# Patient Record
Sex: FEMALE | Race: WHITE | NOT HISPANIC OR LATINO | Employment: OTHER | URBAN - METROPOLITAN AREA
[De-identification: names, ages, dates, MRNs, and addresses within clinical notes are randomized per-mention and may not be internally consistent; named-entity substitution may affect disease eponyms.]

---

## 2017-01-12 ENCOUNTER — OFFICE VISIT (OUTPATIENT)
Dept: BARIATRICS/WEIGHT MGMT | Facility: CLINIC | Age: 57
End: 2017-01-12

## 2017-01-12 VITALS
WEIGHT: 258.5 LBS | BODY MASS INDEX: 45.8 KG/M2 | DIASTOLIC BLOOD PRESSURE: 78 MMHG | TEMPERATURE: 99 F | OXYGEN SATURATION: 98 % | RESPIRATION RATE: 18 BRPM | HEIGHT: 63 IN | HEART RATE: 84 BPM | SYSTOLIC BLOOD PRESSURE: 133 MMHG

## 2017-01-12 DIAGNOSIS — Z98.84 STATUS POST BARIATRIC SURGERY: Primary | ICD-10-CM

## 2017-01-12 DIAGNOSIS — E66.01 MORBID OBESITY DUE TO EXCESS CALORIES (HCC): ICD-10-CM

## 2017-01-12 DIAGNOSIS — R13.10 DYSPHAGIA, UNSPECIFIED TYPE: ICD-10-CM

## 2017-01-12 PROCEDURE — 99024 POSTOP FOLLOW-UP VISIT: CPT | Performed by: PHYSICIAN ASSISTANT

## 2017-01-12 NOTE — PROGRESS NOTES
[facility] BARIATRIC SURGERY  [address]  [facility] [address]  [phone]    Francoise Kamara.  1960    Day Of Visit: 1/12/17  Reason for Visit:  Band removal Follow Up    HPI:    56 y.o. year old female s/p LAGB APS w/HHR 11/2008 by GDW @[facility] followed by AGBR removal by Dr. Urias  on 12/21/16 for chronic dysphagia and port pain. Doing well now. No further issues w/ dysphagia. Tolerating PO w/out issue. Denies fever, nausea, vomiting and abdominal pain. Bowels are moving. Voiding well. No other issues/concerns.      Past Medical History   Diagnosis Date   • Fluid retention in legs    • GERD (gastroesophageal reflux disease)    • Hypertension    • Urinary frequency    • Wears glasses      Past Surgical History   Procedure Laterality Date   • Laparoscopic gastric banding     • Cholecystectomy  2015   • Colonoscopy  2014   • [place] tooth extraction     • Hiatal hernia repair     • Gastric banding removal N/A 12/21/2016     Procedure: GASTRIC BANDING REMOVAL LAPAROSCOPIC;  Surgeon: Guille Urias MD;  Location: [facility];  Service:        Current Outpatient Prescriptions:   •  estrogen, conjugated,-medroxyprogesterone (PREMPRO) 0.45-1.5 MG per tablet, Take 1 tablet by mouth Daily., Disp: , Rfl:   •  hydrochlorothiazide (HYDRODIURIL) 12.5 MG tablet, Take 12.5 mg by mouth Daily., Disp: , Rfl:   •  HYDROcodone-acetaminophen (NORCO) 7.5-325 MG per tablet, Take 1-2 tablets by mouth Every 4 (Four) Hours As Needed for moderate pain (4-6) (Pain)., Disp: 20 tablet, Rfl: 0  •  LISINOPRIL PO, Take 40 mg by mouth Daily., Disp: , Rfl:   •  pantoprazole (PROTONIX) 40 MG EC tablet, Take 40 mg by mouth Daily., Disp: , Rfl:   •  tolterodine LA (DETROL LA) 4 MG 24 hr capsule, Take 4 mg by mouth Daily., Disp: , Rfl:   Allergies   Allergen Reactions   • Penicillins Swelling     Social History     Social History   • Marital status:      Spouse name: N/A   • Number of children: N/A   •  "Years of education: N/A     Occupational History   • Not on file.     Social History Main Topics   • Smoking status: Never Smoker   • Smokeless tobacco: Never Used   • Alcohol use No   • Drug use: No   • Sexual activity: Yes     Partners: Male      Comment: spouse     Other Topics Concern   • Not on file     Social History Narrative     w/2 children. Lives in [place]. 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      Visit Vitals   • /78 (BP Location: Left arm, Patient Position: Sitting, Cuff Size: Large Adult)   • Pulse 84   • Temp 99 °F (37.2 °C) (Temporal Artery )   • Resp 18   • Ht 63\" (160 cm)   • Wt 258 lb 8 oz (117 kg)   • SpO2 98%   • BMI 45.79 kg/m2     General Appearance:  Well nourished.  In no acute distress.  Patient was observed to be obese.  Oral Cavity:   Buccal Mucosa: The buccal mucosa was moist.  Lungs:  Normal breath sounds/voice sounds.  Cardiovascular:   Heart Rate And Rhythm: Heart rate and rhythm normal.   Edema: No calf tenderness.  Abdomen:  Abdomen: incisions healing well.   Auscultation: The bowel sounds were normal.   Palpation: No mass was palpated in the abdomen, Soft, nontender, and nondistended.   Hernia: No hernia was discovered.  Musculoskeletal System:   General/bilateral: No edema present in extremities.  Normal movement of all extremities.  Neurological:  Was alert and oriented.   Gait And Stance: Gait and stance were normal.  Psychiatric:   Attitude: The attitude was cooperative.   Mood: Mood pleasant.  Skin:  The complexion was normal.  The skin moisture was normal and the skin temperature was normal.    Assessment:   2 weeks s/p AGBR;  the patient is doing well.   Dysphagia-resolved  Patient Active Problem List   Diagnosis   • Dysphagia   • Abdominal pain   • Urinary frequency   • Stress incontinence   • Hypertension   • GERD (gastroesophageal reflux disease)       Plan:   Call w/issues and concerns  Follow up 3 months for sleeve consult.  RTC " sooner with problems.    BRANDON Perla

## 2017-01-12 NOTE — MR AVS SNAPSHOT
Francoise Kamara   2017 11:30 AM   Office Visit    Dept Phone:  [phone]   Encounter #:  [id number]    Provider:  BRANDON Perla   Department:  [facility] BARIATRIC SURGERY                Your Full Care Plan              Your Updated Medication List          This list is accurate as of: 17 11:38 AM.  Always use your most recent med list.                estrogen (conjugated)-medroxyprogesterone 0.45-1.5 MG per tablet   Commonly known as:  PREMPRO       hydrochlorothiazide 12.5 MG tablet   Commonly known as:  HYDRODIURIL       HYDROcodone-acetaminophen 7.5-325 MG per tablet   Commonly known as:  NORCO   Take 1-2 tablets by mouth Every 4 (Four) Hours As Needed for moderate pain (4-6) (Pain).       LISINOPRIL PO       pantoprazole 40 MG EC tablet   Commonly known as:  PROTONIX       tolterodine LA 4 MG 24 hr capsule   Commonly known as:  DETROL LA               You Were Diagnosed With        Codes Comments    Status post bariatric surgery    -  Primary ICD-10-CM: Z98.84  ICD-9-CM: V45.86     Morbid obesity due to excess calories     ICD-10-CM: E66.01  ICD-9-CM: 278.01     Dysphagia, unspecified type     ICD-10-CM: R13.10  ICD-9-CM: 787.20       Instructions     None    Patient Instructions History      Upcoming Appointments     Visit Type Date Time Department    POST-OP 2017 11:30 AM [facility] BARIATRIC SURG BERTR[facility]      [employer] Signup     [facility] [employer] Elf allows you to send messages to your doctor, view your test results, renew your prescriptions, schedule appointments, and more. To sign up, go to [employer] and click on the Sign Up Now link in the New User? box. Enter your [employer] Activation Code exactly as it appears below along with the last four digits of your Social Security Number and your Date of Birth () to complete the sign-up process. If you do not sign up before the expiration date, you must request a new code.    [employer]end [employer] Elf  "Activation Code: [email or web address]  Expires: 1/26/2017 11:38 AM    If you have questions, you can email [email or web address] or call [phone] to talk to our MyChart staff. Remember, [employer] is NOT to be used for urgent needs. For medical emergencies, dial 911.               Other Info from Your Visit           Allergies     Penicillins  Swelling      Vital Signs     Blood Pressure Pulse Temperature Respirations Height    133/78 (BP Location: Left arm, Patient Position: Sitting, Cuff Size: Large Adult) 84 99 °F (37.2 °C) (Temporal Artery ) 18 63\" (160 cm)    Weight Oxygen Saturation Body Mass Index Smoking Status       258 lb 8 oz (117 kg) 98% 45.79 kg/m2 Never Smoker       Problems and Diagnoses Noted     Difficulty swallowing    Status following surgery for weight loss    -  Primary    Severe obesity            "

## 2017-01-16 RX ORDER — ERGOCALCIFEROL 1.25 MG/1
50000 CAPSULE ORAL WEEKLY
COMMUNITY
Start: 2016-12-22

## 2017-02-07 ENCOUNTER — DOCUMENTATION (OUTPATIENT)
Dept: BARIATRICS/WEIGHT MGMT | Facility: CLINIC | Age: 57
End: 2017-02-07

## 2017-02-07 DIAGNOSIS — R53.83 FATIGUE, UNSPECIFIED TYPE: Primary | ICD-10-CM

## 2017-02-07 DIAGNOSIS — R06.00 DYSPNEA, UNSPECIFIED TYPE: ICD-10-CM

## 2017-02-07 DIAGNOSIS — R10.13 DYSPEPSIA: ICD-10-CM

## 2017-03-27 ENCOUNTER — OFFICE VISIT (OUTPATIENT)
Dept: OBSTETRICS AND GYNECOLOGY | Facility: CLINIC | Age: 57
End: 2017-03-27

## 2017-03-27 VITALS
BODY MASS INDEX: 46.71 KG/M2 | DIASTOLIC BLOOD PRESSURE: 78 MMHG | SYSTOLIC BLOOD PRESSURE: 120 MMHG | HEIGHT: 63 IN | WEIGHT: 263.6 LBS

## 2017-03-27 DIAGNOSIS — Z79.890 POST-MENOPAUSE ON HRT (HORMONE REPLACEMENT THERAPY): Primary | ICD-10-CM

## 2017-03-27 DIAGNOSIS — Z01.419 ENCOUNTER FOR GYNECOLOGICAL EXAMINATION WITHOUT ABNORMAL FINDING: ICD-10-CM

## 2017-03-27 PROCEDURE — 99396 PREV VISIT EST AGE 40-64: CPT | Performed by: OBSTETRICS & GYNECOLOGY

## 2017-03-27 RX ORDER — METRONIDAZOLE 7.5 MG/G
1 GEL TOPICAL DAILY
COMMUNITY
Start: 2017-03-21 | End: 2017-10-03

## 2017-03-27 RX ORDER — DOXYCYCLINE HYCLATE 100 MG/1
1 CAPSULE ORAL DAILY
COMMUNITY
Start: 2017-03-21 | End: 2017-10-03 | Stop reason: DRUGHIGH

## 2017-03-27 RX ORDER — VALACYCLOVIR HYDROCHLORIDE 1 G/1
1 TABLET, FILM COATED ORAL AS NEEDED
COMMUNITY
Start: 2016-12-23 | End: 2018-03-28

## 2017-03-27 NOTE — PROGRESS NOTES
"   Chief Complaint   Patient presents with   • Gynecologic Exam       Francoise Kamara is a 56 y.o. year old  presenting to be seen for her annual exam.  This patient is menopausal and receives a prescription for Prempro, 0.45/1.5 mg daily from her PCP.  She denies menopausal symptoms.  She denies bowel or urinary symptoms.    SCREENING TESTS    Year 2012   Age                         PAP   Neg.                      HPV high risk                         Mammogram    benign benign                    MIKAYLA score                         Breast MRI                         Lipids                         Vitamin D                         Colonoscopy                         DEXA  Frax (hip/any)                         Ovarian Screen                           Enter the month test was performed.  If month not known, enter \"X'  · Black numbers = normal results  · Red numbers = abnormal results  · Black X = patient reported normal  · Red X - patient reported abnormal      Referred by:    Profession:    Other info:         History   Sexual Activity   • Sexual activity: Yes   • Partners: Male     Comment: spouse    She would not like to be screened for STD's at today's exam.     She exercises regularly: no.  She wears her seat belt: yes.  She has concerns about domestic violence: no.  She has noticed changes in height: no    GYN screening history:  · Last mammogram: was done on approximately 2016 and the result was: Birads I (Normal)..    No Additional Complaints Reported    The following portions of the patient's history were reviewed and updated as appropriate:vital signs and   She  does not have any pertinent problems on file.  She  has a past surgical history that includes Laparoscopic gastric banding; Cholecystectomy (); Colonoscopy (); [place] tooth extraction; Hiatal hernia repair; Gastric Banding Removal " "(N/A, 12/21/2016); and Esophagogastroduodenoscopy (2016).  Her family history includes Cancer in her father; Hypertension in her father, maternal grandfather, mother, and paternal grandmother; Sleep apnea in her mother; Stroke in her maternal grandfather.  She  reports that she has never smoked. She has never used smokeless tobacco. She reports that she does not drink alcohol or use illicit drugs.  Current Outpatient Prescriptions   Medication Sig Dispense Refill   • doxycycline (VIBRAMYCIN) 100 MG capsule Take 1 capsule by mouth Daily.     • estrogen, conjugated,-medroxyprogesterone (PREMPRO) 0.45-1.5 MG per tablet Take 1 tablet by mouth Daily.     • hydrochlorothiazide (HYDRODIURIL) 12.5 MG tablet Take 12.5 mg by mouth Daily.     • LISINOPRIL PO Take 40 mg by mouth Daily.     • metroNIDAZOLE (METROGEL) 0.75 % gel Apply 1 application topically Daily.     • pantoprazole (PROTONIX) 40 MG EC tablet Take 40 mg by mouth Daily.     • tolterodine LA (DETROL LA) 4 MG 24 hr capsule Take 4 mg by mouth Daily.     • valACYclovir (VALTREX) 1000 MG tablet Take 1 tablet by mouth As Needed.     • vitamin D (ERGOCALCIFEROL) 97281 UNITS capsule capsule        No current facility-administered medications for this visit.      She is allergic to penicillins..    Review of Systems  A comprehensive review of systems was negative.  Constitutional: negative for fever, chills, activity change, appetite change, fatigue and unexpected weight change.  Respiratory: negative  Cardiovascular: negative  Gastrointestinal: negative  Genitourinary:negative  Musculoskeletal:negative  Behavioral/Psych: negative       /78  Ht 63\" (160 cm)  Wt 263 lb 9.6 oz (120 kg)  BMI 46.69 kg/m2    Physical Exam    General:  obese - Body mass index is 46.69 kg/(m^2).   Skin:  No suspicious lesions seen   Thyroid: normal to inspection and palpation   Lungs:  clear to auscultation bilaterally   Heart:  regular rate and rhythm, S1, S2 normal, no murmur, click, " rub or gallop   Breasts:  Examined in supine position  Symmetric without masses or skin dimpling  Nipples normal without inversion, lesions or discharge  There are no palpable axillary nodes   Abdomen: soft, non-tender; no masses  no umbilical or inginual hernias are present  no hepato-splenomegaly   Pelvis: Clinical staff was present for exam  External genitalia:  normal appearance of the external genitalia including Bartholin's and [place]'s glands.  Vaginal:  atrophic mucosal changes are present;  Cervix:  normal appearance.  Uterus:  normal size, shape and consistency. anteverted;  Adnexa:  non palpable bilaterally.  Rectal:  anus visually normal appearing. recto-vaginal exam unremarkable and confirms findings;     Lab Review   No data reviewed    Imaging  Mammogram results- benign         ASSESSMENT  Problems Addressed this Visit        Genitourinary    Post-menopause on HRT (hormone replacement therapy) - Primary      Other Visit Diagnoses     Encounter for gynecological examination without abnormal finding              PLAN    Medications prescribed this encounter:    New Medications Ordered This Visit   Medications   • doxycycline (VIBRAMYCIN) 100 MG capsule     Sig: Take 1 capsule by mouth Daily.   • metroNIDAZOLE (METROGEL) 0.75 % gel     Sig: Apply 1 application topically Daily.   • valACYclovir (VALTREX) 1000 MG tablet     Sig: Take 1 tablet by mouth As Needed.   · Pap test done  · Calcium, 600 mg/ Vit. D, 400 IU daily; regular weight-bearing exercise  · Follow up: 12 month(s)  *Please note that portions of this documentation may have been completed with a voice recognition program.  Efforts were made to edit this dictation, but occasional words may have been mistranscribed.       This note was electronically signed.    POLLO Oseguera MD  March 27, 2017  2:15 PM

## 2017-03-28 ENCOUNTER — RESULTS ENCOUNTER (OUTPATIENT)
Dept: BARIATRICS/WEIGHT MGMT | Facility: CLINIC | Age: 57
End: 2017-03-28

## 2017-03-28 DIAGNOSIS — R10.13 DYSPEPSIA: ICD-10-CM

## 2017-03-28 DIAGNOSIS — R06.00 DYSPNEA, UNSPECIFIED TYPE: ICD-10-CM

## 2017-03-28 DIAGNOSIS — R53.83 FATIGUE, UNSPECIFIED TYPE: ICD-10-CM

## 2017-04-25 ENCOUNTER — TRANSCRIBE ORDERS (OUTPATIENT)
Dept: ADMINISTRATIVE | Facility: HOSPITAL | Age: 57
End: 2017-04-25

## 2017-04-25 DIAGNOSIS — Z12.31 VISIT FOR SCREENING MAMMOGRAM: Primary | ICD-10-CM

## 2017-04-26 DIAGNOSIS — R10.13 DYSPEPSIA: Primary | ICD-10-CM

## 2017-04-26 DIAGNOSIS — R06.00 DYSPNEA, UNSPECIFIED TYPE: ICD-10-CM

## 2017-04-26 DIAGNOSIS — R53.83 FATIGUE, UNSPECIFIED TYPE: ICD-10-CM

## 2017-04-26 DIAGNOSIS — R10.13 DYSPEPSIA: ICD-10-CM

## 2017-04-27 ENCOUNTER — OFFICE VISIT (OUTPATIENT)
Dept: BARIATRICS/WEIGHT MGMT | Facility: CLINIC | Age: 57
End: 2017-04-27

## 2017-04-27 ENCOUNTER — DOCUMENTATION (OUTPATIENT)
Dept: BARIATRICS/WEIGHT MGMT | Facility: HOSPITAL | Age: 57
End: 2017-04-27

## 2017-04-27 VITALS
TEMPERATURE: 98 F | BODY MASS INDEX: 45.16 KG/M2 | SYSTOLIC BLOOD PRESSURE: 124 MMHG | HEART RATE: 84 BPM | WEIGHT: 264.5 LBS | DIASTOLIC BLOOD PRESSURE: 78 MMHG | HEIGHT: 64 IN | OXYGEN SATURATION: 99 % | RESPIRATION RATE: 18 BRPM

## 2017-04-27 DIAGNOSIS — I10 ESSENTIAL HYPERTENSION: ICD-10-CM

## 2017-04-27 DIAGNOSIS — E66.01 MORBID OBESITY, UNSPECIFIED OBESITY TYPE (HCC): ICD-10-CM

## 2017-04-27 DIAGNOSIS — K21.9 GASTROESOPHAGEAL REFLUX DISEASE, ESOPHAGITIS PRESENCE NOT SPECIFIED: ICD-10-CM

## 2017-04-27 DIAGNOSIS — R53.83 FATIGUE, UNSPECIFIED TYPE: Primary | ICD-10-CM

## 2017-04-27 PROCEDURE — 99215 OFFICE O/P EST HI 40 MIN: CPT | Performed by: PHYSICIAN ASSISTANT

## 2017-04-27 NOTE — PROGRESS NOTES
[facility] BARIATRIC SURGERY  [address]  [facility] [address]  [phone]      Patient  Name:  Francoise Kamara.  :  1960      Date of Visit: 2017      Chief Complaint:  weight gain; unable to maintain weight loss    History of Present Illness:  Francoise Kamara is a 57 y.o. female who presents today for evaluation, education and consultation regarding weight loss surgery. The patient is interested in sleeve gastrectomy.     Francoise has been overweight for at least 43 years, has been 35 pounds or more overweight for at least 45 years, has been 100 pounds or more overweight for 28 or more years and started dieting at age 14.      s/p LAGB APS w/ HHR 2008 by [facility] @ [facility] s/p uncomplicated LAGBR by [facility] on 16 for band intolerance and chronic dysphagia.  Feels so much better since band removed.  No further issues w/ dysphagia/vomiting.   Only up 6 lbs.  Wishing to pursue revision.    Past Medical History:   Diagnosis Date   • Dyspnea on exertion    • Fatigue    • GERD (gastroesophageal reflux disease)     on daily Protonix, EGD 10/2016    • Hypertension    • Morbid obesity    • OAB (overactive bladder)    • Peripheral edema    • Post-menopause on HRT (hormone replacement therapy)    • Rosacea     on Doxycycline   • Stress incontinence    • Vaginal atrophy    • Vitamin D deficiency    • Wears glasses      Past Surgical History:   Procedure Laterality Date   • CHOLECYSTECTOMY      for stones w/ Dr. Barboza @ [facility]   • COLONOSCOPY     • ENDOSCOPY      by Dr. Urias   • GASTRIC BANDING REMOVAL N/A 2016    Procedure: GASTRIC BANDING REMOVAL LAPAROSCOPIC;  Surgeon: Guille Urias MD;  Location: [facility];  Service:    • LAPAROSCOPIC GASTRIC BANDING      s/p LAGB APS w/ HHR 2008 by GDW @ [facility]     Allergies   Allergen Reactions   • Penicillins Hives and Swelling     Invanz given for AGBR surgery       Current Outpatient Prescriptions:   •   doxycycline (VIBRAMYCIN) 100 MG capsule, Take 1 capsule by mouth Daily., Disp: , Rfl:   •  estrogen, conjugated,-medroxyprogesterone (PREMPRO) 0.45-1.5 MG per tablet, Take 1 tablet by mouth Daily., Disp: , Rfl:   •  hydrochlorothiazide (HYDRODIURIL) 12.5 MG tablet, Take 12.5 mg by mouth Daily., Disp: , Rfl:   •  LISINOPRIL PO, Take 40 mg by mouth Daily., Disp: , Rfl:   •  metroNIDAZOLE (METROGEL) 0.75 % gel, Apply 1 application topically Daily., Disp: , Rfl:   •  pantoprazole (PROTONIX) 40 MG EC tablet, Take 40 mg by mouth Daily., Disp: , Rfl:   •  tolterodine LA (DETROL LA) 4 MG 24 hr capsule, Take 4 mg by mouth Daily., Disp: , Rfl:   •  valACYclovir (VALTREX) 1000 MG tablet, Take 1 tablet by mouth As Needed., Disp: , Rfl:   •  vitamin D (ERGOCALCIFEROL) 51981 UNITS capsule capsule, , Disp: , Rfl:   Social History     Social History   • Marital status:      Spouse name: N/A   • Number of children: N/A   • Years of education: N/A     Occupational History   • Not on file.     Social History Main Topics   • Smoking status: Never Smoker   • Smokeless tobacco: Never Used   • Alcohol use No   • Drug use: No   • Sexual activity: Yes     Partners: Male      Comment: spouse     Other Topics Concern   • Not on file     Social History Narrative     w/2 children.  Lives in [place].  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      Family History   Problem Relation Age of Onset   • Hypertension Mother    • Sleep apnea Mother    • Hypertension Father    • Lung cancer Father    • Hypertension Maternal Grandfather    • Stroke Maternal Grandfather    • Hypertension Paternal Grandmother          Review of Systems:  Constitutional:  The patient reports fatigue, weight gain and denies fevers and chills.  Cardiovascular:  The patient reports HTN and denies heart disease and DVT.  Respiratory:  The patient denies asthma, apnea and PE.  Gastrointestinal:  The patient reports heartburn and denies  pancreatitis and liver disease.  Genitourinary:  The patient denies renal insufficiency.    Musculoskeletal:  The patient reports joint pain and denies autoimmune disease.  Neurological:  The patient denies seizure and stroke.  Psychiatric:  The patient denies anxiety, depression and bipolar disorder.  Endocrine:  The patient denies diabetes and thyroid disease.  Hematologic:  The patient denies anemia and bleeding disorder.  Skin:  The patient denies MRSA.    Physical Exam:  Vital Signs:  Weight: 264 lb 8 oz (120 kg)   Body mass index is 46.12 kg/(m^2).  Temp: 98 °F (36.7 °C)   Heart Rate: 84   BP: 124/78     Physical Exam   Constitutional: She is oriented to person, place, and time. She appears well-developed and well-nourished.   HENT:   Head: Normocephalic and atraumatic.   Eyes: Conjunctivae are normal. No scleral icterus.   Neck: Neck supple. No thyromegaly present.   Cardiovascular: Normal rate and regular rhythm.    No murmur heard.  Pulmonary/Chest: Effort normal and breath sounds normal. No respiratory distress. She has no wheezes. She has no rales.   Abdominal: Soft. Bowel sounds are normal. She exhibits no distension and no mass. There is no tenderness. No hernia.   Scars:  Lap kee, lapband, prior LLQ port   Musculoskeletal: Normal range of motion. She exhibits no edema.   Neurological: She is alert and oriented to person, place, and time. Gait normal.   Skin: Skin is warm and dry. No rash noted.   Psychiatric: She has a normal mood and affect. Judgment normal.   Vitals reviewed.         Patient Active Problem List   Diagnosis   • Urinary frequency   • Stress incontinence   • Hypertension   • GERD (gastroesophageal reflux disease)   • Vaginal atrophy   • Rosacea   • Post-menopause on HRT (hormone replacement therapy)   • OAB (overactive bladder)   • Vitamin D deficiency   • Peripheral edema   • Morbid obesity   • Fatigue   • Dyspnea on exertion       Assessment:    Francoise Kamara is a 57 y.o. year old  female with medically complicated obesity pursuing sleeve gastrectomy.    Weight loss surgery is deemed medically necessary given the following obesity related comorbidities including hypertension and GERD with current Weight: 264 lb 8 oz (120 kg) and Body mass index is 46.12 kg/(m^2)..    She is a good candidate for weight loss surgery pending further evaluation.    Plan:  The consultation plan and program requirements were reviewed with the patient.  The patient has been advised that a letter of medical support must be obtained from her primary care physician or referring provider. A psychological evaluation will be arranged.  A nutritional evaluation will be performed.  The patient was advised to start a high protein and low carbohydrate diet.  Necessary lifestyle modifications were discussed.  Instructions on how to access DANIELLE was given to the patient.  DANIELLE is an internet based educational video that explains the surgical procedure chosen and answers basic questions regarding that procedure.     Preoperative testing will include: CBC, CMP, Fasting Lipids, TSH, H.Pylori, CXR and EKG     Preop clearances required prior to surgery will include Cardiac.      Patient understands that bariatric surgery is not cosmetic surgery but rather a tool to help make a lifelong commitment to lifestyle changes including diet, exercise, behavior modifications, and healthy habits.      BRANDON Daniel

## 2017-04-28 NOTE — PROGRESS NOTES
Weight Loss Surgery  Presurgical Nutrition Assessment     Francoise Kamara  04/27/2017  [id number]  [id number]  1960  female    Surgery desired: Sleeve Gastrectomy    Vital Signs:  Weight: 264 lb 8 oz (120 kg)   Body mass index is 46.12 kg/(m^2).  Past Medical History:   Diagnosis Date   • Dyspnea on exertion    • Fatigue    • GERD (gastroesophageal reflux disease)     on daily Protonix, EGD 10/2016    • Hypertension    • Morbid obesity    • OAB (overactive bladder)    • Peripheral edema    • Post-menopause on HRT (hormone replacement therapy)    • Rosacea     on Doxycycline   • Stress incontinence    • Vaginal atrophy    • Vitamin D deficiency    • Wears glasses      Past Surgical History:   Procedure Laterality Date   • CHOLECYSTECTOMY  2015    for stones w/ Dr. Barboza @ [facility]   • COLONOSCOPY  2014   • ENDOSCOPY  2016    by Dr. Urias   • GASTRIC BANDING REMOVAL N/A 12/21/2016    Procedure: GASTRIC BANDING REMOVAL LAPAROSCOPIC;  Surgeon: Guille Urias MD;  Location: [facility];  Service:    • LAPAROSCOPIC GASTRIC BANDING  2008    s/p LAGB APS w/ HHR 11/2008 by GDW @ [facility]     Allergies   Allergen Reactions   • Penicillins Hives and Swelling     Invanz given for AGBR surgery       Current Outpatient Prescriptions:   •  doxycycline (VIBRAMYCIN) 100 MG capsule, Take 1 capsule by mouth Daily., Disp: , Rfl:   •  estrogen, conjugated,-medroxyprogesterone (PREMPRO) 0.45-1.5 MG per tablet, Take 1 tablet by mouth Daily., Disp: , Rfl:   •  hydrochlorothiazide (HYDRODIURIL) 12.5 MG tablet, Take 12.5 mg by mouth Daily., Disp: , Rfl:   •  LISINOPRIL PO, Take 40 mg by mouth Daily., Disp: , Rfl:   •  metroNIDAZOLE (METROGEL) 0.75 % gel, Apply 1 application topically Daily., Disp: , Rfl:   •  pantoprazole (PROTONIX) 40 MG EC tablet, Take 40 mg by mouth Daily., Disp: , Rfl:   •  tolterodine LA (DETROL LA) 4 MG 24 hr capsule, Take 4 mg by mouth Daily., Disp: , Rfl:   •  valACYclovir (VALTREX) 1000 MG tablet, Take 1 tablet by  "mouth As Needed., Disp: , Rfl:   •  vitamin D (ERGOCALCIFEROL) 50254 UNITS capsule capsule, , Disp: , Rfl:       Nutrition Assessment    Estimated energy needs: 2000    Estimated calories for weight loss:1500    IBW (Pounds):  164      Excess body weight (Pounds):100       Nutrition Recall  24 Hour recall: (B) (L) (D) -  Reviewed and discussed with patient       in general, an \"unhealthy\" diet      Exercise  rarely      Education    Provided manual:    Sleeve Gastrectomy    Recommend that team proceed with surgery and follow per protocol.      Nutrition Goals   Dietary Guidelines per manual  Protein goal:  grams per day   Eliminate soda    Exercise Goals  Add 15-30 minutes of activity per day as tolerated          Viky Rosado RD  04/27/2017  10:04 AM  "

## 2017-05-01 LAB
ALBUMIN SERPL-MCNC: 4 G/DL (ref 3.2–4.8)
ALBUMIN/GLOB SERPL: 1.5 G/DL (ref 1.5–2.5)
ALP SERPL-CCNC: 84 U/L (ref 25–100)
ALT SERPL-CCNC: 16 U/L (ref 7–40)
AST SERPL-CCNC: 18 U/L (ref 0–33)
BILIRUB SERPL-MCNC: 0.4 MG/DL (ref 0.3–1.2)
BUN SERPL-MCNC: 15 MG/DL (ref 9–23)
BUN/CREAT SERPL: 18.8 (ref 7–25)
CALCIUM SERPL-MCNC: 9.4 MG/DL (ref 8.7–10.4)
CHLORIDE SERPL-SCNC: 108 MMOL/L (ref 99–109)
CHOLEST SERPL-MCNC: 190 MG/DL (ref 0–200)
CO2 SERPL-SCNC: 26 MMOL/L (ref 20–31)
CREAT SERPL-MCNC: 0.8 MG/DL (ref 0.6–1.3)
ERYTHROCYTE [DISTWIDTH] IN BLOOD BY AUTOMATED COUNT: 14.2 % (ref 11.3–14.5)
GLOBULIN SER CALC-MCNC: 2.7 GM/DL
GLUCOSE SERPL-MCNC: 96 MG/DL (ref 70–100)
H PYLORI IGA SER-ACNC: <9 UNITS (ref 0–8.9)
H PYLORI IGG SER IA-ACNC: <0.9 U/ML (ref 0–0.8)
H PYLORI IGM SER-ACNC: <9 UNITS (ref 0–8.9)
HCT VFR BLD AUTO: 40.7 % (ref 34.5–44)
HDLC SERPL-MCNC: 72 MG/DL (ref 40–60)
HGB BLD-MCNC: 12.5 G/DL (ref 11.5–15.5)
LDLC SERPL CALC-MCNC: 101 MG/DL (ref 0–100)
MCH RBC QN AUTO: 28 PG (ref 27–31)
MCHC RBC AUTO-ENTMCNC: 30.7 G/DL (ref 32–36)
MCV RBC AUTO: 91.3 FL (ref 80–99)
PLATELET # BLD AUTO: 257 10*3/MM3 (ref 150–450)
POTASSIUM SERPL-SCNC: 4 MMOL/L (ref 3.5–5.5)
PROT SERPL-MCNC: 6.7 G/DL (ref 5.7–8.2)
RBC # BLD AUTO: 4.46 10*6/MM3 (ref 3.89–5.14)
SODIUM SERPL-SCNC: 141 MMOL/L (ref 132–146)
TRIGL SERPL-MCNC: 83 MG/DL (ref 0–150)
TSH SERPL DL<=0.005 MIU/L-ACNC: 2.46 MIU/ML (ref 0.35–5.35)
VLDLC SERPL CALC-MCNC: 16.6 MG/DL
WBC # BLD AUTO: 8.63 10*3/MM3 (ref 3.5–10.8)

## 2017-05-15 ENCOUNTER — HOSPITAL ENCOUNTER (OUTPATIENT)
Dept: MAMMOGRAPHY | Facility: HOSPITAL | Age: 57
Discharge: HOME | End: 2017-05-15
Attending: OBSTETRICS & GYNECOLOGY | Admitting: OBSTETRICS & GYNECOLOGY

## 2017-05-15 DIAGNOSIS — Z12.31 VISIT FOR SCREENING MAMMOGRAM: ICD-10-CM

## 2017-05-15 PROCEDURE — 77067 SCR MAMMO BI INCL CAD: CPT | Performed by: RADIOLOGY

## 2017-05-15 PROCEDURE — 77063 BREAST TOMOSYNTHESIS BI: CPT | Performed by: RADIOLOGY

## 2017-05-15 PROCEDURE — G0202 SCR MAMMO BI INCL CAD: HCPCS

## 2017-05-15 PROCEDURE — 77063 BREAST TOMOSYNTHESIS BI: CPT

## 2017-06-28 ENCOUNTER — CONSULT (OUTPATIENT)
Dept: CARDIOLOGY | Facility: CLINIC | Age: 57
End: 2017-06-28

## 2017-06-28 VITALS
DIASTOLIC BLOOD PRESSURE: 76 MMHG | HEIGHT: 63 IN | SYSTOLIC BLOOD PRESSURE: 126 MMHG | HEART RATE: 76 BPM | WEIGHT: 266 LBS | BODY MASS INDEX: 47.13 KG/M2

## 2017-06-28 DIAGNOSIS — Z01.810 PREOP CARDIOVASCULAR EXAM: Primary | ICD-10-CM

## 2017-06-28 DIAGNOSIS — E66.01 MORBID OBESITY, UNSPECIFIED OBESITY TYPE (HCC): ICD-10-CM

## 2017-06-28 DIAGNOSIS — R06.09 DOE (DYSPNEA ON EXERTION): ICD-10-CM

## 2017-06-28 DIAGNOSIS — I10 ESSENTIAL HYPERTENSION: ICD-10-CM

## 2017-06-28 PROCEDURE — 99242 OFF/OP CONSLTJ NEW/EST SF 20: CPT | Performed by: INTERNAL MEDICINE

## 2017-06-28 PROCEDURE — 93000 ELECTROCARDIOGRAM COMPLETE: CPT | Performed by: INTERNAL MEDICINE

## 2017-06-28 NOTE — PROGRESS NOTES
[facility]  Consultation H&P  Francoise Kamara  1960  [phone]    VISIT DATE:  06/28/2017    PCP: Ezekiel Ba MD  [address]  [place] [id number]    IDENTIFICATION: A 57 y.o. female from [facility]    CC:  Chief Complaint   Patient presents with   • Surgical Clearance     For Gastric sleeve.   • Hypertension       PROBLEM LIST:  1. HTN, on lisinopril, HCTZ  2. Morbid obesity  1. S/p gastric band 11/2008, then removal 12/2016 due to significant dysphagia  3. GERD  4. Overactive bladder and stress incontinence   5. Postmenopausal HRT  6. Surgical history  1. Laparoscopic gastric banding, 2008  2. Colonoscopy 2014  3. Cholecystectomy 2015  4. Endoscopy 2016  5. Gastric banding removal 2016    Allergies  Allergies   Allergen Reactions   • Penicillins Hives and Swelling     Invanz given for AGBR surgery       Current Medications    Current Outpatient Prescriptions:   •  doxycycline (VIBRAMYCIN) 100 MG capsule, Take 1 capsule by mouth Daily., Disp: , Rfl:   •  estrogen, conjugated,-medroxyprogesterone (PREMPRO) 0.45-1.5 MG per tablet, Take 1 tablet by mouth Daily., Disp: , Rfl:   •  hydrochlorothiazide (HYDRODIURIL) 12.5 MG tablet, Take 12.5 mg by mouth Daily., Disp: , Rfl:   •  LISINOPRIL PO, Take 40 mg by mouth Daily., Disp: , Rfl:   •  metroNIDAZOLE (METROGEL) 0.75 % gel, Apply 1 application topically Daily., Disp: , Rfl:   •  pantoprazole (PROTONIX) 40 MG EC tablet, Take 40 mg by mouth Daily., Disp: , Rfl:   •  tolterodine LA (DETROL LA) 4 MG 24 hr capsule, Take 4 mg by mouth Daily., Disp: , Rfl:   •  valACYclovir (VALTREX) 1000 MG tablet, Take 1 tablet by mouth As Needed., Disp: , Rfl:   •  vitamin D (ERGOCALCIFEROL) 27170 UNITS capsule capsule, 50,000 Units Every 7 (Seven) Days., Disp: , Rfl:      History of Present Illness   HPI  This is a 57-year-old  female with the above mentioned PMH who presents for consult for cardiac clearance for bariatric surgery with  the gastric sleeve procedure with Dr. Urias.  She previously had gastric banding that in 2008 and this was subsequently reversed in December 2016 due to complications with significant dysphagia.        Pt denies any chest pain,  orthopnea, PND, palpitations, lower extremity edema, or claudication. Pt denies history of CHF, DVT, PE, MI, CVA, TIA, or rheumatic fever.   She does attempt to exercise with walking at home and getting thousand steps each day.  She has noted some shortness of breath.    ROS  Review of Systems   Constitution: Negative for chills, fever, weakness, malaise/fatigue, night sweats, weight gain and weight loss.   HENT: Negative for headaches, hearing loss and nosebleeds.    Eyes: Negative for blurred vision, vision loss in left eye, vision loss in right eye, visual disturbance and visual halos.   Cardiovascular: Positive for dyspnea on exertion. Negative for chest pain, claudication, cyanosis, irregular heartbeat, leg swelling, near-syncope, orthopnea, palpitations, paroxysmal nocturnal dyspnea and syncope.   Respiratory: Negative for cough, hemoptysis, shortness of breath, snoring and wheezing.    Endocrine: Negative for cold intolerance, heat intolerance, polydipsia, polyphagia and polyuria.   Hematologic/Lymphatic: Negative for adenopathy and bleeding problem. Does not bruise/bleed easily.   Skin: Negative for dry skin, poor wound healing and rash.   Musculoskeletal: Negative for falls, joint pain, joint swelling, muscle cramps, muscle weakness, myalgias and neck pain.   Gastrointestinal: Negative for bloating, abdominal pain, change in bowel habit, bowel incontinence, constipation, diarrhea, dysphagia, excessive appetite, heartburn, hematemesis, hematochezia, jaundice, melena, nausea and vomiting.   Genitourinary: Negative for bladder incontinence, dysuria, flank pain, hematuria, hesitancy and nocturia.   Neurological: Negative for aphonia, excessive daytime sleepiness, dizziness, focal  "weakness, light-headedness, loss of balance, seizures, sensory change, tremors and vertigo.   Psychiatric/Behavioral: Negative for altered mental status, depression, memory loss, substance abuse and suicidal ideas. The patient is not nervous/anxious.    All other systems reviewed and are negative.      SOCIAL HX  Social History     Social History   • Marital status:      Spouse name: N/A   • Number of children: N/A   • Years of education: N/A     Occupational History   • Not on file.     Social History Main Topics   • Smoking status: Never Smoker   • Smokeless tobacco: Never Used   • Alcohol use No   • Drug use: No   • Sexual activity: Yes     Partners: Male      Comment: spouse     Other Topics Concern   • Not on file     Social History Narrative     w/2 children.  Lives in [place].  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        FAMILY HX  Family History   Problem Relation Age of Onset   • Hypertension Mother    • Sleep apnea Mother    • Hypertension Father    • Lung cancer Father    • Hypertension Maternal Grandfather    • Stroke Maternal Grandfather    • Hypertension Paternal Grandmother    • Breast cancer Paternal Aunt 45   • No Known Problems Brother    • BRCA 1/2 Neg Hx    • Ovarian cancer Neg Hx        Vitals:    06/28/17 0928   BP: 126/76   BP Location: Right arm   Patient Position: Sitting   Pulse: 76   Weight: 266 lb (121 kg)   Height: 63\" (160 cm)       PHYSICAL EXAMINATION:  Physical Exam   Constitutional: She is oriented to person, place, and time. She appears well-developed and well-nourished. No distress.   HENT:   Head: Normocephalic and atraumatic.   Nose: Nose normal.   Mouth/Throat: Uvula is midline, oropharynx is clear and moist and mucous membranes are normal.   Eyes: Conjunctivae and EOM are normal. Pupils are equal, round, and reactive to light. No scleral icterus.   Neck: Normal range of motion. Neck supple. No hepatojugular reflux and no JVD present. " Carotid bruit is not present. No tracheal deviation present. No thyromegaly present.   Cardiovascular: Normal rate, regular rhythm, S1 normal, S2 normal, intact distal pulses and normal pulses.  PMI is not displaced.  Exam reveals no gallop, no distant heart sounds, no friction rub, no midsystolic click and no opening snap.    No murmur heard.  Pulses:       Radial pulses are 2+ on the right side, and 2+ on the left side.        Dorsalis pedis pulses are 2+ on the right side, and 2+ on the left side.        Posterior tibial pulses are 2+ on the right side, and 2+ on the left side.   Pulmonary/Chest: Effort normal and breath sounds normal. She has no wheezes. She has no rhonchi. She has no rales.   Abdominal: Soft. Bowel sounds are normal. She exhibits no mass. There is no tenderness. There is no guarding.   Musculoskeletal: She exhibits no edema or tenderness.   Lymphadenopathy:     She has no cervical adenopathy.   Neurological: She is alert and oriented to person, place, and time.   Skin: Skin is warm, dry and intact. No rash noted. No cyanosis or erythema. Nails show no clubbing.   Psychiatric: She has a normal mood and affect. Her behavior is normal.   Nursing note and vitals reviewed.      Diagnostic Data:    ECG 12 Lead  Date/Time: 6/28/2017 10:01 AM  Performed by: PAULINE RAMOS  Authorized by: PAULINE RAMOS   Rhythm: sinus rhythm  Clinical impression: non-specific ECG          Lab Results   Component Value Date    CHLPL 190 04/27/2017    TRIG 83 04/27/2017    HDL 72 (H) 04/27/2017         LDL  101    Lab Results   Component Value Date    BUN 15 04/27/2017    CREATININE 0.80 04/27/2017     04/27/2017    K 4.0 04/27/2017     04/27/2017    CO2 26.0 04/27/2017     Lab Results   Component Value Date    HGBA1C 5.40 12/16/2016     Lab Results   Component Value Date    WBC 8.63 04/27/2017    HGB 12.5 04/27/2017    HCT 40.7 04/27/2017     04/27/2017       ASSESSMENT:   Diagnosis Plan   1. Preop  cardiovascular exam     2. Essential hypertension     3. Morbid obesity, unspecified obesity type     4. BEY (dyspnea on exertion)         PLAN:  1.  Dyspnea will document echocardiogram to assess filling pressures systolic diastolic function and pulmonary pressures  2.    3. Hypertension controlled on current regimen    Scribed for Boyd Dumont MD by Sangeeta Qiu PA-C. 6/28/2017  9:42 AM  I, Boyd Dumont MD, personally performed the services described in this documentation as scribed by the above named individual in my presence, and it is both accurate and complete.  6/28/2017  10:01 AM    Boyd Dumont MD, FACC

## 2017-07-12 ENCOUNTER — HOSPITAL ENCOUNTER (OUTPATIENT)
Dept: CARDIOLOGY | Facility: HOSPITAL | Age: 57
Discharge: HOME | End: 2017-07-12
Attending: INTERNAL MEDICINE | Admitting: INTERNAL MEDICINE

## 2017-07-12 VITALS — WEIGHT: 266 LBS | HEIGHT: 63 IN | BODY MASS INDEX: 47.13 KG/M2

## 2017-07-12 DIAGNOSIS — I10 ESSENTIAL HYPERTENSION: ICD-10-CM

## 2017-07-12 DIAGNOSIS — R06.09 DOE (DYSPNEA ON EXERTION): ICD-10-CM

## 2017-07-12 DIAGNOSIS — E66.01 MORBID OBESITY, UNSPECIFIED OBESITY TYPE (HCC): ICD-10-CM

## 2017-07-12 LAB
BH CV ECHO MEAS - AO ROOT AREA: 6.6 CM^2
BH CV ECHO MEAS - AO ROOT DIAM: 2.9 CM
BH CV ECHO MEAS - CONTRAST EF (2CH): 62.8 ML/M^2
BH CV ECHO MEAS - CONTRAST EF 4CH: 60 ML/M^2
BH CV ECHO MEAS - EDV(CUBED): 99.9 ML
BH CV ECHO MEAS - EDV(MOD-SP2): 78 ML
BH CV ECHO MEAS - EDV(MOD-SP4): 85 ML
BH CV ECHO MEAS - EDV(TEICH): 99.3 ML
BH CV ECHO MEAS - EF(CUBED): 75.3 %
BH CV ECHO MEAS - EF(MOD-SP2): 62.8 %
BH CV ECHO MEAS - EF(MOD-SP4): 60 %
BH CV ECHO MEAS - EF(TEICH): 67.3 %
BH CV ECHO MEAS - ESV(CUBED): 24.6 ML
BH CV ECHO MEAS - ESV(MOD-SP2): 29 ML
BH CV ECHO MEAS - ESV(MOD-SP4): 34 ML
BH CV ECHO MEAS - ESV(TEICH): 32.5 ML
BH CV ECHO MEAS - FS: 37.3 %
BH CV ECHO MEAS - IVS/LVPW: 0.96
BH CV ECHO MEAS - IVSD: 0.82 CM
BH CV ECHO MEAS - LA DIMENSION: 3.3 CM
BH CV ECHO MEAS - LA/AO: 1.1
BH CV ECHO MEAS - LAT PEAK E' VEL: 8.9 CM/SEC
BH CV ECHO MEAS - LV MASS(C)D: 127.4 GRAMS
BH CV ECHO MEAS - LVIDD: 4.6 CM
BH CV ECHO MEAS - LVIDS: 2.9 CM
BH CV ECHO MEAS - LVLD AP2: 6.9 CM
BH CV ECHO MEAS - LVLD AP4: 7.1 CM
BH CV ECHO MEAS - LVLS AP2: 5.3 CM
BH CV ECHO MEAS - LVLS AP4: 5.3 CM
BH CV ECHO MEAS - LVPWD: 0.86 CM
BH CV ECHO MEAS - MED PEAK E' VEL: 10.4 CM/SEC
BH CV ECHO MEAS - MV A MAX VEL: 95.6 CM/SEC
BH CV ECHO MEAS - MV DEC TIME: 0.14 SEC
BH CV ECHO MEAS - MV E MAX VEL: 122 CM/SEC
BH CV ECHO MEAS - MV E/A: 1.3
BH CV ECHO MEAS - PA ACC SLOPE: 363 CM/SEC^2
BH CV ECHO MEAS - PA ACC TIME: 0.18 SEC
BH CV ECHO MEAS - PA PR(ACCEL): -2 MMHG
BH CV ECHO MEAS - RAP SYSTOLE: 3 MMHG
BH CV ECHO MEAS - RVDD: 2.9 CM
BH CV ECHO MEAS - RVSP: 21.7 MMHG
BH CV ECHO MEAS - SV(CUBED): 75.3 ML
BH CV ECHO MEAS - SV(MOD-SP2): 49 ML
BH CV ECHO MEAS - SV(MOD-SP4): 51 ML
BH CV ECHO MEAS - SV(TEICH): 66.8 ML
BH CV ECHO MEAS - TAPSE (>1.6): 2.2 CM2
BH CV ECHO MEAS - TR MAX VEL: 216 CM/SEC
BH CV VAS BP LEFT ARM: NORMAL MMHG
BH CV XLRA - RV BASE: 3.9 CM
BH CV XLRA - RV LENGTH: 6.7 CM
BH CV XLRA - RV MID: 3.1 CM
BH CV XLRA - TDI S': 13.5 CM/SEC
E/E' RATIO: 12.7
LEFT ATRIUM VOLUME: 52 CM3
LV EF 2D ECHO EST: 60 %

## 2017-07-12 PROCEDURE — 93306 TTE W/DOPPLER COMPLETE: CPT

## 2017-07-12 PROCEDURE — 93306 TTE W/DOPPLER COMPLETE: CPT | Performed by: INTERNAL MEDICINE

## 2017-09-19 ENCOUNTER — TELEPHONE (OUTPATIENT)
Dept: CARDIOLOGY | Facility: CLINIC | Age: 57
End: 2017-09-19

## 2017-09-19 NOTE — TELEPHONE ENCOUNTER
----- Message from BRANDON Hein sent at 9/19/2017  1:45 PM EDT -----  Regarding: FW: Cardiac Clearance  Contact: [phone]  Bhanu,  I reviewed Dr. Dumont's reading of this pt's echo and it was wnl, so she can be cleared for gastric sleeve with Dr. Urias. I was unable to addend the note since Dr. Dumont has signed it. Can you send a note to Dr. Urias' office saying this pt is cleared? Thanks!    ----- Message -----     From: Dewayne Proctor Rep     Sent: 9/19/2017   1:00 PM       To: BRANDON Hein  Subject: Cardiac Clearance                                Hi Sangeeta,    The pt. Is needing cardiac clearance for her bariatric surgery. She stated that she had called the office and they explained to her it was in the letters. However I am only seeing the original consult note with Dr. Dumont so I just wanted to check and see if she needs to follow up with your office or if we can get a clearance letter sent over to us.     Thank you,  Tasia Bradley

## 2017-09-20 DIAGNOSIS — R10.13 DYSPEPSIA: Primary | ICD-10-CM

## 2017-09-20 DIAGNOSIS — R53.83 FATIGUE, UNSPECIFIED TYPE: ICD-10-CM

## 2017-09-20 DIAGNOSIS — R10.13 DYSPEPSIA: ICD-10-CM

## 2017-10-03 ENCOUNTER — CONSULT (OUTPATIENT)
Dept: BARIATRICS/WEIGHT MGMT | Facility: CLINIC | Age: 57
End: 2017-10-03

## 2017-10-03 VITALS
BODY MASS INDEX: 46.51 KG/M2 | HEIGHT: 63 IN | HEART RATE: 70 BPM | SYSTOLIC BLOOD PRESSURE: 121 MMHG | DIASTOLIC BLOOD PRESSURE: 86 MMHG | TEMPERATURE: 97.9 F | OXYGEN SATURATION: 99 % | WEIGHT: 262.5 LBS | RESPIRATION RATE: 18 BRPM

## 2017-10-03 DIAGNOSIS — E66.01 OBESITY, CLASS III, BMI 40-49.9 (MORBID OBESITY) (HCC): Primary | ICD-10-CM

## 2017-10-03 PROCEDURE — 99214 OFFICE O/P EST MOD 30 MIN: CPT | Performed by: SURGERY

## 2017-10-03 RX ORDER — IVERMECTIN 10 MG/G
1 CREAM TOPICAL DAILY
COMMUNITY
Start: 2017-09-27 | End: 2019-11-18

## 2017-10-03 RX ORDER — PANTOPRAZOLE SODIUM 40 MG/10ML
40 INJECTION, POWDER, LYOPHILIZED, FOR SOLUTION INTRAVENOUS ONCE
Status: CANCELLED | OUTPATIENT
Start: 2017-10-03 | End: 2017-10-03

## 2017-10-03 RX ORDER — ACETAMINOPHEN 325 MG/1
650 TABLET ORAL ONCE
Status: CANCELLED | OUTPATIENT
Start: 2017-10-03 | End: 2017-10-03

## 2017-10-03 RX ORDER — SCOLOPAMINE TRANSDERMAL SYSTEM 1 MG/1
1 PATCH, EXTENDED RELEASE TRANSDERMAL ONCE
Status: CANCELLED | OUTPATIENT
Start: 2017-10-03 | End: 2017-10-03

## 2017-10-03 RX ORDER — CHLORHEXIDINE GLUCONATE 0.12 MG/ML
15 RINSE ORAL ONCE
Status: CANCELLED | OUTPATIENT
Start: 2017-10-03

## 2017-10-03 RX ORDER — DOXYCYCLINE HYCLATE 50 MG/1
50 CAPSULE ORAL DAILY
COMMUNITY
End: 2018-10-29

## 2017-10-03 RX ORDER — SODIUM CHLORIDE 0.9 % (FLUSH) 0.9 %
1-10 SYRINGE (ML) INJECTION AS NEEDED
Status: CANCELLED | OUTPATIENT
Start: 2017-10-03

## 2017-10-03 RX ORDER — SODIUM CHLORIDE, SODIUM LACTATE, POTASSIUM CHLORIDE, CALCIUM CHLORIDE 600; 310; 30; 20 MG/100ML; MG/100ML; MG/100ML; MG/100ML
150 INJECTION, SOLUTION INTRAVENOUS CONTINUOUS
Status: CANCELLED | OUTPATIENT
Start: 2017-10-03

## 2017-10-03 NOTE — PROGRESS NOTES
[facility] BARIATRIC SURGERY  [address]  [facility] [id number]  [phone]      Patient  Name:  Francoise Kamara.  :  1960      Date of Visit: 10/3/2017      Chief Complaint:  weight gain; unable to maintain weight loss.  Preop LSG    History of Present Illness:  Francoise Kamara is a 57 y.o. female who presents today for evaluation, education and consultation regarding weight loss surgery. The patient is interested in sleeve gastrectomy.     Francoise has been overweight for at least 43 years, has been 35 pounds or more overweight for at least 45 years, has been 100 pounds or more overweight for 28 or more years and started dieting at age 14.      s/p LAGB APS w/ HHR 2008 by [facility]DW @ [facility] s/p uncomplicated LAGBR by GDW on 16 for band intolerance and chronic dysphagia.  Feels so much better since band removed.  No further issues w/ dysphagia/vomiting.   Only up 6 lbs.  Wishing to pursue revision.    The patient returns for final visit prior to LSG.  Original intake evaluation LC 17 reviewed.  The patient has had issues with morbid obesity for years and only temporary success with non-surgical and surgical methods of weight loss.  Pt is aware that LSG after prev AGB/AGBR carries increased risk over primary LSG alone, hawa wrt bleeding, infection, leak, prolonged OR times with incr risk pulm complications and VTE, etc and still wishes to proceed.  The patient is seeking LSG to help with the morbid obesity related conditions of  HTN, fatigue, elevated LDL, decreased HDL, edema, stress urinary incontinence, Vit D Defic, JAMISON.    Past Medical History:   Diagnosis Date   • Cardiomegaly     stable on CXR   • Dyspnea on exertion    • Elevated LDL cholesterol level    • Fatigue    • GERD (gastroesophageal reflux disease)     on daily Protonix, EGD 10/2016. Sx's resolved since AGBR, even if doesn't take her PPI. serum h. pyl neg.  EGD GDW 10/16 prior to AGBR  36 cm   •  Hypertension    • Hypoalbuminemia     admits to chronic undereating   • Low HDL (under 40)    • Microcytic red blood cells     H/H 12.6/38.4   • Morbid obesity    • OAB (overactive bladder)    • Peripheral edema    • Post-menopause on HRT (hormone replacement therapy)    • Rosacea     on Doxycycline   • Stress incontinence    • Vaginal atrophy    • Vitamin D deficiency    • Wears glasses      Past Surgical History:   Procedure Laterality Date   • CHOLECYSTECTOMY  2015    for stones w/ Dr. Barboza @ [facility]   • COLONOSCOPY  2014   • ENDOSCOPY  2016    by Dr. Urias   • GASTRIC BANDING REMOVAL N/A 12/21/2016    Procedure: GASTRIC BANDING REMOVAL LAPAROSCOPIC;  Surgeon: Guille Urias MD;  Location: [facility];  Service:    • LAPAROSCOPIC GASTRIC BANDING  2008    s/p LAGB APS w/ HHR 11/2008 by GDW @ [facility].  full dissection hiatus, single stitch ant repair     Allergies   Allergen Reactions   • Penicillins Hives and Swelling     Invanz given for AGBR surgery       Current Outpatient Prescriptions:   •  doxycycline (VIBRAMYCIN) 50 MG capsule, Take 50 mg by mouth 2 (Two) Times a Day., Disp: , Rfl:   •  hydrochlorothiazide (HYDRODIURIL) 12.5 MG tablet, Take 12.5 mg by mouth Daily., Disp: , Rfl:   •  LISINOPRIL PO, Take 40 mg by mouth Daily., Disp: , Rfl:   •  pantoprazole (PROTONIX) 40 MG EC tablet, Take 40 mg by mouth Daily., Disp: , Rfl:   •  SOOLANTRA 1 % cream, , Disp: , Rfl:   •  tolterodine LA (DETROL LA) 4 MG 24 hr capsule, Take 4 mg by mouth Daily., Disp: , Rfl:   •  vitamin D (ERGOCALCIFEROL) 14081 UNITS capsule capsule, 50,000 Units Every 7 (Seven) Days., Disp: , Rfl:   •  estrogen, conjugated,-medroxyprogesterone (PREMPRO) 0.45-1.5 MG per tablet, Take 1 tablet by mouth Daily., Disp: , Rfl:   •  valACYclovir (VALTREX) 1000 MG tablet, Take 1 tablet by mouth As Needed., Disp: , Rfl:   Social History     Social History   • Marital status:      Spouse name: N/A   • Number of children: N/A   • Years of education:  N/A     Occupational History   • Not on file.     Social History Main Topics   • Smoking status: Never Smoker   • Smokeless tobacco: Never Used   • Alcohol use No   • Drug use: No   • Sexual activity: Yes     Partners: Male      Comment: spouse     Other Topics Concern   • Not on file     Social History Narrative     w/2 children.  Lives in [place].  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      Family History   Problem Relation Age of Onset   • Hypertension Mother    • Sleep apnea Mother    • Hypertension Father    • Lung cancer Father    • Hypertension Maternal Grandfather    • Stroke Maternal Grandfather    • Hypertension Paternal Grandmother    • Breast cancer Paternal Aunt 45   • No Known Problems Brother    • BRCA 1/2 Neg Hx    • Ovarian cancer Neg Hx          Review of Systems:  Constitutional:  The patient reports fatigue, weight gain and denies fevers and chills.  Cardiovascular:  The patient reports HTN and denies heart disease and DVT.  Respiratory:  The patient denies asthma, apnea and PE.  Gastrointestinal:  The patient reports heartburn and denies pancreatitis and liver disease.  Genitourinary:  The patient denies renal insufficiency.    Musculoskeletal:  The patient reports joint pain and denies autoimmune disease.  Neurological:  The patient denies seizure and stroke.  Psychiatric:  The patient denies anxiety, depression and bipolar disorder.  Endocrine:  The patient denies diabetes and thyroid disease.  Hematologic:  The patient denies anemia and bleeding disorder.  Skin:  The patient denies MRSA.    Physical Exam:  Vital Signs:      There is no height or weight on file to calculate BMI.  Temp: 97.9 °F (36.6 °C)   Heart Rate: 70   BP: 121/86     Physical Exam   Constitutional: She is oriented to person, place, and time. She appears well-developed and well-nourished.   HENT:   Head: Normocephalic and atraumatic.   Eyes: Conjunctivae are normal. No scleral icterus.   Neck:  Neck supple. Carotid bruit is not present. No thyromegaly present.   Cardiovascular: Normal rate and regular rhythm.    No murmur heard.  Pulmonary/Chest: Effort normal and breath sounds normal. No respiratory distress. She has no wheezes. She has no rales.   Abdominal: Soft. Bowel sounds are normal. She exhibits no distension and no mass. There is no hepatosplenomegaly. There is no tenderness. No hernia.   Scars:  Lap kee, prior Lapband, prior LLQ port   Musculoskeletal: Normal range of motion. She exhibits no edema.   Neurological: She is alert and oriented to person, place, and time. Gait normal.   Skin: Skin is warm and dry. No rash noted.   Psychiatric: She has a normal mood and affect. Judgment normal.   Vitals reviewed.         Patient Active Problem List   Diagnosis   • Urinary frequency   • Stress incontinence   • Hypertension   • GERD (gastroesophageal reflux disease)   • Vaginal atrophy   • Rosacea   • Post-menopause on HRT (hormone replacement therapy)   • OAB (overactive bladder)   • Vitamin D deficiency   • Peripheral edema   • Morbid obesity   • Fatigue   • Dyspnea on exertion   • Wears glasses     Psych Brown /17 approp  Pritesh - HC x 1 w AGBR   Assessment:    Francoise Kamara is a 57 y.o. year old female with medically complicated obesity pursuing sleeve gastrectomy.    Weight loss surgery is deemed medically necessary given the following obesity related comorbidities including hypertension and GERD with current 262 lb 8 oz (119 kg) and Body mass index is 46.5 kg/(m^2).    Patient is aware that surgery is a tool, and that weight loss is not guaranteed but only seen in the context of appropriate use, follow up and exercise.    The patient was present for an approximately a 2.5 hour discussion of the purpose of weight loss surgery, how WLS is a tool to assist in achieving weight loss goals, the most common complications and how best to avoid them, and the strategies for short and long term weight  "loss.  Ample opportunity to discuss questions was available both in group and during the time of individual examination.    I reviewed all available preop labs, Xrays, tests, clearances, etc and signed off on these in the record.  All of this in addition to the patient's unique history and exam has been taken into consideration in determining their appropriate candidacy for weight loss surgery.    Complications  of laparoscopic/possible robotic gastric sleeve were discussed. The patient is well aware of the potential complications of surgery that include but not limited to bleeding, infections, deep venous thrombosis, pulmonary embolism, pulmonary complications such as pneumonia, cardiac events, hernias, small bowel obstruction, damage to the spleen or other organs, bowel injury, disfiguring scars, failure to lose weight, need for additional surgery, conversion to an open procedure, and death. Patient is also aware of complications which apply in this particular procedure that can include but are not limited to a \"leak\" at the staple line which in some instances may require conversion to gastric bypass.    The patient is aware if a hiatal hernia is encountered, it likely will be repaired.  R/B/A Rx to hiatal hernia repair were discussed as outlined in our long consent form.  Briefly risks in addition to those for LSG include recurrent hernia, JAMISON, dysphagia, esophageal injury, pneumothorax, injury to the vagus nerves, injury to the thoracic duct, aorta or vena cava.    Greater than 3 minutes was spent with the patient discussing avoiding all tobacco products and second hand smoke at least 2 weeks pre-operatively and 6 weeks post-operatively to minimize the risk of sleeve leak.  This included discussing the importance of avoiding even secondhand smoke as the risk of leak is increased.  Examples discussed:  I made it very clear that the patient understands they should avoid even riding in a car where someone has " previously smoked in the last 2 weeks, living in a house where someone smokes (even if it's in a separate room/patio/attached garage, etc.) we discussed that they should not have a conversation with a group of people who are smoking even if it's outside.  They can be around wood burning fires and barbecue.  I told them I do not know if marijuana has a same effects but my overall recommendation is to avoid it for 2 weeks prior in 6 weeks after surgery.  They also are aware that nicotine may also increase the risk of leak and I strongly encouraged him to avoid that as well for 2 weeks prior in 6 weeks after surgery.    Discussed the risks, benefits and alternative therapies at great length as outlined in our extensive consent forms, consent videos, and educational teaching process under the direction of the center's .    A copy of the patient's signed informed consent is on file.    Hypoalbuminemia suggests chronic protein malnutrition despite morbid obese status and may put pt at incr risk for poor wound healing, wound complications, infection, leak - pt aware and encouraged to increase protein intake to hopefully avoid delayed LSG leak.     Plan:  CORTNEY, raul serrano HHR.      Guille Urias MD

## 2017-10-04 PROBLEM — E66.01 OBESITY, CLASS III, BMI 40-49.9 (MORBID OBESITY) (HCC): Status: ACTIVE | Noted: 2017-10-04

## 2017-10-04 PROBLEM — E66.813 OBESITY, CLASS III, BMI 40-49.9 (MORBID OBESITY): Status: ACTIVE | Noted: 2017-10-04

## 2017-10-15 ENCOUNTER — APPOINTMENT (OUTPATIENT)
Dept: PREADMISSION TESTING | Facility: HOSPITAL | Age: 57
End: 2017-10-15

## 2017-10-15 LAB
ANION GAP SERPL CALCULATED.3IONS-SCNC: 3 MMOL/L (ref 3–11)
BUN BLD-MCNC: 20 MG/DL (ref 9–23)
BUN/CREAT SERPL: 28.6 (ref 7–25)
CALCIUM SPEC-SCNC: 9.4 MG/DL (ref 8.7–10.4)
CHLORIDE SERPL-SCNC: 106 MMOL/L (ref 99–109)
CO2 SERPL-SCNC: 27 MMOL/L (ref 20–31)
CREAT BLD-MCNC: 0.7 MG/DL (ref 0.6–1.3)
DEPRECATED RDW RBC AUTO: 43.5 FL (ref 37–54)
ERYTHROCYTE [DISTWIDTH] IN BLOOD BY AUTOMATED COUNT: 13.5 % (ref 11.3–14.5)
GFR SERPL CREATININE-BSD FRML MDRD: 87 ML/MIN/1.73
GLUCOSE BLD-MCNC: 85 MG/DL (ref 70–100)
HBA1C MFR BLD: 5.2 % (ref 4.8–5.6)
HCT VFR BLD AUTO: 42.4 % (ref 34.5–44)
HGB BLD-MCNC: 13.4 G/DL (ref 11.5–15.5)
MCH RBC QN AUTO: 27.9 PG (ref 27–31)
MCHC RBC AUTO-ENTMCNC: 31.6 G/DL (ref 32–36)
MCV RBC AUTO: 88.1 FL (ref 80–99)
PLATELET # BLD AUTO: 283 10*3/MM3 (ref 150–450)
PMV BLD AUTO: 10.9 FL (ref 6–12)
POTASSIUM BLD-SCNC: 4.3 MMOL/L (ref 3.5–5.5)
RBC # BLD AUTO: 4.81 10*6/MM3 (ref 3.89–5.14)
SODIUM BLD-SCNC: 136 MMOL/L (ref 132–146)
WBC NRBC COR # BLD: 13.96 10*3/MM3 (ref 3.5–10.8)

## 2017-10-15 PROCEDURE — 80048 BASIC METABOLIC PNL TOTAL CA: CPT | Performed by: ANESTHESIOLOGY

## 2017-10-15 PROCEDURE — 36415 COLL VENOUS BLD VENIPUNCTURE: CPT

## 2017-10-15 PROCEDURE — 85027 COMPLETE CBC AUTOMATED: CPT | Performed by: ANESTHESIOLOGY

## 2017-10-15 PROCEDURE — 83036 HEMOGLOBIN GLYCOSYLATED A1C: CPT | Performed by: ANESTHESIOLOGY

## 2017-10-20 ENCOUNTER — ANESTHESIA (OUTPATIENT)
Dept: PERIOP | Facility: HOSPITAL | Age: 57
End: 2017-10-20

## 2017-10-20 ENCOUNTER — ANESTHESIA EVENT (OUTPATIENT)
Dept: PERIOP | Facility: HOSPITAL | Age: 57
End: 2017-10-20

## 2017-10-20 ENCOUNTER — HOSPITAL ENCOUNTER (INPATIENT)
Facility: HOSPITAL | Age: 57
LOS: 1 days | Discharge: HOME | End: 2017-10-21
Attending: SURGERY | Admitting: SURGERY

## 2017-10-20 DIAGNOSIS — E66.01 OBESITY, CLASS III, BMI 40-49.9 (MORBID OBESITY) (HCC): ICD-10-CM

## 2017-10-20 LAB — POTASSIUM BLDA-SCNC: 3.92 MMOL/L (ref 3.5–5.3)

## 2017-10-20 PROCEDURE — 94640 AIRWAY INHALATION TREATMENT: CPT

## 2017-10-20 PROCEDURE — 25010000002 PROMETHAZINE PER 50 MG: Performed by: NURSE ANESTHETIST, CERTIFIED REGISTERED

## 2017-10-20 PROCEDURE — 25010000002 DEXAMETHASONE SODIUM PHOSPHATE 10 MG/ML SOLUTION 1 ML VIAL: Performed by: NURSE ANESTHETIST, CERTIFIED REGISTERED

## 2017-10-20 PROCEDURE — 25010000002 ENOXAPARIN PER 10 MG: Performed by: SURGERY

## 2017-10-20 PROCEDURE — 88307 TISSUE EXAM BY PATHOLOGIST: CPT | Performed by: SURGERY

## 2017-10-20 PROCEDURE — 0DB64Z3 EXCISION OF STOMACH, PERCUTANEOUS ENDOSCOPIC APPROACH, VERTICAL: ICD-10-PCS | Performed by: SURGERY

## 2017-10-20 PROCEDURE — 25010000002 FENTANYL CITRATE (PF) 100 MCG/2ML SOLUTION: Performed by: NURSE ANESTHETIST, CERTIFIED REGISTERED

## 2017-10-20 PROCEDURE — 25010000002 PROPOFOL 10 MG/ML EMULSION: Performed by: NURSE ANESTHETIST, CERTIFIED REGISTERED

## 2017-10-20 PROCEDURE — 25010000002 ONDANSETRON PER 1 MG: Performed by: NURSE ANESTHETIST, CERTIFIED REGISTERED

## 2017-10-20 PROCEDURE — 25010000002 PROPOFOL 1000 MG/ML EMULSION: Performed by: NURSE ANESTHETIST, CERTIFIED REGISTERED

## 2017-10-20 PROCEDURE — 25010000002 HYDROMORPHONE PER 4 MG: Performed by: NURSE ANESTHETIST, CERTIFIED REGISTERED

## 2017-10-20 PROCEDURE — 0DJ08ZZ INSPECTION OF UPPER INTESTINAL TRACT, VIA NATURAL OR ARTIFICIAL OPENING ENDOSCOPIC: ICD-10-PCS | Performed by: SURGERY

## 2017-10-20 PROCEDURE — 84132 ASSAY OF SERUM POTASSIUM: CPT | Performed by: SURGERY

## 2017-10-20 PROCEDURE — 25010000002 NEOSTIGMINE PER 0.5 MG: Performed by: NURSE ANESTHETIST, CERTIFIED REGISTERED

## 2017-10-20 PROCEDURE — 25010000002 BUPRENORPHINE PER 0.1 MG: Performed by: NURSE ANESTHETIST, CERTIFIED REGISTERED

## 2017-10-20 PROCEDURE — 25010000002 ONDANSETRON PER 1 MG: Performed by: SURGERY

## 2017-10-20 PROCEDURE — 25010000002 DEXAMETHASONE PER 1 MG: Performed by: NURSE ANESTHETIST, CERTIFIED REGISTERED

## 2017-10-20 PROCEDURE — 43775 LAP SLEEVE GASTRECTOMY: CPT | Performed by: SURGERY

## 2017-10-20 DEVICE — SEALANT WND FIBRIN TISSEEL VAPOR/HEAT/PREFIL/SYR 10ML: Type: IMPLANTABLE DEVICE | Site: STOMACH | Status: FUNCTIONAL

## 2017-10-20 DEVICE — PERI-STRIPS DRY WITH VERITAS COLLAGEN MATRIX (PSD-V) IS PREPARED FROM DEHYDRATED BOVINE PERICARDIUM PROCURED FROM CATTLE UNDER 30 MONTHS OF AGE IN THE UNITED STATES. ONE (1) TUBE OF PSD GEL (GEL) IS PROVIDED FOR EVERY TWO (2) POUCHES OF PSD-V. THE GEL IS USED TO CREATE A TEMPORARY BOND BETWEEN THE PSD-V BUTTRESS AND THE SURGICAL STAPLER JAWS UNTIL THE STAPLER IS POSITIONED AND FIRED.
Type: IMPLANTABLE DEVICE | Site: STOMACH | Status: FUNCTIONAL
Brand: PERI-STRIPS DRY WITH VERITAS COLLAGEN MATRIX

## 2017-10-20 RX ORDER — MAGNESIUM HYDROXIDE 1200 MG/15ML
LIQUID ORAL AS NEEDED
Status: DISCONTINUED | OUTPATIENT
Start: 2017-10-20 | End: 2017-10-20 | Stop reason: HOSPADM

## 2017-10-20 RX ORDER — MORPHINE SULFATE 2 MG/ML
4 INJECTION, SOLUTION INTRAMUSCULAR; INTRAVENOUS
Status: DISCONTINUED | OUTPATIENT
Start: 2017-10-20 | End: 2017-10-21 | Stop reason: HOSPADM

## 2017-10-20 RX ORDER — PROMETHAZINE HYDROCHLORIDE 25 MG/ML
12.5 INJECTION, SOLUTION INTRAMUSCULAR; INTRAVENOUS EVERY 6 HOURS PRN
Status: DISCONTINUED | OUTPATIENT
Start: 2017-10-20 | End: 2017-10-21 | Stop reason: HOSPADM

## 2017-10-20 RX ORDER — SODIUM CHLORIDE AND POTASSIUM CHLORIDE 150; 450 MG/100ML; MG/100ML
125 INJECTION, SOLUTION INTRAVENOUS CONTINUOUS
Status: DISCONTINUED | OUTPATIENT
Start: 2017-10-21 | End: 2017-10-21 | Stop reason: HOSPADM

## 2017-10-20 RX ORDER — MORPHINE SULFATE 2 MG/ML
6 INJECTION, SOLUTION INTRAMUSCULAR; INTRAVENOUS
Status: DISCONTINUED | OUTPATIENT
Start: 2017-10-20 | End: 2017-10-21 | Stop reason: HOSPADM

## 2017-10-20 RX ORDER — CYANOCOBALAMIN 1000 UG/ML
1000 INJECTION, SOLUTION INTRAMUSCULAR; SUBCUTANEOUS ONCE
Status: COMPLETED | OUTPATIENT
Start: 2017-10-21 | End: 2017-10-21

## 2017-10-20 RX ORDER — ATRACURIUM BESYLATE 10 MG/ML
INJECTION, SOLUTION INTRAVENOUS AS NEEDED
Status: DISCONTINUED | OUTPATIENT
Start: 2017-10-20 | End: 2017-10-20 | Stop reason: SURG

## 2017-10-20 RX ORDER — HYDROCODONE BITARTRATE AND ACETAMINOPHEN 7.5; 325 MG/1; MG/1
1 TABLET ORAL EVERY 4 HOURS PRN
Status: DISCONTINUED | OUTPATIENT
Start: 2017-10-20 | End: 2017-10-21 | Stop reason: HOSPADM

## 2017-10-20 RX ORDER — PANTOPRAZOLE SODIUM 40 MG/10ML
40 INJECTION, POWDER, LYOPHILIZED, FOR SOLUTION INTRAVENOUS
Status: DISCONTINUED | OUTPATIENT
Start: 2017-10-21 | End: 2017-10-21 | Stop reason: HOSPADM

## 2017-10-20 RX ORDER — ONDANSETRON 4 MG/1
4 TABLET, FILM COATED ORAL EVERY 6 HOURS PRN
Status: DISCONTINUED | OUTPATIENT
Start: 2017-10-20 | End: 2017-10-21 | Stop reason: HOSPADM

## 2017-10-20 RX ORDER — FIBRINOGEN HUMAN AND THROMBIN HUMAN 10 ML
KIT TOPICAL AS NEEDED
Status: DISCONTINUED | OUTPATIENT
Start: 2017-10-20 | End: 2017-10-20 | Stop reason: HOSPADM

## 2017-10-20 RX ORDER — PROPOFOL 10 MG/ML
VIAL (ML) INTRAVENOUS AS NEEDED
Status: DISCONTINUED | OUTPATIENT
Start: 2017-10-20 | End: 2017-10-20 | Stop reason: SURG

## 2017-10-20 RX ORDER — SODIUM CHLORIDE 0.9 % (FLUSH) 0.9 %
1-10 SYRINGE (ML) INJECTION AS NEEDED
Status: DISCONTINUED | OUTPATIENT
Start: 2017-10-20 | End: 2017-10-20 | Stop reason: HOSPADM

## 2017-10-20 RX ORDER — DEXAMETHASONE SODIUM PHOSPHATE 4 MG/ML
INJECTION, SOLUTION INTRA-ARTICULAR; INTRALESIONAL; INTRAMUSCULAR; INTRAVENOUS; SOFT TISSUE AS NEEDED
Status: DISCONTINUED | OUTPATIENT
Start: 2017-10-20 | End: 2017-10-20 | Stop reason: SURG

## 2017-10-20 RX ORDER — DIPHENHYDRAMINE HYDROCHLORIDE 50 MG/ML
25 INJECTION INTRAMUSCULAR; INTRAVENOUS EVERY 4 HOURS PRN
Status: DISCONTINUED | OUTPATIENT
Start: 2017-10-20 | End: 2017-10-20 | Stop reason: SDUPTHER

## 2017-10-20 RX ORDER — LORAZEPAM 1 MG/1
1 TABLET ORAL EVERY 12 HOURS PRN
Status: DISCONTINUED | OUTPATIENT
Start: 2017-10-20 | End: 2017-10-21 | Stop reason: HOSPADM

## 2017-10-20 RX ORDER — METOCLOPRAMIDE HYDROCHLORIDE 5 MG/ML
10 INJECTION INTRAMUSCULAR; INTRAVENOUS EVERY 6 HOURS PRN
Status: DISCONTINUED | OUTPATIENT
Start: 2017-10-20 | End: 2017-10-21 | Stop reason: HOSPADM

## 2017-10-20 RX ORDER — PROMETHAZINE HYDROCHLORIDE 25 MG/1
25 SUPPOSITORY RECTAL ONCE AS NEEDED
Status: COMPLETED | OUTPATIENT
Start: 2017-10-20 | End: 2017-10-20

## 2017-10-20 RX ORDER — HYDROMORPHONE HYDROCHLORIDE 1 MG/ML
0.5 INJECTION, SOLUTION INTRAMUSCULAR; INTRAVENOUS; SUBCUTANEOUS
Status: DISCONTINUED | OUTPATIENT
Start: 2017-10-20 | End: 2017-10-20 | Stop reason: HOSPADM

## 2017-10-20 RX ORDER — HYDROMORPHONE HYDROCHLORIDE 2 MG/1
2 TABLET ORAL EVERY 4 HOURS PRN
Status: DISCONTINUED | OUTPATIENT
Start: 2017-10-20 | End: 2017-10-21 | Stop reason: HOSPADM

## 2017-10-20 RX ORDER — LORAZEPAM 2 MG/ML
0.5 INJECTION INTRAMUSCULAR EVERY 12 HOURS PRN
Status: DISCONTINUED | OUTPATIENT
Start: 2017-10-20 | End: 2017-10-21 | Stop reason: HOSPADM

## 2017-10-20 RX ORDER — PROMETHAZINE HYDROCHLORIDE 25 MG/ML
12.5 INJECTION, SOLUTION INTRAMUSCULAR; INTRAVENOUS ONCE AS NEEDED
Status: COMPLETED | OUTPATIENT
Start: 2017-10-20 | End: 2017-10-20

## 2017-10-20 RX ORDER — SCOLOPAMINE TRANSDERMAL SYSTEM 1 MG/1
1 PATCH, EXTENDED RELEASE TRANSDERMAL ONCE
Status: DISCONTINUED | OUTPATIENT
Start: 2017-10-20 | End: 2017-10-20

## 2017-10-20 RX ORDER — ONDANSETRON 2 MG/ML
INJECTION INTRAMUSCULAR; INTRAVENOUS AS NEEDED
Status: DISCONTINUED | OUTPATIENT
Start: 2017-10-20 | End: 2017-10-20 | Stop reason: SURG

## 2017-10-20 RX ORDER — ACETAMINOPHEN 325 MG/1
650 TABLET ORAL ONCE
Status: DISCONTINUED | OUTPATIENT
Start: 2017-10-20 | End: 2017-10-20 | Stop reason: HOSPADM

## 2017-10-20 RX ORDER — LIDOCAINE HYDROCHLORIDE 10 MG/ML
INJECTION, SOLUTION INFILTRATION; PERINEURAL AS NEEDED
Status: DISCONTINUED | OUTPATIENT
Start: 2017-10-20 | End: 2017-10-20 | Stop reason: SURG

## 2017-10-20 RX ORDER — FENTANYL CITRATE 50 UG/ML
50 INJECTION, SOLUTION INTRAMUSCULAR; INTRAVENOUS
Status: DISCONTINUED | OUTPATIENT
Start: 2017-10-20 | End: 2017-10-20 | Stop reason: HOSPADM

## 2017-10-20 RX ORDER — SIMETHICONE 80 MG
80 TABLET,CHEWABLE ORAL 4 TIMES DAILY PRN
Status: DISCONTINUED | OUTPATIENT
Start: 2017-10-20 | End: 2017-10-21 | Stop reason: HOSPADM

## 2017-10-20 RX ORDER — PROMETHAZINE HYDROCHLORIDE 25 MG/1
25 TABLET ORAL ONCE AS NEEDED
Status: COMPLETED | OUTPATIENT
Start: 2017-10-20 | End: 2017-10-20

## 2017-10-20 RX ORDER — VALACYCLOVIR HYDROCHLORIDE 500 MG/1
1000 TABLET, FILM COATED ORAL DAILY PRN
Status: DISCONTINUED | OUTPATIENT
Start: 2017-10-20 | End: 2017-10-21 | Stop reason: HOSPADM

## 2017-10-20 RX ORDER — NALOXONE HCL 0.4 MG/ML
0.4 VIAL (ML) INJECTION
Status: DISCONTINUED | OUTPATIENT
Start: 2017-10-20 | End: 2017-10-21 | Stop reason: HOSPADM

## 2017-10-20 RX ORDER — PANTOPRAZOLE SODIUM 40 MG/10ML
40 INJECTION, POWDER, LYOPHILIZED, FOR SOLUTION INTRAVENOUS ONCE
Status: COMPLETED | OUTPATIENT
Start: 2017-10-20 | End: 2017-10-20

## 2017-10-20 RX ORDER — OXYBUTYNIN CHLORIDE 5 MG/1
5 TABLET, EXTENDED RELEASE ORAL DAILY
Status: DISCONTINUED | OUTPATIENT
Start: 2017-10-20 | End: 2017-10-21 | Stop reason: HOSPADM

## 2017-10-20 RX ORDER — CLONIDINE HYDROCHLORIDE 0.1 MG/1
0.1 TABLET ORAL EVERY 6 HOURS PRN
Status: DISCONTINUED | OUTPATIENT
Start: 2017-10-20 | End: 2017-10-21 | Stop reason: HOSPADM

## 2017-10-20 RX ORDER — CHLORHEXIDINE GLUCONATE 0.12 MG/ML
15 RINSE ORAL ONCE
Status: COMPLETED | OUTPATIENT
Start: 2017-10-20 | End: 2017-10-20

## 2017-10-20 RX ORDER — CYANOCOBALAMIN 1000 UG/ML
1000 INJECTION, SOLUTION INTRAMUSCULAR; SUBCUTANEOUS ONCE
Status: DISCONTINUED | OUTPATIENT
Start: 2017-10-20 | End: 2017-10-21 | Stop reason: HOSPADM

## 2017-10-20 RX ORDER — METOCLOPRAMIDE HYDROCHLORIDE 5 MG/ML
10 INJECTION INTRAMUSCULAR; INTRAVENOUS EVERY 6 HOURS
Status: DISCONTINUED | OUTPATIENT
Start: 2017-10-20 | End: 2017-10-21 | Stop reason: HOSPADM

## 2017-10-20 RX ORDER — LIDOCAINE HYDROCHLORIDE 10 MG/ML
0.2 INJECTION, SOLUTION INFILTRATION; PERINEURAL ONCE
Status: COMPLETED | OUTPATIENT
Start: 2017-10-20 | End: 2017-10-20

## 2017-10-20 RX ORDER — LORAZEPAM 1 MG/1
1 TABLET ORAL EVERY 12 HOURS PRN
Status: DISCONTINUED | OUTPATIENT
Start: 2017-10-20 | End: 2017-10-20 | Stop reason: SDUPTHER

## 2017-10-20 RX ORDER — LISINOPRIL 40 MG/1
40 TABLET ORAL DAILY
Status: DISCONTINUED | OUTPATIENT
Start: 2017-10-20 | End: 2017-10-21 | Stop reason: HOSPADM

## 2017-10-20 RX ORDER — SODIUM CHLORIDE, SODIUM LACTATE, POTASSIUM CHLORIDE, CALCIUM CHLORIDE 600; 310; 30; 20 MG/100ML; MG/100ML; MG/100ML; MG/100ML
150 INJECTION, SOLUTION INTRAVENOUS CONTINUOUS
Status: DISCONTINUED | OUTPATIENT
Start: 2017-10-20 | End: 2017-10-20 | Stop reason: SDUPTHER

## 2017-10-20 RX ORDER — LABETALOL HYDROCHLORIDE 5 MG/ML
10 INJECTION, SOLUTION INTRAVENOUS
Status: DISCONTINUED | OUTPATIENT
Start: 2017-10-20 | End: 2017-10-21 | Stop reason: HOSPADM

## 2017-10-20 RX ORDER — BUPIVACAINE HYDROCHLORIDE AND EPINEPHRINE 2.5; 5 MG/ML; UG/ML
INJECTION, SOLUTION EPIDURAL; INFILTRATION; INTRACAUDAL; PERINEURAL AS NEEDED
Status: DISCONTINUED | OUTPATIENT
Start: 2017-10-20 | End: 2017-10-20 | Stop reason: HOSPADM

## 2017-10-20 RX ORDER — FENTANYL CITRATE 50 UG/ML
INJECTION, SOLUTION INTRAMUSCULAR; INTRAVENOUS AS NEEDED
Status: DISCONTINUED | OUTPATIENT
Start: 2017-10-20 | End: 2017-10-20 | Stop reason: SURG

## 2017-10-20 RX ORDER — GLYCOPYRROLATE 0.2 MG/ML
INJECTION INTRAMUSCULAR; INTRAVENOUS AS NEEDED
Status: DISCONTINUED | OUTPATIENT
Start: 2017-10-20 | End: 2017-10-20 | Stop reason: SURG

## 2017-10-20 RX ORDER — ONDANSETRON 2 MG/ML
4 INJECTION INTRAMUSCULAR; INTRAVENOUS EVERY 6 HOURS PRN
Status: DISCONTINUED | OUTPATIENT
Start: 2017-10-20 | End: 2017-10-21 | Stop reason: HOSPADM

## 2017-10-20 RX ORDER — SODIUM CHLORIDE 9 MG/ML
INJECTION, SOLUTION INTRAVENOUS AS NEEDED
Status: DISCONTINUED | OUTPATIENT
Start: 2017-10-20 | End: 2017-10-20 | Stop reason: HOSPADM

## 2017-10-20 RX ORDER — SODIUM CHLORIDE, SODIUM LACTATE, POTASSIUM CHLORIDE, CALCIUM CHLORIDE 600; 310; 30; 20 MG/100ML; MG/100ML; MG/100ML; MG/100ML
150 INJECTION, SOLUTION INTRAVENOUS CONTINUOUS
Status: ACTIVE | OUTPATIENT
Start: 2017-10-20 | End: 2017-10-21

## 2017-10-20 RX ORDER — DIPHENHYDRAMINE HYDROCHLORIDE 50 MG/ML
25 INJECTION INTRAMUSCULAR; INTRAVENOUS EVERY 4 HOURS PRN
Status: DISCONTINUED | OUTPATIENT
Start: 2017-10-20 | End: 2017-10-21 | Stop reason: HOSPADM

## 2017-10-20 RX ORDER — DOXYCYCLINE HYCLATE 100 MG
50 TABLET ORAL EVERY 12 HOURS SCHEDULED
Status: DISCONTINUED | OUTPATIENT
Start: 2017-10-20 | End: 2017-10-21 | Stop reason: HOSPADM

## 2017-10-20 RX ORDER — LORAZEPAM 2 MG/ML
0.5 INJECTION INTRAMUSCULAR EVERY 12 HOURS PRN
Status: DISCONTINUED | OUTPATIENT
Start: 2017-10-20 | End: 2017-10-20 | Stop reason: SDUPTHER

## 2017-10-20 RX ADMIN — DEXAMETHASONE SODIUM PHOSPHATE 60 ML: 10 INJECTION, SOLUTION INTRAMUSCULAR; INTRAVENOUS at 15:11

## 2017-10-20 RX ADMIN — ATRACURIUM BESYLATE 50 MG: 10 INJECTION, SOLUTION INTRAVENOUS at 15:06

## 2017-10-20 RX ADMIN — CHLORHEXIDINE GLUCONATE 15 ML: 1.2 RINSE ORAL at 13:23

## 2017-10-20 RX ADMIN — ONDANSETRON 4 MG: 2 INJECTION INTRAMUSCULAR; INTRAVENOUS at 19:56

## 2017-10-20 RX ADMIN — LIDOCAINE HYDROCHLORIDE 0.2 ML: 10 INJECTION, SOLUTION EPIDURAL; INFILTRATION; INTRACAUDAL; PERINEURAL at 13:25

## 2017-10-20 RX ADMIN — SIMETHICONE CHEW TAB 80 MG 80 MG: 80 TABLET ORAL at 16:49

## 2017-10-20 RX ADMIN — ATRACURIUM BESYLATE 10 MG: 10 INJECTION, SOLUTION INTRAVENOUS at 15:33

## 2017-10-20 RX ADMIN — SODIUM CHLORIDE, POTASSIUM CHLORIDE, SODIUM LACTATE AND CALCIUM CHLORIDE: 600; 310; 30; 20 INJECTION, SOLUTION INTRAVENOUS at 15:02

## 2017-10-20 RX ADMIN — HYDROMORPHONE HYDROCHLORIDE 0.5 MG: 1 INJECTION, SOLUTION INTRAMUSCULAR; INTRAVENOUS; SUBCUTANEOUS at 16:44

## 2017-10-20 RX ADMIN — PANTOPRAZOLE SODIUM 40 MG: 40 INJECTION, POWDER, FOR SOLUTION INTRAVENOUS at 13:23

## 2017-10-20 RX ADMIN — FENTANYL CITRATE 50 MCG: 50 INJECTION INTRAMUSCULAR; INTRAVENOUS at 17:20

## 2017-10-20 RX ADMIN — ONDANSETRON 4 MG: 2 INJECTION INTRAMUSCULAR; INTRAVENOUS at 16:25

## 2017-10-20 RX ADMIN — DEXAMETHASONE SODIUM PHOSPHATE 4 MG: 4 INJECTION, SOLUTION INTRAMUSCULAR; INTRAVENOUS at 15:14

## 2017-10-20 RX ADMIN — SODIUM CHLORIDE, POTASSIUM CHLORIDE, SODIUM LACTATE AND CALCIUM CHLORIDE 1000 ML: 600; 310; 30; 20 INJECTION, SOLUTION INTRAVENOUS at 13:23

## 2017-10-20 RX ADMIN — ERTAPENEM SODIUM 1 G: 1 INJECTION, POWDER, LYOPHILIZED, FOR SOLUTION INTRAMUSCULAR; INTRAVENOUS at 15:02

## 2017-10-20 RX ADMIN — FENTANYL CITRATE 50 MCG: 50 INJECTION INTRAMUSCULAR; INTRAVENOUS at 16:55

## 2017-10-20 RX ADMIN — PROPOFOL 25 MCG/KG/MIN: 10 INJECTION, EMULSION INTRAVENOUS at 15:13

## 2017-10-20 RX ADMIN — ATRACURIUM BESYLATE 10 MG: 10 INJECTION, SOLUTION INTRAVENOUS at 16:02

## 2017-10-20 RX ADMIN — GLYCOPYRROLATE 0.4 MG: 0.2 INJECTION, SOLUTION INTRAMUSCULAR; INTRAVENOUS at 16:25

## 2017-10-20 RX ADMIN — PROPOFOL 200 MG: 10 INJECTION, EMULSION INTRAVENOUS at 15:06

## 2017-10-20 RX ADMIN — LIDOCAINE HYDROCHLORIDE 50 MG: 10 INJECTION, SOLUTION INFILTRATION; PERINEURAL at 15:06

## 2017-10-20 RX ADMIN — ALBUTEROL SULFATE 2.5 MG: 2.5 SOLUTION RESPIRATORY (INHALATION) at 19:12

## 2017-10-20 RX ADMIN — PROMETHAZINE HYDROCHLORIDE 6.25 MG: 25 INJECTION INTRAMUSCULAR; INTRAVENOUS at 16:47

## 2017-10-20 RX ADMIN — SCOPALAMINE 1 PATCH: 1 PATCH, EXTENDED RELEASE TRANSDERMAL at 13:22

## 2017-10-20 RX ADMIN — FENTANYL CITRATE 100 MCG: 50 INJECTION, SOLUTION INTRAMUSCULAR; INTRAVENOUS at 15:06

## 2017-10-20 RX ADMIN — Medication 2.5 MG: at 16:25

## 2017-10-20 NOTE — ANESTHESIA PROCEDURE NOTES
Airway  Urgency: elective    Date/Time: 10/20/2017 3:08 PM  Airway not difficult    General Information and Staff    Patient location during procedure: OR  Anesthesiologist: SHELLY LANE  CRNA: DANIEL RINCON    Indications and Patient Condition  Indications for airway management: airway protection    Preoxygenated: yes  MILS not maintained throughout  Mask difficulty assessment: 1 - vent by mask    Final Airway Details  Final airway type: endotracheal airway      Successful airway: ETT  Cuffed: yes   Successful intubation technique: direct laryngoscopy  Endotracheal tube insertion site: oral  Blade: Lesly  Blade size: #3  ETT size: 7.0 mm  Cormack-Lehane Classification: grade I - full view of glottis  Placement verified by: chest auscultation and capnometry   Measured from: lips  ETT to lips (cm): 20  Number of attempts at approach: 1    Additional Comments  Negative epigastric sounds, Breath sound equal bilaterally with symmetric chest rise and fall

## 2017-10-20 NOTE — ANESTHESIA POSTPROCEDURE EVALUATION
Patient: Francoise Kamara    Procedure Summary     Date Anesthesia Start Anesthesia Stop Room / Location    10/20/17 1502 1638 [facility] / [facility]       Procedure Diagnosis Surgeon Provider    GASTRIC SLEEVE LAPAROSCOPIC,  (N/A Abdomen); ESOPHAGOGASTRODUODENOSCOPY (N/A Esophagus) Obesity, Class III, BMI 40-49.9 (morbid obesity)  (Obesity, Class III, BMI 40-49.9 (morbid obesity) [E66.01]) MD Steve Ramirez MD          Anesthesia Type: general  Last vitals  BP   133/70 (10/20/17 1314)   Temp   99.2 °F (37.3 °C) (10/20/17 1637)   Pulse   98 (10/20/17 1637)   Resp   18 (10/20/17 1637)     SpO2   97 % (10/20/17 1637)     Post Anesthesia Care and Evaluation    Patient location during evaluation: PACU  Patient participation: complete - patient participated  Level of consciousness: sleepy but conscious  Pain management: adequate  Airway patency: patent  Anesthetic complications: No anesthetic complications  PONV Status: none  Cardiovascular status: hemodynamically stable and acceptable  Respiratory status: nonlabored ventilation, acceptable and nasal cannula  Hydration status: acceptable

## 2017-10-20 NOTE — PLAN OF CARE
Problem: Patient Care Overview (Adult)  Goal: Plan of Care Review  Outcome: Ongoing (interventions implemented as appropriate)    10/20/17 1820   Coping/Psychosocial Response Interventions   Plan Of Care Reviewed With patient;mother   Patient Care Overview   Progress unable to show any progress toward functional goals         Problem: Perioperative Period (Adult)  Goal: Signs and Symptoms of Listed Potential Problems Will be Absent or Manageable (Perioperative Period)  Outcome: Ongoing (interventions implemented as appropriate)    10/20/17 1820   Perioperative Period   Problems Assessed (Perioperative Period) all   Problems Present (Perioperative Period) pain         Problem: Bariatric Surgery (Open/Laparoscopic) (Adult,Pediatric)  Goal: Signs and Symptoms of Listed Potential Problems Will be Absent or Manageable (Bariatric Surgery)  Outcome: Ongoing (interventions implemented as appropriate)    10/20/17 1820   Bariatric Surgery (Open/Laparoscopic)   Problems Assessed (Bariatric Surgery) all   Problems Present (Bariatric Surgery) pain;nausea and vomiting

## 2017-10-20 NOTE — ANESTHESIA PROCEDURE NOTES
Peripheral Block    Patient location during procedure: OR  Start time: 10/20/2017 3:07 PM  Stop time: 10/20/2017 3:11 PM  Reason for block: at surgeon's request and post-op pain management  Performed by  Anesthesiologist: SHELLY LANE  Preanesthetic Checklist  Completed: patient identified, site marked, surgical consent, pre-op evaluation, timeout performed, IV checked, risks and benefits discussed and monitors and equipment checked  Prep:  Pt Position: supine  Sterile barriers:cap, gloves, sterile barriers and mask  Prep: ChloraPrep  Patient monitoring: blood pressure monitoring, continuous pulse oximetry and EKG  Procedure  Sedation:yes  Performed under: general  Guidance:ultrasound guided  Images:still images obtained    Laterality:Bilateral  Block Type:TAP  Injection Technique:single-shot  Needle Type:short-bevel and echogenic  Needle Gauge:20 G    Medications  Comment:Block Injection:  LA dose divided between Right and Left block       Adjuncts:  Decadron 4mg PSF, Buprenex 0.3mg (Per total volume of LA)  Local Injected:bupivacaine 0.25% Local Amount Injected:60mL  Post Assessment  Injection Assessment: negative aspiration for heme, incremental injection and no paresthesia on injection  Patient Tolerance:comfortable throughout block  Complications:no  Additional Notes      Under Ultrasound guidance, a BBraun 4inch 360 degree needle was advanced with Normal Saline hydro dissection of tissue.  The Internal Oblique and Transversus Abdominus muscles where visualized.  At or before the aponeurosis of Internal Oblique, local anesthetic spread was visualized in the Transversus Abdominus Plane. Injection was made incrementally with aspiration every 5 mls.  There was no  intravascular injection,  injection pressure was normal, there was no neural injection, and the procedure was completed without difficulty.  Thank You.

## 2017-10-20 NOTE — OP NOTE
Preoperative Diagnosis:   Morbid Obesity with Multiple Co-Morbidities s/p LapBand 11/08, LapBand removal 12/16    Postoperative Diagnosis:   Same    Procedure:                                                      Laparoscopic Sleeve Gastrectomy (85% subtotal vertical gastrectomy) over a 36 Macedonian Bougie Dilator                                                                        EGD    Surgeon:                                                       MOISES Urias MD    Anesthesia:                                                   GETA    EBL:                                                              50 cc    Fluids:                                                           Crystalloid    Specimens:                                                   Subtotal gastrectomy    Drains:                                                           None    Counts:                                                          Correct    Complications:                                               None    Indications:   This is a 57-year-old morbidly obese white female known to me s/p LapBand placement 11/08, removal for intolerance 12/16 who presents for elective laparoscopic sleeve gastrectomy.  Pt is aware that LSG after prev AGB/AGBR carries increased risk over primary LSG alone, hawa wrt bleeding, infection, leak, prolonged OR times with incr risk pulm complications and VTE, etc and still wishes to proceed.  She's undergone extensive preoperative education teaching and consent process everything's in order and she wishes to proceed.    Operative technique:     The patient was brought to the operating room, and placed supine upon the operating room table.  SCD hose were placed, she underwent uneventful general endotracheal anesthesia per the anesthesiology staff, she received IV Invanz and subcutaneous Lovenox, the anesthesiology staff performed a tap block, and her abdomen was prepped and draped with ChloraPrep in a sterile  fashion, an Ioban was used as well, a Ortiz catheter was not placed.    The peritoneal cavity was entered in the upper abdomen to the left of midline through a previous laparoscopy scar using an 11 mm trocar and an Optiview technique and the abdomen was insufflated to a pressure of 15 mmHg with CO2 gas.  Exploratory laparoscopy revealed no evidence of injury from the entrance technique, an enlarged, smooth appearing left lobe of the liver, adhesions of the omentum to the LUQ, no other abnormalities noted.  Under direct visualization a 5 mm trocar was placed through an old laparoscopy scar in the RUQ, and a 15 mm trocar was placed to the right of the umbilicus in a skin fold.  Adhesions were lysed in the LUQ and an 11 mm trocar was placed in the left mid abdomen and a 5 mm trocar was placed in a left subcostal position.  Through a stab incision in the epigastrium and Funmilayo retractors used to elevate the medial aspect of left lobe of the liver - there were dense adhesions of the lesser omentum and cardia to the undersurface of the lateral aspect of the left lobe of the liver.  These were tediously lysed, bleeding from the liver capsule was minor but nonetheless I used some Floseal.  I exposed the hiatus, no visible recurrent hiatal hernia from the anterior view and this was photodocumented.  Beginning approximately two thirds of the way around the greater curvature the stomach, the gastrocolic vessels were divided with the Harmonic scalpel.  This proceeded proximally taking down all the short gastric vessels and exposing the left peng.  Her were no posterior hernias or lipomas and this was photodocumented.  The remaining anterior previous band imbrications were tediously taken down taking care to avoid a gastrotomy.  The old band tunnel was opened up to the left peng.  There was still a fairly thick peel and this was debrided.  Gastrocolic vessels were then divided medially to 5 cm proximal to the pylorus.  Some  of the filmy attachments of the posterior stomach to the pancreas and retroperitoneum were divided.  The anesthesiology staff passed a 36 Kuwaiti blunt tip bougie dilator which was manipulated along the lesser curvature into the distal antrum.  The 85% subtotal vertical sleeve gastrectomy was then performed over the 36 Kuwaiti bougie dilator using an Curex.Coelon 60 mm articulating electric GST stapler.  The first firing was a black load, the next 5 firings were green loads.  The black load and the first 5 green loads included a single absorbable Veritas arthur-strip.  After clamping down the final green load and prior to firing it the bougie dilator was removed.  The sleeve was performed such that it was uniform in size, no hourglassing or narrowing, especially at the angularis, and the final firing was done a centimeter away from the angle of His to hopefully avoid incorporating esophageal fibers.  The subtotal gastrectomy specimen was placed into a large retrieval bag and withdrawn and placed on a separate [place] stand, it was an average size specimen.  The sleeve was submerged under saline.  Upper endoscopy was performed, and the endoscope was advanced into the duodenal bulb.  No air bubbles or leak seen, no bleeding at the staple line, no narrowing at the angularis, no pyloric spasm or deformity, no gastritis, no hiatal hernia or Franco's esophagus, no residual changes of previous banding, and the endoscope was withdrawn.  The subtotal gastrectomy specimen was inspected, staples were all well formed confirming laparoscopic findings, it was sent unopened to pathology for permanent section.  Irrigation fluid was suctioned free.  The sleeve was resting nicely and hemostatic.  The sleeve staple line and the raw areas of the liver (hemostasis had been excellent for a while) was treated with a total of 10 cc of aerosolized Tisseel fibrin glue.  Photodocumentation of the sleeve was obtained.  The Funmilayo retractor was  removed.  Fascia at the 15 mm trocar site incision was closed with a horizontal mattress 0 Vicryl suture placed with a suture passer under direct visualization and tying the knot extracorporeally, fascia was infiltrated approximately 10 cc of quarter percent Marcaine with epinephrine with the okay from anesthesia.  Remaining trochars were removed under direct visualization, no bleeding noted from their sites.  Subcutaneous tissue in the 15 mm incision was closed with a figure-of-eight 2-0 Vicryl plus suture, and skin in each incision was closed using 3-0 Monocryl plus in an interrupted subcuticular stitch followed by skin-a-fix.  The patient tolerated the procedure well without complication, was taken to the recovery room in stable condition.

## 2017-10-20 NOTE — BRIEF OP NOTE
GASTRIC SLEEVE LAPAROSCOPIC, ESOPHAGOGASTRODUODENOSCOPY  Progress Note    Francoise Kamara  10/20/2017    Pre-op Diagnosis:   Obesity, Class III, BMI 40-49.9 (morbid obesity) [E66.01]       Post-Op Diagnosis Codes:     * Obesity, Class III, BMI 40-49.9 (morbid obesity) [E66.01]    Procedure/CPT® Codes:  NC LAP, ABHILASH RESTRICT PROC, LONGITUDINAL GASTRECTOMY [96872]  NC ESOPHAGOGASTRODUODENOSCOPY TRANSORAL DIAGNOSTIC [77658]    Procedure(s):  GASTRIC SLEEVE LAPAROSCOPIC,   ESOPHAGOGASTRODUODENOSCOPY    Surgeon(s):  Guille Urias MD    Anesthesia: General with Block    Staff:   Circulator: Teri FALL RN  Scrub Person: Ana Méndez  Nursing Assistant: Katey Fajardo; Barrett Welch; Celine Calloway    Estimated Blood Loss: 50    Urine Voided: * No values recorded between 10/20/2017  3:02 PM and 10/20/2017  4:28 PM *    Specimens:                  ID Type Source Tests Collected by Time Destination   A : SUB-TOTAL GASTRECTOMY Tissue Stomach TISSUE EXAM Guille Urias MD 10/20/2017 1604          Drains:           Findings:     Complications: none      Guille Urias MD     Date: 10/20/2017  Time: 4:29 PM

## 2017-10-20 NOTE — ANESTHESIA PREPROCEDURE EVALUATION
Anesthesia Evaluation     Patient summary reviewed and Nursing notes reviewed   NPO Solid Status: > 8 hours  NPO Liquid Status: > 8 hours     Airway   Mallampati: II  TM distance: >3 FB  Neck ROM: full  no difficulty expected  Dental      Pulmonary    (+) shortness of breath,   Cardiovascular     ECG reviewed    (+) hypertension,     ROS comment: EKG NSR NS changes  ECHO  EF 60% ow normal     Neuro/Psych  GI/Hepatic/Renal/Endo    (+) obesity,  GERD,     Musculoskeletal     Abdominal    Substance History      OB/GYN          Other            Phys Exam Other: Has crown rear mouth                                 Anesthesia Plan    ASA 3     general   (TAP ERAS)  intravenous induction   Anesthetic plan and risks discussed with patient.    Plan discussed with CRNA.

## 2017-10-20 NOTE — H&P (VIEW-ONLY)
[facility] BARIATRIC SURGERY  [address]  [facility] [address]  [phone]      Patient  Name:  Francoise Kamara.  :  1960      Date of Visit: 10/3/2017      Chief Complaint:  weight gain; unable to maintain weight loss.  Preop LSG    History of Present Illness:  Francoise Kamara is a 57 y.o. female who presents today for evaluation, education and consultation regarding weight loss surgery. The patient is interested in sleeve gastrectomy.     Francoise has been overweight for at least 43 years, has been 35 pounds or more overweight for at least 45 years, has been 100 pounds or more overweight for 28 or more years and started dieting at age 14.      s/p LAGB APS w/ HHR 2008 by [facility] @ [facility] s/p uncomplicated LAGBR by GDW on 16 for band intolerance and chronic dysphagia.  Feels so much better since band removed.  No further issues w/ dysphagia/vomiting.   Only up 6 lbs.  Wishing to pursue revision.    The patient returns for final visit prior to LSG.  Original intake evaluation LC 17 reviewed.  The patient has had issues with morbid obesity for years and only temporary success with non-surgical and surgical methods of weight loss.  Pt is aware that LSG after prev AGB/AGBR carries increased risk over primary LSG alone, hawa wrt bleeding, infection, leak, prolonged OR times with incr risk pulm complications and VTE, etc and still wishes to proceed.  The patient is seeking LSG to help with the morbid obesity related conditions of  HTN, fatigue, elevated LDL, decreased HDL, edema, stress urinary incontinence, Vit D Defic, JAMISON.    Past Medical History:   Diagnosis Date   • Cardiomegaly     stable on CXR   • Dyspnea on exertion    • Elevated LDL cholesterol level    • Fatigue    • GERD (gastroesophageal reflux disease)     on daily Protonix, EGD 10/2016. Sx's resolved since AGBR, even if doesn't take her PPI. serum h. pyl neg.  EGD GDW 10/16 prior to AGBR  36 cm   •  Hypertension    • Hypoalbuminemia     admits to chronic undereating   • Low HDL (under 40)    • Microcytic red blood cells     H/H 12.6/38.4   • Morbid obesity    • OAB (overactive bladder)    • Peripheral edema    • Post-menopause on HRT (hormone replacement therapy)    • Rosacea     on Doxycycline   • Stress incontinence    • Vaginal atrophy    • Vitamin D deficiency    • Wears glasses      Past Surgical History:   Procedure Laterality Date   • CHOLECYSTECTOMY  2015    for stones w/ Dr. Barboza @ [facility]   • COLONOSCOPY  2014   • ENDOSCOPY  2016    by Dr. Urias   • GASTRIC BANDING REMOVAL N/A 12/21/2016    Procedure: GASTRIC BANDING REMOVAL LAPAROSCOPIC;  Surgeon: Guille Urias MD;  Location: [facility];  Service:    • LAPAROSCOPIC GASTRIC BANDING  2008    s/p LAGB APS w/ HHR 11/2008 by GDW @ [facility].  full dissection hiatus, single stitch ant repair     Allergies   Allergen Reactions   • Penicillins Hives and Swelling     Invanz given for AGBR surgery       Current Outpatient Prescriptions:   •  doxycycline (VIBRAMYCIN) 50 MG capsule, Take 50 mg by mouth 2 (Two) Times a Day., Disp: , Rfl:   •  hydrochlorothiazide (HYDRODIURIL) 12.5 MG tablet, Take 12.5 mg by mouth Daily., Disp: , Rfl:   •  LISINOPRIL PO, Take 40 mg by mouth Daily., Disp: , Rfl:   •  pantoprazole (PROTONIX) 40 MG EC tablet, Take 40 mg by mouth Daily., Disp: , Rfl:   •  SOOLANTRA 1 % cream, , Disp: , Rfl:   •  tolterodine LA (DETROL LA) 4 MG 24 hr capsule, Take 4 mg by mouth Daily., Disp: , Rfl:   •  vitamin D (ERGOCALCIFEROL) 81291 UNITS capsule capsule, 50,000 Units Every 7 (Seven) Days., Disp: , Rfl:   •  estrogen, conjugated,-medroxyprogesterone (PREMPRO) 0.45-1.5 MG per tablet, Take 1 tablet by mouth Daily., Disp: , Rfl:   •  valACYclovir (VALTREX) 1000 MG tablet, Take 1 tablet by mouth As Needed., Disp: , Rfl:   Social History     Social History   • Marital status:      Spouse name: N/A   • Number of children: N/A   • Years of education:  N/A     Occupational History   • Not on file.     Social History Main Topics   • Smoking status: Never Smoker   • Smokeless tobacco: Never Used   • Alcohol use No   • Drug use: No   • Sexual activity: Yes     Partners: Male      Comment: spouse     Other Topics Concern   • Not on file     Social History Narrative     w/2 children.  Lives in [place].  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      Family History   Problem Relation Age of Onset   • Hypertension Mother    • Sleep apnea Mother    • Hypertension Father    • Lung cancer Father    • Hypertension Maternal Grandfather    • Stroke Maternal Grandfather    • Hypertension Paternal Grandmother    • Breast cancer Paternal Aunt 45   • No Known Problems Brother    • BRCA 1/2 Neg Hx    • Ovarian cancer Neg Hx          Review of Systems:  Constitutional:  The patient reports fatigue, weight gain and denies fevers and chills.  Cardiovascular:  The patient reports HTN and denies heart disease and DVT.  Respiratory:  The patient denies asthma, apnea and PE.  Gastrointestinal:  The patient reports heartburn and denies pancreatitis and liver disease.  Genitourinary:  The patient denies renal insufficiency.    Musculoskeletal:  The patient reports joint pain and denies autoimmune disease.  Neurological:  The patient denies seizure and stroke.  Psychiatric:  The patient denies anxiety, depression and bipolar disorder.  Endocrine:  The patient denies diabetes and thyroid disease.  Hematologic:  The patient denies anemia and bleeding disorder.  Skin:  The patient denies MRSA.    Physical Exam:  Vital Signs:      There is no height or weight on file to calculate BMI.  Temp: 97.9 °F (36.6 °C)   Heart Rate: 70   BP: 121/86     Physical Exam   Constitutional: She is oriented to person, place, and time. She appears well-developed and well-nourished.   HENT:   Head: Normocephalic and atraumatic.   Eyes: Conjunctivae are normal. No scleral icterus.   Neck:  Neck supple. Carotid bruit is not present. No thyromegaly present.   Cardiovascular: Normal rate and regular rhythm.    No murmur heard.  Pulmonary/Chest: Effort normal and breath sounds normal. No respiratory distress. She has no wheezes. She has no rales.   Abdominal: Soft. Bowel sounds are normal. She exhibits no distension and no mass. There is no hepatosplenomegaly. There is no tenderness. No hernia.   Scars:  Lap kee, prior Lapband, prior LLQ port   Musculoskeletal: Normal range of motion. She exhibits no edema.   Neurological: She is alert and oriented to person, place, and time. Gait normal.   Skin: Skin is warm and dry. No rash noted.   Psychiatric: She has a normal mood and affect. Judgment normal.   Vitals reviewed.         Patient Active Problem List   Diagnosis   • Urinary frequency   • Stress incontinence   • Hypertension   • GERD (gastroesophageal reflux disease)   • Vaginal atrophy   • Rosacea   • Post-menopause on HRT (hormone replacement therapy)   • OAB (overactive bladder)   • Vitamin D deficiency   • Peripheral edema   • Morbid obesity   • Fatigue   • Dyspnea on exertion   • Wears glasses     Psych Brown /17 approp  Pritesh - HC x 1 w AGBR   Assessment:    Francoise Kamara is a 57 y.o. year old female with medically complicated obesity pursuing sleeve gastrectomy.    Weight loss surgery is deemed medically necessary given the following obesity related comorbidities including hypertension and GERD with current 262 lb 8 oz (119 kg) and Body mass index is 46.5 kg/(m^2).    Patient is aware that surgery is a tool, and that weight loss is not guaranteed but only seen in the context of appropriate use, follow up and exercise.    The patient was present for an approximately a 2.5 hour discussion of the purpose of weight loss surgery, how WLS is a tool to assist in achieving weight loss goals, the most common complications and how best to avoid them, and the strategies for short and long term weight  "loss.  Ample opportunity to discuss questions was available both in group and during the time of individual examination.    I reviewed all available preop labs, Xrays, tests, clearances, etc and signed off on these in the record.  All of this in addition to the patient's unique history and exam has been taken into consideration in determining their appropriate candidacy for weight loss surgery.    Complications  of laparoscopic/possible robotic gastric sleeve were discussed. The patient is well aware of the potential complications of surgery that include but not limited to bleeding, infections, deep venous thrombosis, pulmonary embolism, pulmonary complications such as pneumonia, cardiac events, hernias, small bowel obstruction, damage to the spleen or other organs, bowel injury, disfiguring scars, failure to lose weight, need for additional surgery, conversion to an open procedure, and death. Patient is also aware of complications which apply in this particular procedure that can include but are not limited to a \"leak\" at the staple line which in some instances may require conversion to gastric bypass.    The patient is aware if a hiatal hernia is encountered, it likely will be repaired.  R/B/A Rx to hiatal hernia repair were discussed as outlined in our long consent form.  Briefly risks in addition to those for LSG include recurrent hernia, JAMISON, dysphagia, esophageal injury, pneumothorax, injury to the vagus nerves, injury to the thoracic duct, aorta or vena cava.    Greater than 3 minutes was spent with the patient discussing avoiding all tobacco products and second hand smoke at least 2 weeks pre-operatively and 6 weeks post-operatively to minimize the risk of sleeve leak.  This included discussing the importance of avoiding even secondhand smoke as the risk of leak is increased.  Examples discussed:  I made it very clear that the patient understands they should avoid even riding in a car where someone has " previously smoked in the last 2 weeks, living in a house where someone smokes (even if it's in a separate room/patio/attached garage, etc.) we discussed that they should not have a conversation with a group of people who are smoking even if it's outside.  They can be around wood burning fires and barbecue.  I told them I do not know if marijuana has a same effects but my overall recommendation is to avoid it for 2 weeks prior in 6 weeks after surgery.  They also are aware that nicotine may also increase the risk of leak and I strongly encouraged him to avoid that as well for 2 weeks prior in 6 weeks after surgery.    Discussed the risks, benefits and alternative therapies at great length as outlined in our extensive consent forms, consent videos, and educational teaching process under the direction of the center's .    A copy of the patient's signed informed consent is on file.    Hypoalbuminemia suggests chronic protein malnutrition despite morbid obese status and may put pt at incr risk for poor wound healing, wound complications, infection, leak - pt aware and encouraged to increase protein intake to hopefully avoid delayed LSG leak.     Plan:  CORTNEY, raul serrano HHR.      Guille Urias MD

## 2017-10-21 ENCOUNTER — APPOINTMENT (OUTPATIENT)
Dept: GENERAL RADIOLOGY | Facility: HOSPITAL | Age: 57
End: 2017-10-21

## 2017-10-21 VITALS
DIASTOLIC BLOOD PRESSURE: 77 MMHG | HEART RATE: 76 BPM | RESPIRATION RATE: 24 BRPM | WEIGHT: 262 LBS | SYSTOLIC BLOOD PRESSURE: 157 MMHG | TEMPERATURE: 98.8 F | BODY MASS INDEX: 46.42 KG/M2 | OXYGEN SATURATION: 90 % | HEIGHT: 63 IN

## 2017-10-21 LAB
ALBUMIN SERPL-MCNC: 3.8 G/DL (ref 3.2–4.8)
ALBUMIN/GLOB SERPL: 1.4 G/DL (ref 1.5–2.5)
ALP SERPL-CCNC: 73 U/L (ref 25–100)
ALT SERPL W P-5'-P-CCNC: 33 U/L (ref 7–40)
ANION GAP SERPL CALCULATED.3IONS-SCNC: 11 MMOL/L (ref 3–11)
AST SERPL-CCNC: 29 U/L (ref 0–33)
BASOPHILS # BLD AUTO: 0.01 10*3/MM3 (ref 0–0.2)
BASOPHILS NFR BLD AUTO: 0.1 % (ref 0–1)
BILIRUB SERPL-MCNC: 0.3 MG/DL (ref 0.3–1.2)
BUN BLD-MCNC: 11 MG/DL (ref 9–23)
BUN/CREAT SERPL: 22 (ref 7–25)
CALCIUM SPEC-SCNC: 9 MG/DL (ref 8.7–10.4)
CHLORIDE SERPL-SCNC: 103 MMOL/L (ref 99–109)
CO2 SERPL-SCNC: 23 MMOL/L (ref 20–31)
CREAT BLD-MCNC: 0.5 MG/DL (ref 0.6–1.3)
DEPRECATED RDW RBC AUTO: 42.6 FL (ref 37–54)
EOSINOPHIL # BLD AUTO: 0 10*3/MM3 (ref 0–0.3)
EOSINOPHIL NFR BLD AUTO: 0 % (ref 0–3)
ERYTHROCYTE [DISTWIDTH] IN BLOOD BY AUTOMATED COUNT: 13.4 % (ref 11.3–14.5)
GFR SERPL CREATININE-BSD FRML MDRD: 128 ML/MIN/1.73
GLOBULIN UR ELPH-MCNC: 2.8 GM/DL
GLUCOSE BLD-MCNC: 123 MG/DL (ref 70–100)
HCT VFR BLD AUTO: 39.9 % (ref 34.5–44)
HGB BLD-MCNC: 12.7 G/DL (ref 11.5–15.5)
IMM GRANULOCYTES # BLD: 0.06 10*3/MM3 (ref 0–0.03)
IMM GRANULOCYTES NFR BLD: 0.5 % (ref 0–0.6)
IRON 24H UR-MRATE: 17 MCG/DL (ref 50–175)
LYMPHOCYTES # BLD AUTO: 1.08 10*3/MM3 (ref 0.6–4.8)
LYMPHOCYTES NFR BLD AUTO: 8.5 % (ref 24–44)
MCH RBC QN AUTO: 27.5 PG (ref 27–31)
MCHC RBC AUTO-ENTMCNC: 31.8 G/DL (ref 32–36)
MCV RBC AUTO: 86.4 FL (ref 80–99)
MONOCYTES # BLD AUTO: 0.26 10*3/MM3 (ref 0–1)
MONOCYTES NFR BLD AUTO: 2 % (ref 0–12)
NEUTROPHILS # BLD AUTO: 11.32 10*3/MM3 (ref 1.5–8.3)
NEUTROPHILS NFR BLD AUTO: 88.9 % (ref 41–71)
PLATELET # BLD AUTO: 251 10*3/MM3 (ref 150–450)
PMV BLD AUTO: 11.5 FL (ref 6–12)
POTASSIUM BLD-SCNC: 4.2 MMOL/L (ref 3.5–5.5)
PROT SERPL-MCNC: 6.6 G/DL (ref 5.7–8.2)
RBC # BLD AUTO: 4.62 10*6/MM3 (ref 3.89–5.14)
SODIUM BLD-SCNC: 137 MMOL/L (ref 132–146)
WBC NRBC COR # BLD: 12.73 10*3/MM3 (ref 3.5–10.8)

## 2017-10-21 PROCEDURE — 85025 COMPLETE CBC W/AUTO DIFF WBC: CPT | Performed by: SURGERY

## 2017-10-21 PROCEDURE — 25010000002 ONDANSETRON PER 1 MG: Performed by: SURGERY

## 2017-10-21 PROCEDURE — 25010000002 PYRIDOXINE PER 100 MG: Performed by: SURGERY

## 2017-10-21 PROCEDURE — 94799 UNLISTED PULMONARY SVC/PX: CPT

## 2017-10-21 PROCEDURE — 25010000002 THIAMINE PER 100 MG: Performed by: SURGERY

## 2017-10-21 PROCEDURE — 83540 ASSAY OF IRON: CPT | Performed by: SURGERY

## 2017-10-21 PROCEDURE — 25010000002 METOCLOPRAMIDE PER 10 MG: Performed by: SURGERY

## 2017-10-21 PROCEDURE — 80053 COMPREHEN METABOLIC PANEL: CPT | Performed by: SURGERY

## 2017-10-21 PROCEDURE — 25010000002 NA FERRIC GLUC CPLX PER 12.5 MG: Performed by: SURGERY

## 2017-10-21 PROCEDURE — 74241: CPT

## 2017-10-21 PROCEDURE — 25010000002 CYANOCOBALAMIN PER 1000 MCG: Performed by: SURGERY

## 2017-10-21 PROCEDURE — 25810000003 POTASSIUM CHLORIDE PER 2 MEQ: Performed by: SURGERY

## 2017-10-21 PROCEDURE — 94640 AIRWAY INHALATION TREATMENT: CPT

## 2017-10-21 PROCEDURE — 99024 POSTOP FOLLOW-UP VISIT: CPT | Performed by: SURGERY

## 2017-10-21 PROCEDURE — 0 DIATRIZOATE MEGLUMINE & SODIUM PER 1 ML: Performed by: SURGERY

## 2017-10-21 RX ORDER — ONDANSETRON 4 MG/1
4 TABLET, ORALLY DISINTEGRATING ORAL EVERY 8 HOURS PRN
Qty: 20 TABLET | Refills: 0 | Status: SHIPPED | OUTPATIENT
Start: 2017-10-21 | End: 2017-11-16

## 2017-10-21 RX ORDER — HYDROCODONE BITARTRATE AND ACETAMINOPHEN 7.5; 325 MG/1; MG/1
1 TABLET ORAL EVERY 6 HOURS PRN
Qty: 30 TABLET | Refills: 0 | Status: SHIPPED | OUTPATIENT
Start: 2017-10-21 | End: 2017-11-16

## 2017-10-21 RX ADMIN — PANTOPRAZOLE SODIUM 40 MG: 40 INJECTION, POWDER, FOR SOLUTION INTRAVENOUS at 05:00

## 2017-10-21 RX ADMIN — ERTAPENEM SODIUM 1 G: 1 INJECTION, POWDER, LYOPHILIZED, FOR SOLUTION INTRAMUSCULAR; INTRAVENOUS at 15:12

## 2017-10-21 RX ADMIN — Medication 45 ML: at 09:46

## 2017-10-21 RX ADMIN — PYRIDOXINE HYDROCHLORIDE 250 ML/HR: 100 INJECTION, SOLUTION INTRAMUSCULAR; INTRAVENOUS at 08:52

## 2017-10-21 RX ADMIN — METOCLOPRAMIDE 10 MG: 5 INJECTION, SOLUTION INTRAMUSCULAR; INTRAVENOUS at 05:11

## 2017-10-21 RX ADMIN — METOCLOPRAMIDE 10 MG: 5 INJECTION, SOLUTION INTRAMUSCULAR; INTRAVENOUS at 11:45

## 2017-10-21 RX ADMIN — LISINOPRIL 40 MG: 40 TABLET ORAL at 10:18

## 2017-10-21 RX ADMIN — CYANOCOBALAMIN 1000 MCG: 1000 INJECTION, SOLUTION INTRAMUSCULAR; SUBCUTANEOUS at 10:18

## 2017-10-21 RX ADMIN — HYDROCODONE BITARTRATE AND ACETAMINOPHEN 1 TABLET: 7.5; 325 TABLET ORAL at 00:10

## 2017-10-21 RX ADMIN — SODIUM CHLORIDE 125 MG: 9 INJECTION, SOLUTION INTRAVENOUS at 13:00

## 2017-10-21 RX ADMIN — METOCLOPRAMIDE 10 MG: 5 INJECTION, SOLUTION INTRAMUSCULAR; INTRAVENOUS at 05:00

## 2017-10-21 RX ADMIN — OXYBUTYNIN CHLORIDE 5 MG: 5 TABLET, EXTENDED RELEASE ORAL at 10:18

## 2017-10-21 RX ADMIN — ONDANSETRON 4 MG: 2 INJECTION INTRAMUSCULAR; INTRAVENOUS at 09:28

## 2017-10-21 RX ADMIN — POTASSIUM CHLORIDE AND SODIUM CHLORIDE 125 ML/HR: 450; 150 INJECTION, SOLUTION INTRAVENOUS at 08:52

## 2017-10-21 RX ADMIN — LISINOPRIL 40 MG: 40 TABLET ORAL at 05:01

## 2017-10-21 RX ADMIN — DOXYCYCLINE HYCLATE 50 MG: 100 TABLET, COATED ORAL at 10:17

## 2017-10-21 RX ADMIN — METOCLOPRAMIDE 10 MG: 5 INJECTION, SOLUTION INTRAMUSCULAR; INTRAVENOUS at 00:10

## 2017-10-21 RX ADMIN — ALBUTEROL SULFATE 2.5 MG: 2.5 SOLUTION RESPIRATORY (INHALATION) at 07:49

## 2017-10-21 RX ADMIN — ALBUTEROL SULFATE 2.5 MG: 2.5 SOLUTION RESPIRATORY (INHALATION) at 13:03

## 2017-10-21 NOTE — PLAN OF CARE
Problem: Patient Care Overview (Adult)  Goal: Plan of Care Review  Outcome: Ongoing (interventions implemented as appropriate)    10/21/17 1735   Coping/Psychosocial Response Interventions   Plan Of Care Reviewed With patient   Patient Care Overview   Progress improving         Problem: Perioperative Period (Adult)  Goal: Signs and Symptoms of Listed Potential Problems Will be Absent or Manageable (Perioperative Period)  Outcome: Ongoing (interventions implemented as appropriate)    10/21/17 1735   Perioperative Period   Problems Assessed (Perioperative Period) all   Problems Present (Perioperative Period) pain         Problem: Bariatric Surgery (Open/Laparoscopic) (Adult,Pediatric)  Goal: Signs and Symptoms of Listed Potential Problems Will be Absent or Manageable (Bariatric Surgery)  Outcome: Ongoing (interventions implemented as appropriate)    10/21/17 1735   Bariatric Surgery (Open/Laparoscopic)   Problems Assessed (Bariatric Surgery) all   Problems Present (Bariatric Surgery) pain;nausea and vomiting

## 2017-10-21 NOTE — PROGRESS NOTES
"Bariatric Surgery Progress Note:      Chief Complaint:  POD #1    Subjective     Interval History:  Doing well.  No complaints.  Pain controlled.  Denies N/V and tolerating stage 1 diet.  No fevers.  Ambulating.  Voiding.  IS 1000.  From [place], but has home in [place] and plans to convalesce there.      Objective     Vital Signs  Blood pressure 157/77, pulse 76, temperature 98.8 °F (37.1 °C), temperature source Oral, resp. rate 24, height 63\" (160 cm), weight 262 lb (119 kg), SpO2 90 %.      Intake/Output Summary (Last 24 hours) at 10/21/17 1808  Last data filed at 10/21/17 1800   Gross per 24 hour   Intake             3156 ml   Output             1300 ml   Net             1856 ml       Physical Exam:  General: Alert, NAD  Lungs: Clear  Heart: RRR  Abdomen: soft, appropriate, incisions clean and dry, +BS  Extremities: warm, (+) SCDs       Labs:  Lab Results (last 24 hours)     Procedure Component Value Units Date/Time    Tissue Pathology Exam - Tissue, Stomach [[id number]] Collected:  10/20/17 1604    Specimen:  Tissue from Stomach Updated:  10/21/17 0416    CBC & Differential [[id number]] Collected:  10/21/17 0535    Specimen:  Blood Updated:  10/21/17 0623    Narrative:       The following orders were created for panel order CBC & Differential.  Procedure                               Abnormality         Status                     ---------                               -----------         ------                     CBC Auto Differential[[id number]]        Abnormal            Final result                 Please view results for these tests on the individual orders.    CBC Auto Differential [[id number]]  (Abnormal) Collected:  10/21/17 0535    Specimen:  Blood Updated:  10/21/17 0623     WBC 12.73 (H) 10*3/mm3      RBC 4.62 10*6/mm3      Hemoglobin 12.7 g/dL      Hematocrit 39.9 %      MCV 86.4 fL      MCH 27.5 pg      MCHC 31.8 (L) g/dL      RDW 13.4 %      RDW-SD 42.6 fl      MPV 11.5 fL      Platelets 251 10*3/mm3  "     Neutrophil % 88.9 (H) %      Lymphocyte % 8.5 (L) %      Monocyte % 2.0 %      Eosinophil % 0.0 %      Basophil % 0.1 %      Immature Grans % 0.5 %      Neutrophils, Absolute 11.32 (H) 10*3/mm3      Lymphocytes, Absolute 1.08 10*3/mm3      Monocytes, Absolute 0.26 10*3/mm3      Eosinophils, Absolute 0.00 10*3/mm3      Basophils, Absolute 0.01 10*3/mm3      Immature Grans, Absolute 0.06 (H) 10*3/mm3     Comprehensive Metabolic Panel [[id number]]  (Abnormal) Collected:  10/21/17 0535    Specimen:  Blood Updated:  10/21/17 0643     Glucose 123 (H) mg/dL      BUN 11 mg/dL      Creatinine 0.50 (L) mg/dL      Sodium 137 mmol/L      Potassium 4.2 mmol/L      Chloride 103 mmol/L      CO2 23.0 mmol/L      Calcium 9.0 mg/dL      Total Protein 6.6 g/dL      Albumin 3.80 g/dL      ALT (SGPT) 33 U/L      AST (SGOT) 29 U/L      Alkaline Phosphatase 73 U/L      Total Bilirubin 0.3 mg/dL      eGFR Non African Amer 128 mL/min/1.73      Globulin 2.8 gm/dL      A/G Ratio 1.4 (L) g/dL      BUN/Creatinine Ratio 22.0     Anion Gap 11.0 mmol/L     Narrative:       [employer] Guidelines    Stage     Description        GFR  1         Normal or High     90+  2         Mild decrease      60-89  3         Moderate decrease  30-59  4         Severe decrease    15-29  5         Kidney failure     <15    Iron [[id number]]  (Abnormal) Collected:  10/21/17 0535    Specimen:  Blood Updated:  10/21/17 0643     Iron 17 (L) mcg/dL             Assessment/Plan     POD # 1 s/p LSG.    Doing well.  UGI normal post-sleeve, images and report reviewed.  IV iron given for low Fe.  Patient feels well and has met discharge criteria.  Will discharge home with follow up in 1 week with PA.  Rx given for Norco and zofran.   Discharge instructions reviewed with patient and all questions answered.        10/21/17  6:08 PM  Angelica Wiley MD

## 2017-10-23 LAB
CYTO UR: NORMAL
LAB AP CASE REPORT: NORMAL
LAB AP CLINICAL INFORMATION: NORMAL
Lab: NORMAL
PATH REPORT.FINAL DX SPEC: NORMAL
PATH REPORT.GROSS SPEC: NORMAL

## 2017-10-27 ENCOUNTER — OFFICE VISIT (OUTPATIENT)
Dept: BARIATRICS/WEIGHT MGMT | Facility: CLINIC | Age: 57
End: 2017-10-27

## 2017-10-27 VITALS
RESPIRATION RATE: 18 BRPM | OXYGEN SATURATION: 99 % | WEIGHT: 246.5 LBS | TEMPERATURE: 97.8 F | SYSTOLIC BLOOD PRESSURE: 118 MMHG | HEIGHT: 63 IN | HEART RATE: 76 BPM | BODY MASS INDEX: 43.68 KG/M2 | DIASTOLIC BLOOD PRESSURE: 72 MMHG

## 2017-10-27 DIAGNOSIS — Z98.84 S/P BARIATRIC SURGERY: Primary | ICD-10-CM

## 2017-10-27 PROCEDURE — 99024 POSTOP FOLLOW-UP VISIT: CPT | Performed by: PHYSICIAN ASSISTANT

## 2017-10-27 NOTE — PROGRESS NOTES
[facility] Bariatric Surgery  [address]  [facility] [address] [phone]      Patient Name:  Francoise Kamara.  :  1960      Date of Visit: 10/27/2017      Reason for Visit:  POD # 7    HPI:  Francoise Kamara is a 57 y.o. female s/p LSG by GDW on 10/20/17    Doing well.  No issues/concerns. Denies dysphagia, reflux, nausea, vomiting, abdominal pain, pulmonary issues and fevers.  Tolerating diet progression - on stage 1.  Getting 70-80g prot/day.  Drinking fluids w/out issue.  Not yet taking vitamins.  On Pantoprazole.  Ambulating frequently - already back to work w/ [employer].     Presurgery weight: 262 pounds.  Today's weight is 246 lb 8 oz (112 kg) pounds, today's  Body mass index is 43.67 kg/(m^2)., and her weight loss since surgery is 16 pounds.       Past Medical History:   Diagnosis Date   • Cardiomegaly     stable on CXR   • Dyspnea on exertion    • Elevated LDL cholesterol level    • Fatigue    • GERD (gastroesophageal reflux disease)     on daily Protonix, EGD 10/2016. Sx's resolved since AGBR, even if doesn't take her PPI. serum h. pyl neg.  EGD GDW 10/16 prior to AGBR  36 cm   • Hypertension    • Hypoalbuminemia     admits to chronic undereating   • Low HDL (under 40)    • Microcytic red blood cells     H/H 12.6/38.4   • Morbid obesity    • OAB (overactive bladder)    • Peripheral edema    • Post-menopause on HRT (hormone replacement therapy)    • Rosacea     on Doxycycline   • Stress incontinence    • Vaginal atrophy    • Vitamin D deficiency    • Wears glasses      Past Surgical History:   Procedure Laterality Date   • CHOLECYSTECTOMY      for stones w/ Dr. Barboza @ [facility]   • COLONOSCOPY     • ENDOSCOPY      by Dr. Urias   • ENDOSCOPY N/A 10/20/2017    Procedure: ESOPHAGOGASTRODUODENOSCOPY;  Surgeon: Guille Urias MD;  Location: [facility];  Service:    • GASTRIC BANDING REMOVAL N/A 2016    Procedure: GASTRIC BANDING REMOVAL  "LAPAROSCOPIC;  Surgeon: Guille Urias MD;  Location:  [facility];  Service:    • GASTRIC SLEEVE LAPAROSCOPIC N/A 10/20/2017    Procedure: GASTRIC SLEEVE LAPAROSCOPIC, ;  Surgeon: Guille Urias MD;  Location:  [facility];  Service:    • LAPAROSCOPIC GASTRIC BANDING  2008    s/p LAGB APS w/ HHR 11/2008 by GDW @ [facility].  full dissection hiatus, single stitch ant repair   • WISDOM TOOTH EXTRACTION       Outpatient Prescriptions Marked as Taking for the 10/27/17 encounter (Office Visit) with BRANDON Daniel   Medication Sig Dispense Refill   • doxycycline (VIBRAMYCIN) 50 MG capsule Take 50 mg by mouth 2 (Two) Times a Day.     • LISINOPRIL PO Take 40 mg by mouth Daily.     • pantoprazole (PROTONIX) 40 MG EC tablet Take 40 mg by mouth Daily.     • SOOLANTRA 1 % cream Apply 1 application topically Daily. Apply to face     • vitamin D (ERGOCALCIFEROL) 70337 UNITS capsule capsule 50,000 Units 1 (One) Time Per Week. sundays       Allergies   Allergen Reactions   • Penicillins Hives and Swelling     Invanz given for AGBR surgery       Social History     Social History   • Marital status:      Spouse name: N/A   • Number of children: N/A   • Years of education: N/A     Occupational History   • Not on file.     Social History Main Topics   • Smoking status: Never Smoker   • Smokeless tobacco: Never Used   • Alcohol use No   • Drug use: No   • Sexual activity: Defer      Comment: spouse     Other Topics Concern   • Not on file     Social History Narrative     w/2 children.  Lives in [place].  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        /72 (BP Location: Left arm, Patient Position: Sitting, Cuff Size: Large Adult)  Pulse 76  Temp 97.8 °F (36.6 °C) (Temporal Artery )   Resp 18  Ht 63\" (160 cm)  Wt 246 lb 8 oz (112 kg)  SpO2 99%  BMI 43.67 kg/m2  Physical Exam   Constitutional: She appears well-developed and well-nourished. She is cooperative.   obese   HENT: "   Mouth/Throat: Oropharynx is clear and moist and mucous membranes are normal.   Eyes: Conjunctivae are normal. No scleral icterus.   Cardiovascular: Normal rate.    Pulmonary/Chest: Effort normal.   Abdominal: Soft. Bowel sounds are normal. There is no tenderness.   Incisions healing well   Musculoskeletal: Normal range of motion. She exhibits no edema.   Neurological: She is alert.   Skin: Skin is warm and dry. No rash noted.   Psychiatric: She has a normal mood and affect. Judgment normal.         Assessment:   POD # 7 s/p LSG by GDW on 10/20/17      Plan:  Doing well. Continue to advance diet per manual.  Increase protein intake to 100g/day.  Increase exercise/activity as tolerated.  Reviewed lifting restrictions, nothing >25 lbs x 2 more weeks.  Start vitamins.  Continue PPI.  Continue to avoid ASA/NSAIDs/Steroids x 6 weeks postop.  Call w/ problems/concerns.    The patient was instructed to follow up in 3 weeks, sooner if needed.

## 2017-11-16 ENCOUNTER — OFFICE VISIT (OUTPATIENT)
Dept: BARIATRICS/WEIGHT MGMT | Facility: CLINIC | Age: 57
End: 2017-11-16

## 2017-11-16 VITALS
TEMPERATURE: 99.8 F | BODY MASS INDEX: 41.99 KG/M2 | HEART RATE: 104 BPM | DIASTOLIC BLOOD PRESSURE: 81 MMHG | SYSTOLIC BLOOD PRESSURE: 144 MMHG | RESPIRATION RATE: 18 BRPM | OXYGEN SATURATION: 99 % | HEIGHT: 63 IN | WEIGHT: 237 LBS

## 2017-11-16 DIAGNOSIS — E55.9 HYPOVITAMINOSIS D: ICD-10-CM

## 2017-11-16 DIAGNOSIS — I10 ESSENTIAL HYPERTENSION: ICD-10-CM

## 2017-11-16 DIAGNOSIS — Z13.21 MALNUTRITION SCREEN: ICD-10-CM

## 2017-11-16 DIAGNOSIS — R53.83 FATIGUE, UNSPECIFIED TYPE: Primary | ICD-10-CM

## 2017-11-16 DIAGNOSIS — Z90.3 POSTGASTRECTOMY MALABSORPTION: ICD-10-CM

## 2017-11-16 DIAGNOSIS — K21.9 GASTROESOPHAGEAL REFLUX DISEASE, ESOPHAGITIS PRESENCE NOT SPECIFIED: ICD-10-CM

## 2017-11-16 DIAGNOSIS — K91.2 POSTGASTRECTOMY MALABSORPTION: ICD-10-CM

## 2017-11-16 DIAGNOSIS — R06.00 DYSPNEA, UNSPECIFIED TYPE: ICD-10-CM

## 2017-11-16 DIAGNOSIS — Z13.0 SCREENING, IRON DEFICIENCY ANEMIA: ICD-10-CM

## 2017-11-16 PROCEDURE — 99024 POSTOP FOLLOW-UP VISIT: CPT | Performed by: SURGERY

## 2017-11-16 NOTE — PROGRESS NOTES
[facility] Bariatric Surgery  [address]  [facility] [address]  [phone]      Patient Name:  Francoise Kamara.  :  1960      Date of Visit: 2017      Reason for Visit:  1 month postop    HPI:  Francoise Kamara is a 57 y.o. female s/p LSG by GDW on 10/20/17    Doing well.  No issues/concerns. Denies dysphagia, reflux, nausea, vomiting and abdominal pain.  Tolerating diet progression - on stage 4.  Getting 70 g prot/day.  Drinking 50 fluid oz/day.  Sometimes gets a [employer] all-natural lemonade.  Taking MVI, B12, B1, Calcium, Vit D and Vit C.  Vitamin D 50,000 U weekly.  Thought there was enough iron in MVI so wasn't taking an extra vitamin pill.  On Pantoprazole.  Still holding ASA , NSAIDs , Tramadol, Hormones, Diuretics  and Steroids.  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  Exercising: not regularly yet.     Presurgery weight:  262 pounds. Today's weight is 237 lb (108 kg) pounds, today's Body mass index is 41.98 kg/(m^2)., and her weight loss since surgery is 25 pounds.       Past Medical History:   Diagnosis Date   • Cardiomegaly     stable on CXR   • Dyspnea on exertion    • Elevated LDL cholesterol level    • Fatigue    • GERD (gastroesophageal reflux disease)     on daily Protonix, EGD 10/2016. Sx's resolved since AGBR, even if doesn't take her PPI. serum h. pyl neg.  EGD GDW 10/16 prior to AGBR  36 cm   • Hypertension    • Hypoalbuminemia     admits to chronic undereating   • Low HDL (under 40)    • Microcytic red blood cells     H/H 12.6/38.4   • Morbid obesity    • OAB (overactive bladder)    • Peripheral edema    • Post-menopause on HRT (hormone replacement therapy)    • Rosacea     on Doxycycline   • Stress incontinence    • Vaginal atrophy    • Vitamin D deficiency    • Wears glasses      Past Surgical History:   Procedure Laterality Date   • CHOLECYSTECTOMY   "2015    for stones w/ Dr. Barboza @ [facility]   • COLONOSCOPY  2014   • ENDOSCOPY  2016    by Dr. Urias   • ENDOSCOPY N/A 10/20/2017    Procedure: ESOPHAGOGASTRODUODENOSCOPY;  Surgeon: Guille Urias MD;  Location:  [facility];  Service:    • GASTRIC BANDING REMOVAL N/A 12/21/2016    Procedure: GASTRIC BANDING REMOVAL LAPAROSCOPIC;  Surgeon: Guille Urias MD;  Location:  [facility];  Service:    • GASTRIC SLEEVE LAPAROSCOPIC N/A 10/20/2017    Procedure: GASTRIC SLEEVE LAPAROSCOPIC, ;  Surgeon: Guille Urias MD;  Location:  [facility];  Service:    • LAPAROSCOPIC GASTRIC BANDING  2008    s/p LAGB APS w/ HHR 11/2008 by GDW @ [facility].  full dissection hiatus, single stitch ant repair   • WISDOM TOOTH EXTRACTION       No outpatient prescriptions have been marked as taking for the 11/16/17 encounter (Office Visit) with Angelica Wiley MD.     Allergies   Allergen Reactions   • Penicillins Hives and Swelling     Invanz given for AGBR surgery       Social History     Social History   • Marital status:      Spouse name: N/A   • Number of children: N/A   • Years of education: N/A     Occupational History   • Not on file.     Social History Main Topics   • Smoking status: Never Smoker   • Smokeless tobacco: Never Used   • Alcohol use No   • Drug use: No   • Sexual activity: Defer      Comment: spouse     Other Topics Concern   • Not on file     Social History Narrative     w/2 children.  Lives in [place].  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        /81 (BP Location: Left arm, Patient Position: Sitting, Cuff Size: Large Adult)  Pulse 104  Temp 99.8 °F (37.7 °C) (Temporal Artery )   Resp 18  Ht 63\" (160 cm)  Wt 237 lb (108 kg)  SpO2 99%  BMI 41.98 kg/m2    Physical Exam   Constitutional: She is oriented to person, place, and time. She appears well-developed and well-nourished.   HENT:   Head: Normocephalic and atraumatic.   Mouth/Throat: No oropharyngeal exudate. "   Pulmonary/Chest: Effort normal. No respiratory distress.   Abdominal: Soft. She exhibits no distension. There is no tenderness.   Incisions well-healed with no induration/erythema/drainage   Neurological: She is alert and oriented to person, place, and time.   Skin: Skin is warm and dry. No rash noted. She is not diaphoretic. No erythema.   Psychiatric: She has a normal mood and affect. Her behavior is normal. Judgment and thought content normal.         Assessment:  1 month s/p LSG by JULY on 10/20/17    Plan:  Doing well.  Continue to advance diet per manual.  Continue protein >70g/day.  Encouraged good food choices - high protein, low carb.  Continue routine exercise.  Routine bariatric labs ordered.  Continue vitamins w/ adjustments pending lab results.  Call w/ problems/concerns.    The patient was instructed to follow up in 2 months, sooner if needed.     Angelica Wiley MD

## 2017-11-26 LAB
25(OH)D3+25(OH)D2 SERPL-MCNC: 52.7 NG/ML
A-TOCOPHEROL VIT E SERPL-MCNC: 8.6 MG/L (ref 5.3–16.8)
ALBUMIN SERPL-MCNC: 4 G/DL (ref 3.2–4.8)
ALBUMIN/GLOB SERPL: 1.5 G/DL (ref 1.5–2.5)
ALP SERPL-CCNC: 71 U/L (ref 25–100)
ALT SERPL-CCNC: 32 U/L (ref 7–40)
AST SERPL-CCNC: 24 U/L (ref 0–33)
BASOPHILS # BLD AUTO: 0.02 10*3/MM3 (ref 0–0.2)
BASOPHILS NFR BLD AUTO: 0.3 % (ref 0–1)
BILIRUB SERPL-MCNC: 0.5 MG/DL (ref 0.3–1.2)
BUN SERPL-MCNC: 10 MG/DL (ref 9–23)
BUN/CREAT SERPL: 14.3 (ref 7–25)
CALCIUM SERPL-MCNC: 9.2 MG/DL (ref 8.7–10.4)
CHLORIDE SERPL-SCNC: 108 MMOL/L (ref 99–109)
CO2 SERPL-SCNC: 32 MMOL/L (ref 20–31)
CREAT SERPL-MCNC: 0.7 MG/DL (ref 0.6–1.3)
EOSINOPHIL # BLD AUTO: 0.16 10*3/MM3 (ref 0–0.3)
EOSINOPHIL NFR BLD AUTO: 2.2 % (ref 0–3)
ERYTHROCYTE [DISTWIDTH] IN BLOOD BY AUTOMATED COUNT: 14.7 % (ref 11.3–14.5)
FERRITIN SERPL-MCNC: 78 NG/ML (ref 10–291)
FOLATE SERPL-MCNC: 3.62 NG/ML (ref 3.2–20)
GFR SERPLBLD CREATININE-BSD FMLA CKD-EPI: 105 ML/MIN/1.73
GFR SERPLBLD CREATININE-BSD FMLA CKD-EPI: 87 ML/MIN/1.73
GLOBULIN SER CALC-MCNC: 2.6 GM/DL
GLUCOSE SERPL-MCNC: 87 MG/DL (ref 70–100)
HCT VFR BLD AUTO: 42.7 % (ref 34.5–44)
HGB BLD-MCNC: 13 G/DL (ref 11.5–15.5)
IMM GRANULOCYTES # BLD: 0.02 10*3/MM3 (ref 0–0.03)
IMM GRANULOCYTES NFR BLD: 0.3 % (ref 0–0.6)
IRON SERPL-MCNC: 53 MCG/DL (ref 50–175)
LYMPHOCYTES # BLD AUTO: 2.74 10*3/MM3 (ref 0.6–4.8)
LYMPHOCYTES NFR BLD AUTO: 38.5 % (ref 24–44)
MAGNESIUM SERPL-MCNC: 2.2 MG/DL (ref 1.3–2.7)
MCH RBC QN AUTO: 27.5 PG (ref 27–31)
MCHC RBC AUTO-ENTMCNC: 30.4 G/DL (ref 32–36)
MCV RBC AUTO: 90.5 FL (ref 80–99)
METHYLMALONATE SERPL-SCNC: 110 NMOL/L (ref 0–378)
MONOCYTES # BLD AUTO: 0.52 10*3/MM3 (ref 0–1)
MONOCYTES NFR BLD AUTO: 7.3 % (ref 0–12)
NEUTROPHILS # BLD AUTO: 3.66 10*3/MM3 (ref 1.5–8.3)
NEUTROPHILS NFR BLD AUTO: 51.4 % (ref 41–71)
PHOSPHATE SERPL-MCNC: 3.3 MG/DL (ref 2.4–5.1)
PLATELET # BLD AUTO: 206 10*3/MM3 (ref 150–450)
POTASSIUM SERPL-SCNC: 3.9 MMOL/L (ref 3.5–5.5)
PREALB SERPL-MCNC: 17 MG/DL (ref 10–36)
PROT SERPL-MCNC: 6.6 G/DL (ref 5.7–8.2)
PTH-INTACT SERPL-MCNC: 39 PG/ML (ref 15–65)
RBC # BLD AUTO: 4.72 10*6/MM3 (ref 3.89–5.14)
SODIUM SERPL-SCNC: 145 MMOL/L (ref 132–146)
VIT A SERPL-MCNC: 38 UG/DL (ref 20–65)
VIT B1 BLD-SCNC: 88.6 NMOL/L (ref 66.5–200)
WBC # BLD AUTO: 7.12 10*3/MM3 (ref 3.5–10.8)
ZINC SERPL-MCNC: 68 UG/DL (ref 56–134)

## 2018-01-22 ENCOUNTER — OFFICE VISIT (OUTPATIENT)
Dept: BARIATRICS/WEIGHT MGMT | Facility: CLINIC | Age: 58
End: 2018-01-22

## 2018-01-22 VITALS
RESPIRATION RATE: 18 BRPM | SYSTOLIC BLOOD PRESSURE: 92 MMHG | HEIGHT: 63 IN | DIASTOLIC BLOOD PRESSURE: 64 MMHG | OXYGEN SATURATION: 99 % | WEIGHT: 222.51 LBS | HEART RATE: 96 BPM | BODY MASS INDEX: 39.43 KG/M2 | TEMPERATURE: 99 F

## 2018-01-22 DIAGNOSIS — E55.9 VITAMIN D DEFICIENCY: ICD-10-CM

## 2018-01-22 DIAGNOSIS — Z98.84 STATUS POST BARIATRIC SURGERY: ICD-10-CM

## 2018-01-22 DIAGNOSIS — R53.83 FATIGUE, UNSPECIFIED TYPE: Primary | ICD-10-CM

## 2018-01-22 DIAGNOSIS — E66.9 OBESITY, CLASS II, BMI 35-39.9: ICD-10-CM

## 2018-01-22 PROCEDURE — 99214 OFFICE O/P EST MOD 30 MIN: CPT | Performed by: PHYSICIAN ASSISTANT

## 2018-01-22 NOTE — PROGRESS NOTES
[facility] Bariatric Surgery  [address]  [facility] [address]  [phone]      Patient Name:  Francoise Kamara.  :  1960      Date of Visit: 2018      Reason for Visit:  3 month postop    HPI:  Francoise Kamara is a 57 y.o. female s/p LSG by GDW on 10/20/17    Doing well.  She has had urgency with bowels, episodes of diarrhea.  She did have this prior to surgery, has not had workup of this.  Denies dysphagia, reflux, nausea, vomiting and abdominal pain.  Tolerated diet progression well, eating steak.  Getting 70-100g prot/day. Using protein shake and protein bars.  Trying for 3 meals a day.  Drinking 24 fluid oz/day, adding lemon water to make this more enjoyable. Taking MVI, B12, B1, Calcium, Vit D, iron and Vit C.  Vitamin D 50,000 U weekly.   On Pantoprazole  , reflux well controlled. Has not been routinely exercising, does have a gym really close to her house.     Presurgery weight:  262 pounds. Today's weight is 101 kg (222 lb 8.2 oz) pounds, today's Body mass index is 39.42 kg/(m^2)., and her weight loss since surgery is 40 pounds.       Past Medical History:   Diagnosis Date   • Cardiomegaly     stable on CXR   • Dyspnea on exertion    • Elevated LDL cholesterol level    • Fatigue    • GERD (gastroesophageal reflux disease)     on daily Protonix, EGD 10/2016. Sx's resolved since AGBR, even if doesn't take her PPI. serum h. pyl neg.  EGD GDW 10/16 prior to AGBR  36 cm   • Hypertension    • Hypoalbuminemia     admits to chronic undereating   • Low HDL (under 40)    • Microcytic red blood cells     H/H 12.6/38.4   • Morbid obesity    • OAB (overactive bladder)    • Peripheral edema    • Post-menopause on HRT (hormone replacement therapy)    • Rosacea     on Doxycycline   • Stress incontinence    • Vaginal atrophy    • Vitamin D deficiency    • Wears glasses      Past Surgical History:   Procedure Laterality Date   • CHOLECYSTECTOMY      for stones w/   Tamra @ [facility]   • COLONOSCOPY  2014   • ENDOSCOPY  2016    by Dr. Urias   • ENDOSCOPY N/A 10/20/2017    Procedure: ESOPHAGOGASTRODUODENOSCOPY;  Surgeon: Guille Urias MD;  Location:  [facility];  Service:    • GASTRIC BANDING REMOVAL N/A 12/21/2016    Procedure: GASTRIC BANDING REMOVAL LAPAROSCOPIC;  Surgeon: Guille Urias MD;  Location:  [facility];  Service:    • GASTRIC SLEEVE LAPAROSCOPIC N/A 10/20/2017    Procedure: GASTRIC SLEEVE LAPAROSCOPIC, ;  Surgeon: Guille Urias MD;  Location:  [facility];  Service:    • LAPAROSCOPIC GASTRIC BANDING  2008    s/p LAGB APS w/ HHR 11/2008 by GDW @ [facility].  full dissection hiatus, single stitch ant repair   • WISDOM TOOTH EXTRACTION       Outpatient Prescriptions Marked as Taking for the 1/22/18 encounter (Office Visit) with Celine Jacobsen PA-C   Medication Sig Dispense Refill   • doxycycline (VIBRAMYCIN) 50 MG capsule Take 50 mg by mouth 2 (Two) Times a Day.     • LISINOPRIL PO Take 40 mg by mouth Daily.     • pantoprazole (PROTONIX) 40 MG EC tablet Take 40 mg by mouth Daily.     • SOOLANTRA 1 % cream Apply 1 application topically Daily. Apply to face     • tolterodine LA (DETROL LA) 4 MG 24 hr capsule Take 4 mg by mouth Daily.     • valACYclovir (VALTREX) 1000 MG tablet Take 1 tablet by mouth As Needed.     • vitamin D (ERGOCALCIFEROL) 95857 UNITS capsule capsule 50,000 Units 1 (One) Time Per Week. sundays       Allergies   Allergen Reactions   • Penicillins Hives and Swelling     Invanz given for AGBR surgery       Social History     Social History   • Marital status:      Spouse name: N/A   • Number of children: N/A   • Years of education: N/A     Occupational History   • Not on file.     Social History Main Topics   • Smoking status: Never Smoker   • Smokeless tobacco: Never Used   • Alcohol use No   • Drug use: No   • Sexual activity: Defer      Comment: spouse     Other Topics Concern   • Not on file     Social History Narrative     w/2  "children.  Lives in [place].  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        BP 92/64 (BP Location: Left arm, Patient Position: Sitting, Cuff Size: Large Adult)  Pulse 96  Temp 99 °F (37.2 °C) (Temporal Artery )   Resp 18  Ht 160 cm (63\")  Wt 101 kg (222 lb 8.2 oz)  SpO2 99%  BMI 39.42 kg/m2    Physical Exam   Constitutional: She is oriented to person, place, and time. She appears well-developed and well-nourished.   HENT:   Head: Normocephalic and atraumatic.   Mouth/Throat: No oropharyngeal exudate.   Cardiovascular: Normal rate, regular rhythm and normal heart sounds.    Pulmonary/Chest: Effort normal and breath sounds normal. No respiratory distress. She has no wheezes.   Abdominal: Soft. Bowel sounds are normal. She exhibits no distension. There is no tenderness.   Incisions well-healed with no induration/erythema/drainage   Neurological: She is alert and oriented to person, place, and time.   Skin: Skin is warm and dry. No rash noted. She is not diaphoretic. No erythema.   Psychiatric: She has a normal mood and affect. Her behavior is normal. Judgment and thought content normal.         Assessment:  3 month s/p LSG by JULY on 10/20/17    ICD-10-CM ICD-9-CM   1. Fatigue, unspecified type R53.83 780.79   2. Vitamin D deficiency E55.9 268.9   3. Status post bariatric surgery Z98.84 V45.86   4. Obesity, Class II, BMI 35-39.9 E66.9 278.00       Plan:  Doing well.  Continue to advance diet per manual.  Increase protein to 100g/day.  Encouraged good food choices - high protein, low carb.  Start routine exercise.  Routine bariatric labs ordered.  Continue vitamins w/ adjustments pending lab results.  Call w/ problems/concerns.    The patient was instructed to follow up in 3 months, sooner if needed.     Celine Jacobsen PA-C      "

## 2018-01-25 LAB
25(OH)D3+25(OH)D2 SERPL-MCNC: 35.3 NG/ML (ref 30–100)
ALBUMIN SERPL-MCNC: 4.1 G/DL (ref 3.5–5.5)
ALBUMIN/GLOB SERPL: 1.4 {RATIO} (ref 1.2–2.2)
ALP SERPL-CCNC: 90 IU/L (ref 39–117)
ALT SERPL-CCNC: 12 IU/L (ref 0–32)
AST SERPL-CCNC: 12 IU/L (ref 0–40)
BASOPHILS # BLD AUTO: 0.1 X10E3/UL (ref 0–0.2)
BASOPHILS NFR BLD AUTO: 1 %
BILIRUB SERPL-MCNC: 0.3 MG/DL (ref 0–1.2)
BUN SERPL-MCNC: 11 MG/DL (ref 6–24)
BUN/CREAT SERPL: 13 (ref 9–23)
CALCIUM SERPL-MCNC: 9.3 MG/DL (ref 8.7–10.2)
CHLORIDE SERPL-SCNC: 100 MMOL/L (ref 96–106)
CO2 SERPL-SCNC: 24 MMOL/L (ref 18–29)
CREAT SERPL-MCNC: 0.86 MG/DL (ref 0.57–1)
EOSINOPHIL # BLD AUTO: 0.1 X10E3/UL (ref 0–0.4)
EOSINOPHIL NFR BLD AUTO: 1 %
ERYTHROCYTE [DISTWIDTH] IN BLOOD BY AUTOMATED COUNT: 14.5 % (ref 12.3–15.4)
FERRITIN SERPL-MCNC: 98 NG/ML (ref 15–150)
FOLATE SERPL-MCNC: 6.9 NG/ML
GFR SERPLBLD CREATININE-BSD FMLA CKD-EPI: 76 ML/MIN/1.73
GFR SERPLBLD CREATININE-BSD FMLA CKD-EPI: 88 ML/MIN/1.73
GLOBULIN SER CALC-MCNC: 2.9 G/DL (ref 1.5–4.5)
GLUCOSE SERPL-MCNC: 91 MG/DL (ref 65–99)
HCT VFR BLD AUTO: 41.6 % (ref 34–46.6)
HGB BLD-MCNC: 13.5 G/DL (ref 11.1–15.9)
IMM GRANULOCYTES # BLD: 0 X10E3/UL (ref 0–0.1)
IMM GRANULOCYTES NFR BLD: 0 %
IRON SERPL-MCNC: 55 UG/DL (ref 27–159)
LYMPHOCYTES # BLD AUTO: 2.9 X10E3/UL (ref 0.7–3.1)
LYMPHOCYTES NFR BLD AUTO: 24 %
MCH RBC QN AUTO: 28 PG (ref 26.6–33)
MCHC RBC AUTO-ENTMCNC: 32.5 G/DL (ref 31.5–35.7)
MCV RBC AUTO: 86 FL (ref 79–97)
METHYLMALONATE SERPL-SCNC: 150 NMOL/L (ref 0–378)
MONOCYTES # BLD AUTO: 0.9 X10E3/UL (ref 0.1–0.9)
MONOCYTES NFR BLD AUTO: 7 %
NEUTROPHILS # BLD AUTO: 8.1 X10E3/UL (ref 1.4–7)
NEUTROPHILS NFR BLD AUTO: 67 %
PLATELET # BLD AUTO: 277 X10E3/UL (ref 150–379)
POTASSIUM SERPL-SCNC: 4 MMOL/L (ref 3.5–5.2)
PREALB SERPL-MCNC: 19 MG/DL (ref 10–36)
PROT SERPL-MCNC: 7 G/DL (ref 6–8.5)
RBC # BLD AUTO: 4.82 X10E6/UL (ref 3.77–5.28)
SODIUM SERPL-SCNC: 143 MMOL/L (ref 134–144)
VIT B1 BLD-SCNC: 98.5 NMOL/L (ref 66.5–200)
WBC # BLD AUTO: 12.1 X10E3/UL (ref 3.4–10.8)

## 2018-03-28 ENCOUNTER — OFFICE VISIT (OUTPATIENT)
Dept: OBSTETRICS AND GYNECOLOGY | Facility: CLINIC | Age: 58
End: 2018-03-28

## 2018-03-28 VITALS
BODY MASS INDEX: 40.04 KG/M2 | WEIGHT: 226 LBS | HEIGHT: 63 IN | DIASTOLIC BLOOD PRESSURE: 80 MMHG | SYSTOLIC BLOOD PRESSURE: 122 MMHG

## 2018-03-28 DIAGNOSIS — N32.81 OAB (OVERACTIVE BLADDER): Primary | ICD-10-CM

## 2018-03-28 DIAGNOSIS — N95.2 VAGINAL ATROPHY: ICD-10-CM

## 2018-03-28 DIAGNOSIS — Z01.419 ENCOUNTER FOR GYNECOLOGICAL EXAMINATION WITHOUT ABNORMAL FINDING: ICD-10-CM

## 2018-03-28 DIAGNOSIS — Z78.0 MENOPAUSE: ICD-10-CM

## 2018-03-28 PROCEDURE — 99396 PREV VISIT EST AGE 40-64: CPT | Performed by: OBSTETRICS & GYNECOLOGY

## 2018-03-29 ENCOUNTER — TRANSCRIBE ORDERS (OUTPATIENT)
Dept: ADMINISTRATIVE | Facility: HOSPITAL | Age: 58
End: 2018-03-29

## 2018-03-29 DIAGNOSIS — Z12.31 VISIT FOR SCREENING MAMMOGRAM: Primary | ICD-10-CM

## 2018-04-23 ENCOUNTER — OFFICE VISIT (OUTPATIENT)
Dept: BARIATRICS/WEIGHT MGMT | Facility: CLINIC | Age: 58
End: 2018-04-23

## 2018-04-23 VITALS
TEMPERATURE: 97.8 F | SYSTOLIC BLOOD PRESSURE: 132 MMHG | WEIGHT: 213.5 LBS | OXYGEN SATURATION: 99 % | HEART RATE: 78 BPM | HEIGHT: 63 IN | BODY MASS INDEX: 37.83 KG/M2 | RESPIRATION RATE: 18 BRPM | DIASTOLIC BLOOD PRESSURE: 90 MMHG

## 2018-04-23 DIAGNOSIS — Z90.3 POSTGASTRECTOMY MALABSORPTION: ICD-10-CM

## 2018-04-23 DIAGNOSIS — K91.2 POSTGASTRECTOMY MALABSORPTION: ICD-10-CM

## 2018-04-23 DIAGNOSIS — R53.83 FATIGUE, UNSPECIFIED TYPE: ICD-10-CM

## 2018-04-23 DIAGNOSIS — R19.7 DIARRHEA, UNSPECIFIED TYPE: ICD-10-CM

## 2018-04-23 DIAGNOSIS — Z98.84 STATUS POST BARIATRIC SURGERY: ICD-10-CM

## 2018-04-23 DIAGNOSIS — Z13.21 MALNUTRITION SCREEN: ICD-10-CM

## 2018-04-23 DIAGNOSIS — E55.9 HYPOVITAMINOSIS D: Primary | ICD-10-CM

## 2018-04-23 DIAGNOSIS — Z13.0 SCREENING, IRON DEFICIENCY ANEMIA: ICD-10-CM

## 2018-04-23 PROCEDURE — 99214 OFFICE O/P EST MOD 30 MIN: CPT | Performed by: PHYSICIAN ASSISTANT

## 2018-04-23 NOTE — PROGRESS NOTES
[facility] Bariatric Surgery  [address]  [facility] [address]  [phone]        Patient Name:  Francoise Kamara.  :  1960      Date of Visit: 2018      Reason for Visit:   6 months postop      HPI: Francoise Kamara is a 58 y.o. female s/p LSG by GDW on 10/20/17    Doing well. Very pleased with progress. Mobility and SOA improved, able to be more active, overall feeling much better. She continues to have urgency with defecation, with occasional incontinence.  Diarrhea noted daily ongoing since surgery, a couple episodes a day, last few days has been solid. No other issues/concerns. Denies dysphagia, reflux, nausea, vomiting and abdominal pain.  Getting 60g prot/day, 1 shake a day. Tolerating all foods, eats occasional biscuits with jam. Protein shake for breakfast, meals for lunch and dinner, minimal snacks: premier protein bars.   Drinking mostly lemonade 3-4oz, doesn't drink much otherwise.  3 month labs revealed bariatric levels wnl .  Taking Vit D and iron, D weekly per PCP.  On Pantoprazole.  Not really exercising.     Presurgery weight: 262 pounds.  Today's weight is 96.8 kg (213 lb 8 oz) pounds, today's  Body mass index is 37.82 kg/m²., and her weight loss since surgery is 49 pounds.      Past Medical History:   Diagnosis Date   • Cardiomegaly     stable on CXR   • Dyspnea on exertion    • Elevated LDL cholesterol level    • Fatigue    • GERD (gastroesophageal reflux disease)     on daily Protonix, EGD 10/2016. Sx's resolved since AGBR, even if doesn't take her PPI. serum h. pyl neg.  EGD GDW 10/16 prior to AGBR  36 cm   • Hypertension    • Hypoalbuminemia     admits to chronic undereating   • Low HDL (under 40)    • Microcytic red blood cells     H/H 12.6/38.4   • Morbid obesity    • OAB (overactive bladder)    • Peripheral edema    • Post-menopause on HRT (hormone replacement therapy)    • Rosacea     on Doxycycline   • Stress incontinence    • Vaginal  atrophy    • Vitamin D deficiency    • Wears glasses      Past Surgical History:   Procedure Laterality Date   • CHOLECYSTECTOMY  2015    for stones w/ Dr. Barboza @ [facility]   • COLONOSCOPY  2014   • ENDOSCOPY  2016    by Dr. Urias   • ENDOSCOPY N/A 10/20/2017    Procedure: ESOPHAGOGASTRODUODENOSCOPY;  Surgeon: Guille Urias MD;  Location:  [facility];  Service:    • GASTRIC BANDING REMOVAL N/A 12/21/2016    Procedure: GASTRIC BANDING REMOVAL LAPAROSCOPIC;  Surgeon: Guille Urias MD;  Location:  [facility];  Service:    • GASTRIC SLEEVE LAPAROSCOPIC N/A 10/20/2017    Procedure: GASTRIC SLEEVE LAPAROSCOPIC, ;  Surgeon: Guille Urias MD;  Location:  [facility];  Service:    • LAPAROSCOPIC GASTRIC BANDING  2008    s/p LAGB APS w/ HHR 11/2008 by W @ [facility] ([place]  full dissection hiatus, single stitch ant repair   • WISDOM TOOTH EXTRACTION       Outpatient Prescriptions Marked as Taking for the 4/23/18 encounter (Office Visit) with Celine Jcaobsen PA-C   Medication Sig Dispense Refill   • doxycycline (VIBRAMYCIN) 50 MG capsule Take 50 mg by mouth Daily.     • LISINOPRIL PO Take 40 mg by mouth Daily.     • pantoprazole (PROTONIX) 40 MG EC tablet Take 40 mg by mouth Daily.     • SOOLANTRA 1 % cream Apply 1 application topically Daily. Apply to face     • tolterodine LA (DETROL LA) 4 MG 24 hr capsule Take 4 mg by mouth Daily.     • vitamin D (ERGOCALCIFEROL) 31270 UNITS capsule capsule 50,000 Units 1 (One) Time Per Week. sundays         Allergies   Allergen Reactions   • Penicillins Hives and Swelling     Invanz given for AGBR surgery       Social History     Social History   • Marital status:      Spouse name: N/A   • Number of children: N/A   • Years of education: N/A     Occupational History   • Not on file.     Social History Main Topics   • Smoking status: Never Smoker   • Smokeless tobacco: Never Used   • Alcohol use No   • Drug use: No   • Sexual activity: Defer      Comment: spouse     Other Topics  "Concern   • Not on file     Social History Narrative     w/2 children.  Lives in [place].  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        /90 (BP Location: Left arm, Patient Position: Sitting, Cuff Size: Large Adult)   Pulse 78   Temp 97.8 °F (36.6 °C) (Temporal Artery )   Resp 18   Ht 160 cm (63\")   Wt 96.8 kg (213 lb 8 oz)   SpO2 99%   BMI 37.82 kg/m²     Physical Exam   Constitutional: She is oriented to person, place, and time. She appears well-developed and well-nourished.   HENT:   Head: Normocephalic and atraumatic.   Cardiovascular: Normal rate, regular rhythm and normal heart sounds.    Pulmonary/Chest: Effort normal and breath sounds normal.   Abdominal: Soft. Bowel sounds are normal.   Incisions well healed   Neurological: She is alert and oriented to person, place, and time.   Skin: Skin is warm and dry.   Psychiatric: She has a normal mood and affect. Her behavior is normal. Judgment and thought content normal.         Assessment:  6 months s/p LSG by JULY on 10/20/17    ICD-10-CM ICD-9-CM   1. Hypovitaminosis D E55.9 268.9   2. Screening, iron deficiency anemia Z13.0 V78.0   3. Malnutrition screen Z13.21 V77.2   4. Postgastrectomy malabsorption K91.2 579.3    Z90.3    5. Fatigue, unspecified type R53.83 780.79   6. Status post bariatric surgery Z98.84 V45.86   7. Diarrhea, unspecified type R19.7 787.91         Plan:  Diarrhea: will r/o c diff with stool testing. Try alternative/ dairy free protein shakes. Otherwise, discuss with PCP.   Continue w/ good food choices and healthy habits.  Continue to focus on high protein, low carb. Increase protein to  daily.  Increase water to 64oz daily. Keep tracking intake.  Continue routine exercise.  Routine bariatric labs ordered.  Continue vitamins w/ adjustments pending lab results.   Call w/ problems/concerns.     The patient was instructed to follow up in 3 months, sooner if needed.      Total time spent w/ " patient 25 minutes and 15 minutes spent counseling the patient on nutrition and necessary dietary/lifestyle modifications.

## 2018-04-27 ENCOUNTER — TELEPHONE (OUTPATIENT)
Dept: BARIATRICS/WEIGHT MGMT | Facility: CLINIC | Age: 58
End: 2018-04-27

## 2018-04-27 RX ORDER — METRONIDAZOLE 500 MG/1
500 TABLET ORAL 3 TIMES DAILY
Qty: 42 TABLET | Refills: 0 | Status: SHIPPED | OUTPATIENT
Start: 2018-04-27 | End: 2018-05-11

## 2018-04-30 ENCOUNTER — RESULTS ENCOUNTER (OUTPATIENT)
Dept: BARIATRICS/WEIGHT MGMT | Facility: CLINIC | Age: 58
End: 2018-04-30

## 2018-04-30 DIAGNOSIS — R19.7 DIARRHEA, UNSPECIFIED TYPE: ICD-10-CM

## 2018-05-16 ENCOUNTER — HOSPITAL ENCOUNTER (OUTPATIENT)
Dept: MAMMOGRAPHY | Facility: HOSPITAL | Age: 58
Discharge: HOME | End: 2018-05-16
Attending: OBSTETRICS & GYNECOLOGY | Admitting: OBSTETRICS & GYNECOLOGY

## 2018-05-16 DIAGNOSIS — Z12.31 VISIT FOR SCREENING MAMMOGRAM: ICD-10-CM

## 2018-05-16 PROCEDURE — 77067 SCR MAMMO BI INCL CAD: CPT | Performed by: RADIOLOGY

## 2018-05-16 PROCEDURE — 77063 BREAST TOMOSYNTHESIS BI: CPT | Performed by: RADIOLOGY

## 2018-05-16 PROCEDURE — 77067 SCR MAMMO BI INCL CAD: CPT

## 2018-05-16 PROCEDURE — 77063 BREAST TOMOSYNTHESIS BI: CPT

## 2018-07-26 ENCOUNTER — OFFICE VISIT (OUTPATIENT)
Dept: BARIATRICS/WEIGHT MGMT | Facility: CLINIC | Age: 58
End: 2018-07-26

## 2018-07-26 VITALS
OXYGEN SATURATION: 99 % | BODY MASS INDEX: 35.88 KG/M2 | TEMPERATURE: 97.8 F | RESPIRATION RATE: 18 BRPM | WEIGHT: 202.5 LBS | DIASTOLIC BLOOD PRESSURE: 74 MMHG | SYSTOLIC BLOOD PRESSURE: 112 MMHG | HEIGHT: 63 IN | HEART RATE: 86 BPM

## 2018-07-26 DIAGNOSIS — E55.9 HYPOVITAMINOSIS D: ICD-10-CM

## 2018-07-26 DIAGNOSIS — E66.9 OBESITY, CLASS II, BMI 35-39.9: ICD-10-CM

## 2018-07-26 DIAGNOSIS — K91.2 POSTGASTRECTOMY MALABSORPTION: ICD-10-CM

## 2018-07-26 DIAGNOSIS — Z13.21 MALNUTRITION SCREEN: ICD-10-CM

## 2018-07-26 DIAGNOSIS — Z98.84 STATUS POST BARIATRIC SURGERY: Primary | ICD-10-CM

## 2018-07-26 DIAGNOSIS — Z13.0 SCREENING, IRON DEFICIENCY ANEMIA: ICD-10-CM

## 2018-07-26 DIAGNOSIS — Z90.3 POSTGASTRECTOMY MALABSORPTION: ICD-10-CM

## 2018-07-26 DIAGNOSIS — R53.83 FATIGUE, UNSPECIFIED TYPE: ICD-10-CM

## 2018-07-26 PROCEDURE — 99214 OFFICE O/P EST MOD 30 MIN: CPT | Performed by: PHYSICIAN ASSISTANT

## 2018-07-26 NOTE — PROGRESS NOTES
[facility] Bariatric Surgery  [address]  [facility] [address] [phone]        Patient Name:  Francoise Kamara.  :  1960      Date of Visit: 2018      Reason for Visit:   9 months postop      HPI: Francoise Kamara is a 58 y.o. female s/p LSG by [facility] on 10/20/17    LOV- C diff (+), was treated and retested (-) by PCP.     Doing well. Very pleased with weight loss, overall feeling much better.  Ideal goal [id number], but would be happy under 200lb.  No issues/concerns. Denies dysphagia, reflux, nausea, vomiting and abdominal pain.  Getting 20-50g prot/day, protein bar+ shake + minimal food.  Not eating much through the day, only when starving.    Not tracking calories.    Drinking mostly lemonade, 3-4 oz at a time.   Drinking <64 fluid oz/day.  3 month labs revealed low calcium, low albumin, low calcium, low D. Prealbumin 19.  Taking B1, Vit D and iron.  On Pantoprazole 40, has not trialed off. Not exercising.         Presurgery weight: 262 pounds.  Today's weight is 91.9 kg (202 lb 8 oz) pounds, today's  Body mass index is 35.87 kg/m²., and her weight loss since surgery is 60 pounds.      Past Medical History:   Diagnosis Date   • Cardiomegaly     stable on CXR   • Dyspnea on exertion    • Elevated LDL cholesterol level    • Fatigue    • GERD (gastroesophageal reflux disease)     on daily Protonix, EGD 10/2016. Sx's resolved since AGBR, even if doesn't take her PPI. serum h. pyl neg.  EGD [facility] 10/16 prior to AGBR  36 cm   • Hypertension    • Hypoalbuminemia     admits to chronic undereating   • Low HDL (under 40)    • Microcytic red blood cells     H/H 12.6/38.4   • Morbid obesity (CMS/HCC)    • OAB (overactive bladder)    • Peripheral edema    • Post-menopause on HRT (hormone replacement therapy)    • Rosacea     on Doxycycline   • Stress incontinence    • Vaginal atrophy    • Vitamin D deficiency    • Wears glasses      Past Surgical History:   Procedure  Laterality Date   • BREAST BIOPSY      ? LATERALITY   • CHOLECYSTECTOMY  2015    for stones w/ Dr. Barboza @ [facility] [place]   • COLONOSCOPY  2014   • ENDOSCOPY  2016    by Dr. Urias   • ENDOSCOPY N/A 10/20/2017    Procedure: ESOPHAGOGASTRODUODENOSCOPY;  Surgeon: Guille Urias MD;  Location:  [facility];  Service:    • GASTRIC BANDING REMOVAL N/A 12/21/2016    Procedure: GASTRIC BANDING REMOVAL LAPAROSCOPIC;  Surgeon: Guille Urias MD;  Location:  [facility];  Service:    • GASTRIC SLEEVE LAPAROSCOPIC N/A 10/20/2017    Procedure: GASTRIC SLEEVE LAPAROSCOPIC, ;  Surgeon: Guille Urias MD;  Location:  [facility];  Service:    • LAPAROSCOPIC GASTRIC BANDING  2008    s/p LAGB APS w/ HHR 11/2008 by [facility] @ [facility] [place].  full dissection hiatus, single stitch ant repair   • WISDOM TOOTH EXTRACTION       Outpatient Prescriptions Marked as Taking for the 7/26/18 encounter (Office Visit) with Celine Jacobsen PA-C   Medication Sig Dispense Refill   • doxycycline (VIBRAMYCIN) 50 MG capsule Take 50 mg by mouth Daily.     • LISINOPRIL PO Take 40 mg by mouth Daily.     • pantoprazole (PROTONIX) 40 MG EC tablet Take 40 mg by mouth Daily.     • SOOLANTRA 1 % cream Apply 1 application topically Daily. Apply to face     • tolterodine LA (DETROL LA) 4 MG 24 hr capsule Take 4 mg by mouth Daily.     • vitamin D (ERGOCALCIFEROL) 77402 UNITS capsule capsule 50,000 Units 1 (One) Time Per Week. sundays         Allergies   Allergen Reactions   • Penicillins Hives and Swelling     Invanz given for AGBR surgery       Social History     Social History   • Marital status:      Spouse name: N/A   • Number of children: N/A   • Years of education: N/A     Occupational History   • Not on file.     Social History Main Topics   • Smoking status: Never Smoker   • Smokeless tobacco: Never Used   • Alcohol use No   • Drug use: No   • Sexual activity: Defer      Comment: spouse     Other Topics Concern   • Not on file     Social History Narrative      "w/2 children.  Lives in [place].  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        /74 (BP Location: Left arm, Patient Position: Sitting, Cuff Size: Large Adult)   Pulse 86   Temp 97.8 °F (36.6 °C) (Temporal Artery )   Resp 18   Ht 160 cm (63\")   Wt 91.9 kg (202 lb 8 oz)   SpO2 99%   BMI 35.87 kg/m²     Physical Exam   Constitutional: She is oriented to person, place, and time. She appears well-developed and well-nourished.   HENT:   Head: Normocephalic and atraumatic.   Cardiovascular: Normal rate, regular rhythm and normal heart sounds.    Pulmonary/Chest: Effort normal and breath sounds normal. No respiratory distress. She has no wheezes.   Abdominal: Soft. Bowel sounds are normal. She exhibits no distension. There is no tenderness.   Incisions well healed   Neurological: She is alert and oriented to person, place, and time.   Skin: Skin is warm and dry.   Psychiatric: She has a normal mood and affect. Her behavior is normal. Judgment and thought content normal.         Assessment:  9 months s/p LSG by JULY on 10/20/17    ICD-10-CM ICD-9-CM   1. Status post bariatric surgery Z98.84 V45.86   2. Fatigue, unspecified type R53.83 780.79   3. Hypovitaminosis D E55.9 268.9   4. Screening, iron deficiency anemia Z13.0 V78.0   5. Malnutrition screen Z13.21 V77.2   6. Postgastrectomy malabsorption K91.2 579.3    Z90.3    7. Obesity, Class II, BMI 35-39.9 E66.9 278.00         Plan:  Continue w/ good food choices and healthy habits.  Continue to focus on high protein, low carb.  Start tracking intake, discussed increasing intake- target 3 meals a day with snacks between- 1200 calories, 70-100g protein.  Continue routine exercise.  Routine bariatric labs ordered.  Continue vitamins w/ adjustments pending lab results. Did not start calcium , advised f/u with PCP to discuss labs as well. Call w/ problems/concerns.     The patient was instructed to follow up in 3 months, sooner if " needed.      Total time spent w/ patient 25 minutes and 15 minutes spent counseling the patient on nutrition and necessary dietary/lifestyle modifications.

## 2018-07-30 LAB
25(OH)D3+25(OH)D2 SERPL-MCNC: 36.8 NG/ML (ref 30–100)
ALBUMIN SERPL-MCNC: 4.3 G/DL (ref 3.5–5.5)
ALBUMIN/GLOB SERPL: 1.5 {RATIO} (ref 1.2–2.2)
ALP SERPL-CCNC: 94 IU/L (ref 39–117)
ALT SERPL-CCNC: 8 IU/L (ref 0–32)
AST SERPL-CCNC: 13 IU/L (ref 0–40)
BASOPHILS # BLD AUTO: 0 X10E3/UL (ref 0–0.2)
BASOPHILS NFR BLD AUTO: 0 %
BILIRUB SERPL-MCNC: 0.4 MG/DL (ref 0–1.2)
BUN SERPL-MCNC: 13 MG/DL (ref 6–24)
BUN/CREAT SERPL: 17 (ref 9–23)
CALCIUM SERPL-MCNC: 9.7 MG/DL (ref 8.7–10.2)
CHLORIDE SERPL-SCNC: 100 MMOL/L (ref 96–106)
CO2 SERPL-SCNC: 25 MMOL/L (ref 20–29)
CREAT SERPL-MCNC: 0.76 MG/DL (ref 0.57–1)
EOSINOPHIL # BLD AUTO: 0.1 X10E3/UL (ref 0–0.4)
EOSINOPHIL NFR BLD AUTO: 1 %
ERYTHROCYTE [DISTWIDTH] IN BLOOD BY AUTOMATED COUNT: 13.4 % (ref 12.3–15.4)
FERRITIN SERPL-MCNC: 83 NG/ML (ref 15–150)
FOLATE SERPL-MCNC: 3.4 NG/ML
GLOBULIN SER CALC-MCNC: 2.8 G/DL (ref 1.5–4.5)
GLUCOSE SERPL-MCNC: 76 MG/DL (ref 65–99)
HCT VFR BLD AUTO: 42.2 % (ref 34–46.6)
HGB BLD-MCNC: 13.8 G/DL (ref 11.1–15.9)
IMM GRANULOCYTES # BLD: 0 X10E3/UL (ref 0–0.1)
IMM GRANULOCYTES NFR BLD: 0 %
IRON SERPL-MCNC: 50 UG/DL (ref 27–159)
LYMPHOCYTES # BLD AUTO: 4.1 X10E3/UL (ref 0.7–3.1)
LYMPHOCYTES NFR BLD AUTO: 34 %
Lab: NORMAL
MCH RBC QN AUTO: 29.7 PG (ref 26.6–33)
MCHC RBC AUTO-ENTMCNC: 32.7 G/DL (ref 31.5–35.7)
MCV RBC AUTO: 91 FL (ref 79–97)
METHYLMALONATE SERPL-SCNC: 170 NMOL/L (ref 0–378)
MONOCYTES # BLD AUTO: 0.6 X10E3/UL (ref 0.1–0.9)
MONOCYTES NFR BLD AUTO: 5 %
NEUTROPHILS # BLD AUTO: 7.3 X10E3/UL (ref 1.4–7)
NEUTROPHILS NFR BLD AUTO: 60 %
PLATELET # BLD AUTO: 256 X10E3/UL (ref 150–379)
POTASSIUM SERPL-SCNC: 4.4 MMOL/L (ref 3.5–5.2)
PREALB SERPL-MCNC: 21 MG/DL (ref 10–36)
PROT SERPL-MCNC: 7.1 G/DL (ref 6–8.5)
RBC # BLD AUTO: 4.65 X10E6/UL (ref 3.77–5.28)
SODIUM SERPL-SCNC: 139 MMOL/L (ref 134–144)
VIT B1 BLD-SCNC: 104 NMOL/L (ref 66.5–200)
WBC # BLD AUTO: 12.1 X10E3/UL (ref 3.4–10.8)

## 2018-10-29 ENCOUNTER — OFFICE VISIT (OUTPATIENT)
Dept: BARIATRICS/WEIGHT MGMT | Facility: CLINIC | Age: 58
End: 2018-10-29

## 2018-10-29 VITALS
BODY MASS INDEX: 35.61 KG/M2 | SYSTOLIC BLOOD PRESSURE: 116 MMHG | HEART RATE: 72 BPM | WEIGHT: 201 LBS | OXYGEN SATURATION: 99 % | TEMPERATURE: 97.7 F | HEIGHT: 63 IN | DIASTOLIC BLOOD PRESSURE: 78 MMHG | RESPIRATION RATE: 18 BRPM

## 2018-10-29 DIAGNOSIS — K21.9 GASTROESOPHAGEAL REFLUX DISEASE, ESOPHAGITIS PRESENCE NOT SPECIFIED: ICD-10-CM

## 2018-10-29 DIAGNOSIS — R10.13 DYSPEPSIA: Primary | ICD-10-CM

## 2018-10-29 DIAGNOSIS — Z98.84 S/P BARIATRIC SURGERY: ICD-10-CM

## 2018-10-29 DIAGNOSIS — E66.9 OBESITY, CLASS II, BMI 35-39.9: ICD-10-CM

## 2018-10-29 PROBLEM — E66.01 OBESITY, CLASS III, BMI 40-49.9 (MORBID OBESITY) (HCC): Status: RESOLVED | Noted: 2017-10-04 | Resolved: 2018-10-29

## 2018-10-29 PROBLEM — E66.813 OBESITY, CLASS III, BMI 40-49.9 (MORBID OBESITY): Status: RESOLVED | Noted: 2017-10-04 | Resolved: 2018-10-29

## 2018-10-29 PROCEDURE — 99213 OFFICE O/P EST LOW 20 MIN: CPT | Performed by: PHYSICIAN ASSISTANT

## 2018-10-29 NOTE — PROGRESS NOTES
[facility] Bariatric Surgery  [address]  [facility] [address] [phone]        Patient Name:  Francoise Kamara.  :  1960      Date of Visit: 10/29/2018      Reason for Visit:   Annual Eval - 1 year postop    HPI: Francoise Kamara is a 58 y.o. female s/p LSG by [facility] on 10/20/17    Only down 1 lb in the last 3 months, but she is still completely thrilled w/ her progress.  Wanting to be <200 lbs.  Gets 50-75g prot/day but does not really track intake o/w.  Still has excellent portion control.  No issues/concerns.  Has tried to go w/out Protonix and says cannot.  Zantac does not control her symptoms - wishes to continue on her PPI tx.  Doing daily MVI + iron + wkly Vit D.  Recent labs w/ PCP 10/24/18 WNL.  Not exercising consistently, but stays very active w/ work and life in general.     Presurgery weight: 262 pounds.  Today's weight is 91.2 kg (201 lb) pounds, today's  Body mass index is 35.61 kg/m²., and her weight loss since surgery is 61 pounds.      Past Medical History:   Diagnosis Date   • Cardiomegaly     stable on CXR   • Dyspnea on exertion    • Elevated LDL cholesterol level    • Fatigue    • GERD (gastroesophageal reflux disease)     on daily Protonix, EGD 10/2016. Sx's resolved since AGBR, even if doesn't take her PPI. serum h. pyl neg.  EGD [facility] 10/16 prior to AGBR  36 cm   • Hypertension    • Hypoalbuminemia     admits to chronic undereating   • Low HDL (under 40)    • Microcytic red blood cells     H/H 12.6/38.4   • Morbid obesity (CMS/HCC)    • OAB (overactive bladder)    • Peripheral edema    • Post-menopause on HRT (hormone replacement therapy)    • Rosacea     on Doxycycline   • Stress incontinence    • Vaginal atrophy    • Vitamin D deficiency    • Wears glasses      Past Surgical History:   Procedure Laterality Date   • BREAST BIOPSY      ? LATERALITY   • CHOLECYSTECTOMY      for stones w/ Dr. Barboza @ [facility]   • COLONOSCOPY     • ENDOSCOPY   2016    by Dr. Urias   • ENDOSCOPY N/A 10/20/2017    Procedure: ESOPHAGOGASTRODUODENOSCOPY;  Surgeon: Guille Urias MD;  Location:  [facility];  Service:    • GASTRIC BANDING REMOVAL N/A 12/21/2016    Procedure: GASTRIC BANDING REMOVAL LAPAROSCOPIC;  Surgeon: Guille Urias MD;  Location:  [facility];  Service:    • GASTRIC SLEEVE LAPAROSCOPIC N/A 10/20/2017    Procedure: GASTRIC SLEEVE LAPAROSCOPIC, ;  Surgeon: Guille Urias MD;  Location:  [facility];  Service:    • LAPAROSCOPIC GASTRIC BANDING  2008    s/p LAGB APS w/ HHR 11/2008 by [facility] @ [facility].  full dissection hiatus, single stitch ant repair   • WISDOM TOOTH EXTRACTION       Outpatient Prescriptions Marked as Taking for the 10/29/18 encounter (Office Visit) with Yessenia Elaine PA   Medication Sig Dispense Refill   • LISINOPRIL PO Take 40 mg by mouth Daily.     • pantoprazole (PROTONIX) 40 MG EC tablet Take 40 mg by mouth Daily.     • SOOLANTRA 1 % cream Apply 1 application topically Daily. Apply to face     • tolterodine LA (DETROL LA) 4 MG 24 hr capsule Take 4 mg by mouth Daily.     • vitamin D (ERGOCALCIFEROL) 55410 UNITS capsule capsule 50,000 Units 1 (One) Time Per Week. sundays         Allergies   Allergen Reactions   • Penicillins Hives and Swelling     Invanz given for AGBR surgery       Social History     Social History   • Marital status:      Spouse name: N/A   • Number of children: N/A   • Years of education: N/A     Occupational History   • Not on file.     Social History Main Topics   • Smoking status: Never Smoker   • Smokeless tobacco: Never Used   • Alcohol use No   • Drug use: No   • Sexual activity: Defer      Comment: spouse     Other Topics Concern   • Not on file     Social History Narrative     w/2 children.  Lives in [place].  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        /78 (BP Location: Left arm, Patient Position: Sitting, Cuff Size: Large Adult)   Pulse 72   Temp 97.7 °F  "(36.5 °C) (Temporal Artery )   Resp 18   Ht 160 cm (63\")   Wt 91.2 kg (201 lb)   SpO2 99%   BMI 35.61 kg/m²     Physical Exam   Constitutional: She appears well-developed and well-nourished. She is cooperative.   HENT:   Mouth/Throat: Oropharynx is clear and moist and mucous membranes are normal.   Eyes: Conjunctivae are normal. No scleral icterus.   Cardiovascular: Normal rate.    Pulmonary/Chest: Effort normal.   Abdominal: Soft. There is no tenderness.   Musculoskeletal: Normal range of motion. She exhibits no edema.   Neurological: She is alert.   Skin: Skin is warm and dry. No rash noted.   Psychiatric: She has a normal mood and affect. Judgment normal.         Assessment:  1 year s/p LSG by JULY on 10/20/17    ICD-10-CM ICD-9-CM   1. Dyspepsia R10.13 536.8   2. Gastroesophageal reflux disease, esophagitis presence not specified K21.9 530.81   3. Obesity, Class II, BMI 35-39.9 E66.9 278.00   4. S/P bariatric surgery Z98.84 V45.86         Plan:  Continue w/ good food choices and healthy habits.  Continue to focus on high protein, low carb.  Keep tracking intake.  Continue routine exercise.  Recent PCP labs reviewed.  Continue current vitamin regimen.  Call w/ problems/concerns.     The patient was instructed to follow up in 6 months, sooner if needed.            "

## 2019-04-02 ENCOUNTER — OFFICE VISIT (OUTPATIENT)
Dept: OBSTETRICS AND GYNECOLOGY | Facility: CLINIC | Age: 59
End: 2019-04-02

## 2019-04-02 VITALS
HEIGHT: 63 IN | BODY MASS INDEX: 37.35 KG/M2 | WEIGHT: 210.8 LBS | SYSTOLIC BLOOD PRESSURE: 124 MMHG | DIASTOLIC BLOOD PRESSURE: 78 MMHG

## 2019-04-02 DIAGNOSIS — N32.81 OAB (OVERACTIVE BLADDER): ICD-10-CM

## 2019-04-02 DIAGNOSIS — Z01.419 ENCOUNTER FOR GYNECOLOGICAL EXAMINATION WITHOUT ABNORMAL FINDING: ICD-10-CM

## 2019-04-02 DIAGNOSIS — Z79.890 POST-MENOPAUSE ON HRT (HORMONE REPLACEMENT THERAPY): Primary | ICD-10-CM

## 2019-04-02 PROBLEM — Z78.0 MENOPAUSE: Status: RESOLVED | Noted: 2018-03-28 | Resolved: 2019-04-02

## 2019-04-02 PROCEDURE — 99396 PREV VISIT EST AGE 40-64: CPT | Performed by: OBSTETRICS & GYNECOLOGY

## 2019-04-02 RX ORDER — TOLTERODINE 4 MG/1
4 CAPSULE, EXTENDED RELEASE ORAL DAILY
Qty: 30 CAPSULE | Refills: 11 | Status: SHIPPED | OUTPATIENT
Start: 2019-04-02 | End: 2020-04-01

## 2019-04-02 RX ORDER — HYDROCHLOROTHIAZIDE 12.5 MG/1
12.5 CAPSULE, GELATIN COATED ORAL AS NEEDED
COMMUNITY

## 2019-04-02 RX ORDER — ANTIARTHRITIC COMBINATION NO.2 900 MG
TABLET ORAL
COMMUNITY

## 2019-04-02 NOTE — PROGRESS NOTES
Chief Complaint   Patient presents with   • Gynecologic Exam     annual       Francoise Kamara is a 58 y.o. year old  presenting to be seen for her annual exam.  Patient has a history of a gastric band procedure in 2008; gastric band removal in ; and a gastric sleeve procedure in 2017.  She has lost 70 pounds since that procedure.  She has had a cholecystectomy.  She has no postmenopausal bleeding.  She takes Prempro, 0.45/1.5 mg daily and is asymptomatic.  She has no side effects on this medication.  She desires to decrease the strength of her estrogen dose.  She has a diagnosis of overactive bladder and is treated with tolterodine LA, 4 mg daily with relief of symptoms.  Her urinary frequency and urgency have decreased as she is losing weight.  She has no side effects on this medication.  She denies bowel symptoms.    SCREENING TESTS    Year 2012   Age                         PAP     Neg.                    HPV high risk                         Mammogram       benign                  MIKAYLA score                         Breast MRI                         Lipids                         Vitamin D                         Colonoscopy                         DEXA  Frax (hip/any)                         Ovarian Screen                             She exercises regularly: no.  She wears her seat belt: yes.  She has concerns about domestic violence: no.  She has noticed changes in height: no    GYN screening history:  · Last mammogram: was done on approximately 2018 and the result was: Birads I (Normal)..    No Additional Complaints Reported    The following portions of the patient's history were reviewed and updated as appropriate:vital signs and   She  has a past medical history of Cardiomegaly, Dyspnea on exertion, Elevated LDL cholesterol level, Fatigue, GERD (gastroesophageal reflux disease),  Hypertension, Hypoalbuminemia, Low HDL (under 40), Microcytic red blood cells, Morbid obesity (CMS/HCC), OAB (overactive bladder), Peripheral edema, Post-menopause on HRT (hormone replacement therapy), Rosacea, Stress incontinence, Vaginal atrophy, Vitamin D deficiency, and Wears glasses.  She does not have any pertinent problems on file.  She  has a past surgical history that includes Laparoscopic gastric banding (2008); Cholecystectomy (2015); Colonoscopy (2014); Gastric Banding Removal (N/A, 12/21/2016); Esophagogastroduodenoscopy (2016); [place] tooth extraction; Gastric Sleeve (N/A, 10/20/2017); Esophagogastroduodenoscopy (N/A, 10/20/2017); and Breast biopsy.  Her family history includes Breast cancer (age of onset: 45) in her paternal aunt; Hypertension in her father, maternal grandfather, mother, and paternal grandmother; Lung cancer in her father; No Known Problems in her brother; Sleep apnea in her mother; Stroke in her maternal grandfather.  She  reports that she has never smoked. She has never used smokeless tobacco. She reports that she does not drink alcohol or use drugs.  Current Outpatient Medications   Medication Sig Dispense Refill   • Biotin 5000 MCG tablet Take  by mouth.     • Ferrous Gluconate (IRON 27 PO) Take  by mouth.     • hydrochlorothiazide (MICROZIDE) 12.5 MG capsule Take 12.5 mg by mouth Daily.     • LISINOPRIL PO Take 40 mg by mouth Daily.     • pantoprazole (PROTONIX) 40 MG EC tablet Take 40 mg by mouth Daily.     • SOOLANTRA 1 % cream Apply 1 application topically Daily. Apply to face     • tolterodine LA (DETROL LA) 4 MG 24 hr capsule Take 1 capsule by mouth Daily. 30 capsule 11   • vitamin D (ERGOCALCIFEROL) 36357 UNITS capsule capsule 50,000 Units 1 (One) Time Per Week. sundays     • estrogen, conjugated,-medroxyprogesterone (PREMPRO) 0.3-1.5 MG per tablet Take 1 tablet by mouth Daily. 30 tablet 11     No current facility-administered medications for this visit.      She is  "allergic to penicillins..    Review of Systems  A comprehensive review of systems was taken.  Constitutional: negative for fever, chills, activity change, appetite change, fatigue and unexpected weight change.  Respiratory: negative  Cardiovascular: negative  Gastrointestinal: negative  Genitourinary:negative  Musculoskeletal:negative  Behavioral/Psych: negative       /78   Ht 160 cm (63\")   Wt 95.6 kg (210 lb 12.8 oz)   Breastfeeding? No   BMI 37.34 kg/m²     Physical Exam    General:  alert; cooperative; well developed; well nourished  obese - Body mass index is 37.34 kg/m².   Skin:  No suspicious lesions seen   Thyroid: normal to inspection and palpation   Lungs:  clear to auscultation bilaterally   Heart:  regular rate and rhythm, S1, S2 normal, no murmur, click, rub or gallop   Breasts:  Examined in supine position  Symmetric without masses or skin dimpling  Nipples normal without inversion, lesions or discharge  There are no palpable axillary nodes  Fibrocystic changes are present both breasts without a discrete mass   Abdomen: soft, non-tender; no masses  no umbilical or inguinal hernias are present  no hepato-splenomegaly   Pelvis: Clinical staff was present for exam  External genitalia:  normal appearance of the external genitalia including Bartholin's and Blackduck's glands.  Vaginal:  normal pink mucosa without prolapse or lesions.  Cervix:  normal appearance.  Uterus:  normal size, shape and consistency. anteverted;  Adnexa:  non palpable bilaterally.  Rectal:  anus visually normal appearing. recto-vaginal exam unremarkable and confirms findings;     Lab Review   No data reviewed    Imaging  Mammogram results         ASSESSMENT  Problems Addressed this Visit        Musculoskeletal and Integument    OAB (overactive bladder)    Relevant Medications    tolterodine LA (DETROL LA) 4 MG 24 hr capsule       Genitourinary    Post-menopause on HRT (hormone replacement therapy) - Primary    Relevant " Medications    estrogen, conjugated,-medroxyprogesterone (PREMPRO) 0.3-1.5 MG per tablet      Other Visit Diagnoses     Encounter for gynecological examination without abnormal finding        Relevant Orders    Liquid-based Pap Smear, Screening          PLAN    Medications prescribed this encounter:    New Medications Ordered This Visit   Medications   • tolterodine LA (DETROL LA) 4 MG 24 hr capsule     Sig: Take 1 capsule by mouth Daily.     Dispense:  30 capsule     Refill:  11   • estrogen, conjugated,-medroxyprogesterone (PREMPRO) 0.3-1.5 MG per tablet     Sig: Take 1 tablet by mouth Daily.     Dispense:  30 tablet     Refill:  11   · Monthly self breast assessment and annual breast imaging  · The patient will contact me if her menopausal symptoms worsen  · Calcium, 600 mg/ Vit. D, 400 IU daily; regular weight-bearing exercise  · Follow up: 12 month(s)  *Please note that portions of this documentation may have been completed with a voice recognition program.  Efforts were made to edit this dictation, but occasional words may have been mistranscribed.       This note was electronically signed.    POLLO Oseguera MD  April 2, 2019  10:18 AM

## 2019-05-08 ENCOUNTER — OFFICE VISIT (OUTPATIENT)
Dept: BARIATRICS/WEIGHT MGMT | Facility: CLINIC | Age: 59
End: 2019-05-08

## 2019-05-08 VITALS
BODY MASS INDEX: 36.68 KG/M2 | HEIGHT: 63 IN | DIASTOLIC BLOOD PRESSURE: 68 MMHG | HEART RATE: 75 BPM | RESPIRATION RATE: 18 BRPM | SYSTOLIC BLOOD PRESSURE: 118 MMHG | TEMPERATURE: 96.4 F | OXYGEN SATURATION: 100 % | WEIGHT: 207 LBS

## 2019-05-08 DIAGNOSIS — I10 HYPERTENSION, UNSPECIFIED TYPE: ICD-10-CM

## 2019-05-08 DIAGNOSIS — E55.9 VITAMIN D DEFICIENCY: ICD-10-CM

## 2019-05-08 DIAGNOSIS — E66.9 OBESITY, CLASS II, BMI 35-39.9: ICD-10-CM

## 2019-05-08 DIAGNOSIS — R79.0 ABNORMAL BLOOD LEVEL OF IRON: ICD-10-CM

## 2019-05-08 DIAGNOSIS — R53.83 FATIGUE, UNSPECIFIED TYPE: Primary | ICD-10-CM

## 2019-05-08 DIAGNOSIS — Z98.84 S/P BARIATRIC SURGERY: ICD-10-CM

## 2019-05-08 PROCEDURE — 99214 OFFICE O/P EST MOD 30 MIN: CPT | Performed by: PHYSICIAN ASSISTANT

## 2019-05-08 NOTE — PROGRESS NOTES
"[facility]ariat[facility]  [address]  [facility] [address] [phone]        Patient Name:  Francoise Kamara.  :  1960      Date of Visit: 2019      Reason for Visit:   18 months postop    HPI: Francoise Kamara is a 59 y.o. female s/p LSG by GDW on 10/20/17    Has gained 6 lbs in the last 6 months.  Says \"I know what I need to be doing - more protein and more exercise.\"  Getting maybe 60g prot/day.  Typically relies on protein shakes but says Premiere protein tends to send her right to the restroom, so she is starting to avoid drinking it b/c of that.  Has not experimented w/ any other protein shakes to find one that she would better tolerate.  Not tracking intake.  Unfortunately not exercising.  No other issues/concerns.      Last bariatric labs 2018 - WBC 12, o/w unremarkable.  Taking daily iron + wkly Vit D.      Presurgery weight: 262 pounds.  Today's weight is 93.9 kg (207 lb) pounds, today's  Body mass index is 36.67 kg/m²., and her weight loss since surgery is 55 pounds.      Past Medical History:   Diagnosis Date   • Cardiomegaly     stable on CXR   • Dyspnea on exertion    • Elevated LDL cholesterol level    • Fatigue    • GERD (gastroesophageal reflux disease)     on daily Protonix, EGD 10/2016. Sx's resolved since AGBR, even if doesn't take her PPI. serum h. pyl neg.  EGD GDW 10/16 prior to AGBR  36 cm   • Hypertension    • Hypoalbuminemia     admits to chronic undereating   • Low HDL (under 40)    • Microcytic red blood cells     H/H 12.6/38.4   • Morbid obesity (CMS/HCC)    • OAB (overactive bladder)    • Peripheral edema    • Post-menopause on HRT (hormone replacement therapy)    • Rosacea     on Doxycycline   • Stress incontinence    • Vaginal atrophy    • Vitamin D deficiency    • Wears glasses      Past Surgical History:   Procedure Laterality Date   • BREAST BIOPSY      ? LATERALITY   • CHOLECYSTECTOMY      for stones w/ Dr. Barboza " @ [facility]   • COLONOSCOPY  2014   • ENDOSCOPY  2016    by Dr. Urias   • ENDOSCOPY N/A 10/20/2017    Procedure: ESOPHAGOGASTRODUODENOSCOPY;  Surgeon: Guille Urias MD;  Location:  [facility];  Service:    • GASTRIC BANDING REMOVAL N/A 12/21/2016    Procedure: GASTRIC BANDING REMOVAL LAPAROSCOPIC;  Surgeon: Guille Urias MD;  Location:  [facility];  Service:    • GASTRIC SLEEVE LAPAROSCOPIC N/A 10/20/2017    Procedure: GASTRIC SLEEVE LAPAROSCOPIC, ;  Surgeon: Guille Urias MD;  Location:  [facility];  Service:    • LAPAROSCOPIC GASTRIC BANDING  2008    s/p LAGB APS w/ HHR 11/2008 by G[facility] @ [facility].  full dissection hiatus, single stitch ant repair   • WISDOM TOOTH EXTRACTION       Outpatient Medications Marked as Taking for the 5/8/19 encounter (Office Visit) with Yessenia Elaine PA   Medication Sig Dispense Refill   • Biotin 5000 MCG tablet Take  by mouth.     • estrogen, conjugated,-medroxyprogesterone (PREMPRO) 0.3-1.5 MG per tablet Take 1 tablet by mouth Daily. 30 tablet 11   • Ferrous Gluconate (IRON 27 PO) Take  by mouth.     • hydrochlorothiazide (MICROZIDE) 12.5 MG capsule Take 12.5 mg by mouth Daily.     • LISINOPRIL PO Take 40 mg by mouth Daily.     • pantoprazole (PROTONIX) 40 MG EC tablet Take 40 mg by mouth Daily.     • SOOLANTRA 1 % cream Apply 1 application topically Daily. Apply to face     • tolterodine LA (DETROL LA) 4 MG 24 hr capsule Take 1 capsule by mouth Daily. 30 capsule 11   • vitamin D (ERGOCALCIFEROL) 87297 UNITS capsule capsule 50,000 Units 1 (One) Time Per Week. sundays         Allergies   Allergen Reactions   • Penicillins Hives and Swelling     Invanz given for AGBR surgery       Social History     Socioeconomic History   • Marital status:      Spouse name: Not on file   • Number of children: Not on file   • Years of education: Not on file   • Highest education level: Not on file   Tobacco Use   • Smoking status: Never Smoker   • Smokeless tobacco: Never Used   Substance  "and Sexual Activity   • Alcohol use: No   • Drug use: No   • Sexual activity: Defer     Comment: spouse   Social History Narrative     w/2 children.  Lives in [place].  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        /68 (BP Location: Left arm, Patient Position: Sitting, Cuff Size: Adult)   Pulse 75   Temp 96.4 °F (35.8 °C) (Temporal)   Resp 18   Ht 160 cm (63\")   Wt 93.9 kg (207 lb)   SpO2 100%   BMI 36.67 kg/m²     Physical Exam   Constitutional: She appears well-developed and well-nourished. She is cooperative.   HENT:   Mouth/Throat: Oropharynx is clear and moist and mucous membranes are normal.   Eyes: Conjunctivae are normal. No scleral icterus.   Cardiovascular: Normal rate.   Pulmonary/Chest: Effort normal.   Abdominal: Soft. There is no tenderness.   Musculoskeletal: Normal range of motion. She exhibits no edema.   Neurological: She is alert.   Skin: Skin is warm and dry. No rash noted.   Psychiatric: She has a normal mood and affect. Judgment normal.         Assessment:  18 months s/p LSG by JULY on 10/20/17    ICD-10-CM ICD-9-CM   1. Fatigue, unspecified type R53.83 780.79   2. Vitamin D deficiency E55.9 268.9   3. Abnormal blood level of iron R79.0 790.6   4. Hypertension, unspecified type I10 401.9   5. Obesity, Class II, BMI 35-39.9 E66.9 278.00   6. S/P bariatric surgery Z98.84 V45.86       Plan:  Encouraged to refocus on healthy habits.  Start tracking intake w/ goal of 1200 calories and 100g protein/day.  Naomi w/ lactose-free/dairy-free protein shakes to find one she can better tolerate.  Increase exercise/activity as able - perhaps start walking w/ her  who walks nightly for his exercise.  Routine bariatric labs ordered.  Further input pending results.  Call w/ issues/concerns.       The patient was instructed to follow up in 6 months, sooner if needed.    Total time spent w/ patient 25 minutes and 15 minutes spent counseling the patient on " nutrition and necessary dietary/lifestyle modifications.

## 2019-05-11 LAB
25(OH)D3+25(OH)D2 SERPL-MCNC: 33.2 NG/ML (ref 30–100)
ALBUMIN SERPL-MCNC: 4.1 G/DL (ref 3.5–5.5)
ALBUMIN/GLOB SERPL: 1.4 {RATIO} (ref 1.2–2.2)
ALP SERPL-CCNC: 82 IU/L (ref 39–117)
ALT SERPL-CCNC: 9 IU/L (ref 0–32)
AST SERPL-CCNC: 14 IU/L (ref 0–40)
BASOPHILS # BLD AUTO: 0 X10E3/UL (ref 0–0.2)
BASOPHILS NFR BLD AUTO: 0 %
BILIRUB SERPL-MCNC: 0.6 MG/DL (ref 0–1.2)
BUN SERPL-MCNC: 10 MG/DL (ref 6–24)
BUN/CREAT SERPL: 14 (ref 9–23)
CALCIUM SERPL-MCNC: 9.6 MG/DL (ref 8.7–10.2)
CHLORIDE SERPL-SCNC: 104 MMOL/L (ref 96–106)
CO2 SERPL-SCNC: 22 MMOL/L (ref 20–29)
CREAT SERPL-MCNC: 0.72 MG/DL (ref 0.57–1)
EOSINOPHIL # BLD AUTO: 0.1 X10E3/UL (ref 0–0.4)
EOSINOPHIL NFR BLD AUTO: 1 %
ERYTHROCYTE [DISTWIDTH] IN BLOOD BY AUTOMATED COUNT: 12.8 % (ref 12.3–15.4)
FERRITIN SERPL-MCNC: 63 NG/ML (ref 15–150)
FOLATE SERPL-MCNC: 5.2 NG/ML
GLOBULIN SER CALC-MCNC: 2.9 G/DL (ref 1.5–4.5)
GLUCOSE SERPL-MCNC: 82 MG/DL (ref 65–99)
HCT VFR BLD AUTO: 42 % (ref 34–46.6)
HGB BLD-MCNC: 13.8 G/DL (ref 11.1–15.9)
IMM GRANULOCYTES # BLD AUTO: 0 X10E3/UL (ref 0–0.1)
IMM GRANULOCYTES NFR BLD AUTO: 0 %
IRON SERPL-MCNC: 104 UG/DL (ref 27–159)
LYMPHOCYTES # BLD AUTO: 3.8 X10E3/UL (ref 0.7–3.1)
LYMPHOCYTES NFR BLD AUTO: 38 %
Lab: NORMAL
MCH RBC QN AUTO: 29.4 PG (ref 26.6–33)
MCHC RBC AUTO-ENTMCNC: 32.9 G/DL (ref 31.5–35.7)
MCV RBC AUTO: 90 FL (ref 79–97)
METHYLMALONATE SERPL-SCNC: 107 NMOL/L (ref 0–378)
MONOCYTES # BLD AUTO: 0.7 X10E3/UL (ref 0.1–0.9)
MONOCYTES NFR BLD AUTO: 7 %
NEUTROPHILS # BLD AUTO: 5.4 X10E3/UL (ref 1.4–7)
NEUTROPHILS NFR BLD AUTO: 54 %
PLATELET # BLD AUTO: 261 X10E3/UL (ref 150–379)
POTASSIUM SERPL-SCNC: 4.3 MMOL/L (ref 3.5–5.2)
PREALB SERPL-MCNC: 23 MG/DL (ref 10–36)
PROT SERPL-MCNC: 7 G/DL (ref 6–8.5)
RBC # BLD AUTO: 4.69 X10E6/UL (ref 3.77–5.28)
SODIUM SERPL-SCNC: 143 MMOL/L (ref 134–144)
VIT B1 BLD-SCNC: 112.9 NMOL/L (ref 66.5–200)
WBC # BLD AUTO: 10 X10E3/UL (ref 3.4–10.8)

## 2019-05-22 ENCOUNTER — TRANSCRIBE ORDERS (OUTPATIENT)
Dept: ADMINISTRATIVE | Facility: HOSPITAL | Age: 59
End: 2019-05-22

## 2019-05-22 DIAGNOSIS — Z12.31 VISIT FOR SCREENING MAMMOGRAM: Primary | ICD-10-CM

## 2019-06-28 ENCOUNTER — HOSPITAL ENCOUNTER (OUTPATIENT)
Dept: MAMMOGRAPHY | Facility: HOSPITAL | Age: 59
Discharge: HOME | End: 2019-06-28
Admitting: OBSTETRICS & GYNECOLOGY

## 2019-06-28 DIAGNOSIS — Z12.31 VISIT FOR SCREENING MAMMOGRAM: ICD-10-CM

## 2019-06-28 PROCEDURE — 77063 BREAST TOMOSYNTHESIS BI: CPT | Performed by: RADIOLOGY

## 2019-06-28 PROCEDURE — 77063 BREAST TOMOSYNTHESIS BI: CPT

## 2019-06-28 PROCEDURE — 77067 SCR MAMMO BI INCL CAD: CPT

## 2019-06-28 PROCEDURE — 77067 SCR MAMMO BI INCL CAD: CPT | Performed by: RADIOLOGY

## 2019-07-18 ENCOUNTER — HOSPITAL ENCOUNTER (OUTPATIENT)
Dept: ULTRASOUND IMAGING | Facility: HOSPITAL | Age: 59
Discharge: HOME | End: 2019-07-18

## 2019-07-18 ENCOUNTER — HOSPITAL ENCOUNTER (OUTPATIENT)
Dept: MAMMOGRAPHY | Facility: HOSPITAL | Age: 59
Discharge: HOME | End: 2019-07-18

## 2019-07-18 ENCOUNTER — HOSPITAL ENCOUNTER (OUTPATIENT)
Dept: MAMMOGRAPHY | Facility: HOSPITAL | Age: 59
Discharge: HOME | End: 2019-07-18
Admitting: RADIOLOGY

## 2019-07-18 DIAGNOSIS — R92.8 ABNORMAL MAMMOGRAM: ICD-10-CM

## 2019-07-18 PROCEDURE — A4648 IMPLANTABLE TISSUE MARKER: HCPCS

## 2019-07-18 PROCEDURE — 76642 ULTRASOUND BREAST LIMITED: CPT | Performed by: RADIOLOGY

## 2019-07-18 PROCEDURE — 77065 DX MAMMO INCL CAD UNI: CPT

## 2019-07-18 PROCEDURE — 19083 BX BREAST 1ST LESION US IMAG: CPT | Performed by: RADIOLOGY

## 2019-07-18 PROCEDURE — 76642 ULTRASOUND BREAST LIMITED: CPT

## 2019-07-18 PROCEDURE — G0279 TOMOSYNTHESIS, MAMMO: HCPCS

## 2019-07-18 PROCEDURE — 77065 DX MAMMO INCL CAD UNI: CPT | Performed by: RADIOLOGY

## 2019-07-18 PROCEDURE — 88360 TUMOR IMMUNOHISTOCHEM/MANUAL: CPT | Performed by: OBSTETRICS & GYNECOLOGY

## 2019-07-18 PROCEDURE — 88305 TISSUE EXAM BY PATHOLOGIST: CPT | Performed by: OBSTETRICS & GYNECOLOGY

## 2019-07-18 PROCEDURE — 77061 BREAST TOMOSYNTHESIS UNI: CPT | Performed by: RADIOLOGY

## 2019-07-18 PROCEDURE — 25010000003 LIDOCAINE 1 % SOLUTION: Performed by: RADIOLOGY

## 2019-07-18 PROCEDURE — 88341 IMHCHEM/IMCYTCHM EA ADD ANTB: CPT | Performed by: OBSTETRICS & GYNECOLOGY

## 2019-07-18 PROCEDURE — 88342 IMHCHEM/IMCYTCHM 1ST ANTB: CPT | Performed by: OBSTETRICS & GYNECOLOGY

## 2019-07-18 RX ORDER — LIDOCAINE HYDROCHLORIDE AND EPINEPHRINE 10; 10 MG/ML; UG/ML
10 INJECTION, SOLUTION INFILTRATION; PERINEURAL ONCE
Status: COMPLETED | OUTPATIENT
Start: 2019-07-18 | End: 2019-07-18

## 2019-07-18 RX ORDER — LIDOCAINE HYDROCHLORIDE 10 MG/ML
5 INJECTION, SOLUTION INFILTRATION; PERINEURAL ONCE
Status: COMPLETED | OUTPATIENT
Start: 2019-07-18 | End: 2019-07-18

## 2019-07-18 RX ADMIN — LIDOCAINE HYDROCHLORIDE 1 ML: 10 INJECTION, SOLUTION INFILTRATION; PERINEURAL at 10:10

## 2019-07-18 RX ADMIN — LIDOCAINE HYDROCHLORIDE,EPINEPHRINE BITARTRATE 2 ML: 10; .01 INJECTION, SOLUTION INFILTRATION; PERINEURAL at 10:12

## 2019-07-22 LAB
CYTO UR: NORMAL
DX PRELIMINARY: NORMAL
LAB AP CASE REPORT: NORMAL
LAB AP CLINICAL INFORMATION: NORMAL
LAB AP DIAGNOSIS COMMENT: NORMAL
LAB AP SPECIAL STAINS: NORMAL
PATH REPORT.FINAL DX SPEC: NORMAL
PATH REPORT.GROSS SPEC: NORMAL

## 2019-07-23 ENCOUNTER — TELEPHONE (OUTPATIENT)
Dept: MAMMOGRAPHY | Facility: HOSPITAL | Age: 59
End: 2019-07-23

## 2019-07-23 NOTE — TELEPHONE ENCOUNTER
Referring provider's office notified pathology returned as cancer & patient will be notified. Pt notified of pathology results and recommendation. Verbalizes understanding. Denies discomfort. Denies signs and symptoms of infection.       Patient desires Dr. Stephanie Fraire for surgical consult. Patient notified of appointment on 7.29.19 @ 1500/1530. Told to bring photo ID, insurance cards & list of current medications. Patient encouraged to call back with any questions or concerns.  Pt information sent to PARISH Perdomo RN, OCN, Breast Navigator for evaluation & referral for genetic counseling. Breast cancer information packet offered and accepted. Patient verbalized understanding.

## 2019-07-24 ENCOUNTER — TELEPHONE (OUTPATIENT)
Dept: MAMMOGRAPHY | Facility: HOSPITAL | Age: 59
End: 2019-07-24

## 2019-07-24 NOTE — TELEPHONE ENCOUNTER
Patient called in with questions about pathology results.  Questions answered.  Patient verbalized understanding.  Encouraged patient to call back with any additional concerns/questions.

## 2019-08-01 ENCOUNTER — HOSPITAL ENCOUNTER (OUTPATIENT)
Dept: MRI IMAGING | Facility: HOSPITAL | Age: 59
Discharge: HOME | End: 2019-08-01
Admitting: SURGERY

## 2019-08-01 DIAGNOSIS — C50.911 MALIGNANT NEOPLASM OF RIGHT FEMALE BREAST (HCC): ICD-10-CM

## 2019-08-01 LAB — CREAT BLDA-MCNC: 0.8 MG/DL (ref 0.6–1.3)

## 2019-08-01 PROCEDURE — 77049 MRI BREAST C-+ W/CAD BI: CPT | Performed by: RADIOLOGY

## 2019-08-01 PROCEDURE — A9577 INJ MULTIHANCE: HCPCS | Performed by: SURGERY

## 2019-08-01 PROCEDURE — 82565 ASSAY OF CREATININE: CPT

## 2019-08-01 PROCEDURE — 77049 MRI BREAST C-+ W/CAD BI: CPT

## 2019-08-01 PROCEDURE — 0 GADOBENATE DIMEGLUMINE 529 MG/ML SOLUTION: Performed by: SURGERY

## 2019-08-01 RX ADMIN — GADOBENATE DIMEGLUMINE 20 ML: 529 INJECTION, SOLUTION INTRAVENOUS at 09:33

## 2019-08-07 ENCOUNTER — TELEPHONE (OUTPATIENT)
Dept: MRI IMAGING | Facility: HOSPITAL | Age: 59
End: 2019-08-07

## 2019-08-07 ENCOUNTER — DOCUMENTATION (OUTPATIENT)
Dept: ONCOLOGY | Facility: CLINIC | Age: 59
End: 2019-08-07

## 2019-08-07 NOTE — PROGRESS NOTES
Spoke with patient on the phone regarding MRI results. Pt states she is good to go and needs to be scheduled for surgery. Patient's name and  were given to Maricruz Smith at [facility]. Patient verbalizes understanding that the  will call her to plan her surgery. SC

## 2019-08-07 NOTE — PROGRESS NOTES
Patient called to say she is ready to move forward with BCT - we reviewed plan of care for this procedure - I explained that it may be Monday before she hears from Michael Sug  as it has to be coordinated with breast imaging for wire localization -patient verbalized understanding.

## 2019-08-07 NOTE — TELEPHONE ENCOUNTER
Called pt with Breast MRI results. Recommended surgical consult. Pt was transferred to the HELP line after questions were asked and answered. Pt expressed understanding and was encouraged to call with any further questions or concerns.

## 2019-08-08 ENCOUNTER — TRANSCRIBE ORDERS (OUTPATIENT)
Dept: MAMMOGRAPHY | Facility: HOSPITAL | Age: 59
End: 2019-08-08

## 2019-08-08 ENCOUNTER — TRANSCRIBE ORDERS (OUTPATIENT)
Dept: ADMINISTRATIVE | Facility: HOSPITAL | Age: 59
End: 2019-08-08

## 2019-08-08 DIAGNOSIS — C50.211 MALIGNANT NEOPLASM OF UPPER-INNER QUADRANT OF RIGHT FEMALE BREAST, UNSPECIFIED ESTROGEN RECEPTOR STATUS (HCC): Primary | ICD-10-CM

## 2019-08-15 ENCOUNTER — APPOINTMENT (OUTPATIENT)
Dept: PREADMISSION TESTING | Facility: HOSPITAL | Age: 59
End: 2019-08-15

## 2019-08-15 LAB
ANION GAP SERPL CALCULATED.3IONS-SCNC: 11 MMOL/L (ref 5–15)
BUN BLD-MCNC: 15 MG/DL (ref 6–20)
BUN/CREAT SERPL: 22.1 (ref 7–25)
CALCIUM SPEC-SCNC: 9.3 MG/DL (ref 8.6–10.5)
CHLORIDE SERPL-SCNC: 105 MMOL/L (ref 98–107)
CO2 SERPL-SCNC: 29 MMOL/L (ref 22–29)
CREAT BLD-MCNC: 0.68 MG/DL (ref 0.57–1)
DEPRECATED RDW RBC AUTO: 42.6 FL (ref 37–54)
ERYTHROCYTE [DISTWIDTH] IN BLOOD BY AUTOMATED COUNT: 12.3 % (ref 12.3–15.4)
GFR SERPL CREATININE-BSD FRML MDRD: 89 ML/MIN/1.73
GLUCOSE BLD-MCNC: 91 MG/DL (ref 65–99)
HCT VFR BLD AUTO: 43.7 % (ref 34–46.6)
HGB BLD-MCNC: 13.7 G/DL (ref 12–15.9)
MCH RBC QN AUTO: 29.4 PG (ref 26.6–33)
MCHC RBC AUTO-ENTMCNC: 31.4 G/DL (ref 31.5–35.7)
MCV RBC AUTO: 93.8 FL (ref 79–97)
PLATELET # BLD AUTO: 197 10*3/MM3 (ref 140–450)
PMV BLD AUTO: 11.2 FL (ref 6–12)
POTASSIUM BLD-SCNC: 4.7 MMOL/L (ref 3.5–5.2)
RBC # BLD AUTO: 4.66 10*6/MM3 (ref 3.77–5.28)
SODIUM BLD-SCNC: 145 MMOL/L (ref 136–145)
WBC NRBC COR # BLD: 6.27 10*3/MM3 (ref 3.4–10.8)

## 2019-08-15 PROCEDURE — 85027 COMPLETE CBC AUTOMATED: CPT | Performed by: SURGERY

## 2019-08-15 PROCEDURE — 93010 ELECTROCARDIOGRAM REPORT: CPT | Performed by: INTERNAL MEDICINE

## 2019-08-15 PROCEDURE — 80048 BASIC METABOLIC PNL TOTAL CA: CPT | Performed by: SURGERY

## 2019-08-15 PROCEDURE — 93005 ELECTROCARDIOGRAM TRACING: CPT

## 2019-08-15 PROCEDURE — 36415 COLL VENOUS BLD VENIPUNCTURE: CPT

## 2019-08-15 NOTE — PAT
It was noted during Pre Admission Testing that patient was wearing some form of fingernail polish (gel/regular) and/or acrylic/artificial nails.  Patient was told that polish and/or artificial nails must be removed for surgery.  If a patient had recent manicure, and would rather not remove polish or artificial nails. Then the minimum requirement is that the polish/artificial nails must be removed from the middle finger on each hand.  Patient verbalized understanding.    If patient was having surgery on an upper extremity, then the patient was instructed that fingernail polish/artificial fingernails must be removed for surgery.  NO EXCEPTIONS.  Patient verbalized understanding.    If patient was having surgery on a lower extremity, then the patient was instructed that toenail polish on both extremities must be removed for surgery.  NO EXCEPTIONS. Patient verbalized understanding.  The following information and instructions were given:    Nothing to eat or drink after midnight except sips of water with routine prescribed medication (except blood thinner, certain blood pressure medications, diabetes, or weight reducing medication) unless otherwise instructed by your physician.  Do not eat, drink, smoke or chew gum after midnight the night before surgery. This also includes no mints.    EXCEPTION: [facility]AS patients Patient instructed to drink 20 ounces (or until full) of Gatorade and it needs to be completed 1 hour before given arrival time on the day of surgery. (NO RED Gatorade)    Patient verbalized understanding.      DO NOT shave for two days before your procedure.  Do not wear makeup.      DO NOT wear fingernail polish (gel/regular) and/or acrylic/artificial nails on the day of surgery.   If a patient had recent manicure and would rather not remove polish or artificial nails, then the minimum requirement is that the polish/artificial nails must be removed from the middle finger on each hand.      If patient was having  surgery on an upper extremity, then the patient was instructed that fingernail polish/artificial fingernails must be removed for surgery.  NO EXCEPTIONS.      If patient was having surgery on a lower extremity, then the patient was instructed that toenail polish on both extremities must be removed for surgery.  NO EXCEPTIONS.    Remove all jewelry (advised to go to jeweler if unable to remove).  Jewelry especially rings can no longer be taped for surgery.    Leave anything you consider valuable at home.      Bring the following with you (if applicable)   -picture ID and insurance cards   -Co-pay/deductible required by insurance   -Medications in the original bottles (not a list) including all over-the-counter meds     Education booklet, brochure, and/or given to patient.    Patient must have a  for transportation home after procedure.  It must be an   adult that will take responsibility for care for 24 hours after surgery.

## 2019-08-15 NOTE — DISCHARGE INSTRUCTIONS
The following information and instructions were given:    Nothing to eat or drink after midnight except sips of water with routine prescribed medication (except blood thinner, certain blood pressure medications, diabetes, or weight reducing medication) unless otherwise instructed by your physician.  Do not eat, drink, smoke or chew gum after midnight the night before surgery. This also includes no mints.    EXCEPTION: [facility]AS patients Patient instructed to drink 20 ounces (or until full) of Gatorade and it needs to be completed 1 hour before given arrival time on the day of surgery. (NO RED Gatorade)    Patient verbalized understanding.      DO NOT shave for two days before your procedure.  Do not wear makeup.      DO NOT wear fingernail polish (gel/regular) and/or acrylic/artificial nails on the day of surgery.   If a patient had recent manicure and would rather not remove polish or artificial nails, then the minimum requirement is that the polish/artificial nails must be removed from the middle finger on each hand.      If patient was having surgery on an upper extremity, then the patient was instructed that fingernail polish/artificial fingernails must be removed for surgery.  NO EXCEPTIONS.      If patient was having surgery on a lower extremity, then the patient was instructed that toenail polish on both extremities must be removed for surgery.  NO EXCEPTIONS.    Remove all jewelry (advised to go to jeweler if unable to remove).  Jewelry especially rings can no longer be taped for surgery.    Leave anything you consider valuable at home.      Bring the following with you (if applicable)   -picture ID and insurance cards   -Co-pay/deductible required by insurance   -Medications in the original bottles (not a list) including all over-the-counter meds     Education booklet, brochure, and/or given to patient.    Patient must have a  for transportation home after procedure.  It must be an   adult that will take  responsibility for care for 24 hours after surgery.    It was noted during Pre Admission Testing that patient was wearing some form of fingernail polish (gel/regular) and/or acrylic/artificial nails.  Patient was told that polish and/or artificial nails must be removed for surgery.  If a patient had recent manicure, and would rather not remove polish or artificial nails. Then the minimum requirement is that the polish/artificial nails must be removed from the middle finger on each hand.  Patient verbalized understanding.    If patient was having surgery on an upper extremity, then the patient was instructed that fingernail polish/artificial fingernails must be removed for surgery.  NO EXCEPTIONS.  Patient verbalized understanding.    If patient was having surgery on a lower extremity, then the patient was instructed that toenail polish on both extremities must be removed for surgery.  NO EXCEPTIONS. Patient verbalized understanding.

## 2019-08-19 ENCOUNTER — HOSPITAL ENCOUNTER (OUTPATIENT)
Dept: MAMMOGRAPHY | Facility: HOSPITAL | Age: 59
Discharge: HOME | End: 2019-08-19
Admitting: SURGERY

## 2019-08-19 ENCOUNTER — LAB REQUISITION (OUTPATIENT)
Dept: LAB | Facility: HOSPITAL | Age: 59
End: 2019-08-19

## 2019-08-19 ENCOUNTER — HOSPITAL ENCOUNTER (OUTPATIENT)
Dept: MAMMOGRAPHY | Facility: HOSPITAL | Age: 59
Discharge: HOME | End: 2019-08-19

## 2019-08-19 ENCOUNTER — HOSPITAL ENCOUNTER (OUTPATIENT)
Dept: NUCLEAR MEDICINE | Facility: HOSPITAL | Age: 59
Discharge: HOME | End: 2019-08-19

## 2019-08-19 DIAGNOSIS — C50.211 MALIGNANT NEOPLASM OF UPPER-INNER QUADRANT OF RIGHT FEMALE BREAST (HCC): ICD-10-CM

## 2019-08-19 DIAGNOSIS — C50.211 MALIGNANT NEOPLASM OF UPPER-INNER QUADRANT OF RIGHT FEMALE BREAST, UNSPECIFIED ESTROGEN RECEPTOR STATUS (HCC): ICD-10-CM

## 2019-08-19 PROCEDURE — 88341 IMHCHEM/IMCYTCHM EA ADD ANTB: CPT | Performed by: SURGERY

## 2019-08-19 PROCEDURE — 88342 IMHCHEM/IMCYTCHM 1ST ANTB: CPT | Performed by: SURGERY

## 2019-08-19 PROCEDURE — 88307 TISSUE EXAM BY PATHOLOGIST: CPT | Performed by: SURGERY

## 2019-08-19 PROCEDURE — 25010000003 LIDOCAINE 1 % SOLUTION: Performed by: RADIOLOGY

## 2019-08-19 PROCEDURE — 76098 X-RAY EXAM SURGICAL SPECIMEN: CPT | Performed by: RADIOLOGY

## 2019-08-19 PROCEDURE — 76098 X-RAY EXAM SURGICAL SPECIMEN: CPT

## 2019-08-19 PROCEDURE — 88305 TISSUE EXAM BY PATHOLOGIST: CPT | Performed by: SURGERY

## 2019-08-19 PROCEDURE — A9541 TC99M SULFUR COLLOID: HCPCS | Performed by: SURGERY

## 2019-08-19 PROCEDURE — 38792 RA TRACER ID OF SENTINL NODE: CPT

## 2019-08-19 PROCEDURE — 19281 PERQ DEVICE BREAST 1ST IMAG: CPT | Performed by: RADIOLOGY

## 2019-08-19 PROCEDURE — 0 TECHNETIUM FILTERED SULFUR COLLOID: Performed by: SURGERY

## 2019-08-19 RX ORDER — LIDOCAINE HYDROCHLORIDE 10 MG/ML
5 INJECTION, SOLUTION INFILTRATION; PERINEURAL ONCE
Status: COMPLETED | OUTPATIENT
Start: 2019-08-19 | End: 2019-08-19

## 2019-08-19 RX ADMIN — LIDOCAINE HYDROCHLORIDE 1 ML: 10 INJECTION, SOLUTION INFILTRATION; PERINEURAL at 08:45

## 2019-08-19 RX ADMIN — TECHNETIUM TC 99M SULFUR COLLOID 1 DOSE: KIT at 11:45

## 2019-08-28 ENCOUNTER — DOCUMENTATION (OUTPATIENT)
Dept: ONCOLOGY | Facility: CLINIC | Age: 59
End: 2019-08-28

## 2019-08-28 NOTE — PROGRESS NOTES
Patient called to report that she is having discomfort around her surgical site. She is reporting th steristips look brown. She does not report any fevers, redness, or drainage at site. Patient will see MD at her post op visit tomorrow. Patient reassured she does not exhibit s/s of infection an she will have some tenderness at the surgical site. She was reassured and will call with nay new questions or concerns. SC

## 2019-09-10 ENCOUNTER — TRANSCRIBE ORDERS (OUTPATIENT)
Dept: ADMINISTRATIVE | Facility: HOSPITAL | Age: 59
End: 2019-09-10

## 2019-09-10 DIAGNOSIS — C50.211 MALIGNANT NEOPLASM OF UPPER-INNER QUADRANT OF RIGHT FEMALE BREAST, UNSPECIFIED ESTROGEN RECEPTOR STATUS (HCC): Primary | ICD-10-CM

## 2019-09-12 ENCOUNTER — HOSPITAL ENCOUNTER (OUTPATIENT)
Dept: NUCLEAR MEDICINE | Facility: HOSPITAL | Age: 59
Discharge: HOME | End: 2019-09-12

## 2019-09-12 ENCOUNTER — CONSULT (OUTPATIENT)
Dept: ONCOLOGY | Facility: CLINIC | Age: 59
End: 2019-09-12

## 2019-09-12 VITALS
TEMPERATURE: 97.5 F | DIASTOLIC BLOOD PRESSURE: 90 MMHG | BODY MASS INDEX: 38.8 KG/M2 | RESPIRATION RATE: 18 BRPM | WEIGHT: 219 LBS | HEIGHT: 63 IN | HEART RATE: 70 BPM | SYSTOLIC BLOOD PRESSURE: 144 MMHG | OXYGEN SATURATION: 100 %

## 2019-09-12 DIAGNOSIS — C50.211 MALIGNANT NEOPLASM OF UPPER-INNER QUADRANT OF RIGHT FEMALE BREAST, UNSPECIFIED ESTROGEN RECEPTOR STATUS (HCC): ICD-10-CM

## 2019-09-12 DIAGNOSIS — C50.411 MALIGNANT NEOPLASM OF UPPER-OUTER QUADRANT OF RIGHT BREAST IN FEMALE, ESTROGEN RECEPTOR POSITIVE (HCC): Primary | ICD-10-CM

## 2019-09-12 DIAGNOSIS — Z17.0 MALIGNANT NEOPLASM OF UPPER-OUTER QUADRANT OF RIGHT BREAST IN FEMALE, ESTROGEN RECEPTOR POSITIVE (HCC): Primary | ICD-10-CM

## 2019-09-12 PROCEDURE — 78306 BONE IMAGING WHOLE BODY: CPT

## 2019-09-12 PROCEDURE — A9503 TC99M MEDRONATE: HCPCS | Performed by: SURGERY

## 2019-09-12 PROCEDURE — 0 TECHNETIUM MEDRONATE KIT: Performed by: SURGERY

## 2019-09-12 PROCEDURE — 99205 OFFICE O/P NEW HI 60 MIN: CPT | Performed by: INTERNAL MEDICINE

## 2019-09-12 RX ORDER — TC 99M MEDRONATE 20 MG/10ML
26.2 INJECTION, POWDER, LYOPHILIZED, FOR SOLUTION INTRAVENOUS
Status: COMPLETED | OUTPATIENT
Start: 2019-09-12 | End: 2019-09-12

## 2019-09-12 RX ADMIN — Medication 26.2 MILLICURIE: at 09:00

## 2019-09-12 NOTE — PROGRESS NOTES
ID: 59 y.o. year old female from [place] [address]    PCP: Ezekiel Ba MD    REFERRING PHYSICIAN: Stephanie Fraire MD    Reason for Consultation: Stage Ib low-grade lobular carcinoma of the right breast status post lumpectomy    Dear Dr. Fraire    It is a pleasure to meet Mrs. Kamara today.  She is a very pleasant 59-year-old lady who is in reasonable health who presents today for consultation due to a newly diagnosed lobular breast cancer.  She underwent a lumpectomy in mid August 2019.  Pathology revealed this to be a 2.2 cm mass with 3 nodes positive in the axilla.  She is healed up nicely from her surgery and presents today to discuss adjuvant therapy going forward.  She does not have significant chronic medical issues.  She does have some heartburn and issues with mild anemia.  She also has some obesity issues.  But overall she is actually in reasonable health.  She denies any headaches.  No pain in her back.  No unexplained weight loss.      Past Medical History:   Diagnosis Date   • Cardiomegaly     stable on CXR   • Dyspnea on exertion    • Elevated LDL cholesterol level    • Fatigue    • GERD (gastroesophageal reflux disease)     on daily Protonix, EGD 10/2016. Sx's resolved since AGBR, even if doesn't take her PPI. serum h. pyl neg.  EGD [facility] 10/16 prior to AGBR  36 cm   • Hypertension    • Hypoalbuminemia     admits to chronic undereating   • Low HDL (under 40)    • Malignant neoplasm of upper-outer quadrant of right breast in female, estrogen receptor positive (CMS/HCC) 9/12/2019   • Microcytic red blood cells     H/H 12.6/38.4   • Morbid obesity (CMS/HCC)    • OAB (overactive bladder)    • Peripheral edema    • Post-menopause on HRT (hormone replacement therapy)    • Rosacea     on Doxycycline   • Stress incontinence    • Vaginal atrophy    • Vitamin D deficiency    • Wears glasses        Past Surgical History:   Procedure Laterality Date   • BREAST BIOPSY      ? LATERALITY   • BREAST LUMPECTOMY Right  08/19/2019   • CHOLECYSTECTOMY  2015    for stones w/ Dr. Barboza @ [facility]   • COLONOSCOPY  2014   • COLONOSCOPY  2017   • ENDOSCOPY  2016    by Dr. Urias   • ENDOSCOPY N/A 10/20/2017    Procedure: ESOPHAGOGASTRODUODENOSCOPY;  Surgeon: Guille Urias MD;  Location:  [facility];  Service:    • GASTRIC BANDING REMOVAL N/A 12/21/2016    Procedure: GASTRIC BANDING REMOVAL LAPAROSCOPIC;  Surgeon: Guille Urias MD;  Location:  [facility];  Service:    • GASTRIC SLEEVE LAPAROSCOPIC N/A 10/20/2017    Procedure: GASTRIC SLEEVE LAPAROSCOPIC, ;  Surgeon: Guille Urias MD;  Location:  [facility];  Service:    • LAPAROSCOPIC GASTRIC BANDING  2008    s/p LAGB APS w/ HHR 11/2008 by [facility] @ [facility].  full dissection hiatus, single stitch ant repair   • WISDOM TOOTH EXTRACTION         Social History     Socioeconomic History   • Marital status:      Spouse name: Not on file   • Number of children: Not on file   • Years of education: Not on file   • Highest education level: Not on file   Tobacco Use   • Smoking status: Never Smoker   • Smokeless tobacco: Never Used   Substance and Sexual Activity   • Alcohol use: No   • Drug use: No   • Sexual activity: Defer     Comment: spouse   Social History Narrative     w/2 children.  Lives in [place].  Retired speech pathologist x 28yrs. Now working w/[employer].        Family History   Problem Relation Age of Onset   • Hypertension Mother    • Sleep apnea Mother    • Hypertension Father    • Lung cancer Father    • Hypertension Maternal Grandfather    • Stroke Maternal Grandfather    • Hypertension Paternal Grandmother    • Breast cancer Paternal Aunt 45   • No Known Problems Brother    • BRCA 1/2 Neg Hx    • Ovarian cancer Neg Hx        Review of Systems:    16 point review of systems was performed and reviewed and scanned into the EMR    Review of Systems - Oncology      Current Outpatient Medications:   •  Biotin 5000 MCG tablet, Take  by mouth., Disp: , Rfl:   •   estrogen, conjugated,-medroxyprogesterone (PREMPRO) 0.3-1.5 MG per tablet, Take 1 tablet by mouth Daily., Disp: 30 tablet, Rfl: 11  •  Ferrous Gluconate (IRON 27 PO), Take  by mouth., Disp: , Rfl:   •  hydrochlorothiazide (MICROZIDE) 12.5 MG capsule, Take 12.5 mg by mouth Daily., Disp: , Rfl:   •  LISINOPRIL PO, Take 40 mg by mouth Daily., Disp: , Rfl:   •  oxyCODONE-acetaminophen (PERCOCET) 5-325 MG per tablet, Take 1-2 tablets by mouth Every 6 (Six) Hours As Needed for pain, Disp: 15 tablet, Rfl: 0  •  pantoprazole (PROTONIX) 40 MG EC tablet, Take 40 mg by mouth Daily., Disp: , Rfl:   •  SOOLANTRA 1 % cream, Apply 1 application topically Daily. Apply to face, Disp: , Rfl:   •  tolterodine LA (DETROL LA) 4 MG 24 hr capsule, Take 1 capsule by mouth Daily., Disp: 30 capsule, Rfl: 11  •  vitamin D (ERGOCALCIFEROL) 26300 UNITS capsule capsule, 50,000 Units 1 (One) Time Per Week. sundays, Disp: , Rfl:   No current facility-administered medications for this visit.     Allergies   Allergen Reactions   • Penicillins Hives and Swelling     Invanz given for AGBR surgery       ECOG SCORE: 0    Objective     Vitals:    09/12/19 0936   BP: 144/90   Pulse: 70   Resp: 18   Temp: 97.5 °F (36.4 °C)   SpO2: 100%     Body mass index is 38.79 kg/m².  Body surface area is 2.01 meters squared.        09/12/19  0936   Weight: 99.3 kg (219 lb)         Physical Exam    General: well appearing, in no acute distress  HEENT: sclera anicteric, oropharynx clear, neck is supple  Lymphatics: no cervical, supraclavicular, or axillary adenopathy  Cardiovascular: regular rate and rhythm, no murmurs, rubs or gallops  Lungs: clear to auscultation bilaterally  Abdomen: soft, nontender, nondistended.  No palpable organomegaly  Extremities: no lower extremity edema  Skin: no rashes, lesions, bruising, or petechiae  Msk:  Shows no weakness of the large muscle groups  Psych: Mood is stable      Lab Results   Component Value Date    GLUCOSE 91 08/15/2019     BUN 15 08/15/2019    CREATININE 0.68 08/15/2019     08/15/2019    K 4.7 08/15/2019     08/15/2019    CO2 29.0 08/15/2019    CALCIUM 9.3 08/15/2019    PROTEINTOT 6.6 10/21/2017    ALBUMIN 4.1 05/08/2019    BILITOT 0.6 05/08/2019    ALKPHOS 82 05/08/2019    AST 14 05/08/2019    ALT 9 05/08/2019       Lab Results   Component Value Date    HGB 13.7 08/15/2019    HCT 43.7 08/15/2019    MCV 93.8 08/15/2019     08/15/2019    WBC 6.27 08/15/2019    NEUTROABS 5.4 05/08/2019    LYMPHSABS 3.8 (H) 05/08/2019    MONOSABS 0.7 05/08/2019    EOSABS 0.1 05/08/2019    BASOSABS 0.0 05/08/2019       Mammo Post Clip Placement Right    Addendum Date: 7/22/2019    Pathology has been made available for the patient's recent ultrasound-guided core needle biopsy of a right breast mass. Pathology demonstrated infiltrating intermediate grade lobular carcinoma. Estrogen receptor strongly positive, progesterone receptor strongly positive, HER-2/ysabel negative by immunohistochemistry.  This is considered concordant with the imaging findings. Additionally, given lobular pathology, a breast MRI is recommended to evaluate extent of disease.  Our nurse navigator will contact the patient with the results. The next step would be a surgical referral.  Known right breast malignancy, appropriate clinical action should be pursued.  This report was finalized on 7/22/2019 4:56 PM by Celia Allan MD.      Mri Breast Bilateral Diagnostic With & Without Contrast    Result Date: 8/6/2019  Biopsy-proven carcinoma right 12:00 position with maximum dimensions of 1.7 cm in the anterior posterior projection. There is no evidence of multifocal or multicentric disease. There is no evidence of axillary adenopathy  BI-RADS CATEGORY: 6, KNOWN BIOPSY PROVEN MALIGNANCY.  RECOMMENDATION: Recommend patient followup with Dr. Fraire for further surgical planning. The patient will be notified of the results/recommendations by her lead MRI breast technologist.   This report was finalized on 8/6/2019 9:49 AM by Dr. Gissel Sommers MD.      Nm Windsor Node Injection Only    Result Date: 8/19/2019  Dictation for the purposes of radiopharmaceutical usage alone utilizing 550 microcuries of Technetium 99m filtered sulfur colloid in the right breast for lymphoscintigraphy and sentinel node mapping.   D:  08/19/2019 E:  08/19/2019  This report was finalized on 8/19/2019 5:17 PM by Dr. Ruddy James.      Mammo Breast Specimen    Result Date: 8/23/2019  Successful needle localization of biopsy-proven malignancy right breast.  PATHOLOGY: Infiltrating and in situ intermediate grade lobular carcinoma. Margins of resection negative for tumor closest infiltrating margin inferior and superior measuring 4 mm each. No lymphovascular invasion identified. Windsor node 1 positive for metastatic lobular carcinoma. Windsor node 2 positive for metastatic lobular carcinoma, non-Windsor node extension positive for metastatic carcinoma. Result is concordant.  RECOMMENDATION: Surgical and oncology follow-up.  D:  08/23/2019 E:  08/23/2019  This report was finalized on 8/23/2019 10:19 AM by Dr. Fidelia Koroma MD.      Mammo Breast Placement Device Initial Without Biopsy    Result Date: 8/23/2019  Successful needle localization of biopsy-proven malignancy right breast.  PATHOLOGY: Infiltrating and in situ intermediate grade lobular carcinoma. Margins of resection negative for tumor closest infiltrating margin inferior and superior measuring 4 mm each. No lymphovascular invasion identified. Windsor node 1 positive for metastatic lobular carcinoma. Windsor node 2 positive for metastatic lobular carcinoma, non-Windsor node extension positive for metastatic carcinoma. Result is concordant.  RECOMMENDATION: Surgical and oncology follow-up.  D:  08/23/2019 E:  08/23/2019  This report was finalized on 8/23/2019 10:19 AM by Dr. Fidelia Koroma MD.      Mammo Diagnostic Digital Tomosynthesis Right With  Cad    Result Date: 7/18/2019  Suspicious right breast mass. Differential diagnosis includes radial scar versus malignancy.  ACR BI-RADS CATEGORY:  4, SUSPICIOUS  RECOMMENDATION:  Ultrasound-guided core needle biopsy.  CAD was utilized.  The standard false-negative rate of mammography is between 10% and 25%. Complex patterns or increased breast density will markedly elevate the false-negative rate of mammography.    A letter, in lay terminology, with the results of this exam was given to the patient at the time of the visit.  __________________________________________________________ Physician Order  Ultrasound Guided Breast Biopsy  Diagnosis: Abnormal Mammogram  This report was finalized on 7/18/2019 12:27 PM by Celia Allan MD.      Us Breast Left Limited    Result Date: 7/18/2019  Suspicious right breast mass. Differential diagnosis includes radial scar versus malignancy.  ACR BI-RADS CATEGORY:  4, SUSPICIOUS  RECOMMENDATION:  Ultrasound-guided core needle biopsy.  CAD was utilized.  The standard false-negative rate of mammography is between 10% and 25%. Complex patterns or increased breast density will markedly elevate the false-negative rate of mammography.    A letter, in lay terminology, with the results of this exam was given to the patient at the time of the visit.  __________________________________________________________ Physician Order  Ultrasound Guided Breast Biopsy  Diagnosis: Abnormal Mammogram  This report was finalized on 7/18/2019 12:27 PM by Celia Allan MD.      Mammo Screening Digital Tomosynthesis Bilateral With Cad    Result Date: 7/2/2019  Possible area of architectural distortion in the right 11:00 region.  RECOMMENDATION: 2-D/3-D right CC focal compression, right MLO focal compression, and right ML views.   BI-RADS CATEGORY 0, INCOMPLETE:  NEED ADDITIONAL IMAGING EVALUATION.   She will be contacted by our office to schedule an appointment for the additional studies.   CAD was utilized.   The standard false-negative rate of mammography is between 10% and 25%. Complex patterns or increased breast density will markedly elevate the false-negative rate of mammography.    A letter, in lay terminology, with the results of this exam will be mailed to the patient. _______________________________ Physician Order  Diagnostic Mammogram and/or Ultrasound.  Diagnosis: Abnormal Screening Mammogram  This report was finalized on 7/2/2019 10:02 AM by Dr. Anh Littlejohn MD.      Us Guided Breast Biopsy With & Without Device Initial    Addendum Date: 7/22/2019    Pathology has been made available for the patient's recent ultrasound-guided core needle biopsy of a right breast mass. Pathology demonstrated infiltrating intermediate grade lobular carcinoma. Estrogen receptor strongly positive, progesterone receptor strongly positive, HER-2/ysabel negative by immunohistochemistry.  This is considered concordant with the imaging findings. Additionally, given lobular pathology, a breast MRI is recommended to evaluate extent of disease.  Our nurse navigator will contact the patient with the results. The next step would be a surgical referral.  Known right breast malignancy, appropriate clinical action should be pursued.  This report was finalized on 7/22/2019 4:56 PM by Celia Allan MD.      Lab Results   Component Value Date    FINALDX  08/19/2019     1. RIGHT BREAST LUMPECTOMY WITH NEEDLE LOCALIZATION:   Infiltrating and in-situ intermediate grade lobular carcinoma.  Bloom Denis score 6/9.  Infiltrating tumor size 2.2x1.5x1.0 cm.   Margins of resection negative for tumor with closest infiltrating margin inferior and superior measuring 4 mm each.   No lymphvascular invasion identified.   Presence of previous biopsy site identified.  See Template.  2. SENTINEL LYMPH NODE #1, RIGHT AXILLA, EXCISION:   Lymph node x1; positive for metastatic lobular carcinoma (1/1).  3. SENTINEL LYMPH NODE #2, RIGHT AXILLA, EXCISION:  Lymph node  x1; positive for metastatic lobular carcinoma (1/1).  4. NONSENTINEL LYMPH NODE, RIGHT AXILLA, EXCISION:  Lymph node x1; positive for metastatic lobular carcinoma (1/1).     INVASIVE BREAST CANCER STAGING TEMPLATE:  TYPE OF SPECIMEN/PROCEDURE:  Needle localized lumpectomy  SPECIMEN LATERALITY: Right breast  TUMOR SITE: 12 o'clock   TUMOR SIZE: (Size of Largest Invasive Carcinoma): 2.2x1.5x1.0 cm   HISTOLOGIC TYPE OF INVASIVE CARCINOMA:  Lobular   GRADE: Intermediate   TUBULAR FORMATION:  3  MITOTIC ACTIVITY:  1  PLEOMORPHISM:  2  OVERALL SCORE: 6/9   DUCTAL CARCINOMA IN SITU (DCIS) EXTENT: 0  TUMOR EXTENSION (Only if structures present and involved):    SKIN: N/A    NIPPLE: N/A    SKELETAL MUSCLE: N/A   SURGICAL MARGINS (Uninvolved/positive): Uninvolved   INVASIVE CARCINOMA MARGINS (Uninvolved/Positive) Uninvolved   DISTANCE FROM CLOSEST MARGIN: 4 mm   SPECIFIC CLOSEST MARGIN: Inferior and superior   LCIS MARGINS (Uninvolved/Positive/No LCIS): Uninvolved   DISTANCE FROM CLOSEST MARGIN: 1 mm  SPECIFIC CLOSEST MARGIN: Inferior   LYMPH NODES (required only if lymph nodes are present in the specimen):  Present   NUMBER OF LYMPH NODES WITH MACROMETASTASIS: 3  NUMBER OF LYMPH NODES WITH MICROMETASTASIS: 0  NUMBER OF LYMPH NODES WITH ISOLATED TUMOR CELLS: 0  TOTAL NUMBER OF LYMPH NODES EXAMINED (Including sentinel nodes): 3  NUMBER OF SENTINEL NODES EXAMINED: 2  EXTRANODAL EXTENSION OF TUMOR:  0  VASCULAR/LYMPHATIC INVASION:  Not identified     ESTROGEN RECEPTOR STATUS BY IHC METHOD: Positive   PROGESTERONE RECEPTOR STATUS BY IHC METHOD: Positive    HER-2/ysabel ONCOPROTEIN STATUS BY IHC METHOD:  Negative   HER-2/ysabel ONCOGENE STATUS BY IN SITU HYBRIDIZATION ANALYSIS:  Not performed   TREATMENT EFFECT (BREAST): None  TREATMENT EFFECT (NODES): None   ADDITIONAL PATHOLOGIC FINDINGS:  Previous biopsy site identified   OTHER STUDIES:  None  AJCC PATHOLOGIC STAGE:  (COMPLETED BY PATHOLOGIST, BASED ONLY ON TISSUE FINDINGS, MORE  EXTENSIVE DISEASE MAY NOT BE KNOWN TO THE PATHOLOGIST)  pT=  2  pN=  2  pM=  Unknown    DGD/mbc            ASSESSMENT:    1.  Stage Ib low-grade lobular carcinoma of the right breast status post lumpectomy.  She has 3 out of 3 nodes positive in the axilla.  I discussed with her the concept of adjuvant therapy going forward.  First we need to get a CAT scan of the chest abdomen pelvis and a bone scan to decide if she has metastatic disease.  If she does not then I would recommend adjuvant chemotherapy with dose dense Adriamycin and Cytoxan followed by weekly Taxol.  After which she will need radiotherapy and then I will place her on likely 10 years of adjuvant hormone blockade.  She will need a port placed prior to  chemotherapy.  I discussed with her the expected side effects.  Also discussed with her the rationale of adjuvant therapy.  I was able to answer all the questions she had for me.  Hopefully after all the testing is done she will have only localized disease present.  I will see her back in my clinic on the first day of chemotherapy to discuss the scans and address any other questions that may arise.  She will also be scheduled with my nurse practitioner to discuss the expectations of the side effects and how to manage them.  She will also need a 2D echo of the heart due to the use of an anthracycline.    I spent a total of 60 minutes in direct patient care, greater than 45 minutes (greater than 50%) were spent in coordination of care, and counseling the patient regarding breast cancer. Answered any questions patient had with medication and plan.        Thank you for allowing me to participate in the care of this patient.    Yours sincerely,    Laila Thacker MD  [facility]  Hematology and Oncology    Return on: 09/25/19  Return in (Approximately): Schedule with next infusion, 2 weeks    Orders Placed This Encounter   Procedures   • Ambulatory Referral to [facility] for Port-a-Cath      Referral Priority:   Routine     Referral Type:   Consultation     Referral Reason:   Specialty Services Required     Referral Location:   [facility]     Requested Specialty:   General Surgery     Number of Visits Requested:   1   • Adult Transthoracic Echo Complete W/ Cont if Necessary Per Protocol     Standing Status:   Future     Order Specific Question:   Reason for exam?     Answer:   Other Reasons     Order Specific Question:   Other reason(s)?     Answer:   Chemotherapy         Please note that portions of this note may have been completed with a voice recognition program. Efforts were made to edit the dictations, but occasionally words are mistranscribed.

## 2019-09-20 ENCOUNTER — OFFICE VISIT (OUTPATIENT)
Dept: ONCOLOGY | Facility: CLINIC | Age: 59
End: 2019-09-20

## 2019-09-20 ENCOUNTER — HOSPITAL ENCOUNTER (OUTPATIENT)
Dept: CT IMAGING | Facility: HOSPITAL | Age: 59
Discharge: HOME | End: 2019-09-20
Admitting: SURGERY

## 2019-09-20 ENCOUNTER — HOSPITAL ENCOUNTER (OUTPATIENT)
Dept: CARDIOLOGY | Facility: HOSPITAL | Age: 59
Discharge: HOME | End: 2019-09-20

## 2019-09-20 VITALS — BODY MASS INDEX: 38.8 KG/M2 | HEIGHT: 63 IN | WEIGHT: 219 LBS

## 2019-09-20 VITALS
HEART RATE: 85 BPM | TEMPERATURE: 98.7 F | DIASTOLIC BLOOD PRESSURE: 75 MMHG | SYSTOLIC BLOOD PRESSURE: 141 MMHG | BODY MASS INDEX: 39.34 KG/M2 | HEIGHT: 63 IN | WEIGHT: 222 LBS | RESPIRATION RATE: 18 BRPM

## 2019-09-20 DIAGNOSIS — Z17.0 MALIGNANT NEOPLASM OF UPPER-OUTER QUADRANT OF RIGHT BREAST IN FEMALE, ESTROGEN RECEPTOR POSITIVE (HCC): Primary | ICD-10-CM

## 2019-09-20 DIAGNOSIS — C50.411 MALIGNANT NEOPLASM OF UPPER-OUTER QUADRANT OF RIGHT BREAST IN FEMALE, ESTROGEN RECEPTOR POSITIVE (HCC): ICD-10-CM

## 2019-09-20 DIAGNOSIS — C50.211 MALIGNANT NEOPLASM OF UPPER-INNER QUADRANT OF RIGHT FEMALE BREAST, UNSPECIFIED ESTROGEN RECEPTOR STATUS (HCC): ICD-10-CM

## 2019-09-20 DIAGNOSIS — Z17.0 MALIGNANT NEOPLASM OF UPPER-OUTER QUADRANT OF RIGHT BREAST IN FEMALE, ESTROGEN RECEPTOR POSITIVE (HCC): ICD-10-CM

## 2019-09-20 DIAGNOSIS — C50.411 MALIGNANT NEOPLASM OF UPPER-OUTER QUADRANT OF RIGHT BREAST IN FEMALE, ESTROGEN RECEPTOR POSITIVE (HCC): Primary | ICD-10-CM

## 2019-09-20 LAB
BH CV ECHO MEAS - AO ROOT AREA (BSA CORRECTED): 1.4
BH CV ECHO MEAS - AO ROOT AREA: 6.2 CM^2
BH CV ECHO MEAS - AO ROOT DIAM: 2.8 CM
BH CV ECHO MEAS - BSA(HAYCOCK): 2.2 M^2
BH CV ECHO MEAS - BSA: 2 M^2
BH CV ECHO MEAS - BZI_BMI: 38.8 KILOGRAMS/M^2
BH CV ECHO MEAS - BZI_METRIC_HEIGHT: 160 CM
BH CV ECHO MEAS - BZI_METRIC_WEIGHT: 99.3 KG
BH CV ECHO MEAS - EDV(CUBED): 80.6 ML
BH CV ECHO MEAS - EDV(MOD-SP2): 82 ML
BH CV ECHO MEAS - EDV(MOD-SP4): 65 ML
BH CV ECHO MEAS - EDV(TEICH): 84 ML
BH CV ECHO MEAS - EF(CUBED): 71.9 %
BH CV ECHO MEAS - EF(MOD-BP): 60 %
BH CV ECHO MEAS - EF(MOD-SP2): 54.9 %
BH CV ECHO MEAS - EF(MOD-SP4): 60 %
BH CV ECHO MEAS - EF(TEICH): 63.9 %
BH CV ECHO MEAS - ESV(CUBED): 22.7 ML
BH CV ECHO MEAS - ESV(MOD-SP2): 37 ML
BH CV ECHO MEAS - ESV(MOD-SP4): 26 ML
BH CV ECHO MEAS - ESV(TEICH): 30.3 ML
BH CV ECHO MEAS - FS: 34.5 %
BH CV ECHO MEAS - IVS/LVPW: 1.1
BH CV ECHO MEAS - IVSD: 0.99 CM
BH CV ECHO MEAS - LA/AO: 1.3
BH CV ECHO MEAS - LAD MAJOR: 5.9 CM
BH CV ECHO MEAS - LAT PEAK E' VEL: 9.1 CM/SEC
BH CV ECHO MEAS - LATERAL E/E' RATIO: 10.7
BH CV ECHO MEAS - LV DIASTOLIC VOL/BSA (35-75): 32.3 ML/M^2
BH CV ECHO MEAS - LV MASS(C)D: 137.3 GRAMS
BH CV ECHO MEAS - LV MASS(C)DI: 68.3 GRAMS/M^2
BH CV ECHO MEAS - LV SYSTOLIC VOL/BSA (12-30): 12.9 ML/M^2
BH CV ECHO MEAS - LVIDD: 4.3 CM
BH CV ECHO MEAS - LVIDS: 2.8 CM
BH CV ECHO MEAS - LVLD AP2: 7.9 CM
BH CV ECHO MEAS - LVLD AP4: 7 CM
BH CV ECHO MEAS - LVLS AP2: 6.7 CM
BH CV ECHO MEAS - LVLS AP4: 6.6 CM
BH CV ECHO MEAS - LVPWD: 0.94 CM
BH CV ECHO MEAS - MED PEAK E' VEL: 10.6 CM/SEC
BH CV ECHO MEAS - MEDIAL E/E' RATIO: 9.2
BH CV ECHO MEAS - MV A MAX VEL: 98.2 CM/SEC
BH CV ECHO MEAS - MV DEC SLOPE: 403 CM/SEC^2
BH CV ECHO MEAS - MV DEC TIME: 0.18 SEC
BH CV ECHO MEAS - MV E MAX VEL: 97.7 CM/SEC
BH CV ECHO MEAS - MV E/A: 0.99
BH CV ECHO MEAS - MV P1/2T MAX VEL: 116 CM/SEC
BH CV ECHO MEAS - MV P1/2T: 84.3 MSEC
BH CV ECHO MEAS - MVA P1/2T LCG: 1.9 CM^2
BH CV ECHO MEAS - MVA(P1/2T): 2.6 CM^2
BH CV ECHO MEAS - PA ACC SLOPE: 348 CM/SEC^2
BH CV ECHO MEAS - PA ACC TIME: 0.2 SEC
BH CV ECHO MEAS - PA PR(ACCEL): -9.2 MMHG
BH CV ECHO MEAS - RAP SYSTOLE: 5 MMHG
BH CV ECHO MEAS - RVDD: 2.9 CM
BH CV ECHO MEAS - RVSP: 21 MMHG
BH CV ECHO MEAS - SI(CUBED): 28.8 ML/M^2
BH CV ECHO MEAS - SI(MOD-SP2): 22.4 ML/M^2
BH CV ECHO MEAS - SI(MOD-SP4): 19.4 ML/M^2
BH CV ECHO MEAS - SI(TEICH): 26.7 ML/M^2
BH CV ECHO MEAS - SV(CUBED): 58 ML
BH CV ECHO MEAS - SV(MOD-SP2): 45 ML
BH CV ECHO MEAS - SV(MOD-SP4): 39 ML
BH CV ECHO MEAS - SV(TEICH): 53.6 ML
BH CV ECHO MEAS - TAPSE (>1.6): 2.6 CM2
BH CV ECHO MEAS - TR MAX PG: 16 MMHG
BH CV ECHO MEAS - TR MAX VEL: 198.5 CM/SEC
BH CV ECHO MEASUREMENTS AVERAGE E/E' RATIO: 9.92
BH CV VAS BP RIGHT ARM: NORMAL MMHG
BH CV XLRA - RV BASE: 3.5 CM
BH CV XLRA - RV LENGTH: 6.4 CM
BH CV XLRA - RV MID: 2.8 CM
BH CV XLRA - TDI S': 12.2 CM/SEC
CREAT BLDA-MCNC: 0.7 MG/DL (ref 0.6–1.3)
LEFT ATRIUM VOLUME INDEX: 30.8 ML/M^2
LEFT ATRIUM VOLUME: 62 ML
MAXIMAL PREDICTED HEART RATE: 163 BPM
STRESS TARGET HR: 139 BPM

## 2019-09-20 PROCEDURE — 93306 TTE W/DOPPLER COMPLETE: CPT

## 2019-09-20 PROCEDURE — 99215 OFFICE O/P EST HI 40 MIN: CPT | Performed by: NURSE PRACTITIONER

## 2019-09-20 PROCEDURE — 25010000002 IOPAMIDOL 61 % SOLUTION: Performed by: SURGERY

## 2019-09-20 PROCEDURE — 74177 CT ABD & PELVIS W/CONTRAST: CPT

## 2019-09-20 PROCEDURE — 82565 ASSAY OF CREATININE: CPT

## 2019-09-20 PROCEDURE — 71260 CT THORAX DX C+: CPT

## 2019-09-20 PROCEDURE — 93306 TTE W/DOPPLER COMPLETE: CPT | Performed by: INTERNAL MEDICINE

## 2019-09-20 RX ORDER — LIDOCAINE AND PRILOCAINE 25; 25 MG/G; MG/G
CREAM TOPICAL AS NEEDED
Qty: 30 G | Refills: 2 | Status: SHIPPED | OUTPATIENT
Start: 2019-09-20 | End: 2020-03-02

## 2019-09-20 RX ORDER — ONDANSETRON HYDROCHLORIDE 8 MG/1
8 TABLET, FILM COATED ORAL 3 TIMES DAILY PRN
Qty: 30 TABLET | Refills: 5 | Status: SHIPPED | OUTPATIENT
Start: 2019-09-20 | End: 2019-11-18

## 2019-09-20 RX ORDER — ONDANSETRON HYDROCHLORIDE 8 MG/1
8 TABLET, FILM COATED ORAL 3 TIMES DAILY PRN
Qty: 30 TABLET | Refills: 5 | Status: SHIPPED | OUTPATIENT
Start: 2019-09-20 | End: 2019-09-20 | Stop reason: SDUPTHER

## 2019-09-20 RX ORDER — DEXAMETHASONE 4 MG/1
TABLET ORAL
Qty: 6 TABLET | Refills: 3 | Status: SHIPPED | OUTPATIENT
Start: 2019-09-20 | End: 2019-09-20 | Stop reason: SDUPTHER

## 2019-09-20 RX ORDER — LIDOCAINE AND PRILOCAINE 25; 25 MG/G; MG/G
CREAM TOPICAL AS NEEDED
Qty: 30 G | Refills: 2 | Status: SHIPPED | OUTPATIENT
Start: 2019-09-20 | End: 2019-09-20 | Stop reason: SDUPTHER

## 2019-09-20 RX ORDER — DEXAMETHASONE 4 MG/1
TABLET ORAL
Qty: 6 TABLET | Refills: 3 | Status: SHIPPED | OUTPATIENT
Start: 2019-09-20 | End: 2019-10-11 | Stop reason: SDUPTHER

## 2019-09-20 RX ADMIN — IOPAMIDOL 85 ML: 612 INJECTION, SOLUTION INTRAVENOUS at 10:52

## 2019-09-20 RX ADMIN — BARIUM SULFATE 450 ML: 21 SUSPENSION ORAL at 09:40

## 2019-09-20 NOTE — PROGRESS NOTES
CHEMOTHERAPY PREPARATION    Francoise Kamara  [id number]  1960    Chief Complaint: chemo preparation visit     History of present illness:  Francoise Kamara is a 59 y.o. year old female who is here today for chemotherapy preparation and needs assessment. The patient has been diagnosed with breast cancer and is scheduled to begin treatment with DD DOXOrubicin / Cyclophosphamide followed by Taxol.     Oncology History:     No history exists.       Past Medical History:   Diagnosis Date   • Cardiomegaly     stable on CXR   • Dyspnea on exertion    • Elevated LDL cholesterol level    • Fatigue    • GERD (gastroesophageal reflux disease)     on daily Protonix, EGD 10/2016. Sx's resolved since AGBR, even if doesn't take her PPI. serum h. pyl neg.  EGD [facility] 10/16 prior to AGBR  36 cm   • Hypertension    • Hypoalbuminemia     admits to chronic undereating   • Low HDL (under 40)    • Malignant neoplasm of upper-outer quadrant of right breast in female, estrogen receptor positive (CMS/HCC) 9/12/2019   • Microcytic red blood cells     H/H 12.6/38.4   • Morbid obesity (CMS/HCC)    • OAB (overactive bladder)    • Peripheral edema    • Post-menopause on HRT (hormone replacement therapy)    • Rosacea     on Doxycycline   • Stress incontinence    • Vaginal atrophy    • Vitamin D deficiency    • Wears glasses        Past Surgical History:   Procedure Laterality Date   • BREAST BIOPSY      ? LATERALITY   • BREAST LUMPECTOMY Right 08/19/2019   • CHOLECYSTECTOMY  2015    for stones w/ Dr. Barboza @ [facility]   • COLONOSCOPY  2014   • COLONOSCOPY  2017   • ENDOSCOPY  2016    by Dr. Urias   • ENDOSCOPY N/A 10/20/2017    Procedure: ESOPHAGOGASTRODUODENOSCOPY;  Surgeon: Guille Urias MD;  Location:  [facility];  Service:    • GASTRIC BANDING REMOVAL N/A 12/21/2016    Procedure: GASTRIC BANDING REMOVAL LAPAROSCOPIC;  Surgeon: Guille Urias MD;  Location:  [facility];  Service:    • GASTRIC SLEEVE LAPAROSCOPIC N/A  "10/20/2017    Procedure: GASTRIC SLEEVE LAPAROSCOPIC, ;  Surgeon: Guille Urias MD;  Location: [facility];  Service:    • LAPAROSCOPIC GASTRIC BANDING  2008    s/p LAGB APS w/ HHR 11/2008 by [facility] @ [facility].  full dissection hiatus, single stitch ant repair   • WISDOM TOOTH EXTRACTION         MEDICATIONS: The current medication list was reviewed and reconciled.     Allergies:  is allergic to penicillins.    Family History   Problem Relation Age of Onset   • Hypertension Mother    • Sleep apnea Mother    • Hypertension Father    • Lung cancer Father    • Hypertension Maternal Grandfather    • Stroke Maternal Grandfather    • Hypertension Paternal Grandmother    • Breast cancer Paternal Aunt 45   • No Known Problems Brother    • BRCA 1/2 Neg Hx    • Ovarian cancer Neg Hx          Review of Systems   Constitutional: Negative.    HENT: Negative.    Eyes: Negative.    Respiratory: Negative.    Cardiovascular: Negative.    Gastrointestinal: Negative.    Endocrine: Negative.    Genitourinary: Negative.    Musculoskeletal: Negative.    Skin: Negative.    Allergic/Immunologic: Negative.    Neurological: Negative.    Hematological: Negative.    Psychiatric/Behavioral: The patient is nervous/anxious.        Physical Exam  Vital Signs: /75   Pulse 85   Temp 98.7 °F (37.1 °C) (Temporal)   Resp 18   Ht 160 cm (62.99\")   Wt 101 kg (222 lb)   BMI 39.34 kg/m²    General Appearance:  alert, cooperative, no apparent distress and appears stated age   Neurologic/Psychiatric: A&O x 3, gait steady, appropriate affect   HEENT:  Normocephalic, without obvious abnormality, mucous membranes moist   Lungs:   Clear to auscultation bilaterally; respirations regular, even, and unlabored bilaterally   Heart:  Regular rate and rhythm, no murmurs appreciated   Extremities: Normal, atraumatic; no clubbing, cyanosis, or edema    Skin: No rashes, lesions, or abnormal coloration noted     ECOG Performance Status: (0) Fully active, able to " carry on all predisease performance without restriction          NEEDS ASSESSMENTS    Genetics  The patient's new diagnosis and family history have been reviewed for genetic counseling needs. A genetic referral is not recommended.     Psychosocial  The patient has completed a PHQ-9 Depression Screening and the Distress Thermometer (DT) today.   PHQ-9 results show 1-4 (Minimal Depression). The patient scored their distress today as 3 on a scale of 0-10 with 0 being no distress and 10 being extreme distress.   Problems marked by the patient as being an issue for them within the last week include family problems and physical problems.   Results were reviewed along with psychosocial resources offered by our cancer center. Our oncology social worker will be flagged for a DT score of 4 or above, and a same day call will be made for a score of 9 or 10. A mental health referral is recommended at this time. The patient is not accepting of a referral to [facility].   Copies of patient's questionnaires will be scanned into EMR for details and further reference.    Barriers to care  A barriers form was also completed by the patient today. We discussed services offered by our facility to help her have adequate access to care. The patient was given the name and card for our Oncology Social Worker, Kelly Morton. Based upon barriers assessment today, the patient will not require a follow-up call from the  to further discuss needs.   A copy of the barriers form will also be scanned into EMR for details and further reference.     VAD Assessment  The patient and I discussed planned intervenous chemotherapy as well as other IV treatments that are often needed throughout the course of treatment. These may include, but are not limited to blood transfusions, antibiotics, and IV hydration. The vasculature does not appear to be adequate for multiple peripheral IVs throughout their treatment course. Discussed risks and  "benefits of VADs. The patient would like to pursue Port-A-Cath insertion prior to initiation of treatment.     Advanced Care Planning  The patient and I discussed advanced care planning, \"Conversations that Matter\".   This service was offered, free of charge, for development of advance directives with a certified ACP facilitator.  The patient does not have an up-to-date advanced directive. This document is not on file with our office. The patient is not interested in an appointment with one of our facilitators to create or update their advanced directives.      Palliative Care  The patient and I discussed palliative care services. Palliative care is not the same as Hospice care. This is specialized medical care for people living with serious illness with the goal of improving quality of life for the patient and their family. [facility] has partnered with [facility] to offer our patients outpatient palliative care early along with their treatment to assist in coordination of care, symptom management, pain management, and medical decision making.  Oncology criteria for palliative care referral is not met at this time. The patient is not interested in a palliative care consultation.     Additional Referral needs  none      CHEMOTHERAPY EDUCATION    Booklets Given: Chemotherapy and You [x]  Eating Hints [x]    Sexuality/Fertility Books []      Chemotherapy/Biotherapy Education Sheets: (list all that apply)  nausea management, acid reflux management, diarrhea management, Cancer resourse contacts information, skin and mouth care and vaccination information                                                                                                                                                                 Chemotherapy Regimen:   Treatment Plans     Name Type Plan dates Plan Provider         Active    OP BREAST AC DD DOXOrubicin / Cyclophosphamide ONCOLOGY TREATMENT  9/24/2019 - Present Laila " MD Kedar                    TOPICS EDUCATION PROVIDED COMMENTS   ANEMIA:  role of RBC, cause, s/s, ways to manage, role of transfusion [x]    THROMBOCYTOPENIA:  role of platelet, cause, s/s, ways to prevent bleeding, things to avoid, when to seek help [x]    NEUTROPENIA:  role of WBC, cause, infection precautions, s/s of infection, when to call MD [x]    NUTRITION & APPETITE CHANGES:  importance of maintaining healthy diet & weight, ways to manage to improve intake, dietary consult, exercise regimen [x]    DIARRHEA:  causes, s/s of dehydration, ways to manage, dietary changes, when to call MD [x]    CONSTIPATION:  causes, ways to manage, dietary changes, when to call MD [x]    NAUSEA & VOMITING:  cause, use of antiemetics, dietary changes, when to call MD [x]    MOUTH SORES:  causes, oral care, ways to manage [x]    ALOPECIA:  cause, ways to manage, resources [x]    INFERTILITY & SEXUALITY:  causes, fertility preservation options, sexuality changes, ways to manage, importance of birth control [x]    NERVOUS SYSTEM CHANGES:  causes, s/s, neuropathies, cognitive changes, ways to manage [x]    PAIN:  causes, ways to manage [x] ????   SKIN & NAIL CHANGES:  cause, s/s, ways to manage [x]    ORGAN TOXICITIES:  cause, s/s, need for diagnostic tests, labs, when to notify MD [x]    SURVIVORSHIP:  distress, distress assessment, secondary malignancies, early/late effects, follow-up, social issues, social support [x]    HOME CARE:  use of spill kits, storing of PO chemo, how to manage bodily fluids [x]    MISCELLANEOUS:  drug interactions, administration, vesicant, et [x]        Assessment and Plan:    Diagnoses and all orders for this visit:    Malignant neoplasm of upper-outer quadrant of right breast in female, estrogen receptor positive (CMS/HCC)  -     lidocaine-prilocaine (EMLA) 2.5-2.5 % cream; Apply  topically to the appropriate area as directed As Needed (45-60 minutes prior to port access.  Cover with  saran/plastic wrap).  -     Provider communication  -     dexamethasone (DECADRON) 4 MG tablet; Take 2 tablets in the morning daily on Days 2, 3 & 4.  Take with food.  -     ondansetron (ZOFRAN) 8 MG tablet; Take 1 tablet by mouth 3 (Three) Times a Day As Needed for Nausea or Vomiting.        This was a 60 minute face-to-face visit with 55 minutes spent in  counseling and coordination of care as documented above.   The patient and I have reviewed their new cancer diagnosis and scheduled treatment plan. Needs assessment was completed including genetics, psychosocial needs, barriers to care, VAD evaluation, advanced care planning, and palliative care services. Referrals have been ordered as appropriate based upon our evaluation and patient desires.     Chemotherapy teaching was also completed today as documented above. Adequate time was given to answer all questions to her satisfaction. Patient and family are aware of their care team members and contact information if they have questions or problems throughout the treatment course. Needs assessments and education has been completed. The patient is adequately prepared to begin treatment as scheduled.     Reviewed with patient education regarding  Zofran, dexamethasone and EMLA cream prescriptions sent to pharmacy.       I advised the patient that she can take Tylenol or Ibuprofen as needed for aches/pains related to cancer/treatment. I also advised patient she could use Senakot or Miralax as needed for constipation or Imodium as needed for diarrhea.       I reviewed with the patient the care team members. I also reviewed the option of the urgent care clinic through our oncology office for evaluation and management of symptoms related to treatment.      Tiffanie Gomez, APRN   09/20/2019

## 2019-09-23 ENCOUNTER — TRANSCRIBE ORDERS (OUTPATIENT)
Dept: ADMINISTRATIVE | Facility: HOSPITAL | Age: 59
End: 2019-09-23

## 2019-09-23 DIAGNOSIS — C50.411 MALIGNANT NEOPLASM OF UPPER-OUTER QUADRANT OF RIGHT FEMALE BREAST, UNSPECIFIED ESTROGEN RECEPTOR STATUS (HCC): Primary | ICD-10-CM

## 2019-09-24 ENCOUNTER — HOSPITAL ENCOUNTER (OUTPATIENT)
Dept: GENERAL RADIOLOGY | Facility: HOSPITAL | Age: 59
Discharge: HOME | End: 2019-09-24
Admitting: SURGERY

## 2019-09-24 DIAGNOSIS — C50.411 MALIGNANT NEOPLASM OF UPPER-OUTER QUADRANT OF RIGHT FEMALE BREAST, UNSPECIFIED ESTROGEN RECEPTOR STATUS (HCC): ICD-10-CM

## 2019-09-24 PROCEDURE — 71045 X-RAY EXAM CHEST 1 VIEW: CPT

## 2019-09-25 ENCOUNTER — HOSPITAL ENCOUNTER (OUTPATIENT)
Dept: ONCOLOGY | Facility: HOSPITAL | Age: 59
Setting detail: INFUSION SERIES
Discharge: HOME | End: 2019-09-25

## 2019-09-25 ENCOUNTER — OFFICE VISIT (OUTPATIENT)
Dept: ONCOLOGY | Facility: CLINIC | Age: 59
End: 2019-09-25

## 2019-09-25 ENCOUNTER — HOSPITAL ENCOUNTER (OUTPATIENT)
Dept: GENERAL RADIOLOGY | Facility: HOSPITAL | Age: 59
Discharge: HOME | End: 2019-09-25
Admitting: INTERNAL MEDICINE

## 2019-09-25 ENCOUNTER — TELEPHONE (OUTPATIENT)
Dept: ONCOLOGY | Facility: CLINIC | Age: 59
End: 2019-09-25

## 2019-09-25 ENCOUNTER — DOCUMENTATION (OUTPATIENT)
Dept: NUTRITION | Facility: HOSPITAL | Age: 59
End: 2019-09-25

## 2019-09-25 ENCOUNTER — EDUCATION (OUTPATIENT)
Dept: ONCOLOGY | Facility: HOSPITAL | Age: 59
End: 2019-09-25

## 2019-09-25 VITALS
OXYGEN SATURATION: 98 % | HEART RATE: 74 BPM | DIASTOLIC BLOOD PRESSURE: 81 MMHG | SYSTOLIC BLOOD PRESSURE: 174 MMHG | WEIGHT: 224 LBS | BODY MASS INDEX: 39.69 KG/M2 | RESPIRATION RATE: 18 BRPM | TEMPERATURE: 97.3 F | HEIGHT: 63 IN

## 2019-09-25 DIAGNOSIS — Z17.0 MALIGNANT NEOPLASM OF UPPER-OUTER QUADRANT OF RIGHT BREAST IN FEMALE, ESTROGEN RECEPTOR POSITIVE (HCC): Primary | ICD-10-CM

## 2019-09-25 DIAGNOSIS — C50.411 MALIGNANT NEOPLASM OF UPPER-OUTER QUADRANT OF RIGHT BREAST IN FEMALE, ESTROGEN RECEPTOR POSITIVE (HCC): Primary | ICD-10-CM

## 2019-09-25 DIAGNOSIS — Z95.828 PORT-A-CATH IN PLACE: ICD-10-CM

## 2019-09-25 DIAGNOSIS — C50.411 MALIGNANT NEOPLASM OF UPPER-OUTER QUADRANT OF RIGHT BREAST IN FEMALE, ESTROGEN RECEPTOR POSITIVE (HCC): ICD-10-CM

## 2019-09-25 DIAGNOSIS — Z17.0 MALIGNANT NEOPLASM OF UPPER-OUTER QUADRANT OF RIGHT BREAST IN FEMALE, ESTROGEN RECEPTOR POSITIVE (HCC): ICD-10-CM

## 2019-09-25 LAB
ALBUMIN SERPL-MCNC: 4.2 G/DL (ref 3.5–5.2)
ALBUMIN/GLOB SERPL: 1.9 G/DL
ALP SERPL-CCNC: 82 U/L (ref 39–117)
ALT SERPL W P-5'-P-CCNC: 7 U/L (ref 1–33)
ANION GAP SERPL CALCULATED.3IONS-SCNC: 9 MMOL/L (ref 5–15)
AST SERPL-CCNC: 10 U/L (ref 1–32)
BILIRUB SERPL-MCNC: 0.5 MG/DL (ref 0.2–1.2)
BUN BLD-MCNC: 12 MG/DL (ref 6–20)
BUN/CREAT SERPL: 18.2 (ref 7–25)
CALCIUM SPEC-SCNC: 9.1 MG/DL (ref 8.6–10.5)
CHLORIDE SERPL-SCNC: 106 MMOL/L (ref 98–107)
CO2 SERPL-SCNC: 28 MMOL/L (ref 22–29)
CREAT BLD-MCNC: 0.66 MG/DL (ref 0.57–1)
ERYTHROCYTE [DISTWIDTH] IN BLOOD BY AUTOMATED COUNT: 13.3 % (ref 12.3–15.4)
GFR SERPL CREATININE-BSD FRML MDRD: 92 ML/MIN/1.73
GLOBULIN UR ELPH-MCNC: 2.2 GM/DL
GLUCOSE BLD-MCNC: 82 MG/DL (ref 65–99)
HCT VFR BLD AUTO: 37.5 % (ref 34–46.6)
HGB BLD-MCNC: 12.1 G/DL (ref 12–15.9)
LYMPHOCYTES # BLD AUTO: 3.2 10*3/MM3 (ref 0.7–3.1)
LYMPHOCYTES NFR BLD AUTO: 38 % (ref 19.6–45.3)
MCH RBC QN AUTO: 29.6 PG (ref 26.6–33)
MCHC RBC AUTO-ENTMCNC: 32.2 G/DL (ref 31.5–35.7)
MCV RBC AUTO: 92 FL (ref 79–97)
MONOCYTES # BLD AUTO: 0.5 10*3/MM3 (ref 0.1–0.9)
MONOCYTES NFR BLD AUTO: 5.6 % (ref 5–12)
NEUTROPHILS # BLD AUTO: 4.7 10*3/MM3 (ref 1.7–7)
NEUTROPHILS NFR BLD AUTO: 56.4 % (ref 42.7–76)
PLATELET # BLD AUTO: 177 10*3/MM3 (ref 140–450)
PMV BLD AUTO: 9 FL (ref 6–12)
POTASSIUM BLD-SCNC: 4.3 MMOL/L (ref 3.5–5.2)
PROT SERPL-MCNC: 6.4 G/DL (ref 6–8.5)
RBC # BLD AUTO: 4.08 10*6/MM3 (ref 3.77–5.28)
SODIUM BLD-SCNC: 143 MMOL/L (ref 136–145)
WBC NRBC COR # BLD: 8.3 10*3/MM3 (ref 3.4–10.8)

## 2019-09-25 PROCEDURE — 25010000002 PALONOSETRON 0.25 MG/5ML SOLUTION PREFILLED SYRINGE: Performed by: INTERNAL MEDICINE

## 2019-09-25 PROCEDURE — 96374 THER/PROPH/DIAG INJ IV PUSH: CPT

## 2019-09-25 PROCEDURE — 25010000002 FOSAPREPITANT PER 1 MG: Performed by: INTERNAL MEDICINE

## 2019-09-25 PROCEDURE — 96411 CHEMO IV PUSH ADDL DRUG: CPT

## 2019-09-25 PROCEDURE — 36598 INJ W/FLUOR EVAL CV DEVICE: CPT

## 2019-09-25 PROCEDURE — 85025 COMPLETE CBC W/AUTO DIFF WBC: CPT | Performed by: INTERNAL MEDICINE

## 2019-09-25 PROCEDURE — 96377 APPLICATON ON-BODY INJECTOR: CPT

## 2019-09-25 PROCEDURE — 96409 CHEMO IV PUSH SNGL DRUG: CPT

## 2019-09-25 PROCEDURE — 96375 TX/PRO/DX INJ NEW DRUG ADDON: CPT

## 2019-09-25 PROCEDURE — 25010000002 PEGFILGRASTIM 6 MG/0.6ML PREFILLED SYRINGE KIT: Performed by: INTERNAL MEDICINE

## 2019-09-25 PROCEDURE — 99214 OFFICE O/P EST MOD 30 MIN: CPT | Performed by: INTERNAL MEDICINE

## 2019-09-25 PROCEDURE — 25010000002 DOXORUBICIN PER 10 MG: Performed by: INTERNAL MEDICINE

## 2019-09-25 PROCEDURE — 96413 CHEMO IV INFUSION 1 HR: CPT

## 2019-09-25 PROCEDURE — 96417 CHEMO IV INFUS EACH ADDL SEQ: CPT

## 2019-09-25 PROCEDURE — 80053 COMPREHEN METABOLIC PANEL: CPT | Performed by: INTERNAL MEDICINE

## 2019-09-25 PROCEDURE — 25010000002 DEXAMETHASONE PER 1 MG: Performed by: INTERNAL MEDICINE

## 2019-09-25 PROCEDURE — 96367 TX/PROPH/DG ADDL SEQ IV INF: CPT

## 2019-09-25 PROCEDURE — 25010000002 CYCLOPHOSPHAMIDE PER 100 MG: Performed by: INTERNAL MEDICINE

## 2019-09-25 RX ORDER — SODIUM CHLORIDE 9 MG/ML
250 INJECTION, SOLUTION INTRAVENOUS ONCE
Status: COMPLETED | OUTPATIENT
Start: 2019-09-25 | End: 2019-09-25

## 2019-09-25 RX ORDER — DOXORUBICIN HYDROCHLORIDE 2 MG/ML
60 INJECTION, SOLUTION INTRAVENOUS ONCE
Status: COMPLETED | OUTPATIENT
Start: 2019-09-25 | End: 2019-09-25

## 2019-09-25 RX ORDER — PALONOSETRON 0.05 MG/ML
0.25 INJECTION, SOLUTION INTRAVENOUS ONCE
Status: CANCELLED | OUTPATIENT
Start: 2019-09-25

## 2019-09-25 RX ORDER — DOXORUBICIN HYDROCHLORIDE 2 MG/ML
60 INJECTION, SOLUTION INTRAVENOUS ONCE
Status: CANCELLED | OUTPATIENT
Start: 2019-09-25

## 2019-09-25 RX ORDER — LEVOFLOXACIN 500 MG/1
500 TABLET, FILM COATED ORAL DAILY
Qty: 10 TABLET | Refills: 0 | Status: SHIPPED | OUTPATIENT
Start: 2019-09-25 | End: 2019-09-30

## 2019-09-25 RX ORDER — SODIUM CHLORIDE 9 MG/ML
250 INJECTION, SOLUTION INTRAVENOUS ONCE
Status: CANCELLED | OUTPATIENT
Start: 2019-09-25

## 2019-09-25 RX ORDER — PALONOSETRON 0.05 MG/ML
0.25 INJECTION, SOLUTION INTRAVENOUS ONCE
Status: COMPLETED | OUTPATIENT
Start: 2019-09-25 | End: 2019-09-25

## 2019-09-25 RX ADMIN — HEPARIN 500 UNITS: 100 SYRINGE at 13:22

## 2019-09-25 RX ADMIN — PALONOSETRON 0.25 MG: 0.25 INJECTION, SOLUTION INTRAVENOUS at 11:52

## 2019-09-25 RX ADMIN — SODIUM CHLORIDE 150 MG: 9 INJECTION, SOLUTION INTRAVENOUS at 11:53

## 2019-09-25 RX ADMIN — SODIUM CHLORIDE 250 ML: 9 INJECTION, SOLUTION INTRAVENOUS at 12:47

## 2019-09-25 RX ADMIN — DEXAMETHASONE SODIUM PHOSPHATE 12 MG: 4 INJECTION, SOLUTION INTRAMUSCULAR; INTRAVENOUS at 11:52

## 2019-09-25 RX ADMIN — DOXORUBICIN HYDROCHLORIDE 120 MG: 2 INJECTION, SOLUTION INTRAVENOUS at 12:40

## 2019-09-25 RX ADMIN — PEGFILGRASTIM 6 MG: KIT SUBCUTANEOUS at 13:19

## 2019-09-25 RX ADMIN — CYCLOPHOSPHAMIDE 1210 MG: 1 INJECTION, POWDER, FOR SOLUTION INTRAVENOUS; ORAL at 12:47

## 2019-09-25 NOTE — PROGRESS NOTES
Oncology Nutrition Screening    Patient Name:  Francoise Kamara  YOB: 1960  MRN: [id number]  Date:  09/25/19  Physician:  Dr. Rodriguez / Dr. Everett    Type of Cancer Treatment:   Surgery: right breast lumpectomy (8/19/19)  Radiation/Cyberknife: consult pending (9/30/19)  Chemotherapy: Dose Dense Adriamycin / Cytoxan - every 14 days x 4 followed by Taxol - weekly x 12    Patient Active Problem List   Diagnosis   • Urinary frequency   • Stress incontinence   • Hypertension   • GERD (gastroesophageal reflux disease)   • Vaginal atrophy   • Rosacea   • OAB (overactive bladder)   • Vitamin D deficiency   • Peripheral edema   • Fatigue   • Dyspnea on exertion   • Wears glasses   • Cardiomegaly   • Post-menopause on HRT (hormone replacement therapy)   • Malignant neoplasm of upper-outer quadrant of right breast in female, estrogen receptor positive (CMS/HCC)       Current Outpatient Medications   Medication Sig Dispense Refill   • Biotin 5000 MCG tablet Take  by mouth.     • dexamethasone (DECADRON) 4 MG tablet Take 2 tablets in the morning daily on Days 2, 3 & 4.  Take with food. 6 tablet 3   • estrogen, conjugated,-medroxyprogesterone (PREMPRO) 0.3-1.5 MG per tablet Take 1 tablet by mouth Daily. 30 tablet 11   • Ferrous Gluconate (IRON 27 PO) Take  by mouth.     • hydrochlorothiazide (MICROZIDE) 12.5 MG capsule Take 12.5 mg by mouth Daily.     • HYDROcodone-acetaminophen (NORCO) 5-325 MG per tablet Take 1 tablet by mouth Every 6 (Six) Hours As Needed for pain. 10 tablet 0   • levoFLOXacin (LEVAQUIN) 500 MG tablet Take 1 tablet by mouth Daily for 7 days. 1 tablet 10 tablet 0   • lidocaine-prilocaine (EMLA) 2.5-2.5 % cream Apply  topically to the appropriate area as directed As Needed (45-60 minutes prior to port access.  Cover with saran/plastic wrap). 30 g 2   • LISINOPRIL PO Take 40 mg by mouth Daily.     • ondansetron (ZOFRAN) 8 MG tablet Take 1 tablet by mouth 3 (Three) Times a Day As Needed for Nausea  or Vomiting. 30 tablet 5   • oxyCODONE-acetaminophen (PERCOCET) 5-325 MG per tablet Take 1-2 tablets by mouth Every 6 (Six) Hours As Needed for pain 15 tablet 0   • pantoprazole (PROTONIX) 40 MG EC tablet Take 40 mg by mouth Daily.     • SOOLANTRA 1 % cream Apply 1 application topically Daily. Apply to face     • tolterodine LA (DETROL LA) 4 MG 24 hr capsule Take 1 capsule by mouth Daily. 30 capsule 11   • vitamin D (ERGOCALCIFEROL) 98117 UNITS capsule capsule 50,000 Units 1 (One) Time Per Week. sundays       No current facility-administered medications for this visit.        Glycemic Risk:   Benji    Weight:   Height: 63 inches  Weight: 224 lbs.   BMI: 39.7  Obese  Weight - patient reports she has gained a few pounds recently    Oral Food Intake:  Special Diet Restrictions: Gastric Sleeve (2017)  Premier Protein Drinks - 1-1.5 per day    Hydration Status:   How many 8 ounce glass of water of fluid do you drink per day?  Patient states she does not drink much fluids throughout the day.    Enteral Feeding:   n/a    Nutrition Symptoms:   No Problems with Eating    Activity:   Not assessed at this time.     reports that she has never smoked. She has never used smokeless tobacco. She reports that she does not drink alcohol or use drugs.    Evaluation of Nutritional Risk:   Patient has been identified at mild nutritional risk due to diagnosis, treatment course, and patient education.  Met with patient and her mother during her initial chemotherapy infusion appointment.  She states her appetite/oral intake have been normal recently and she denies nutritional complaints at this time.    Discussed the importance of good nutrition during her treatment course focusing on adequate calorie, protein, nutrient and fluid intake.  Advised her to be consuming smaller more frequent meals/snacks throughout the day to aid with potential nausea management.  Emphasized the importance of protein and its role in the diet; reviewed high  "protein foods; and recommended she have a protein source at each meal/snack.  Also emphasized the importance of hydration; reviewed good hydrating fluid options; and strongly encouraged her to increase her fluid intake with a goal of 64 ounces daily.  Discussed nutritional supplements and their role in the diet and encouraged her to drink supplements as needed.  Briefly discussed the basics and importance of food safety during her treatment course.  Provided and reviewed written diet material \"Nutritional Considerations in Breast Cancer\" for her reference.    Answered their questions and both voiced understanding of information discussed.  RD's contact information provided and encouraged to call with questions.  Will monitor as needed during her treatment course.    Electronically signed by:  Paz Rosado RD  11:39 AM  "

## 2019-09-25 NOTE — PROGRESS NOTES
Patient had her port placed yesterday. Today noted swelling around the site. Port was accessed by Carly Wills RN with 20G 1in needle. Flushed without resistance, blood tinged with return but nothing more. Tried laying down, standing up, coughing and deep breathing. No blood return. Port was de-accessed and Teri Mast RN re-accessed with 20G 1.5in needle. Still flushing well, no resistance but only light pink/orange blood tinge in tubing with return. Port de-accessed again and Yamileth Brady RN re-accessed with 20g 1in needle. Same issues. No blood return. Yamileth Brady RN spoke in person to Dr. Rodriguez about the issue. Dr. Rodriguez ordered CT guided port access. Ms. Kamara went to CT and had the port accessed and returned to our unit. When she returned we were able to get blood return, draw labs and flush without resistance.

## 2019-09-25 NOTE — TELEPHONE ENCOUNTER
----- Message from Meli Mast sent at 9/25/2019  9:01 AM EDT -----  Regarding: ROSALINE-CLARIFICATION ON DIRECTIONS   Contact: [phone]  Rose with [facility] pharmacy called needs clarification on the directions for the Levofloxacin. Please call

## 2019-09-25 NOTE — PROGRESS NOTES
PROBLEM LIST:  Oncology/Hematology History    Lab Results   Component Value Date    FINALDX  08/19/2019     1. RIGHT BREAST LUMPECTOMY WITH NEEDLE LOCALIZATION:   Infiltrating and in-situ intermediate grade lobular carcinoma.  Bloom Denis score 6/9.  Infiltrating tumor size 2.2x1.5x1.0 cm.   Margins of resection negative for tumor with closest infiltrating margin inferior and superior measuring 4 mm each.   No lymphvascular invasion identified.   Presence of previous biopsy site identified.  See Template.  2. SENTINEL LYMPH NODE #1, RIGHT AXILLA, EXCISION:   Lymph node x1; positive for metastatic lobular carcinoma (1/1).  3. SENTINEL LYMPH NODE #2, RIGHT AXILLA, EXCISION:  Lymph node x1; positive for metastatic lobular carcinoma (1/1).  4. NONSENTINEL LYMPH NODE, RIGHT AXILLA, EXCISION:  Lymph node x1; positive for metastatic lobular carcinoma (1/1).     INVASIVE BREAST CANCER STAGING TEMPLATE:  TYPE OF SPECIMEN/PROCEDURE:  Needle localized lumpectomy  SPECIMEN LATERALITY: Right breast  TUMOR SITE: 12 o'clock   TUMOR SIZE: (Size of Largest Invasive Carcinoma): 2.2x1.5x1.0 cm   HISTOLOGIC TYPE OF INVASIVE CARCINOMA:  Lobular   GRADE: Intermediate   TUBULAR FORMATION:  3  MITOTIC ACTIVITY:  1  PLEOMORPHISM:  2  OVERALL SCORE: 6/9   DUCTAL CARCINOMA IN SITU (DCIS) EXTENT: 0  TUMOR EXTENSION (Only if structures present and involved):    SKIN: N/A    NIPPLE: N/A    SKELETAL MUSCLE: N/A   SURGICAL MARGINS (Uninvolved/positive): Uninvolved   INVASIVE CARCINOMA MARGINS (Uninvolved/Positive) Uninvolved   DISTANCE FROM CLOSEST MARGIN: 4 mm   SPECIFIC CLOSEST MARGIN: Inferior and superior   LCIS MARGINS (Uninvolved/Positive/No LCIS): Uninvolved   DISTANCE FROM CLOSEST MARGIN: 1 mm  SPECIFIC CLOSEST MARGIN: Inferior   LYMPH NODES (required only if lymph nodes are present in the specimen):  Present   NUMBER OF LYMPH NODES WITH MACROMETASTASIS: 3  NUMBER OF LYMPH NODES WITH MICROMETASTASIS: 0  NUMBER OF LYMPH NODES WITH  ISOLATED TUMOR CELLS: 0  TOTAL NUMBER OF LYMPH NODES EXAMINED (Including sentinel nodes): 3  NUMBER OF SENTINEL NODES EXAMINED: 2  EXTRANODAL EXTENSION OF TUMOR:  0  VASCULAR/LYMPHATIC INVASION:  Not identified     ESTROGEN RECEPTOR STATUS BY IHC METHOD: Positive   PROGESTERONE RECEPTOR STATUS BY IHC METHOD: Positive    HER-2/ysabel ONCOPROTEIN STATUS BY IHC METHOD:  Negative   HER-2/ysabel ONCOGENE STATUS BY IN SITU HYBRIDIZATION ANALYSIS:  Not performed   TREATMENT EFFECT (BREAST): None  TREATMENT EFFECT (NODES): None   ADDITIONAL PATHOLOGIC FINDINGS:  Previous biopsy site identified   OTHER STUDIES:  None  AJCC PATHOLOGIC STAGE:  (COMPLETED BY PATHOLOGIST, BASED ONLY ON TISSUE FINDINGS, MORE EXTENSIVE DISEASE MAY NOT BE KNOWN TO THE PATHOLOGIST)  pT=  2  pN=  2  pM=  Unknown    DGD/mbc                Malignant neoplasm of upper-outer quadrant of right breast in female, estrogen receptor positive (CMS/HCC)    8/16/2019 Initial Diagnosis     Malignant neoplasm of upper-outer quadrant of right breast in female, estrogen receptor positive (CMS/HCC)         8/19/2019 Surgery     Surgery       Right breast lumpectomy with sentinel node biopsy by Dr. Stephanie Fraire         8/19/2019 Cancer Staged     Cancer Staging  Malignant neoplasm of upper-outer quadrant of right breast in female, estrogen receptor positive (CMS/HCC)  Staging form: Breast, AJCC 8th Edition  - Pathologic stage from 9/12/2019: Stage IB (pT2, pN1a, cM0, G2, ER: Positive, ID: Positive, HER2: Negative) - Signed by Laila Thacker MD on 9/12/2019 9/19/2019 Imaging     Negative work-up for metastatic disease by CAT scan of the chest abdomen pelvis and bone scan         9/20/2019 -  Other Event     ECHO - Normal EF         9/23/2019 Surgery     Surgery       Port Placement         9/25/2019 -  Chemotherapy     OP BREAST AC DD DOXOrubicin / Cyclophosphamide              REASON FOR VISIT: Management of my breast cancer    HISTORY OF PRESENT ILLNESS:   59  y.o.  female presents today for management of her breast cancer.  Prior to this visit she underwent imaging to make sure she did not have metastatic disease.  She also had an echo for heart and a port placement.  Clinically she is doing well.  She is recovered nicely from her surgery.  She denies any issues with pain.  No recent infections.        Past Medical History:   Diagnosis Date   • Cardiomegaly     stable on CXR   • Dyspnea on exertion    • Elevated LDL cholesterol level    • Fatigue    • GERD (gastroesophageal reflux disease)     on daily Protonix, EGD 10/2016. Sx's resolved since AGBR, even if doesn't take her PPI. serum h. pyl neg.  EGD [facility] 10/16 prior to AGBR  36 cm   • Hypertension    • Hypoalbuminemia     admits to chronic undereating   • Low HDL (under 40)    • Malignant neoplasm of upper-outer quadrant of right breast in female, estrogen receptor positive (CMS/HCC) 9/12/2019   • Microcytic red blood cells     H/H 12.6/38.4   • Morbid obesity (CMS/HCC)    • OAB (overactive bladder)    • Peripheral edema    • Post-menopause on HRT (hormone replacement therapy)    • Rosacea     on Doxycycline   • Stress incontinence    • Vaginal atrophy    • Vitamin D deficiency    • Wears glasses      Social History     Socioeconomic History   • Marital status:      Spouse name: Not on file   • Number of children: Not on file   • Years of education: Not on file   • Highest education level: Not on file   Tobacco Use   • Smoking status: Never Smoker   • Smokeless tobacco: Never Used   Substance and Sexual Activity   • Alcohol use: No   • Drug use: No   • Sexual activity: Defer     Comment: spouse   Social History Narrative     w/2 children.  Lives in [place].  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      Family History   Problem Relation Age of Onset   • Hypertension Mother    • Sleep apnea Mother    • Hypertension Father    • Lung cancer Father    • Hypertension Maternal Grandfather     • Stroke Maternal Grandfather    • Hypertension Paternal Grandmother    • Breast cancer Paternal Aunt 45   • No Known Problems Brother    • BRCA 1/2 Neg Hx    • Ovarian cancer Neg Hx        Review of Systems:    Review of Systems - Oncology   A comprehensive 14 point review of systems was performed and was negative except as mentioned.      Medications:        Current Outpatient Medications:   •  Biotin 5000 MCG tablet, Take  by mouth., Disp: , Rfl:   •  dexamethasone (DECADRON) 4 MG tablet, Take 2 tablets in the morning daily on Days 2, 3 & 4.  Take with food., Disp: 6 tablet, Rfl: 3  •  estrogen, conjugated,-medroxyprogesterone (PREMPRO) 0.3-1.5 MG per tablet, Take 1 tablet by mouth Daily., Disp: 30 tablet, Rfl: 11  •  Ferrous Gluconate (IRON 27 PO), Take  by mouth., Disp: , Rfl:   •  hydrochlorothiazide (MICROZIDE) 12.5 MG capsule, Take 12.5 mg by mouth Daily., Disp: , Rfl:   •  HYDROcodone-acetaminophen (NORCO) 5-325 MG per tablet, Take 1 tablet by mouth Every 6 (Six) Hours As Needed for pain., Disp: 10 tablet, Rfl: 0  •  lidocaine-prilocaine (EMLA) 2.5-2.5 % cream, Apply  topically to the appropriate area as directed As Needed (45-60 minutes prior to port access.  Cover with saran/plastic wrap)., Disp: 30 g, Rfl: 2  •  LISINOPRIL PO, Take 40 mg by mouth Daily., Disp: , Rfl:   •  ondansetron (ZOFRAN) 8 MG tablet, Take 1 tablet by mouth 3 (Three) Times a Day As Needed for Nausea or Vomiting., Disp: 30 tablet, Rfl: 5  •  oxyCODONE-acetaminophen (PERCOCET) 5-325 MG per tablet, Take 1-2 tablets by mouth Every 6 (Six) Hours As Needed for pain, Disp: 15 tablet, Rfl: 0  •  pantoprazole (PROTONIX) 40 MG EC tablet, Take 40 mg by mouth Daily., Disp: , Rfl:   •  SOOLANTRA 1 % cream, Apply 1 application topically Daily. Apply to face, Disp: , Rfl:   •  tolterodine LA (DETROL LA) 4 MG 24 hr capsule, Take 1 capsule by mouth Daily., Disp: 30 capsule, Rfl: 11  •  vitamin D (ERGOCALCIFEROL) 68254 UNITS capsule capsule, 50,000  "Units 1 (One) Time Per Week. sundays, Disp: , Rfl:       ALLERGIES:    Allergies   Allergen Reactions   • Penicillins Hives and Swelling     Invanz given for AGBR surgery         Physical Exam    VITAL SIGNS:  /81 Comment: LUE  Pulse 74   Temp 97.3 °F (36.3 °C) (Temporal)   Resp 18   Ht 160 cm (63\")   Wt 102 kg (224 lb)   SpO2 98% Comment: RA  BMI 39.68 kg/m²     Wt Readings from Last 3 Encounters:   09/25/19 102 kg (224 lb)   09/20/19 99.3 kg (219 lb)   09/20/19 101 kg (222 lb)       Body mass index is 39.68 kg/m². Body surface area is 2.03 meters squared.         Performance Status: 0    General: well appearing, in no acute distress  HEENT: sclera anicteric, oropharynx clear, neck is supple  Lymphatics: no cervical, supraclavicular, or axillary adenopathy  Cardiovascular: regular rate and rhythm, no murmurs, rubs or gallops  Lungs: clear to auscultation bilaterally  Abdomen: soft, nontender, nondistended.  No palpable organomegaly  Extremities: no lower extremity edema  Skin: no rashes, lesions, bruising, or petechiae  Msk:  Shows no weakness of the large muscle groups  Psych: Mood is stable  Port in the left chest wall      RECENT LABS:    Lab Results   Component Value Date    HGB 13.7 08/15/2019    HCT 43.7 08/15/2019    MCV 93.8 08/15/2019     08/15/2019    WBC 6.27 08/15/2019    NEUTROABS 5.4 05/08/2019    LYMPHSABS 3.8 (H) 05/08/2019    MONOSABS 0.7 05/08/2019    EOSABS 0.1 05/08/2019    BASOSABS 0.0 05/08/2019       Lab Results   Component Value Date    GLUCOSE 91 08/15/2019    BUN 15 08/15/2019    CREATININE 0.70 09/20/2019     08/15/2019    K 4.7 08/15/2019     08/15/2019    CO2 29.0 08/15/2019    CALCIUM 9.3 08/15/2019    PROTEINTOT 6.6 10/21/2017    ALBUMIN 4.1 05/08/2019    BILITOT 0.6 05/08/2019    ALKPHOS 82 05/08/2019    AST 14 05/08/2019    ALT 9 05/08/2019       Ct Chest With Contrast    Result Date: 9/22/2019  No CT evidence of metastatic disease. No evidence of " acute intrathoracic, intra-abdominal or pelvic abnormality. Postsurgical changes identified within the right axillary region. No definite remaining lymph nodes identified.  DICTATED:   09/20/2019 EDITED/ls :   09/21/2019  This report was finalized on 9/22/2019 9:00 AM by Dr. Paz Hubbard MD.      Nm Bone Scan Whole Body    Result Date: 9/13/2019  Normal examination.  D:  09/13/2019 E:  09/13/2019     This report was finalized on 9/13/2019 4:23 PM by Dr. César Paiz MD.      Ct Abdomen Pelvis With Contrast    Result Date: 9/22/2019  No CT evidence of metastatic disease. No evidence of acute intrathoracic, intra-abdominal or pelvic abnormality. Postsurgical changes identified within the right axillary region. No definite remaining lymph nodes identified.  DICTATED:   09/20/2019 EDITED/ls :   09/21/2019  This report was finalized on 9/22/2019 9:00 AM by Dr. Paz Hubbard MD.              Assessment/Plan    1. Stage IB infiltrating intermediate grade lobular carcinoma of the right breast with node positivity.  I reviewed the CAT scans and the bone scan that she had completed.  This shows no sign of metastatic disease.  She also has a normal echo.  We will go ahead and start her on dose dense Adriamycin and Cytoxan.   plan for 4 cycles of Adriamycin and Cytoxan followed by 12 weekly cycles of Taxol.  We will see her back in my clinic in 2 weeks for cycle #2.  No dose reduction before starting treatment.          I spent a total of 25 minutes in direct patient care, greater than 20 minutes (greater than 50%) were spent in coordination of care, and counseling the patient regarding breast cancer. Answered any questions patient had with medication and plan.      Laila Thacker MD  [facility] Hematology and Oncology         Orders Placed This Encounter   Procedures   • Comprehensive metabolic panel   • CBC and Differential       9/25/2019         Please note that portions of this note may have been  completed with a voice recognition program. Efforts were made to edit the dictations, but occasionally words are mistranscribed.

## 2019-09-25 NOTE — PLAN OF CARE
Outpatient Infusion • [address] • [address] • [place] [id number] • [phone]      CHEMOTHERAPY EDUCATION SHEET    NAME:  Francoise Kamara      : 1960           DATE: 19    Booklets Given: Chemotherapy and You []  Eating Hints []    Sexuality/Fertility Books []     Chemotherapy/Biotherapy Education Sheets: (list all that apply)  [facility] patient education sheets for doxorubicin (Adriamycin) & cyclophosphamide (Cytoxan)                                                                                                                                                                Chemotherapy Regimen:  doxorubicin (Adriamycin) & cyclophosphamide (Cytoxan) + Pegfilgrastim (Neulasta Onpro on-body injector)     TOPICS EDUCATION PROVIDED EDUCATION REINFORCED COMMENTS   ANEMIA:  role of RBC, cause, s/s, ways to manage, role of transfusion [] [x] Discussed role of RBCs, s/s of anemia, importance of maintaining a normal activity level, and when to seek medical attention    THROMBOCYTOPENIA:  role of platelet, cause, s/s, ways to prevent bleeding, things to avoid, when to seek help [] [x] Discussed the role of platelets in blood clotting, s/s, applying pressure to wounds, and when to seek medical attention    NEUTROPENIA:  role of WBC, cause, infection precautions, s/s of infection, when to call MD [] [x] Discussed the role of WBCs in fighting off infections, increased risk of infection because of chemotherapy, s/s of infection, infection prevention strategies, reporting a fever > 100.4 F, and when to seek medical attention    NUTRITION & APPETITE CHANGES:  importance of maintaining healthy diet & weight, ways to manage to improve intake, dietary consult, exercise regimen [] [x] Patient spoke to nutritionist prior to our conversation; reiterated importance of healthy diet & adequate hydration    DIARRHEA:  causes, s/s of dehydration, ways to manage, dietary changes, when to call MD [] [x]  Discussed possibility of diarrhea, home management with loperamide, and when to seek medical attention    CONSTIPATION:  causes, ways to manage, dietary changes, when to call MD [] [x] Discussed possibility of constipation, home management with polyethylene glycol, and when to seek medical attention    NAUSEA & VOMITING:  cause, use of antiemetics, dietary changes, when to call MD [] [x] Discussed high emetic risk of doxarubicin and moderate emetic risk of cyclophosphamide, pre-medications before infusion, and home use of dexamethasone and PRN ondansetron    MOUTH SORES:  causes, oral care, ways to manage [] [x] Dicussed possibility of mucositis & mouth sores, s/s, prevention with mouthwash of salt + baking soda + water, and when to seek medical attention    ALOPECIA:  cause, ways to manage, resources [] [x] Discussed risk of alopecia with cyclophosphamide regimens > 300mg/m2   INFERTILITY & SEXUALITY:  causes, fertility preservation options, sexuality changes, ways to manage, importance of birth control [] [x] Discussed using protection for partner x 48 hr post chemotherapy, possibility of amenorrhea with cyclophosphamide and temporary infertility with doxorubicin (patient is post-menopausal so not a concern for her)    NERVOUS SYSTEM CHANGES:  causes, s/s, neuropathies, cognitive changes, ways to manage [] [x] Discussed possibility of neurotoxicity/peripheral neuropathy, s/s, weakness/malaise, and when to seek medical attention    PAIN:  causes, ways to manage [] [] ????   SKIN & NAIL CHANGES:  cause, s/s, ways to manage [] [x] Discussed possibility of rash and/or disorder of skin pigmentation (cyclophosphamide) as well as darkening of the nail beds and/or red sweat/tears/urine 1-2 days after treatment (doxorubicin)    ORGAN TOXICITIES:  cause, s/s, need for diagnostic tests, labs, when to notify MD [] [x] Discussed routine lab monitoring, what we're watching, and why    SURVIVORSHIP:  distress, distress  assessment, secondary malignancies, early/late effects, follow-up, social issues, social support [] []    HOME CARE:  use of spill kits, storing of PO chemo, how to manage bodily fluids [] [x] Discussed safe handling of bodily fluids, wearing gloves, closing the toilet lid when flushing, washing soiled linens separately x 48 hr post chemo   MISCELLANEOUS:  drug interactions, administration, vesicant, et [] []      Referrals:        Notes:   I spoke to the patient and her family member in the infusion center this morning. We discussed the above mentioned points and I provided her with a calendar outlining her treatment regimen and the [facility] patient education sheets for doxorubicin (Adriamycin) & cyclophosphamide (Cytoxan). We discussed how her treatment will look at the infusion center and her home use of dexamethasone daily for the next few days ad well as ondansetron PRN for N/V. I answered all her questions at the time and encouraged her to reach out with any more that come up.

## 2019-09-30 ENCOUNTER — OFFICE VISIT (OUTPATIENT)
Dept: RADIATION ONCOLOGY | Facility: HOSPITAL | Age: 59
End: 2019-09-30

## 2019-09-30 ENCOUNTER — HOSPITAL ENCOUNTER (OUTPATIENT)
Dept: RADIATION ONCOLOGY | Facility: HOSPITAL | Age: 59
Setting detail: RADIATION/ONCOLOGY SERIES
Discharge: HOME | End: 2019-09-30

## 2019-09-30 VITALS
OXYGEN SATURATION: 97 % | DIASTOLIC BLOOD PRESSURE: 73 MMHG | RESPIRATION RATE: 18 BRPM | HEART RATE: 63 BPM | TEMPERATURE: 98.5 F | HEIGHT: 63 IN | WEIGHT: 226.9 LBS | SYSTOLIC BLOOD PRESSURE: 132 MMHG | BODY MASS INDEX: 40.2 KG/M2

## 2019-09-30 DIAGNOSIS — C50.411 MALIGNANT NEOPLASM OF UPPER-OUTER QUADRANT OF RIGHT BREAST IN FEMALE, ESTROGEN RECEPTOR POSITIVE (HCC): Primary | ICD-10-CM

## 2019-09-30 DIAGNOSIS — Z17.0 MALIGNANT NEOPLASM OF UPPER-OUTER QUADRANT OF RIGHT BREAST IN FEMALE, ESTROGEN RECEPTOR POSITIVE (HCC): Primary | ICD-10-CM

## 2019-09-30 NOTE — PROGRESS NOTES
CONSULTATION NOTE    NAME:      Francoise Kamara  :                                                          1960  DATE OF CONSULTATION:                       19  REQUESTING PHYSICIAN:                   Stephanie Fraire MD  REASON FOR CONSULTATION:           Cancer Staging  Malignant neoplasm of upper-outer quadrant of right breast in female, estrogen receptor positive (CMS/HCC)  Staging form: Breast, AJCC 8th Edition  - Pathologic stage from 2019: Stage IB (pT2, pN1a, cM0, G2, ER: Positive, WA: Positive, HER2: Negative)          BRIEF HISTORY:  Francoise Kamara  is a very pleasant 59 y.o. female  who underwent right breast lumpectomy at the 12 o'clock position by Dr. Yvrose Fraire for a 2.2 x 1.5 x 1 cm infiltrating lobular carcinoma.  There was no lymphovascular invasion and 3/3 lymph nodes were positive for tumor without extracapsular extension.  Surgical margins were negative by 4 mm.  CTs of the chest abdomen pelvis were negative for metastatic disease.  She underwent port placement on 2019 and started chemotherapy of AC DD doxorubicin/cyclophosphamide.  She is here to discuss postoperative radiation.      Allergies   Allergen Reactions   • Penicillins Hives and Swelling     Invanz given for AGBR surgery       Social History     Tobacco Use   • Smoking status: Never Smoker   • Smokeless tobacco: Never Used   Substance Use Topics   • Alcohol use: No   • Drug use: No         Past Medical History:   Diagnosis Date   • Cardiomegaly     stable on CXR   • Dyspnea on exertion    • Elevated LDL cholesterol level    • Fatigue    • GERD (gastroesophageal reflux disease)     on daily Protonix, EGD 10/2016. Sx's resolved since AGBR, even if doesn't take her PPI. serum h. pyl neg.  EGD [facility] 10/16 prior to AGBR  36 cm   • Hypertension    • Hypoalbuminemia     admits to chronic undereating   • Low HDL (under 40)    • Malignant neoplasm of upper-outer quadrant of right breast in female,  "estrogen receptor positive (CMS/HCC) 9/12/2019   • Microcytic red blood cells     H/H 12.6/38.4   • Morbid obesity (CMS/HCC)    • OAB (overactive bladder)    • Peripheral edema    • Post-menopause on HRT (hormone replacement therapy)    • Rosacea     on Doxycycline   • Stress incontinence    • Vaginal atrophy    • Vitamin D deficiency    • Wears glasses        family history includes Breast cancer (age of onset: 45) in her paternal aunt; Hypertension in her father, maternal grandfather, mother, and paternal grandmother; Lung cancer in her father; No Known Problems in her brother; Sleep apnea in her mother; Stroke in her maternal grandfather.     Past Surgical History:   Procedure Laterality Date   • BREAST BIOPSY      ? LATERALITY   • BREAST LUMPECTOMY Right 08/19/2019   • CHOLECYSTECTOMY  2015    for stones w/ Dr. Barboza @ [facility]   • COLONOSCOPY  2014   • COLONOSCOPY  2017   • ENDOSCOPY  2016    by Dr. Urias   • ENDOSCOPY N/A 10/20/2017    Procedure: ESOPHAGOGASTRODUODENOSCOPY;  Surgeon: Guille Urias MD;  Location:  [facility];  Service:    • GASTRIC BANDING REMOVAL N/A 12/21/2016    Procedure: GASTRIC BANDING REMOVAL LAPAROSCOPIC;  Surgeon: Guille Urias MD;  Location:  [facility];  Service:    • GASTRIC SLEEVE LAPAROSCOPIC N/A 10/20/2017    Procedure: GASTRIC SLEEVE LAPAROSCOPIC, ;  Surgeon: Guille Urias MD;  Location:  [facility];  Service:    • LAPAROSCOPIC GASTRIC BANDING  2008    s/p LAGB APS w/ HHR 11/2008 by GDW @ [facility].  full dissection hiatus, single stitch ant repair   • VENOUS ACCESS DEVICE (PORT) INSERTION Left 09/24/2019   • WISDOM TOOTH EXTRACTION          Review of Systems   All other systems reviewed and are negative.          Objective   VITAL SIGNS:   Vitals:    09/30/19 0925   BP: 132/73   Pulse: 63   Resp: 18   Temp: 98.5 °F (36.9 °C)   TempSrc: Temporal   SpO2: 97%  Comment: RA   Weight: 103 kg (226 lb 14.4 oz)   Height: 160 cm (63\")   PainSc:   2   PainLoc: Neck        KPS       " 90%    Physical Exam   Constitutional: She is oriented to person, place, and time. She appears well-developed and well-nourished. No distress.   HENT:   Head: Normocephalic and atraumatic.   Eyes: EOM are normal. No scleral icterus.   Neck: Neck supple.   Cardiovascular: Normal rate and regular rhythm.   Pulmonary/Chest: Effort normal and breath sounds normal.   Lymphadenopathy:     She has no cervical adenopathy.   Neurological: She is alert and oriented to person, place, and time.   Nursing note and vitals reviewed.  The breast exam reveals a healing surgical incision in the 12 o'clock position of the right breast and in the right axilla. The breast incision has brisk erythema and sutures in place. She has been using neosporin on the area per Dr. Fraire.  She has no masses or suspicious lesions in either breast and no axillary adenopathy bilaterally.         The following portions of the patient's history were reviewed and updated as appropriate: allergies, current medications, past family history, past medical history, past social history, past surgical history and problem list.    Assessment      IMPRESSION:   Francoise Kamara underwent right breast lumpectomy at the 12 o'clock position  for a 2.2 x 1.5 x 1 cm infiltrating lobular carcinoma.  There was no lymphovascular invasion and 3/3 lymph nodes were positive for tumor without extracapsular extension.  Surgical margins were negative by 4 mm.  CTs of the chest abdomen pelvis were negative for metastatic disease.  She underwent port placement on 9/23/2019 and will receive chemotherapy of AC DD doxorubicin/cyclophosphamide.        RECOMMENDATIONS: I recommend postoperative radiotherapy to decrease the risk of local recurrence.  She had 3 of 3+ lymph nodes.  The pros and cons, risks and benefits were discussed.  She will undergo chemotherapy and then return for radiation.  The patient lives in [place] but would like to undergo treatment here at [facility].  I  anticipate 45-50 Ag in 25 fractions to the right breast and regional lymphatics and a tumor boost of 10 Gray in 5 fractions.  She will call my nurse, Aliyah, when she is ready to return for treatment.  Thank you very much for asking me to see Mrs. Kamara..          Karen Everett MD      Errors in dictation may reflect use of voice recognition software and not all errors in transcription may have been detected prior to signing.

## 2019-10-11 ENCOUNTER — HOSPITAL ENCOUNTER (OUTPATIENT)
Dept: ONCOLOGY | Facility: HOSPITAL | Age: 59
Setting detail: INFUSION SERIES
Discharge: HOME | End: 2019-10-11

## 2019-10-11 ENCOUNTER — OFFICE VISIT (OUTPATIENT)
Dept: ONCOLOGY | Facility: CLINIC | Age: 59
End: 2019-10-11

## 2019-10-11 ENCOUNTER — HOSPITAL ENCOUNTER (OUTPATIENT)
Dept: GENERAL RADIOLOGY | Facility: HOSPITAL | Age: 59
Discharge: HOME | End: 2019-10-11
Admitting: INTERNAL MEDICINE

## 2019-10-11 VITALS
BODY MASS INDEX: 39.16 KG/M2 | HEIGHT: 63 IN | OXYGEN SATURATION: 100 % | TEMPERATURE: 97.5 F | DIASTOLIC BLOOD PRESSURE: 67 MMHG | HEART RATE: 79 BPM | WEIGHT: 221 LBS | SYSTOLIC BLOOD PRESSURE: 143 MMHG | RESPIRATION RATE: 18 BRPM

## 2019-10-11 DIAGNOSIS — Z17.0 MALIGNANT NEOPLASM OF UPPER-OUTER QUADRANT OF RIGHT BREAST IN FEMALE, ESTROGEN RECEPTOR POSITIVE (HCC): Primary | ICD-10-CM

## 2019-10-11 DIAGNOSIS — C50.411 MALIGNANT NEOPLASM OF UPPER-OUTER QUADRANT OF RIGHT BREAST IN FEMALE, ESTROGEN RECEPTOR POSITIVE (HCC): Primary | ICD-10-CM

## 2019-10-11 DIAGNOSIS — Z17.0 MALIGNANT NEOPLASM OF UPPER-OUTER QUADRANT OF RIGHT BREAST IN FEMALE, ESTROGEN RECEPTOR POSITIVE (HCC): ICD-10-CM

## 2019-10-11 DIAGNOSIS — Z95.828 PORT-A-CATH IN PLACE: Primary | ICD-10-CM

## 2019-10-11 DIAGNOSIS — C50.411 MALIGNANT NEOPLASM OF UPPER-OUTER QUADRANT OF RIGHT BREAST IN FEMALE, ESTROGEN RECEPTOR POSITIVE (HCC): ICD-10-CM

## 2019-10-11 DIAGNOSIS — Z95.828 PORT-A-CATH IN PLACE: ICD-10-CM

## 2019-10-11 LAB
ALBUMIN SERPL-MCNC: 3.6 G/DL (ref 3.5–5.2)
ALBUMIN/GLOB SERPL: 1.2 G/DL
ALP SERPL-CCNC: 76 U/L (ref 39–117)
ALT SERPL W P-5'-P-CCNC: 10 U/L (ref 1–33)
ANION GAP SERPL CALCULATED.3IONS-SCNC: 9 MMOL/L (ref 5–15)
AST SERPL-CCNC: 12 U/L (ref 1–32)
BILIRUB SERPL-MCNC: 0.3 MG/DL (ref 0.2–1.2)
BUN BLD-MCNC: 11 MG/DL (ref 6–20)
BUN/CREAT SERPL: 16.9 (ref 7–25)
CALCIUM SPEC-SCNC: 9 MG/DL (ref 8.6–10.5)
CHLORIDE SERPL-SCNC: 105 MMOL/L (ref 98–107)
CO2 SERPL-SCNC: 28 MMOL/L (ref 22–29)
CREAT BLD-MCNC: 0.65 MG/DL (ref 0.57–1)
ERYTHROCYTE [DISTWIDTH] IN BLOOD BY AUTOMATED COUNT: 13.9 % (ref 12.3–15.4)
GFR SERPL CREATININE-BSD FRML MDRD: 94 ML/MIN/1.73
GLOBULIN UR ELPH-MCNC: 3 GM/DL
GLUCOSE BLD-MCNC: 74 MG/DL (ref 65–99)
HCT VFR BLD AUTO: 36.2 % (ref 34–46.6)
HGB BLD-MCNC: 12 G/DL (ref 12–15.9)
LYMPHOCYTES # BLD AUTO: 1.9 10*3/MM3 (ref 0.7–3.1)
LYMPHOCYTES NFR BLD AUTO: 33.8 % (ref 19.6–45.3)
MCH RBC QN AUTO: 30.6 PG (ref 26.6–33)
MCHC RBC AUTO-ENTMCNC: 33.3 G/DL (ref 31.5–35.7)
MCV RBC AUTO: 91.9 FL (ref 79–97)
MONOCYTES # BLD AUTO: 0.5 10*3/MM3 (ref 0.1–0.9)
MONOCYTES NFR BLD AUTO: 8.9 % (ref 5–12)
NEUTROPHILS # BLD AUTO: 3.2 10*3/MM3 (ref 1.7–7)
NEUTROPHILS NFR BLD AUTO: 57.3 % (ref 42.7–76)
PLATELET # BLD AUTO: 207 10*3/MM3 (ref 140–450)
PMV BLD AUTO: 8 FL (ref 6–12)
POTASSIUM BLD-SCNC: 3.7 MMOL/L (ref 3.5–5.2)
PROT SERPL-MCNC: 6.6 G/DL (ref 6–8.5)
RBC # BLD AUTO: 3.94 10*6/MM3 (ref 3.77–5.28)
SODIUM BLD-SCNC: 142 MMOL/L (ref 136–145)
WBC NRBC COR # BLD: 5.6 10*3/MM3 (ref 3.4–10.8)

## 2019-10-11 PROCEDURE — 96376 TX/PRO/DX INJ SAME DRUG ADON: CPT

## 2019-10-11 PROCEDURE — 96367 TX/PROPH/DG ADDL SEQ IV INF: CPT

## 2019-10-11 PROCEDURE — 96409 CHEMO IV PUSH SNGL DRUG: CPT

## 2019-10-11 PROCEDURE — 25010000002 DOXORUBICIN PER 10 MG: Performed by: INTERNAL MEDICINE

## 2019-10-11 PROCEDURE — 25010000002 PEGFILGRASTIM 6 MG/0.6ML PREFILLED SYRINGE KIT: Performed by: INTERNAL MEDICINE

## 2019-10-11 PROCEDURE — 85025 COMPLETE CBC W/AUTO DIFF WBC: CPT | Performed by: INTERNAL MEDICINE

## 2019-10-11 PROCEDURE — 96413 CHEMO IV INFUSION 1 HR: CPT

## 2019-10-11 PROCEDURE — 25010000002 PALONOSETRON 0.25 MG/5ML SOLUTION PREFILLED SYRINGE: Performed by: INTERNAL MEDICINE

## 2019-10-11 PROCEDURE — 96368 THER/DIAG CONCURRENT INF: CPT

## 2019-10-11 PROCEDURE — 99214 OFFICE O/P EST MOD 30 MIN: CPT | Performed by: INTERNAL MEDICINE

## 2019-10-11 PROCEDURE — 36415 COLL VENOUS BLD VENIPUNCTURE: CPT

## 2019-10-11 PROCEDURE — 80053 COMPREHEN METABOLIC PANEL: CPT | Performed by: INTERNAL MEDICINE

## 2019-10-11 PROCEDURE — 36598 INJ W/FLUOR EVAL CV DEVICE: CPT

## 2019-10-11 PROCEDURE — 25010000002 DEXAMETHASONE PER 1 MG: Performed by: INTERNAL MEDICINE

## 2019-10-11 PROCEDURE — 25010000002 FOSAPREPITANT PER 1 MG: Performed by: INTERNAL MEDICINE

## 2019-10-11 PROCEDURE — 96377 APPLICATON ON-BODY INJECTOR: CPT

## 2019-10-11 PROCEDURE — 25010000002 CYCLOPHOSPHAMIDE PER 100 MG: Performed by: INTERNAL MEDICINE

## 2019-10-11 PROCEDURE — 96411 CHEMO IV PUSH ADDL DRUG: CPT

## 2019-10-11 PROCEDURE — 96375 TX/PRO/DX INJ NEW DRUG ADDON: CPT

## 2019-10-11 RX ORDER — PALONOSETRON 0.05 MG/ML
0.25 INJECTION, SOLUTION INTRAVENOUS ONCE
Status: COMPLETED | OUTPATIENT
Start: 2019-10-11 | End: 2019-10-11

## 2019-10-11 RX ORDER — DEXAMETHASONE 4 MG/1
TABLET ORAL
Qty: 6 TABLET | Refills: 3 | Status: SHIPPED | OUTPATIENT
Start: 2019-10-11 | End: 2019-12-06

## 2019-10-11 RX ORDER — PALONOSETRON 0.05 MG/ML
0.25 INJECTION, SOLUTION INTRAVENOUS ONCE
Status: CANCELLED | OUTPATIENT
Start: 2019-10-11

## 2019-10-11 RX ORDER — SODIUM CHLORIDE 9 MG/ML
250 INJECTION, SOLUTION INTRAVENOUS ONCE
Status: CANCELLED | OUTPATIENT
Start: 2019-10-11

## 2019-10-11 RX ORDER — DOXORUBICIN HYDROCHLORIDE 2 MG/ML
60 INJECTION, SOLUTION INTRAVENOUS ONCE
Status: CANCELLED | OUTPATIENT
Start: 2019-10-11

## 2019-10-11 RX ORDER — SODIUM CHLORIDE 9 MG/ML
250 INJECTION, SOLUTION INTRAVENOUS ONCE
Status: COMPLETED | OUTPATIENT
Start: 2019-10-11 | End: 2019-10-11

## 2019-10-11 RX ORDER — DOXORUBICIN HYDROCHLORIDE 2 MG/ML
60 INJECTION, SOLUTION INTRAVENOUS ONCE
Status: COMPLETED | OUTPATIENT
Start: 2019-10-11 | End: 2019-10-11

## 2019-10-11 RX ORDER — VALACYCLOVIR HYDROCHLORIDE 1 G/1
1000 TABLET, FILM COATED ORAL 2 TIMES DAILY
Qty: 6 TABLET | Refills: 4 | Status: SHIPPED | OUTPATIENT
Start: 2019-10-11 | End: 2019-11-18

## 2019-10-11 RX ADMIN — DOXORUBICIN HYDROCHLORIDE 120 MG: 2 INJECTION, SOLUTION INTRAVENOUS at 11:52

## 2019-10-11 RX ADMIN — PALONOSETRON 0.25 MG: 0.25 INJECTION, SOLUTION INTRAVENOUS at 11:46

## 2019-10-11 RX ADMIN — HEPARIN 500 UNITS: 100 SYRINGE at 12:54

## 2019-10-11 RX ADMIN — SODIUM CHLORIDE 150 MG: 9 INJECTION, SOLUTION INTRAVENOUS at 11:25

## 2019-10-11 RX ADMIN — DEXAMETHASONE SODIUM PHOSPHATE 12 MG: 4 INJECTION, SOLUTION INTRAMUSCULAR; INTRAVENOUS at 11:25

## 2019-10-11 RX ADMIN — CYCLOPHOSPHAMIDE 1210 MG: 1 INJECTION, POWDER, FOR SOLUTION INTRAVENOUS; ORAL at 12:14

## 2019-10-11 RX ADMIN — PEGFILGRASTIM 6 MG: KIT SUBCUTANEOUS at 12:53

## 2019-10-11 RX ADMIN — SODIUM CHLORIDE 250 ML: 9 INJECTION, SOLUTION INTRAVENOUS at 11:25

## 2019-10-11 NOTE — PROGRESS NOTES
PROBLEM LIST:  Oncology/Hematology History    Lab Results   Component Value Date    FINALDX  08/19/2019     1. RIGHT BREAST LUMPECTOMY WITH NEEDLE LOCALIZATION:   Infiltrating and in-situ intermediate grade lobular carcinoma.  Bloom Denis score 6/9.  Infiltrating tumor size 2.2x1.5x1.0 cm.   Margins of resection negative for tumor with closest infiltrating margin inferior and superior measuring 4 mm each.   No lymphvascular invasion identified.   Presence of previous biopsy site identified.  See Template.  2. SENTINEL LYMPH NODE #1, RIGHT AXILLA, EXCISION:   Lymph node x1; positive for metastatic lobular carcinoma (1/1).  3. SENTINEL LYMPH NODE #2, RIGHT AXILLA, EXCISION:  Lymph node x1; positive for metastatic lobular carcinoma (1/1).  4. NONSENTINEL LYMPH NODE, RIGHT AXILLA, EXCISION:  Lymph node x1; positive for metastatic lobular carcinoma (1/1).     INVASIVE BREAST CANCER STAGING TEMPLATE:  TYPE OF SPECIMEN/PROCEDURE:  Needle localized lumpectomy  SPECIMEN LATERALITY: Right breast  TUMOR SITE: 12 o'clock   TUMOR SIZE: (Size of Largest Invasive Carcinoma): 2.2x1.5x1.0 cm   HISTOLOGIC TYPE OF INVASIVE CARCINOMA:  Lobular   GRADE: Intermediate   TUBULAR FORMATION:  3  MITOTIC ACTIVITY:  1  PLEOMORPHISM:  2  OVERALL SCORE: 6/9   DUCTAL CARCINOMA IN SITU (DCIS) EXTENT: 0  TUMOR EXTENSION (Only if structures present and involved):    SKIN: N/A    NIPPLE: N/A    SKELETAL MUSCLE: N/A   SURGICAL MARGINS (Uninvolved/positive): Uninvolved   INVASIVE CARCINOMA MARGINS (Uninvolved/Positive) Uninvolved   DISTANCE FROM CLOSEST MARGIN: 4 mm   SPECIFIC CLOSEST MARGIN: Inferior and superior   LCIS MARGINS (Uninvolved/Positive/No LCIS): Uninvolved   DISTANCE FROM CLOSEST MARGIN: 1 mm  SPECIFIC CLOSEST MARGIN: Inferior   LYMPH NODES (required only if lymph nodes are present in the specimen):  Present   NUMBER OF LYMPH NODES WITH MACROMETASTASIS: 3  NUMBER OF LYMPH NODES WITH MICROMETASTASIS: 0  NUMBER OF LYMPH NODES WITH  ISOLATED TUMOR CELLS: 0  TOTAL NUMBER OF LYMPH NODES EXAMINED (Including sentinel nodes): 3  NUMBER OF SENTINEL NODES EXAMINED: 2  EXTRANODAL EXTENSION OF TUMOR:  0  VASCULAR/LYMPHATIC INVASION:  Not identified     ESTROGEN RECEPTOR STATUS BY IHC METHOD: Positive   PROGESTERONE RECEPTOR STATUS BY IHC METHOD: Positive    HER-2/ysabel ONCOPROTEIN STATUS BY IHC METHOD:  Negative   HER-2/ysabel ONCOGENE STATUS BY IN SITU HYBRIDIZATION ANALYSIS:  Not performed   TREATMENT EFFECT (BREAST): None  TREATMENT EFFECT (NODES): None   ADDITIONAL PATHOLOGIC FINDINGS:  Previous biopsy site identified   OTHER STUDIES:  None  AJCC PATHOLOGIC STAGE:  (COMPLETED BY PATHOLOGIST, BASED ONLY ON TISSUE FINDINGS, MORE EXTENSIVE DISEASE MAY NOT BE KNOWN TO THE PATHOLOGIST)  pT=  2  pN=  2  pM=  Unknown    DGD/mbc                Malignant neoplasm of upper-outer quadrant of right breast in female, estrogen receptor positive (CMS/HCC)    8/16/2019 Initial Diagnosis     Malignant neoplasm of upper-outer quadrant of right breast in female, estrogen receptor positive (CMS/HCC)         8/19/2019 Surgery     Surgery       Right breast lumpectomy with sentinel node biopsy by Dr. Stephanie Fraire         8/19/2019 Cancer Staged     Cancer Staging  Malignant neoplasm of upper-outer quadrant of right breast in female, estrogen receptor positive (CMS/HCC)  Staging form: Breast, AJCC 8th Edition  - Pathologic stage from 9/12/2019: Stage IB (pT2, pN1a, cM0, G2, ER: Positive, NV: Positive, HER2: Negative) - Signed by Laila Thacker MD on 9/12/2019 9/19/2019 Imaging     Negative work-up for metastatic disease by CAT scan of the chest abdomen pelvis and bone scan         9/20/2019 -  Other Event     ECHO - Normal EF         9/23/2019 Surgery     Surgery       Port Placement         9/25/2019 -  Chemotherapy     OP BREAST AC DD DOXOrubicin / Cyclophosphamide              REASON FOR VISIT: Management of my breast cancer    HISTORY OF PRESENT ILLNESS:   59  y.o.  female presents today for management of her breast cancer.  She completed 1 cycle of dose dense Adriamycin and Cytoxan.  She is here for cycle #2.  Her first cycle was complicated by alopecia.  Her nausea was fairly controlled.  She did have some fatigue.  No infections.  She does have mouth sores that she has.      Past Medical History:   Diagnosis Date   • Cardiomegaly     stable on CXR   • Dyspnea on exertion    • Elevated LDL cholesterol level    • Fatigue    • GERD (gastroesophageal reflux disease)     on daily Protonix, EGD 10/2016. Sx's resolved since AGBR, even if doesn't take her PPI. serum h. pyl neg.  EGD [facility]DW 10/16 prior to AGBR  36 cm   • Hypertension    • Hypoalbuminemia     admits to chronic undereating   • Low HDL (under 40)    • Malignant neoplasm of upper-outer quadrant of right breast in female, estrogen receptor positive (CMS/HCC) 9/12/2019   • Microcytic red blood cells     H/H 12.6/38.4   • Morbid obesity (CMS/HCC)    • OAB (overactive bladder)    • Peripheral edema    • Post-menopause on HRT (hormone replacement therapy)    • Rosacea     on Doxycycline   • Stress incontinence    • Vaginal atrophy    • Vitamin D deficiency    • Wears glasses      Social History     Socioeconomic History   • Marital status:      Spouse name: Not on file   • Number of children: Not on file   • Years of education: Not on file   • Highest education level: Not on file   Tobacco Use   • Smoking status: Never Smoker   • Smokeless tobacco: Never Used   Substance and Sexual Activity   • Alcohol use: No   • Drug use: No   • Sexual activity: Defer     Comment: spouse   Social History Narrative     w/2 children.  Lives in [place].  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      Family History   Problem Relation Age of Onset   • Hypertension Mother    • Sleep apnea Mother    • Hypertension Father    • Lung cancer Father    • Hypertension Maternal Grandfather    • Stroke Maternal  Grandfather    • Hypertension Paternal Grandmother    • Breast cancer Paternal Aunt 45   • No Known Problems Brother    • BRCA 1/2 Neg Hx    • Ovarian cancer Neg Hx        Review of Systems:    Review of Systems   Constitutional: Positive for fatigue.   HENT:  Negative.         Mouth Sores   Eyes: Negative.    Respiratory: Negative.    Cardiovascular: Negative.    Gastrointestinal: Negative.    Endocrine: Negative.    Genitourinary: Negative.     Musculoskeletal: Negative.    Skin: Negative.    Neurological: Negative.    Hematological: Negative.    Psychiatric/Behavioral: Negative.       A comprehensive 14 point review of systems was performed and was negative except as mentioned.      Medications:        Current Outpatient Medications:   •  Biotin 5000 MCG tablet, Take  by mouth., Disp: , Rfl:   •  dexamethasone (DECADRON) 4 MG tablet, Take 2 tablets in the morning daily on Days 2, 3 & 4.  Take with food., Disp: 6 tablet, Rfl: 3  •  Ferrous Gluconate (IRON 27 PO), Take  by mouth., Disp: , Rfl:   •  hydrochlorothiazide (MICROZIDE) 12.5 MG capsule, Take 12.5 mg by mouth Daily., Disp: , Rfl:   •  lidocaine-prilocaine (EMLA) 2.5-2.5 % cream, Apply  topically to the appropriate area as directed As Needed (45-60 minutes prior to port access.  Cover with saran/plastic wrap)., Disp: 30 g, Rfl: 2  •  LISINOPRIL PO, Take 40 mg by mouth Daily., Disp: , Rfl:   •  ondansetron (ZOFRAN) 8 MG tablet, Take 1 tablet by mouth 3 (Three) Times a Day As Needed for Nausea or Vomiting., Disp: 30 tablet, Rfl: 5  •  pantoprazole (PROTONIX) 40 MG EC tablet, Take 40 mg by mouth Daily., Disp: , Rfl:   •  SOOLANTRA 1 % cream, Apply 1 application topically Daily. Apply to face, Disp: , Rfl:   •  tolterodine LA (DETROL LA) 4 MG 24 hr capsule, Take 1 capsule by mouth Daily., Disp: 30 capsule, Rfl: 11  •  vitamin D (ERGOCALCIFEROL) 70078 UNITS capsule capsule, 50,000 Units 1 (One) Time Per Week. sundays, Disp: , Rfl:   •  valACYclovir (VALTREX)  "1000 MG tablet, Take 1 tablet by mouth 2 (Two) Times a Day for 3 days., Disp: 6 tablet, Rfl: 4      ALLERGIES:    Allergies   Allergen Reactions   • Penicillins Hives and Swelling     Invanz given for AGBR surgery         Physical Exam    VITAL SIGNS:  /67 Comment: LUE  Pulse 79   Temp 97.5 °F (36.4 °C) (Temporal)   Resp 18   Ht 160 cm (63\")   Wt 100 kg (221 lb)   SpO2 100% Comment: RA  BMI 39.15 kg/m²     Wt Readings from Last 3 Encounters:   10/11/19 100 kg (221 lb)   09/30/19 103 kg (226 lb 14.4 oz)   09/25/19 102 kg (224 lb)       Body mass index is 39.15 kg/m². Body surface area is 2.02 meters squared.         Performance Status: 0    General: well appearing, in no acute distress  HEENT: sclera anicteric, oropharynx clear, neck is supple  Lymphatics: no cervical, supraclavicular, or axillary adenopathy  Cardiovascular: regular rate and rhythm, no murmurs, rubs or gallops  Lungs: clear to auscultation bilaterally  Abdomen: soft, nontender, nondistended.  No palpable organomegaly  Extremities: no lower extremity edema  Skin: no rashes, lesions, bruising, or petechiae  Msk:  Shows no weakness of the large muscle groups  Psych: Mood is stable  Port in the left chest wall      RECENT LABS:    Lab Results   Component Value Date    HGB 12.1 09/25/2019    HCT 37.5 09/25/2019    MCV 92.0 09/25/2019     09/25/2019    WBC 8.30 09/25/2019    NEUTROABS 4.70 09/25/2019    LYMPHSABS 3.20 (H) 09/25/2019    MONOSABS 0.50 09/25/2019    EOSABS 0.1 05/08/2019    BASOSABS 0.0 05/08/2019       Lab Results   Component Value Date    GLUCOSE 82 09/25/2019    BUN 12 09/25/2019    CREATININE 0.66 09/25/2019     09/25/2019    K 4.3 09/25/2019     09/25/2019    CO2 28.0 09/25/2019    CALCIUM 9.1 09/25/2019    PROTEINTOT 6.4 09/25/2019    ALBUMIN 4.20 09/25/2019    BILITOT 0.5 09/25/2019    ALKPHOS 82 09/25/2019    AST 10 09/25/2019    ALT 7 09/25/2019       Ct Chest With Contrast    Result Date: " 9/22/2019  No CT evidence of metastatic disease. No evidence of acute intrathoracic, intra-abdominal or pelvic abnormality. Postsurgical changes identified within the right axillary region. No definite remaining lymph nodes identified.  DICTATED:   09/20/2019 EDITED/ls :   09/21/2019  This report was finalized on 9/22/2019 9:00 AM by Dr. Paz Hubbard MD.      Nm Bone Scan Whole Body    Result Date: 9/13/2019  Normal examination.  D:  09/13/2019 E:  09/13/2019     This report was finalized on 9/13/2019 4:23 PM by Dr. César Paiz MD.      Ct Abdomen Pelvis With Contrast    Result Date: 9/22/2019  No CT evidence of metastatic disease. No evidence of acute intrathoracic, intra-abdominal or pelvic abnormality. Postsurgical changes identified within the right axillary region. No definite remaining lymph nodes identified.  DICTATED:   09/20/2019 EDITED/ls :   09/21/2019  This report was finalized on 9/22/2019 9:00 AM by Dr. Paz Hubbard MD.              Assessment/Plan    1. Stage IB infiltrating intermediate grade lobular carcinoma of the right breast with node positivity.  Continue adjuvant chemotherapy with dose dense Adriamycin and Cytoxan.  I plan to treat her with 12 cycles of weekly Taxol after that.  She is tolerating it well.  No changes for now.    2.  Oral mucositis.  There is a component of HSV I suspect.  I will place her on Valtrex.    3.  Chemotherapy-induced nausea.  She is well controlled on the current regiment of Zofran.          I spent a total of 25 minutes in direct patient care, greater than 20 minutes (greater than 50%) were spent in coordination of care, and counseling the patient regarding breast cancer. Answered any questions patient had with medication and plan.      Laila Thacker MD  [facility]    Return on: 10/25/19  Return in (Approximately): 2 weeks, Schedule with next infusion    Orders Placed This Encounter   Procedures   • Comprehensive  metabolic panel   • CBC and Differential       10/11/2019         Please note that portions of this note may have been completed with a voice recognition program. Efforts were made to edit the dictations, but occasionally words are mistranscribed.

## 2019-10-11 NOTE — NURSING NOTE
Accessed in Radiology under [place] for chemo infusion after attempts to access in infusion were unsuccessful.

## 2019-10-19 ENCOUNTER — TRANSCRIBE ORDERS (OUTPATIENT)
Dept: LAB | Facility: HOSPITAL | Age: 59
End: 2019-10-19

## 2019-10-19 ENCOUNTER — LAB (OUTPATIENT)
Dept: LAB | Facility: HOSPITAL | Age: 59
End: 2019-10-19

## 2019-10-19 DIAGNOSIS — Z01.812 PRE-OPERATIVE LABORATORY EXAMINATION: ICD-10-CM

## 2019-10-19 DIAGNOSIS — Z01.812 PRE-OPERATIVE LABORATORY EXAMINATION: Primary | ICD-10-CM

## 2019-10-19 LAB — POTASSIUM BLD-SCNC: 4.9 MMOL/L (ref 3.5–5.2)

## 2019-10-19 PROCEDURE — 84132 ASSAY OF SERUM POTASSIUM: CPT

## 2019-10-19 PROCEDURE — 36415 COLL VENOUS BLD VENIPUNCTURE: CPT

## 2019-10-23 ENCOUNTER — TRANSCRIBE ORDERS (OUTPATIENT)
Dept: ADMINISTRATIVE | Facility: HOSPITAL | Age: 59
End: 2019-10-23

## 2019-10-23 DIAGNOSIS — C50.211 MALIGNANT NEOPLASM OF UPPER-INNER QUADRANT OF RIGHT FEMALE BREAST, UNSPECIFIED ESTROGEN RECEPTOR STATUS (HCC): Primary | ICD-10-CM

## 2019-10-24 ENCOUNTER — HOSPITAL ENCOUNTER (OUTPATIENT)
Dept: GENERAL RADIOLOGY | Facility: HOSPITAL | Age: 59
Discharge: HOME | End: 2019-10-24

## 2019-10-24 DIAGNOSIS — Z17.0 MALIGNANT NEOPLASM OF UPPER-OUTER QUADRANT OF RIGHT BREAST IN FEMALE, ESTROGEN RECEPTOR POSITIVE (HCC): ICD-10-CM

## 2019-10-24 DIAGNOSIS — C50.211 MALIGNANT NEOPLASM OF UPPER-INNER QUADRANT OF RIGHT FEMALE BREAST, UNSPECIFIED ESTROGEN RECEPTOR STATUS (HCC): ICD-10-CM

## 2019-10-24 DIAGNOSIS — C50.411 MALIGNANT NEOPLASM OF UPPER-OUTER QUADRANT OF RIGHT BREAST IN FEMALE, ESTROGEN RECEPTOR POSITIVE (HCC): ICD-10-CM

## 2019-10-24 PROCEDURE — 71045 X-RAY EXAM CHEST 1 VIEW: CPT

## 2019-10-25 ENCOUNTER — HOSPITAL ENCOUNTER (OUTPATIENT)
Dept: ONCOLOGY | Facility: HOSPITAL | Age: 59
Setting detail: INFUSION SERIES
Discharge: HOME | End: 2019-10-25

## 2019-10-25 ENCOUNTER — OFFICE VISIT (OUTPATIENT)
Dept: ONCOLOGY | Facility: CLINIC | Age: 59
End: 2019-10-25

## 2019-10-25 VITALS
HEIGHT: 63 IN | RESPIRATION RATE: 18 BRPM | WEIGHT: 221 LBS | DIASTOLIC BLOOD PRESSURE: 67 MMHG | TEMPERATURE: 97.4 F | HEART RATE: 100 BPM | BODY MASS INDEX: 39.16 KG/M2 | SYSTOLIC BLOOD PRESSURE: 124 MMHG

## 2019-10-25 VITALS
DIASTOLIC BLOOD PRESSURE: 67 MMHG | WEIGHT: 221 LBS | BODY MASS INDEX: 39.16 KG/M2 | TEMPERATURE: 97.4 F | HEART RATE: 100 BPM | RESPIRATION RATE: 18 BRPM | HEIGHT: 63 IN | SYSTOLIC BLOOD PRESSURE: 124 MMHG

## 2019-10-25 DIAGNOSIS — Z17.0 MALIGNANT NEOPLASM OF UPPER-OUTER QUADRANT OF RIGHT BREAST IN FEMALE, ESTROGEN RECEPTOR POSITIVE (HCC): Primary | ICD-10-CM

## 2019-10-25 DIAGNOSIS — C50.411 MALIGNANT NEOPLASM OF UPPER-OUTER QUADRANT OF RIGHT BREAST IN FEMALE, ESTROGEN RECEPTOR POSITIVE (HCC): Primary | ICD-10-CM

## 2019-10-25 DIAGNOSIS — Z95.828 PORT-A-CATH IN PLACE: ICD-10-CM

## 2019-10-25 LAB
ALBUMIN SERPL-MCNC: 4.1 G/DL (ref 3.5–5.2)
ALBUMIN/GLOB SERPL: 1.4 G/DL
ALP SERPL-CCNC: 82 U/L (ref 39–117)
ALT SERPL W P-5'-P-CCNC: 10 U/L (ref 1–33)
ANION GAP SERPL CALCULATED.3IONS-SCNC: 11 MMOL/L (ref 5–15)
AST SERPL-CCNC: 11 U/L (ref 1–32)
BILIRUB SERPL-MCNC: <0.2 MG/DL (ref 0.2–1.2)
BUN BLD-MCNC: 11 MG/DL (ref 6–20)
BUN/CREAT SERPL: 16.9 (ref 7–25)
CALCIUM SPEC-SCNC: 9.1 MG/DL (ref 8.6–10.5)
CHLORIDE SERPL-SCNC: 107 MMOL/L (ref 98–107)
CO2 SERPL-SCNC: 24 MMOL/L (ref 22–29)
CREAT BLD-MCNC: 0.65 MG/DL (ref 0.57–1)
ERYTHROCYTE [DISTWIDTH] IN BLOOD BY AUTOMATED COUNT: 15.8 % (ref 12.3–15.4)
GFR SERPL CREATININE-BSD FRML MDRD: 94 ML/MIN/1.73
GLOBULIN UR ELPH-MCNC: 3 GM/DL
GLUCOSE BLD-MCNC: 97 MG/DL (ref 65–99)
HCT VFR BLD AUTO: 34.4 % (ref 34–46.6)
HGB BLD-MCNC: 11.5 G/DL (ref 12–15.9)
LYMPHOCYTES # BLD AUTO: 1.4 10*3/MM3 (ref 0.7–3.1)
LYMPHOCYTES NFR BLD AUTO: 15.4 % (ref 19.6–45.3)
MCH RBC QN AUTO: 31.4 PG (ref 26.6–33)
MCHC RBC AUTO-ENTMCNC: 33.4 G/DL (ref 31.5–35.7)
MCV RBC AUTO: 93.8 FL (ref 79–97)
MONOCYTES # BLD AUTO: 0.6 10*3/MM3 (ref 0.1–0.9)
MONOCYTES NFR BLD AUTO: 6.2 % (ref 5–12)
NEUTROPHILS # BLD AUTO: 7.3 10*3/MM3 (ref 1.7–7)
NEUTROPHILS NFR BLD AUTO: 78.4 % (ref 42.7–76)
PLATELET # BLD AUTO: 159 10*3/MM3 (ref 140–450)
PMV BLD AUTO: 8.3 FL (ref 6–12)
POTASSIUM BLD-SCNC: 4.2 MMOL/L (ref 3.5–5.2)
PROT SERPL-MCNC: 7.1 G/DL (ref 6–8.5)
RBC # BLD AUTO: 3.67 10*6/MM3 (ref 3.77–5.28)
SODIUM BLD-SCNC: 142 MMOL/L (ref 136–145)
WBC NRBC COR # BLD: 9.3 10*3/MM3 (ref 3.4–10.8)

## 2019-10-25 PROCEDURE — 96377 APPLICATON ON-BODY INJECTOR: CPT

## 2019-10-25 PROCEDURE — 25010000002 DEXAMETHASONE PER 1 MG: Performed by: INTERNAL MEDICINE

## 2019-10-25 PROCEDURE — 25010000002 FOSAPREPITANT PER 1 MG: Performed by: INTERNAL MEDICINE

## 2019-10-25 PROCEDURE — 96367 TX/PROPH/DG ADDL SEQ IV INF: CPT

## 2019-10-25 PROCEDURE — 25010000002 DOXORUBICIN PER 10 MG: Performed by: INTERNAL MEDICINE

## 2019-10-25 PROCEDURE — 80053 COMPREHEN METABOLIC PANEL: CPT | Performed by: INTERNAL MEDICINE

## 2019-10-25 PROCEDURE — 96411 CHEMO IV PUSH ADDL DRUG: CPT

## 2019-10-25 PROCEDURE — 25010000002 CYCLOPHOSPHAMIDE PER 100 MG: Performed by: INTERNAL MEDICINE

## 2019-10-25 PROCEDURE — 99214 OFFICE O/P EST MOD 30 MIN: CPT | Performed by: INTERNAL MEDICINE

## 2019-10-25 PROCEDURE — 96375 TX/PRO/DX INJ NEW DRUG ADDON: CPT

## 2019-10-25 PROCEDURE — 96366 THER/PROPH/DIAG IV INF ADDON: CPT

## 2019-10-25 PROCEDURE — 25010000002 PALONOSETRON 0.25 MG/5ML SOLUTION PREFILLED SYRINGE: Performed by: INTERNAL MEDICINE

## 2019-10-25 PROCEDURE — 85025 COMPLETE CBC W/AUTO DIFF WBC: CPT | Performed by: INTERNAL MEDICINE

## 2019-10-25 PROCEDURE — 25010000002 PEGFILGRASTIM 6 MG/0.6ML PREFILLED SYRINGE KIT: Performed by: INTERNAL MEDICINE

## 2019-10-25 PROCEDURE — 96413 CHEMO IV INFUSION 1 HR: CPT

## 2019-10-25 RX ORDER — SODIUM CHLORIDE 9 MG/ML
250 INJECTION, SOLUTION INTRAVENOUS ONCE
Status: COMPLETED | OUTPATIENT
Start: 2019-10-25 | End: 2019-10-25

## 2019-10-25 RX ORDER — PALONOSETRON 0.05 MG/ML
0.25 INJECTION, SOLUTION INTRAVENOUS ONCE
Status: CANCELLED | OUTPATIENT
Start: 2019-10-25

## 2019-10-25 RX ORDER — PALONOSETRON 0.05 MG/ML
0.25 INJECTION, SOLUTION INTRAVENOUS ONCE
Status: COMPLETED | OUTPATIENT
Start: 2019-10-25 | End: 2019-10-25

## 2019-10-25 RX ORDER — DOXORUBICIN HYDROCHLORIDE 2 MG/ML
60 INJECTION, SOLUTION INTRAVENOUS ONCE
Status: COMPLETED | OUTPATIENT
Start: 2019-10-25 | End: 2019-10-25

## 2019-10-25 RX ORDER — SODIUM CHLORIDE 9 MG/ML
250 INJECTION, SOLUTION INTRAVENOUS ONCE
Status: CANCELLED | OUTPATIENT
Start: 2019-10-25

## 2019-10-25 RX ORDER — DOXORUBICIN HYDROCHLORIDE 2 MG/ML
60 INJECTION, SOLUTION INTRAVENOUS ONCE
Status: CANCELLED | OUTPATIENT
Start: 2019-10-25

## 2019-10-25 RX ADMIN — SODIUM CHLORIDE 250 ML: 9 INJECTION, SOLUTION INTRAVENOUS at 09:02

## 2019-10-25 RX ADMIN — PALONOSETRON 0.25 MG: 0.25 INJECTION, SOLUTION INTRAVENOUS at 09:02

## 2019-10-25 RX ADMIN — DEXAMETHASONE SODIUM PHOSPHATE 12 MG: 4 INJECTION, SOLUTION INTRAMUSCULAR; INTRAVENOUS at 09:02

## 2019-10-25 RX ADMIN — SODIUM CHLORIDE 150 MG: 9 INJECTION, SOLUTION INTRAVENOUS at 09:02

## 2019-10-25 RX ADMIN — PEGFILGRASTIM 6 MG: KIT SUBCUTANEOUS at 10:10

## 2019-10-25 RX ADMIN — CYCLOPHOSPHAMIDE 1210 MG: 1 INJECTION, POWDER, FOR SOLUTION INTRAVENOUS; ORAL at 09:33

## 2019-10-25 RX ADMIN — DOXORUBICIN HYDROCHLORIDE 120 MG: 2 INJECTION, SOLUTION INTRAVENOUS at 09:23

## 2019-10-25 RX ADMIN — HEPARIN 500 UNITS: 100 SYRINGE at 10:11

## 2019-10-29 LAB
CYTO UR: NORMAL
LAB AP CASE REPORT: NORMAL
LAB AP CLINICAL INFORMATION: NORMAL
PATH REPORT.ADDENDUM SPEC: NORMAL
PATH REPORT.FINAL DX SPEC: NORMAL
PATH REPORT.GROSS SPEC: NORMAL

## 2019-11-01 ENCOUNTER — TELEPHONE (OUTPATIENT)
Dept: OBSTETRICS AND GYNECOLOGY | Facility: CLINIC | Age: 59
End: 2019-11-01

## 2019-11-01 NOTE — TELEPHONE ENCOUNTER
Patient has been diagnosed with breast cancer and is currently on chemotherapy.  She was on PremPro at the time of diagnosis and was advised to stop immediately.  Francoise calls today with complaint of extreme vaginal dryness.  I have advised her to try Replens daily.  She will try this and will call back if this doesn't help.  She states that she is doing well with chemo, just tired.

## 2019-11-08 ENCOUNTER — HOSPITAL ENCOUNTER (OUTPATIENT)
Dept: ONCOLOGY | Facility: HOSPITAL | Age: 59
Setting detail: INFUSION SERIES
Discharge: HOME | End: 2019-11-08

## 2019-11-08 ENCOUNTER — OFFICE VISIT (OUTPATIENT)
Dept: ONCOLOGY | Facility: CLINIC | Age: 59
End: 2019-11-08

## 2019-11-08 VITALS
HEIGHT: 63 IN | HEART RATE: 79 BPM | RESPIRATION RATE: 18 BRPM | BODY MASS INDEX: 38.62 KG/M2 | TEMPERATURE: 97.1 F | WEIGHT: 218 LBS | SYSTOLIC BLOOD PRESSURE: 114 MMHG | OXYGEN SATURATION: 100 % | DIASTOLIC BLOOD PRESSURE: 61 MMHG

## 2019-11-08 VITALS
TEMPERATURE: 97.4 F | DIASTOLIC BLOOD PRESSURE: 58 MMHG | WEIGHT: 217 LBS | BODY MASS INDEX: 38.45 KG/M2 | HEIGHT: 63 IN | RESPIRATION RATE: 18 BRPM | SYSTOLIC BLOOD PRESSURE: 124 MMHG | HEART RATE: 166 BPM

## 2019-11-08 DIAGNOSIS — Z95.828 PORT-A-CATH IN PLACE: ICD-10-CM

## 2019-11-08 DIAGNOSIS — C50.411 MALIGNANT NEOPLASM OF UPPER-OUTER QUADRANT OF RIGHT BREAST IN FEMALE, ESTROGEN RECEPTOR POSITIVE (HCC): Primary | ICD-10-CM

## 2019-11-08 DIAGNOSIS — Z17.0 MALIGNANT NEOPLASM OF UPPER-OUTER QUADRANT OF RIGHT BREAST IN FEMALE, ESTROGEN RECEPTOR POSITIVE (HCC): Primary | ICD-10-CM

## 2019-11-08 LAB
ALBUMIN SERPL-MCNC: 3.6 G/DL (ref 3.5–5.2)
ALBUMIN/GLOB SERPL: 1.4 G/DL
ALP SERPL-CCNC: 67 U/L (ref 39–117)
ALT SERPL W P-5'-P-CCNC: 8 U/L (ref 1–33)
ANION GAP SERPL CALCULATED.3IONS-SCNC: 8 MMOL/L (ref 5–15)
AST SERPL-CCNC: 8 U/L (ref 1–32)
BILIRUB SERPL-MCNC: <0.2 MG/DL (ref 0.2–1.2)
BUN BLD-MCNC: 11 MG/DL (ref 6–20)
BUN/CREAT SERPL: 18.3 (ref 7–25)
CALCIUM SPEC-SCNC: 8.9 MG/DL (ref 8.6–10.5)
CHLORIDE SERPL-SCNC: 104 MMOL/L (ref 98–107)
CO2 SERPL-SCNC: 26 MMOL/L (ref 22–29)
CREAT BLD-MCNC: 0.6 MG/DL (ref 0.57–1)
ERYTHROCYTE [DISTWIDTH] IN BLOOD BY AUTOMATED COUNT: 18.2 % (ref 12.3–15.4)
GFR SERPL CREATININE-BSD FRML MDRD: 103 ML/MIN/1.73
GLOBULIN UR ELPH-MCNC: 2.5 GM/DL
GLUCOSE BLD-MCNC: 90 MG/DL (ref 65–99)
HCT VFR BLD AUTO: 34.7 % (ref 34–46.6)
HGB BLD-MCNC: 11.3 G/DL (ref 12–15.9)
LYMPHOCYTES # BLD AUTO: 1.9 10*3/MM3 (ref 0.7–3.1)
LYMPHOCYTES NFR BLD AUTO: 28.3 % (ref 19.6–45.3)
MCH RBC QN AUTO: 30.4 PG (ref 26.6–33)
MCHC RBC AUTO-ENTMCNC: 32.6 G/DL (ref 31.5–35.7)
MCV RBC AUTO: 93 FL (ref 79–97)
MONOCYTES # BLD AUTO: 0.3 10*3/MM3 (ref 0.1–0.9)
MONOCYTES NFR BLD AUTO: 4 % (ref 5–12)
NEUTROPHILS # BLD AUTO: 4.5 10*3/MM3 (ref 1.7–7)
NEUTROPHILS NFR BLD AUTO: 67.7 % (ref 42.7–76)
PLATELET # BLD AUTO: 145 10*3/MM3 (ref 140–450)
PMV BLD AUTO: 8.2 FL (ref 6–12)
POTASSIUM BLD-SCNC: 4 MMOL/L (ref 3.5–5.2)
PROT SERPL-MCNC: 6.1 G/DL (ref 6–8.5)
RBC # BLD AUTO: 3.73 10*6/MM3 (ref 3.77–5.28)
SODIUM BLD-SCNC: 138 MMOL/L (ref 136–145)
WBC NRBC COR # BLD: 6.6 10*3/MM3 (ref 3.4–10.8)

## 2019-11-08 PROCEDURE — 25010000002 FOSAPREPITANT PER 1 MG: Performed by: INTERNAL MEDICINE

## 2019-11-08 PROCEDURE — 25010000002 DOXORUBICIN PER 10 MG: Performed by: INTERNAL MEDICINE

## 2019-11-08 PROCEDURE — 25010000002 PEGFILGRASTIM 6 MG/0.6ML PREFILLED SYRINGE KIT: Performed by: INTERNAL MEDICINE

## 2019-11-08 PROCEDURE — 85025 COMPLETE CBC W/AUTO DIFF WBC: CPT | Performed by: INTERNAL MEDICINE

## 2019-11-08 PROCEDURE — 96409 CHEMO IV PUSH SNGL DRUG: CPT

## 2019-11-08 PROCEDURE — 96411 CHEMO IV PUSH ADDL DRUG: CPT

## 2019-11-08 PROCEDURE — 96417 CHEMO IV INFUS EACH ADDL SEQ: CPT

## 2019-11-08 PROCEDURE — 25010000002 ALTEPLASE 2 MG RECONSTITUTED SOLUTION: Performed by: INTERNAL MEDICINE

## 2019-11-08 PROCEDURE — 96365 THER/PROPH/DIAG IV INF INIT: CPT

## 2019-11-08 PROCEDURE — 96374 THER/PROPH/DIAG INJ IV PUSH: CPT

## 2019-11-08 PROCEDURE — 96375 TX/PRO/DX INJ NEW DRUG ADDON: CPT

## 2019-11-08 PROCEDURE — 80053 COMPREHEN METABOLIC PANEL: CPT | Performed by: INTERNAL MEDICINE

## 2019-11-08 PROCEDURE — 96377 APPLICATON ON-BODY INJECTOR: CPT

## 2019-11-08 PROCEDURE — 36591 DRAW BLOOD OFF VENOUS DEVICE: CPT

## 2019-11-08 PROCEDURE — 99214 OFFICE O/P EST MOD 30 MIN: CPT | Performed by: INTERNAL MEDICINE

## 2019-11-08 PROCEDURE — 96413 CHEMO IV INFUSION 1 HR: CPT

## 2019-11-08 PROCEDURE — 25010000002 DEXAMETHASONE PER 1 MG: Performed by: INTERNAL MEDICINE

## 2019-11-08 PROCEDURE — 25010000002 PALONOSETRON 0.25 MG/5ML SOLUTION PREFILLED SYRINGE: Performed by: INTERNAL MEDICINE

## 2019-11-08 PROCEDURE — 96367 TX/PROPH/DG ADDL SEQ IV INF: CPT

## 2019-11-08 PROCEDURE — 25010000002 CYCLOPHOSPHAMIDE PER 100 MG: Performed by: INTERNAL MEDICINE

## 2019-11-08 RX ORDER — DOXORUBICIN HYDROCHLORIDE 2 MG/ML
60 INJECTION, SOLUTION INTRAVENOUS ONCE
Status: CANCELLED | OUTPATIENT
Start: 2019-11-08

## 2019-11-08 RX ORDER — SODIUM CHLORIDE 9 MG/ML
250 INJECTION, SOLUTION INTRAVENOUS ONCE
Status: CANCELLED | OUTPATIENT
Start: 2019-11-08

## 2019-11-08 RX ORDER — SODIUM CHLORIDE 9 MG/ML
250 INJECTION, SOLUTION INTRAVENOUS ONCE
Status: COMPLETED | OUTPATIENT
Start: 2019-11-08 | End: 2019-11-08

## 2019-11-08 RX ORDER — PALONOSETRON 0.05 MG/ML
0.25 INJECTION, SOLUTION INTRAVENOUS ONCE
Status: COMPLETED | OUTPATIENT
Start: 2019-11-08 | End: 2019-11-08

## 2019-11-08 RX ORDER — DOXORUBICIN HYDROCHLORIDE 2 MG/ML
60 INJECTION, SOLUTION INTRAVENOUS ONCE
Status: COMPLETED | OUTPATIENT
Start: 2019-11-08 | End: 2019-11-08

## 2019-11-08 RX ORDER — PALONOSETRON 0.05 MG/ML
0.25 INJECTION, SOLUTION INTRAVENOUS ONCE
Status: CANCELLED | OUTPATIENT
Start: 2019-11-08

## 2019-11-08 RX ADMIN — HEPARIN 500 UNITS: 100 SYRINGE at 13:04

## 2019-11-08 RX ADMIN — SODIUM CHLORIDE 250 ML: 9 INJECTION, SOLUTION INTRAVENOUS at 11:35

## 2019-11-08 RX ADMIN — DEXAMETHASONE SODIUM PHOSPHATE 12 MG: 4 INJECTION, SOLUTION INTRAMUSCULAR; INTRAVENOUS at 11:39

## 2019-11-08 RX ADMIN — SODIUM CHLORIDE 150 MG: 9 INJECTION, SOLUTION INTRAVENOUS at 11:41

## 2019-11-08 RX ADMIN — PALONOSETRON 0.25 MG: 0.25 INJECTION, SOLUTION INTRAVENOUS at 11:36

## 2019-11-08 RX ADMIN — DOXORUBICIN HYDROCHLORIDE 120 MG: 2 INJECTION, SOLUTION INTRAVENOUS at 12:16

## 2019-11-08 RX ADMIN — PEGFILGRASTIM 6 MG: KIT SUBCUTANEOUS at 13:00

## 2019-11-08 RX ADMIN — ALTEPLASE: 2.2 INJECTION, POWDER, LYOPHILIZED, FOR SOLUTION INTRAVENOUS at 10:16

## 2019-11-08 RX ADMIN — CYCLOPHOSPHAMIDE 1210 MG: 1 INJECTION, POWDER, FOR SOLUTION INTRAVENOUS; ORAL at 12:23

## 2019-11-08 NOTE — PROGRESS NOTES
PROBLEM LIST:  Oncology/Hematology History    Lab Results   Component Value Date    FINALDX  08/19/2019     1. RIGHT BREAST LUMPECTOMY WITH NEEDLE LOCALIZATION:   Infiltrating and in-situ intermediate grade lobular carcinoma.  Bloom Denis score 6/9.  Infiltrating tumor size 2.2x1.5x1.0 cm.   Margins of resection negative for tumor with closest infiltrating margin inferior and superior measuring 4 mm each.   No lymphvascular invasion identified.   Presence of previous biopsy site identified.  See Template.  2. SENTINEL LYMPH NODE #1, RIGHT AXILLA, EXCISION:   Lymph node x1; positive for metastatic lobular carcinoma (1/1).  3. SENTINEL LYMPH NODE #2, RIGHT AXILLA, EXCISION:  Lymph node x1; positive for metastatic lobular carcinoma (1/1).  4. NONSENTINEL LYMPH NODE, RIGHT AXILLA, EXCISION:  Lymph node x1; positive for metastatic lobular carcinoma (1/1).     INVASIVE BREAST CANCER STAGING TEMPLATE:  TYPE OF SPECIMEN/PROCEDURE:  Needle localized lumpectomy  SPECIMEN LATERALITY: Right breast  TUMOR SITE: 12 o'clock   TUMOR SIZE: (Size of Largest Invasive Carcinoma): 2.2x1.5x1.0 cm   HISTOLOGIC TYPE OF INVASIVE CARCINOMA:  Lobular   GRADE: Intermediate   TUBULAR FORMATION:  3  MITOTIC ACTIVITY:  1  PLEOMORPHISM:  2  OVERALL SCORE: 6/9   DUCTAL CARCINOMA IN SITU (DCIS) EXTENT: 0  TUMOR EXTENSION (Only if structures present and involved):    SKIN: N/A    NIPPLE: N/A    SKELETAL MUSCLE: N/A   SURGICAL MARGINS (Uninvolved/positive): Uninvolved   INVASIVE CARCINOMA MARGINS (Uninvolved/Positive) Uninvolved   DISTANCE FROM CLOSEST MARGIN: 4 mm   SPECIFIC CLOSEST MARGIN: Inferior and superior   LCIS MARGINS (Uninvolved/Positive/No LCIS): Uninvolved   DISTANCE FROM CLOSEST MARGIN: 1 mm  SPECIFIC CLOSEST MARGIN: Inferior   LYMPH NODES (required only if lymph nodes are present in the specimen):  Present   NUMBER OF LYMPH NODES WITH MACROMETASTASIS: 3  NUMBER OF LYMPH NODES WITH MICROMETASTASIS: 0  NUMBER OF LYMPH NODES WITH  ISOLATED TUMOR CELLS: 0  TOTAL NUMBER OF LYMPH NODES EXAMINED (Including sentinel nodes): 3  NUMBER OF SENTINEL NODES EXAMINED: 2  EXTRANODAL EXTENSION OF TUMOR:  0  VASCULAR/LYMPHATIC INVASION:  Not identified     ESTROGEN RECEPTOR STATUS BY IHC METHOD: Positive   PROGESTERONE RECEPTOR STATUS BY IHC METHOD: Positive    HER-2/ysabel ONCOPROTEIN STATUS BY IHC METHOD:  Negative   HER-2/ysabel ONCOGENE STATUS BY IN SITU HYBRIDIZATION ANALYSIS:  Not performed   TREATMENT EFFECT (BREAST): None  TREATMENT EFFECT (NODES): None   ADDITIONAL PATHOLOGIC FINDINGS:  Previous biopsy site identified   OTHER STUDIES:  None  AJCC PATHOLOGIC STAGE:  (COMPLETED BY PATHOLOGIST, BASED ONLY ON TISSUE FINDINGS, MORE EXTENSIVE DISEASE MAY NOT BE KNOWN TO THE PATHOLOGIST)  pT=  2  pN=  2  pM=  Unknown    DGD/mbc                Malignant neoplasm of upper-outer quadrant of right breast in female, estrogen receptor positive (CMS/HCC)    8/16/2019 Initial Diagnosis     Malignant neoplasm of upper-outer quadrant of right breast in female, estrogen receptor positive (CMS/HCC)         8/19/2019 Surgery     Surgery       Right breast lumpectomy with sentinel node biopsy by Dr. Stephanie Fraire         8/19/2019 Cancer Staged     Cancer Staging  Malignant neoplasm of upper-outer quadrant of right breast in female, estrogen receptor positive (CMS/HCC)  Staging form: Breast, AJCC 8th Edition  - Pathologic stage from 9/12/2019: Stage IB (pT2, pN1a, cM0, G2, ER: Positive, ID: Positive, HER2: Negative) - Signed by Laila Thacker MD on 9/12/2019 9/19/2019 Imaging     Negative work-up for metastatic disease by CAT scan of the chest abdomen pelvis and bone scan         9/20/2019 -  Other Event     ECHO - Normal EF         9/23/2019 Surgery     Surgery       Port Placement         9/25/2019 -  Chemotherapy     OP BREAST AC DD DOXOrubicin / Cyclophosphamide              REASON FOR VISIT: Management of my breast cancer    HISTORY OF PRESENT ILLNESS:   59  y.o.  female presents today for management of her breast cancer.  She completed 3 cycles of dose dense Adriamycin and Cytoxan.  She is here for cycle #4.  Her first cycle was complicated by alopecia.  Her nausea was fairly controlled.  This round was complicated by significant fatigue.  She is doing well otherwise.  No significant weight loss or infections.      Past Medical History:   Diagnosis Date   • Cardiomegaly     stable on CXR   • Dyspnea on exertion    • Elevated LDL cholesterol level    • Fatigue    • GERD (gastroesophageal reflux disease)     on daily Protonix, EGD 10/2016. Sx's resolved since AGBR, even if doesn't take her PPI. serum h. pyl neg.  EGD [facility] 10/16 prior to AGBR  36 cm   • Hypertension    • Hypoalbuminemia     admits to chronic undereating   • Low HDL (under 40)    • Malignant neoplasm of upper-outer quadrant of right breast in female, estrogen receptor positive (CMS/HCC) 9/12/2019   • Microcytic red blood cells     H/H 12.6/38.4   • Morbid obesity (CMS/HCC)    • OAB (overactive bladder)    • Peripheral edema    • Post-menopause on HRT (hormone replacement therapy)    • Rosacea     on Doxycycline   • Stress incontinence    • Vaginal atrophy    • Vitamin D deficiency    • Wears glasses      Social History     Socioeconomic History   • Marital status:      Spouse name: Not on file   • Number of children: Not on file   • Years of education: Not on file   • Highest education level: Not on file   Tobacco Use   • Smoking status: Never Smoker   • Smokeless tobacco: Never Used   Substance and Sexual Activity   • Alcohol use: No   • Drug use: No   • Sexual activity: Defer     Comment: spouse   Social History Narrative     w/2 children.  Lives in [place].  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      Family History   Problem Relation Age of Onset   • Hypertension Mother    • Sleep apnea Mother    • Hypertension Father    • Lung cancer Father    • Hypertension  Maternal Grandfather    • Stroke Maternal Grandfather    • Hypertension Paternal Grandmother    • Breast cancer Paternal Aunt 45   • No Known Problems Brother    • BRCA 1/2 Neg Hx    • Ovarian cancer Neg Hx        Review of Systems:    Review of Systems   Constitutional: Positive for fatigue.   HENT:  Negative.         Mouth Sores   Eyes: Negative.    Respiratory: Negative.    Cardiovascular: Negative.    Gastrointestinal: Negative.    Endocrine: Negative.    Genitourinary: Negative.     Musculoskeletal: Negative.    Skin: Negative.    Neurological: Negative.    Hematological: Negative.    Psychiatric/Behavioral: Negative.       A comprehensive 14 point review of systems was performed and was negative except as mentioned.      Medications:        Current Outpatient Medications:   •  Biotin 5000 MCG tablet, Take  by mouth., Disp: , Rfl:   •  dexamethasone (DECADRON) 4 MG tablet, Take 2 tablets in the morning daily on Days 2, 3 & 4.  Take with food., Disp: 6 tablet, Rfl: 3  •  Ferrous Gluconate (IRON 27 PO), Take  by mouth., Disp: , Rfl:   •  hydrochlorothiazide (MICROZIDE) 12.5 MG capsule, Take 12.5 mg by mouth Daily., Disp: , Rfl:   •  lidocaine-prilocaine (EMLA) 2.5-2.5 % cream, Apply  topically to the appropriate area as directed As Needed (45-60 minutes prior to port access.  Cover with saran/plastic wrap)., Disp: 30 g, Rfl: 2  •  LISINOPRIL PO, Take 40 mg by mouth Daily., Disp: , Rfl:   •  ondansetron (ZOFRAN) 8 MG tablet, Take 1 tablet by mouth 3 (Three) Times a Day As Needed for Nausea or Vomiting., Disp: 30 tablet, Rfl: 5  •  pantoprazole (PROTONIX) 40 MG EC tablet, Take 40 mg by mouth Daily., Disp: , Rfl:   •  SOOLANTRA 1 % cream, Apply 1 application topically Daily. Apply to face, Disp: , Rfl:   •  tolterodine LA (DETROL LA) 4 MG 24 hr capsule, Take 1 capsule by mouth Daily., Disp: 30 capsule, Rfl: 11  •  valACYclovir (VALTREX) 1000 MG tablet, Take 1 tablet by mouth 2 (Two) Times a Day for 3 days., Disp: 6  "tablet, Rfl: 4  •  vitamin D (ERGOCALCIFEROL) 22887 UNITS capsule capsule, 50,000 Units 1 (One) Time Per Week. sundays, Disp: , Rfl:   No current facility-administered medications for this visit.     Facility-Administered Medications Ordered in Other Visits:   •  heparin flush (porcine) 100 UNIT/ML injection 500 Units, 500 Units, Intravenous, PRN, Laila Thacker MD, 500 Units at 11/08/19 1304      ALLERGIES:    Allergies   Allergen Reactions   • Penicillins Hives and Swelling     Invanz given for AGBR surgery         Physical Exam    VITAL SIGNS:  /61 Comment: LUE  Pulse 79   Temp 97.1 °F (36.2 °C) (Temporal)   Resp 18   Ht 160 cm (63\")   Wt 98.9 kg (218 lb)   SpO2 100% Comment: RA  BMI 38.62 kg/m²     Wt Readings from Last 3 Encounters:   11/08/19 98.9 kg (218 lb)   11/08/19 98.4 kg (217 lb)   10/25/19 100 kg (221 lb)       Body mass index is 38.62 kg/m². Body surface area is 2.01 meters squared.         Performance Status: 0    General: well appearing, in no acute distress  HEENT: sclera anicteric, oropharynx clear, neck is supple  Lymphatics: no cervical, supraclavicular, or axillary adenopathy  Cardiovascular: regular rate and rhythm, no murmurs, rubs or gallops  Lungs: clear to auscultation bilaterally  Abdomen: soft, nontender, nondistended.  No palpable organomegaly  Extremities: no lower extremity edema  Skin: no rashes, lesions, bruising, or petechiae  Msk:  Shows no weakness of the large muscle groups  Psych: Mood is stable  Port in the left chest wall      RECENT LABS:    Lab Results   Component Value Date    HGB 11.3 (L) 11/08/2019    HCT 34.7 11/08/2019    MCV 93.0 11/08/2019     11/08/2019    WBC 6.60 11/08/2019    NEUTROABS 4.50 11/08/2019    LYMPHSABS 1.90 11/08/2019    MONOSABS 0.30 11/08/2019    EOSABS 0.1 05/08/2019    BASOSABS 0.0 05/08/2019       Lab Results   Component Value Date    GLUCOSE 90 11/08/2019    BUN 11 11/08/2019    CREATININE 0.60 11/08/2019     " 11/08/2019    K 4.0 11/08/2019     11/08/2019    CO2 26.0 11/08/2019    CALCIUM 8.9 11/08/2019    PROTEINTOT 6.1 11/08/2019    ALBUMIN 3.60 11/08/2019    BILITOT <0.2 (L) 11/08/2019    ALKPHOS 67 11/08/2019    AST 8 11/08/2019    ALT 8 11/08/2019       Ct Chest With Contrast    Result Date: 9/22/2019  No CT evidence of metastatic disease. No evidence of acute intrathoracic, intra-abdominal or pelvic abnormality. Postsurgical changes identified within the right axillary region. No definite remaining lymph nodes identified.  DICTATED:   09/20/2019 EDITED/ls :   09/21/2019  This report was finalized on 9/22/2019 9:00 AM by Dr. Paz Hubbard MD.      Nm Bone Scan Whole Body    Result Date: 9/13/2019  Normal examination.  D:  09/13/2019 E:  09/13/2019     This report was finalized on 9/13/2019 4:23 PM by Dr. César Paiz MD.      Ct Abdomen Pelvis With Contrast    Result Date: 9/22/2019  No CT evidence of metastatic disease. No evidence of acute intrathoracic, intra-abdominal or pelvic abnormality. Postsurgical changes identified within the right axillary region. No definite remaining lymph nodes identified.  DICTATED:   09/20/2019 EDITED/ls :   09/21/2019  This report was finalized on 9/22/2019 9:00 AM by Dr. Paz Hubbard MD.              Assessment/Plan    1. Stage IB infiltrating intermediate grade lobular carcinoma of the right breast with node positivity.  Continue adjuvant chemotherapy with dose dense Adriamycin and Cytoxan.  This will be her last dose of Adriamycin and Cytoxan.  I plan to treat her with 12 cycles of weekly Taxol after that.  She is tolerating it well.  No changes for now.    2.  Oral mucositis.  On valtrex as needed    3.  Chemotherapy-induced nausea.  She is well controlled on the current regiment of Zofran.          I spent a total of 25 minutes in direct patient care, greater than 20 minutes (greater than 50%) were spent in coordination of care, and counseling the patient  regarding breast cancer. Answered any questions patient had with medication and plan.      Laila Thacker MD  [facility] Hematology and Oncology    Return on: 12/06/19  Return in (Approximately): 1 month    No orders of the defined types were placed in this encounter.      11/8/2019         Please note that portions of this note may have been completed with a voice recognition program. Efforts were made to edit the dictations, but occasionally words are mistranscribed.

## 2019-11-18 ENCOUNTER — OFFICE VISIT (OUTPATIENT)
Dept: BARIATRICS/WEIGHT MGMT | Facility: CLINIC | Age: 59
End: 2019-11-18

## 2019-11-18 VITALS
SYSTOLIC BLOOD PRESSURE: 110 MMHG | HEART RATE: 68 BPM | RESPIRATION RATE: 18 BRPM | WEIGHT: 213.5 LBS | DIASTOLIC BLOOD PRESSURE: 58 MMHG | TEMPERATURE: 96.5 F | BODY MASS INDEX: 37.83 KG/M2 | OXYGEN SATURATION: 99 % | HEIGHT: 63 IN

## 2019-11-18 DIAGNOSIS — E55.9 HYPOVITAMINOSIS D: ICD-10-CM

## 2019-11-18 DIAGNOSIS — K91.2 POSTGASTRECTOMY MALABSORPTION: ICD-10-CM

## 2019-11-18 DIAGNOSIS — R79.0 ABNORMAL BLOOD LEVEL OF IRON: ICD-10-CM

## 2019-11-18 DIAGNOSIS — E55.9 VITAMIN D DEFICIENCY: ICD-10-CM

## 2019-11-18 DIAGNOSIS — Z98.84 STATUS POST BARIATRIC SURGERY: ICD-10-CM

## 2019-11-18 DIAGNOSIS — Z90.3 POSTGASTRECTOMY MALABSORPTION: ICD-10-CM

## 2019-11-18 DIAGNOSIS — K21.9 GASTROESOPHAGEAL REFLUX DISEASE, ESOPHAGITIS PRESENCE NOT SPECIFIED: ICD-10-CM

## 2019-11-18 DIAGNOSIS — I10 HYPERTENSION, UNSPECIFIED TYPE: ICD-10-CM

## 2019-11-18 DIAGNOSIS — Z13.21 MALNUTRITION SCREEN: ICD-10-CM

## 2019-11-18 DIAGNOSIS — R53.83 FATIGUE, UNSPECIFIED TYPE: Primary | ICD-10-CM

## 2019-11-18 DIAGNOSIS — Z13.0 SCREENING, IRON DEFICIENCY ANEMIA: ICD-10-CM

## 2019-11-18 PROCEDURE — 99214 OFFICE O/P EST MOD 30 MIN: CPT | Performed by: SURGERY

## 2019-11-18 NOTE — PROGRESS NOTES
[facility]  [address]  [facility] [address]  [phone]        Patient Name:  Francoise Kamara.  :  1960      Date of Visit: 2019      Reason for Visit:   2 years postop      HPI: Francoise Kamara is a 59 y.o. female s/p LSG by GDW on 10/20/17    Since LOV, dx with stage IIb breast cancer, and undergoing adjusvant chemo/XRT/hormone therapy s/p lumpectomy and SNLBx with 3/3 axillary nodes +.    Doing well.  No issues/concerns. Denies dysphagia, reflux, nausea, vomiting and abdominal pain.  Didn't even vomit during chemo.  Has taken Zofran 1-2x per day.  Fatigue has slowed her down.  Getting 75g prot/day.  Last labs revealed no deficiencies. Taking Vit D, iron and Biotin.  On Pantoprazole.      Presurgery weight: 266 pounds.  Today's weight is 96.8 kg (213 lb 8 oz) pounds, today's  Body mass index is 37.82 kg/m²., and her weight loss since surgery is 53 pounds.  Has been up to 220s during steroids.    Past Medical History:   Diagnosis Date   • Cardiomegaly     stable on CXR   • Dyspnea on exertion    • Elevated LDL cholesterol level    • Fatigue    • GERD (gastroesophageal reflux disease)     on daily Protonix, EGD 10/2016. Sx's resolved since AGBR, even if doesn't take her PPI. serum h. pyl neg.  EGD [facility] 10/16 prior to AGBR  36 cm   • Hypertension    • Hypoalbuminemia     admits to chronic undereating   • Low HDL (under 40)    • Malignant neoplasm of upper-outer quadrant of right breast in female, estrogen receptor positive (CMS/HCC) 2019   • Microcytic red blood cells     H/H 12.6/38.4   • Morbid obesity (CMS/HCC)    • OAB (overactive bladder)    • Peripheral edema    • Post-menopause on HRT (hormone replacement therapy)    • Rosacea     on Doxycycline   • Stress incontinence    • Vaginal atrophy    • Vitamin D deficiency    • Wears glasses      Past Surgical History:   Procedure Laterality Date   • BREAST BIOPSY      ?  LATERALITY   • BREAST LUMPECTOMY Right 08/19/2019   • CHOLECYSTECTOMY  2015    for stones w/ Dr. Barboza @ [facility]   • COLONOSCOPY  2014   • COLONOSCOPY  2017   • ENDOSCOPY  2016    by Dr. Urias   • ENDOSCOPY N/A 10/20/2017    Procedure: ESOPHAGOGASTRODUODENOSCOPY;  Surgeon: Guille Urias MD;  Location:  [facility];  Service:    • GASTRIC BANDING REMOVAL N/A 12/21/2016    Procedure: GASTRIC BANDING REMOVAL LAPAROSCOPIC;  Surgeon: Guille Urias MD;  Location:  [facility];  Service:    • GASTRIC SLEEVE LAPAROSCOPIC N/A 10/20/2017    Procedure: GASTRIC SLEEVE LAPAROSCOPIC, ;  Surgeon: Guille Urias MD;  Location:  [facility];  Service:    • LAPAROSCOPIC GASTRIC BANDING  2008    s/p LAGB APS w/ HHR 11/2008 by W @ [facility].  full dissection hiatus, single stitch ant repair   • VENOUS ACCESS DEVICE (PORT) INSERTION Left 09/24/2019   • VENOUS ACCESS DEVICE (PORT) INSERTION Left 10/24/2019   • WISDOM TOOTH EXTRACTION       Outpatient Medications Marked as Taking for the 11/18/19 encounter (Office Visit) with Angelica Wiley MD   Medication Sig Dispense Refill   • Biotin 5000 MCG tablet Take  by mouth.     • Ferrous Gluconate (IRON 27 PO) Take  by mouth.     • hydrochlorothiazide (MICROZIDE) 12.5 MG capsule Take 12.5 mg by mouth Daily.     • lidocaine-prilocaine (EMLA) 2.5-2.5 % cream Apply  topically to the appropriate area as directed As Needed (45-60 minutes prior to port access.  Cover with saran/plastic wrap). 30 g 2   • LISINOPRIL PO Take 40 mg by mouth Daily.     • pantoprazole (PROTONIX) 40 MG EC tablet Take 40 mg by mouth Daily.     • tolterodine LA (DETROL LA) 4 MG 24 hr capsule Take 1 capsule by mouth Daily. 30 capsule 11   • vitamin D (ERGOCALCIFEROL) 98919 UNITS capsule capsule 50,000 Units 1 (One) Time Per Week. sundays     • [DISCONTINUED] ondansetron (ZOFRAN) 8 MG tablet Take 1 tablet by mouth 3 (Three) Times a Day As Needed for Nausea or Vomiting. 30 tablet 5       Allergies   Allergen Reactions  "  • Penicillins Hives and Swelling     Invanz given for AGBR surgery       Social History     Socioeconomic History   • Marital status:      Spouse name: Not on file   • Number of children: Not on file   • Years of education: Not on file   • Highest education level: Not on file   Tobacco Use   • Smoking status: Never Smoker   • Smokeless tobacco: Never Used   Substance and Sexual Activity   • Alcohol use: No   • Drug use: No   • Sexual activity: Defer     Comment: spouse   Social History Narrative     w/2 children.  Lives in [place].  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        /58 (BP Location: Left arm, Patient Position: Sitting, Cuff Size: Large Adult)   Pulse 68   Temp 96.5 °F (35.8 °C) (Temporal)   Resp 18   Ht 160 cm (63\")   Wt 96.8 kg (213 lb 8 oz)   SpO2 99%   BMI 37.82 kg/m²     Physical Exam   Constitutional: She is oriented to person, place, and time. She appears well-developed and well-nourished. No distress.   HENT:   Head: Normocephalic and atraumatic.   Mouth/Throat: No oropharyngeal exudate.   Wearing hat, bald   Eyes: Conjunctivae and EOM are normal. Pupils are equal, round, and reactive to light.   Pulmonary/Chest: Effort normal. No respiratory distress.   Abdominal: Soft. She exhibits no distension.   Neurological: She is alert and oriented to person, place, and time. No cranial nerve deficit.   Skin: Skin is warm and dry. She is not diaphoretic. No pallor.   Psychiatric: She has a normal mood and affect. Her behavior is normal. Thought content normal.         Assessme t:  2 years s/p LSG by JULY on 10/20/17      ICD-10-CM ICD-9-CM   1. Fatigue, unspecified type R53.83 780.79   2. Malnutrition screen Z13.21 V77.2   3. Hypovitaminosis D E55.9 268.9   4. Postgastrectomy malabsorption K91.2 579.3    Z90.3    5. Screening, iron deficiency anemia Z13.0 V78.0   6. Vitamin D deficiency E55.9 268.9   7. Abnormal blood level of iron R79.0 790.6   8. " Hypertension, unspecified type I10 401.9   9. Gastroesophageal reflux disease, esophagitis presence not specified K21.9 530.81   10. Status post bariatric surgery Z98.84 V45.86         Plan:  Doing well. Continue w/ good food choices and healthy habits.  Continue protein >70g/day.  Continue routine exercise.  Routine bariatric labs ordered.  Continue vitamins w/ adjustments pending lab results.  Call w/ problems/concerns.     The patient was instructed to follow up in 1 year, sooner if needed.    note: approx 15 of the 25 minute visit was spent counseling on nutrition and necessary dietary/lifestyle modifications.    Angelica Wiley MD

## 2019-12-06 ENCOUNTER — DOCUMENTATION (OUTPATIENT)
Dept: NUTRITION | Facility: HOSPITAL | Age: 59
End: 2019-12-06

## 2019-12-06 ENCOUNTER — EDUCATION (OUTPATIENT)
Dept: ONCOLOGY | Facility: HOSPITAL | Age: 59
End: 2019-12-06

## 2019-12-06 ENCOUNTER — HOSPITAL ENCOUNTER (OUTPATIENT)
Dept: ONCOLOGY | Facility: HOSPITAL | Age: 59
Setting detail: INFUSION SERIES
Discharge: HOME | End: 2019-12-06

## 2019-12-06 ENCOUNTER — APPOINTMENT (OUTPATIENT)
Dept: ONCOLOGY | Facility: HOSPITAL | Age: 59
End: 2019-12-06

## 2019-12-06 ENCOUNTER — OFFICE VISIT (OUTPATIENT)
Dept: ONCOLOGY | Facility: CLINIC | Age: 59
End: 2019-12-06

## 2019-12-06 ENCOUNTER — HOSPITAL ENCOUNTER (OUTPATIENT)
Dept: ONCOLOGY | Facility: HOSPITAL | Age: 59
Discharge: HOME | End: 2019-12-06
Admitting: SURGERY

## 2019-12-06 VITALS — HEART RATE: 80 BPM | SYSTOLIC BLOOD PRESSURE: 142 MMHG | DIASTOLIC BLOOD PRESSURE: 79 MMHG

## 2019-12-06 VITALS
SYSTOLIC BLOOD PRESSURE: 120 MMHG | RESPIRATION RATE: 18 BRPM | DIASTOLIC BLOOD PRESSURE: 69 MMHG | BODY MASS INDEX: 39.69 KG/M2 | TEMPERATURE: 97.5 F | OXYGEN SATURATION: 100 % | HEIGHT: 63 IN | HEART RATE: 74 BPM | WEIGHT: 224 LBS

## 2019-12-06 DIAGNOSIS — Z95.828 PORT-A-CATH IN PLACE: ICD-10-CM

## 2019-12-06 DIAGNOSIS — C50.411 MALIGNANT NEOPLASM OF UPPER-OUTER QUADRANT OF RIGHT BREAST IN FEMALE, ESTROGEN RECEPTOR POSITIVE (HCC): Primary | ICD-10-CM

## 2019-12-06 DIAGNOSIS — Z17.0 MALIGNANT NEOPLASM OF UPPER-OUTER QUADRANT OF RIGHT BREAST IN FEMALE, ESTROGEN RECEPTOR POSITIVE (HCC): Primary | ICD-10-CM

## 2019-12-06 LAB
ALBUMIN SERPL-MCNC: 3.4 G/DL (ref 3.5–5.2)
ALBUMIN/GLOB SERPL: 1.2 G/DL
ALP SERPL-CCNC: 59 U/L (ref 39–117)
ALT SERPL W P-5'-P-CCNC: 8 U/L (ref 1–33)
ANION GAP SERPL CALCULATED.3IONS-SCNC: 10 MMOL/L (ref 5–15)
AST SERPL-CCNC: 13 U/L (ref 1–32)
BILIRUB SERPL-MCNC: 0.3 MG/DL (ref 0.2–1.2)
BUN BLD-MCNC: 11 MG/DL (ref 6–20)
BUN/CREAT SERPL: 17.5 (ref 7–25)
CALCIUM SPEC-SCNC: 8.9 MG/DL (ref 8.6–10.5)
CHLORIDE SERPL-SCNC: 107 MMOL/L (ref 98–107)
CO2 SERPL-SCNC: 25 MMOL/L (ref 22–29)
CREAT BLD-MCNC: 0.63 MG/DL (ref 0.57–1)
ERYTHROCYTE [DISTWIDTH] IN BLOOD BY AUTOMATED COUNT: 21.4 % (ref 12.3–15.4)
GFR SERPL CREATININE-BSD FRML MDRD: 97 ML/MIN/1.73
GLOBULIN UR ELPH-MCNC: 2.8 GM/DL
GLUCOSE BLD-MCNC: 87 MG/DL (ref 65–99)
HCT VFR BLD AUTO: 29.3 % (ref 34–46.6)
HGB BLD-MCNC: 9.6 G/DL (ref 12–15.9)
LYMPHOCYTES # BLD AUTO: 1.3 10*3/MM3 (ref 0.7–3.1)
LYMPHOCYTES NFR BLD AUTO: 21.5 % (ref 19.6–45.3)
MCH RBC QN AUTO: 32.5 PG (ref 26.6–33)
MCHC RBC AUTO-ENTMCNC: 32.7 G/DL (ref 31.5–35.7)
MCV RBC AUTO: 99.2 FL (ref 79–97)
MONOCYTES # BLD AUTO: 0.5 10*3/MM3 (ref 0.1–0.9)
MONOCYTES NFR BLD AUTO: 7.6 % (ref 5–12)
NEUTROPHILS # BLD AUTO: 4.3 10*3/MM3 (ref 1.7–7)
NEUTROPHILS NFR BLD AUTO: 70.9 % (ref 42.7–76)
PLATELET # BLD AUTO: 201 10*3/MM3 (ref 140–450)
PMV BLD AUTO: 8 FL (ref 6–12)
POTASSIUM BLD-SCNC: 3.7 MMOL/L (ref 3.5–5.2)
PROT SERPL-MCNC: 6.2 G/DL (ref 6–8.5)
RBC # BLD AUTO: 2.96 10*6/MM3 (ref 3.77–5.28)
SODIUM BLD-SCNC: 142 MMOL/L (ref 136–145)
WBC NRBC COR # BLD: 6 10*3/MM3 (ref 3.4–10.8)

## 2019-12-06 PROCEDURE — 84425 ASSAY OF VITAMIN B-1: CPT | Performed by: SURGERY

## 2019-12-06 PROCEDURE — 25010000002 DEXAMETHASONE PER 1 MG: Performed by: INTERNAL MEDICINE

## 2019-12-06 PROCEDURE — 83921 ORGANIC ACID SINGLE QUANT: CPT | Performed by: SURGERY

## 2019-12-06 PROCEDURE — 96368 THER/DIAG CONCURRENT INF: CPT

## 2019-12-06 PROCEDURE — 25010000002 PACLITAXEL PER 30 MG: Performed by: INTERNAL MEDICINE

## 2019-12-06 PROCEDURE — 25010000002 DIPHENHYDRAMINE PER 50 MG: Performed by: INTERNAL MEDICINE

## 2019-12-06 PROCEDURE — 99214 OFFICE O/P EST MOD 30 MIN: CPT | Performed by: INTERNAL MEDICINE

## 2019-12-06 PROCEDURE — 82306 VITAMIN D 25 HYDROXY: CPT | Performed by: SURGERY

## 2019-12-06 PROCEDURE — 85025 COMPLETE CBC W/AUTO DIFF WBC: CPT | Performed by: INTERNAL MEDICINE

## 2019-12-06 PROCEDURE — 96375 TX/PRO/DX INJ NEW DRUG ADDON: CPT

## 2019-12-06 PROCEDURE — 82728 ASSAY OF FERRITIN: CPT | Performed by: SURGERY

## 2019-12-06 PROCEDURE — 96413 CHEMO IV INFUSION 1 HR: CPT

## 2019-12-06 PROCEDURE — 80053 COMPREHEN METABOLIC PANEL: CPT | Performed by: INTERNAL MEDICINE

## 2019-12-06 PROCEDURE — 83540 ASSAY OF IRON: CPT | Performed by: SURGERY

## 2019-12-06 PROCEDURE — 82746 ASSAY OF FOLIC ACID SERUM: CPT | Performed by: SURGERY

## 2019-12-06 PROCEDURE — 84134 ASSAY OF PREALBUMIN: CPT | Performed by: SURGERY

## 2019-12-06 PROCEDURE — 96367 TX/PROPH/DG ADDL SEQ IV INF: CPT

## 2019-12-06 RX ORDER — FAMOTIDINE 10 MG/ML
20 INJECTION, SOLUTION INTRAVENOUS ONCE
Status: CANCELLED | OUTPATIENT
Start: 2019-12-06

## 2019-12-06 RX ORDER — FAMOTIDINE 10 MG/ML
20 INJECTION, SOLUTION INTRAVENOUS AS NEEDED
Status: CANCELLED | OUTPATIENT
Start: 2019-12-20

## 2019-12-06 RX ORDER — FAMOTIDINE 10 MG/ML
20 INJECTION, SOLUTION INTRAVENOUS AS NEEDED
Status: CANCELLED | OUTPATIENT
Start: 2019-12-13

## 2019-12-06 RX ORDER — FAMOTIDINE 10 MG/ML
20 INJECTION, SOLUTION INTRAVENOUS AS NEEDED
Status: CANCELLED | OUTPATIENT
Start: 2019-12-06

## 2019-12-06 RX ORDER — ONDANSETRON HYDROCHLORIDE 8 MG/1
8 TABLET, FILM COATED ORAL 3 TIMES DAILY PRN
Qty: 30 TABLET | Refills: 5 | Status: SHIPPED | OUTPATIENT
Start: 2019-12-06 | End: 2020-06-01

## 2019-12-06 RX ORDER — FAMOTIDINE 10 MG/ML
20 INJECTION, SOLUTION INTRAVENOUS ONCE
Status: CANCELLED | OUTPATIENT
Start: 2019-12-13

## 2019-12-06 RX ORDER — SODIUM CHLORIDE 9 MG/ML
250 INJECTION, SOLUTION INTRAVENOUS ONCE
Status: CANCELLED | OUTPATIENT
Start: 2019-12-20

## 2019-12-06 RX ORDER — FAMOTIDINE 10 MG/ML
20 INJECTION, SOLUTION INTRAVENOUS ONCE
Status: CANCELLED | OUTPATIENT
Start: 2019-12-20

## 2019-12-06 RX ORDER — SODIUM CHLORIDE 9 MG/ML
250 INJECTION, SOLUTION INTRAVENOUS ONCE
Status: DISCONTINUED | OUTPATIENT
Start: 2019-12-06 | End: 2019-12-07 | Stop reason: HOSPADM

## 2019-12-06 RX ORDER — SODIUM CHLORIDE 9 MG/ML
250 INJECTION, SOLUTION INTRAVENOUS ONCE
Status: CANCELLED | OUTPATIENT
Start: 2019-12-06

## 2019-12-06 RX ORDER — SODIUM CHLORIDE 9 MG/ML
250 INJECTION, SOLUTION INTRAVENOUS ONCE
Status: CANCELLED | OUTPATIENT
Start: 2019-12-13

## 2019-12-06 RX ORDER — DIPHENHYDRAMINE HYDROCHLORIDE 50 MG/ML
50 INJECTION INTRAMUSCULAR; INTRAVENOUS AS NEEDED
Status: CANCELLED | OUTPATIENT
Start: 2019-12-20

## 2019-12-06 RX ORDER — FAMOTIDINE 10 MG/ML
20 INJECTION, SOLUTION INTRAVENOUS ONCE
Status: COMPLETED | OUTPATIENT
Start: 2019-12-06 | End: 2019-12-06

## 2019-12-06 RX ORDER — DIPHENHYDRAMINE HYDROCHLORIDE 50 MG/ML
50 INJECTION INTRAMUSCULAR; INTRAVENOUS AS NEEDED
Status: CANCELLED | OUTPATIENT
Start: 2019-12-13

## 2019-12-06 RX ORDER — DIPHENHYDRAMINE HYDROCHLORIDE 50 MG/ML
50 INJECTION INTRAMUSCULAR; INTRAVENOUS AS NEEDED
Status: CANCELLED | OUTPATIENT
Start: 2019-12-06

## 2019-12-06 RX ADMIN — FAMOTIDINE 20 MG: 10 INJECTION, SOLUTION INTRAVENOUS at 11:35

## 2019-12-06 RX ADMIN — DEXAMETHASONE SODIUM PHOSPHATE 12 MG: 4 INJECTION, SOLUTION INTRAMUSCULAR; INTRAVENOUS at 11:38

## 2019-12-06 RX ADMIN — DIPHENHYDRAMINE HYDROCHLORIDE 50 MG: 50 INJECTION INTRAMUSCULAR; INTRAVENOUS at 11:37

## 2019-12-06 RX ADMIN — HEPARIN 500 UNITS: 100 SYRINGE at 13:04

## 2019-12-06 RX ADMIN — SODIUM CHLORIDE 160 MG: 9 INJECTION, SOLUTION INTRAVENOUS at 12:00

## 2019-12-06 NOTE — PROGRESS NOTES
PROBLEM LIST:  Oncology/Hematology History    Lab Results   Component Value Date    FINALDX  08/19/2019     1. RIGHT BREAST LUMPECTOMY WITH NEEDLE LOCALIZATION:   Infiltrating and in-situ intermediate grade lobular carcinoma.  Bloom Denis score 6/9.  Infiltrating tumor size 2.2x1.5x1.0 cm.   Margins of resection negative for tumor with closest infiltrating margin inferior and superior measuring 4 mm each.   No lymphvascular invasion identified.   Presence of previous biopsy site identified.  See Template.  2. SENTINEL LYMPH NODE #1, RIGHT AXILLA, EXCISION:   Lymph node x1; positive for metastatic lobular carcinoma (1/1).  3. SENTINEL LYMPH NODE #2, RIGHT AXILLA, EXCISION:  Lymph node x1; positive for metastatic lobular carcinoma (1/1).  4. NONSENTINEL LYMPH NODE, RIGHT AXILLA, EXCISION:  Lymph node x1; positive for metastatic lobular carcinoma (1/1).     INVASIVE BREAST CANCER STAGING TEMPLATE:  TYPE OF SPECIMEN/PROCEDURE:  Needle localized lumpectomy  SPECIMEN LATERALITY: Right breast  TUMOR SITE: 12 o'clock   TUMOR SIZE: (Size of Largest Invasive Carcinoma): 2.2x1.5x1.0 cm   HISTOLOGIC TYPE OF INVASIVE CARCINOMA:  Lobular   GRADE: Intermediate   TUBULAR FORMATION:  3  MITOTIC ACTIVITY:  1  PLEOMORPHISM:  2  OVERALL SCORE: 6/9   DUCTAL CARCINOMA IN SITU (DCIS) EXTENT: 0  TUMOR EXTENSION (Only if structures present and involved):    SKIN: N/A    NIPPLE: N/A    SKELETAL MUSCLE: N/A   SURGICAL MARGINS (Uninvolved/positive): Uninvolved   INVASIVE CARCINOMA MARGINS (Uninvolved/Positive) Uninvolved   DISTANCE FROM CLOSEST MARGIN: 4 mm   SPECIFIC CLOSEST MARGIN: Inferior and superior   LCIS MARGINS (Uninvolved/Positive/No LCIS): Uninvolved   DISTANCE FROM CLOSEST MARGIN: 1 mm  SPECIFIC CLOSEST MARGIN: Inferior   LYMPH NODES (required only if lymph nodes are present in the specimen):  Present   NUMBER OF LYMPH NODES WITH MACROMETASTASIS: 3  NUMBER OF LYMPH NODES WITH MICROMETASTASIS: 0  NUMBER OF LYMPH NODES WITH  ISOLATED TUMOR CELLS: 0  TOTAL NUMBER OF LYMPH NODES EXAMINED (Including sentinel nodes): 3  NUMBER OF SENTINEL NODES EXAMINED: 2  EXTRANODAL EXTENSION OF TUMOR:  0  VASCULAR/LYMPHATIC INVASION:  Not identified     ESTROGEN RECEPTOR STATUS BY IHC METHOD: Positive   PROGESTERONE RECEPTOR STATUS BY IHC METHOD: Positive    HER-2/ysabel ONCOPROTEIN STATUS BY IHC METHOD:  Negative   HER-2/ysabel ONCOGENE STATUS BY IN SITU HYBRIDIZATION ANALYSIS:  Not performed   TREATMENT EFFECT (BREAST): None  TREATMENT EFFECT (NODES): None   ADDITIONAL PATHOLOGIC FINDINGS:  Previous biopsy site identified   OTHER STUDIES:  None  AJCC PATHOLOGIC STAGE:  (COMPLETED BY PATHOLOGIST, BASED ONLY ON TISSUE FINDINGS, MORE EXTENSIVE DISEASE MAY NOT BE KNOWN TO THE PATHOLOGIST)  pT=  2  pN=  2  pM=  Unknown    DGD/mbc                Malignant neoplasm of upper-outer quadrant of right breast in female, estrogen receptor positive (CMS/HCC)    8/16/2019 Initial Diagnosis     Malignant neoplasm of upper-outer quadrant of right breast in female, estrogen receptor positive (CMS/HCC)         8/19/2019 Surgery     Surgery       Right breast lumpectomy with sentinel node biopsy by Dr. Stephanie Fraire         8/19/2019 Cancer Staged     Cancer Staging  Malignant neoplasm of upper-outer quadrant of right breast in female, estrogen receptor positive (CMS/HCC)  Staging form: Breast, AJCC 8th Edition  - Pathologic stage from 9/12/2019: Stage IB (pT2, pN1a, cM0, G2, ER: Positive, FL: Positive, HER2: Negative) - Signed by Laila Thacker MD on 9/12/2019 9/19/2019 Imaging     Negative work-up for metastatic disease by CAT scan of the chest abdomen pelvis and bone scan         9/20/2019 -  Other Event     ECHO - Normal EF         9/23/2019 Surgery     Surgery       Port Placement         9/25/2019 -  Chemotherapy     OP BREAST AC DD DOXOrubicin / Cyclophosphamide              REASON FOR VISIT: Management of my breast cancer    HISTORY OF PRESENT ILLNESS:   59  y.o.  female presents today for management of her breast cancer.  She completed 3 cycles of dose dense Adriamycin and Cytoxan.  She is here for start of Taxol.   Her nausea was fairly controlled.  This round was complicated by significant fatigue.  She is doing well otherwise.  No significant weight loss or infections.      Past Medical History:   Diagnosis Date   • Cardiomegaly     stable on CXR   • Dyspnea on exertion    • Elevated LDL cholesterol level    • Fatigue    • GERD (gastroesophageal reflux disease)     on daily Protonix, EGD 10/2016. Sx's resolved since AGBR, even if doesn't take her PPI. serum h. pyl neg.  EGD GDW 10/16 prior to AGBR  36 cm   • Hypertension    • Hypoalbuminemia     admits to chronic undereating   • Low HDL (under 40)    • Malignant neoplasm of upper-outer quadrant of right breast in female, estrogen receptor positive (CMS/HCC) 9/12/2019   • Microcytic red blood cells     H/H 12.6/38.4   • Morbid obesity (CMS/HCC)    • OAB (overactive bladder)    • Peripheral edema    • Post-menopause on HRT (hormone replacement therapy)    • Rosacea     on Doxycycline   • Stress incontinence    • Vaginal atrophy    • Vitamin D deficiency    • Wears glasses      Social History     Socioeconomic History   • Marital status:      Spouse name: Not on file   • Number of children: Not on file   • Years of education: Not on file   • Highest education level: Not on file   Tobacco Use   • Smoking status: Never Smoker   • Smokeless tobacco: Never Used   Substance and Sexual Activity   • Alcohol use: No   • Drug use: No   • Sexual activity: Defer     Comment: spouse   Social History Narrative     w/2 children.  Lives in [place].  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      Family History   Problem Relation Age of Onset   • Hypertension Mother    • Sleep apnea Mother    • Hypertension Father    • Lung cancer Father    • Hypertension Maternal Grandfather    • Stroke Maternal  Grandfather    • Hypertension Paternal Grandmother    • Breast cancer Paternal Aunt 45   • No Known Problems Brother    • BRCA 1/2 Neg Hx    • Ovarian cancer Neg Hx        Review of Systems:    Review of Systems   Constitutional: Positive for fatigue.   HENT:  Negative.         Mouth Sores   Eyes: Negative.    Respiratory: Negative.    Cardiovascular: Negative.    Gastrointestinal: Negative.    Endocrine: Negative.    Genitourinary: Negative.     Musculoskeletal: Negative.    Skin: Negative.    Neurological: Negative.    Hematological: Negative.    Psychiatric/Behavioral: Negative.       A comprehensive 14 point review of systems was performed and was negative except as mentioned.      Medications:        Current Outpatient Medications:   •  Biotin 5000 MCG tablet, Take  by mouth., Disp: , Rfl:   •  dexamethasone (DECADRON) 4 MG tablet, Take 2 tablets in the morning daily on Days 2, 3 & 4.  Take with food., Disp: 6 tablet, Rfl: 3  •  Ferrous Gluconate (IRON 27 PO), Take  by mouth., Disp: , Rfl:   •  hydrochlorothiazide (MICROZIDE) 12.5 MG capsule, Take 12.5 mg by mouth Daily., Disp: , Rfl:   •  lidocaine-prilocaine (EMLA) 2.5-2.5 % cream, Apply  topically to the appropriate area as directed As Needed (45-60 minutes prior to port access.  Cover with saran/plastic wrap)., Disp: 30 g, Rfl: 2  •  LISINOPRIL PO, Take 40 mg by mouth Daily., Disp: , Rfl:   •  pantoprazole (PROTONIX) 40 MG EC tablet, Take 40 mg by mouth Daily., Disp: , Rfl:   •  tolterodine LA (DETROL LA) 4 MG 24 hr capsule, Take 1 capsule by mouth Daily., Disp: 30 capsule, Rfl: 11  •  vitamin D (ERGOCALCIFEROL) 00343 UNITS capsule capsule, 50,000 Units 1 (One) Time Per Week. sundays, Disp: , Rfl:       ALLERGIES:    Allergies   Allergen Reactions   • Penicillins Hives and Swelling     Invanz given for AGBR surgery         Physical Exam    VITAL SIGNS:  There were no vitals taken for this visit.    Wt Readings from Last 3 Encounters:   11/18/19 96.8 kg (213  lb 8 oz)   11/08/19 98.4 kg (217 lb)   11/08/19 98.9 kg (218 lb)       There is no height or weight on file to calculate BMI. There is no height or weight on file to calculate BSA.         Performance Status: 0    General: well appearing, in no acute distress  HEENT: sclera anicteric, oropharynx clear, neck is supple  Lymphatics: no cervical, supraclavicular, or axillary adenopathy  Cardiovascular: regular rate and rhythm, no murmurs, rubs or gallops  Lungs: clear to auscultation bilaterally  Abdomen: soft, nontender, nondistended.  No palpable organomegaly  Extremities: no lower extremity edema  Skin: no rashes, lesions, bruising, or petechiae  Msk:  Shows no weakness of the large muscle groups  Psych: Mood is stable  Port in the left chest wall      RECENT LABS:    Lab Results   Component Value Date    HGB 11.3 (L) 11/08/2019    HCT 34.7 11/08/2019    MCV 93.0 11/08/2019     11/08/2019    WBC 6.60 11/08/2019    NEUTROABS 4.50 11/08/2019    LYMPHSABS 1.90 11/08/2019    MONOSABS 0.30 11/08/2019    EOSABS 0.1 05/08/2019    BASOSABS 0.0 05/08/2019       Lab Results   Component Value Date    GLUCOSE 90 11/08/2019    BUN 11 11/08/2019    CREATININE 0.60 11/08/2019     11/08/2019    K 4.0 11/08/2019     11/08/2019    CO2 26.0 11/08/2019    CALCIUM 8.9 11/08/2019    PROTEINTOT 6.1 11/08/2019    ALBUMIN 3.60 11/08/2019    BILITOT <0.2 (L) 11/08/2019    ALKPHOS 67 11/08/2019    AST 8 11/08/2019    ALT 8 11/08/2019       Ct Chest With Contrast    Result Date: 9/22/2019  No CT evidence of metastatic disease. No evidence of acute intrathoracic, intra-abdominal or pelvic abnormality. Postsurgical changes identified within the right axillary region. No definite remaining lymph nodes identified.  DICTATED:   09/20/2019 EDITED/ls :   09/21/2019  This report was finalized on 9/22/2019 9:00 AM by Dr. Paz Hubbard MD.      Nm Bone Scan Whole Body    Result Date: 9/13/2019  Normal examination.  D:  09/13/2019  E:  09/13/2019     This report was finalized on 9/13/2019 4:23 PM by Dr. César Paiz MD.      Ct Abdomen Pelvis With Contrast    Result Date: 9/22/2019  No CT evidence of metastatic disease. No evidence of acute intrathoracic, intra-abdominal or pelvic abnormality. Postsurgical changes identified within the right axillary region. No definite remaining lymph nodes identified.  DICTATED:   09/20/2019 EDITED/ls :   09/21/2019  This report was finalized on 9/22/2019 9:00 AM by Dr. Paz Hubbard MD.              Assessment/Plan    1. Stage IB infiltrating intermediate grade lobular carcinoma of the right breast with node positivity.  Completed 4 doses of dose dense Adriamycin and Cytoxan.  I will start her with 12 cycles of weekly Taxol today.  She will plan for radiotherapy after this is completed.  We will have to watch for neuropathy.    2.  Oral mucositis.  On valtrex as needed    3.  Chemotherapy-induced nausea.  She is well controlled on the current regiment of Zofran.          I spent a total of 25 minutes in direct patient care, greater than 20 minutes (greater than 50%) were spent in coordination of care, and counseling the patient regarding breast cancer. Answered any questions patient had with medication and plan.      Laila Thacker MD  [facility]         No orders of the defined types were placed in this encounter.      12/6/2019         Please note that portions of this note may have been completed with a voice recognition program. Efforts were made to edit the dictations, but occasionally words are mistranscribed.

## 2019-12-06 NOTE — PLAN OF CARE
Outpatient Infusion • [address] • [address] • [place] [address] • [phone]      CHEMOTHERAPY EDUCATION SHEET    NAME:  Francoise Kamara      : 1960           DATE: 21    Booklets Given: Chemotherapy and You []  Eating Hints []    Sexuality/Fertility Books []     Chemotherapy/Biotherapy Education Sheets: (list all that apply)  paclitaxel                                                                                                                                                                Chemotherapy Regimen:  Paclitaxel weekly x 12 weeks    TOPICS EDUCATION PROVIDED EDUCATION REINFORCED COMMENTS   ANEMIA:  role of RBC, cause, s/s, ways to manage, role of transfusion [] [x]    THROMBOCYTOPENIA:  role of platelet, cause, s/s, ways to prevent bleeding, things to avoid, when to seek help [] [x]    NEUTROPENIA:  role of WBC, cause, infection precautions, s/s of infection, when to call MD [] [x]    NUTRITION & APPETITE CHANGES:  importance of maintaining healthy diet & weight, ways to manage to improve intake, dietary consult, exercise regimen [] [x]    DIARRHEA:  causes, s/s of dehydration, ways to manage, dietary changes, when to call MD [] [x]    CONSTIPATION:  causes, ways to manage, dietary changes, when to call MD [] [x]    NAUSEA & VOMITING:  cause, use of antiemetics, dietary changes, when to call MD [] [x]    MOUTH SORES:  causes, oral care, ways to manage [] [x]    ALOPECIA:  cause, ways to manage, resources [] [x]    INFERTILITY & SEXUALITY:  causes, fertility preservation options, sexuality changes, ways to manage, importance of birth control [] [x]    NERVOUS SYSTEM CHANGES:  causes, s/s, neuropathies, cognitive changes, ways to manage [] [x]    PAIN:  causes, ways to manage [] [] ????   SKIN & NAIL CHANGES:  cause, s/s, ways to manage [] []    ORGAN TOXICITIES:  cause, s/s, need for diagnostic tests, labs, when to notify MD [] [x]    SURVIVORSHIP:  distress, distress  assessment, secondary malignancies, early/late effects, follow-up, social issues, social support [] []    HOME CARE:  use of spill kits, storing of PO chemo, how to manage bodily fluids [] []    MISCELLANEOUS:  drug interactions, administration, vesicant, et [] [x] Discussed risk of HSR and s/s to be aware of during treatment.     Referrals:        Notes:   Provided patient with personalized calendar and business card.

## 2019-12-06 NOTE — PROGRESS NOTES
ON[facility] Nutrition    Surgery: right breast lumpectomy (8/19/19)  Radiation:  45-50 Gray in 25 fractions to the right breast and regional lymphatics and a tumor boost of 10 Gray in 5 fractions  Chemotherapy: Dose Dense Adriamycin / Cytoxan - every 14 days x 4 / completed;  Taxol - weekly x 12 started 12/6/19    Weight 224 lbs / weight gain during treatment    Consulted with patient and her  as a follow up assessment of possible chemo induced side effects and patient education.  Patient states that she has continued to work throughout the treatment course and has experienced no side effects except for taste changes.  Discussed the importance of good oral hygiene, brushing prior to the meal and using the baking soda, salt and water mouth rinses, tips for flavor enhancement of food, palate cleansers.  Patient very appreciative of the suggestions, as the taste changes have interfered with her adhering to weight loss goals.    Discussed survivorship and weight loss management; patient is s/p gastric sleeve 2017 and frustrated that she has gained a few lbs with diagnosis and treatment.  Discussed the importance of an active lifestyle and weight management for survivorship.    Patient will continued to be followed as needed during radiation treatment and continuation of weekly Taxol.

## 2019-12-07 LAB
25(OH)D3 SERPL-MCNC: 27.8 NG/ML (ref 30–100)
FERRITIN SERPL-MCNC: 178 NG/ML (ref 15–150)
FOLATE SERPL-MCNC: 4.6 NG/ML
IRON SERPL-MCNC: 80 UG/DL (ref 27–159)
PREALB SERPL-MCNC: 20 MG/DL (ref 10–36)

## 2019-12-09 LAB — VIT B1 BLD-SCNC: 117.1 NMOL/L (ref 66.5–200)

## 2019-12-11 LAB
Lab: NORMAL
METHYLMALONATE SERPL-SCNC: 184 NMOL/L (ref 0–378)

## 2019-12-13 ENCOUNTER — HOSPITAL ENCOUNTER (OUTPATIENT)
Dept: ONCOLOGY | Facility: HOSPITAL | Age: 59
Setting detail: INFUSION SERIES
Discharge: HOME | End: 2019-12-13

## 2019-12-13 ENCOUNTER — DOCUMENTATION (OUTPATIENT)
Dept: ONCOLOGY | Facility: CLINIC | Age: 59
End: 2019-12-13

## 2019-12-13 VITALS
RESPIRATION RATE: 18 BRPM | DIASTOLIC BLOOD PRESSURE: 67 MMHG | BODY MASS INDEX: 39.69 KG/M2 | HEART RATE: 81 BPM | WEIGHT: 224 LBS | TEMPERATURE: 97.4 F | SYSTOLIC BLOOD PRESSURE: 124 MMHG | HEIGHT: 63 IN

## 2019-12-13 DIAGNOSIS — Z95.828 PORT-A-CATH IN PLACE: ICD-10-CM

## 2019-12-13 DIAGNOSIS — Z17.0 MALIGNANT NEOPLASM OF UPPER-OUTER QUADRANT OF RIGHT BREAST IN FEMALE, ESTROGEN RECEPTOR POSITIVE (HCC): Primary | ICD-10-CM

## 2019-12-13 DIAGNOSIS — C50.411 MALIGNANT NEOPLASM OF UPPER-OUTER QUADRANT OF RIGHT BREAST IN FEMALE, ESTROGEN RECEPTOR POSITIVE (HCC): Primary | ICD-10-CM

## 2019-12-13 LAB
ALBUMIN SERPL-MCNC: 3.6 G/DL (ref 3.5–5.2)
ALBUMIN/GLOB SERPL: 1.4 G/DL
ALP SERPL-CCNC: 56 U/L (ref 39–117)
ALT SERPL W P-5'-P-CCNC: 10 U/L (ref 1–33)
ANION GAP SERPL CALCULATED.3IONS-SCNC: 9 MMOL/L (ref 5–15)
AST SERPL-CCNC: 12 U/L (ref 1–32)
BILIRUB SERPL-MCNC: 0.3 MG/DL (ref 0.2–1.2)
BUN BLD-MCNC: 13 MG/DL (ref 6–20)
BUN/CREAT SERPL: 23.2 (ref 7–25)
CALCIUM SPEC-SCNC: 8.9 MG/DL (ref 8.6–10.5)
CHLORIDE SERPL-SCNC: 106 MMOL/L (ref 98–107)
CO2 SERPL-SCNC: 25 MMOL/L (ref 22–29)
CREAT BLD-MCNC: 0.56 MG/DL (ref 0.57–1)
ERYTHROCYTE [DISTWIDTH] IN BLOOD BY AUTOMATED COUNT: 20 % (ref 12.3–15.4)
GFR SERPL CREATININE-BSD FRML MDRD: 112 ML/MIN/1.73
GLOBULIN UR ELPH-MCNC: 2.6 GM/DL
GLUCOSE BLD-MCNC: 87 MG/DL (ref 65–99)
HCT VFR BLD AUTO: 29.3 % (ref 34–46.6)
HGB BLD-MCNC: 9.6 G/DL (ref 12–15.9)
LYMPHOCYTES # BLD AUTO: 1.4 10*3/MM3 (ref 0.7–3.1)
LYMPHOCYTES NFR BLD AUTO: 24.2 % (ref 19.6–45.3)
MCH RBC QN AUTO: 32.9 PG (ref 26.6–33)
MCHC RBC AUTO-ENTMCNC: 32.6 G/DL (ref 31.5–35.7)
MCV RBC AUTO: 101.1 FL (ref 79–97)
MONOCYTES # BLD AUTO: 0.3 10*3/MM3 (ref 0.1–0.9)
MONOCYTES NFR BLD AUTO: 4.6 % (ref 5–12)
NEUTROPHILS # BLD AUTO: 4.1 10*3/MM3 (ref 1.7–7)
NEUTROPHILS NFR BLD AUTO: 71.2 % (ref 42.7–76)
PLATELET # BLD AUTO: 168 10*3/MM3 (ref 140–450)
PMV BLD AUTO: 8.3 FL (ref 6–12)
POTASSIUM BLD-SCNC: 3.9 MMOL/L (ref 3.5–5.2)
PROT SERPL-MCNC: 6.2 G/DL (ref 6–8.5)
RBC # BLD AUTO: 2.9 10*6/MM3 (ref 3.77–5.28)
SODIUM BLD-SCNC: 140 MMOL/L (ref 136–145)
WBC NRBC COR # BLD: 5.7 10*3/MM3 (ref 3.4–10.8)

## 2019-12-13 PROCEDURE — 85025 COMPLETE CBC W/AUTO DIFF WBC: CPT | Performed by: INTERNAL MEDICINE

## 2019-12-13 PROCEDURE — 25010000002 DIPHENHYDRAMINE PER 50 MG: Performed by: INTERNAL MEDICINE

## 2019-12-13 PROCEDURE — 96375 TX/PRO/DX INJ NEW DRUG ADDON: CPT

## 2019-12-13 PROCEDURE — 25010000002 PACLITAXEL PER 30 MG: Performed by: INTERNAL MEDICINE

## 2019-12-13 PROCEDURE — 96367 TX/PROPH/DG ADDL SEQ IV INF: CPT

## 2019-12-13 PROCEDURE — 96413 CHEMO IV INFUSION 1 HR: CPT

## 2019-12-13 PROCEDURE — 80053 COMPREHEN METABOLIC PANEL: CPT | Performed by: INTERNAL MEDICINE

## 2019-12-13 PROCEDURE — 25010000002 DEXAMETHASONE PER 1 MG: Performed by: INTERNAL MEDICINE

## 2019-12-13 PROCEDURE — 96368 THER/DIAG CONCURRENT INF: CPT

## 2019-12-13 RX ORDER — SODIUM CHLORIDE 9 MG/ML
250 INJECTION, SOLUTION INTRAVENOUS ONCE
Status: DISCONTINUED | OUTPATIENT
Start: 2019-12-13 | End: 2019-12-14 | Stop reason: HOSPADM

## 2019-12-13 RX ORDER — FAMOTIDINE 10 MG/ML
20 INJECTION, SOLUTION INTRAVENOUS ONCE
Status: COMPLETED | OUTPATIENT
Start: 2019-12-13 | End: 2019-12-13

## 2019-12-13 RX ADMIN — SODIUM CHLORIDE 160 MG: 9 INJECTION, SOLUTION INTRAVENOUS at 10:20

## 2019-12-13 RX ADMIN — HEPARIN 500 UNITS: 100 SYRINGE at 11:26

## 2019-12-13 RX ADMIN — DEXAMETHASONE SODIUM PHOSPHATE 12 MG: 4 INJECTION, SOLUTION INTRAMUSCULAR; INTRAVENOUS at 09:57

## 2019-12-13 RX ADMIN — DIPHENHYDRAMINE HYDROCHLORIDE 25 MG: 50 INJECTION INTRAMUSCULAR; INTRAVENOUS at 09:58

## 2019-12-13 RX ADMIN — FAMOTIDINE 20 MG: 10 INJECTION, SOLUTION INTRAVENOUS at 09:56

## 2019-12-13 NOTE — PROGRESS NOTES
12/13/2019  ~per Gunjan Richardson  PT DROPPED OFF STD FORMS. PT WANTING TO PAY AT  AND IS REQUESTING CALL WHEN FINISHED. PLACED IN BASKET. PT STATED ONLY PAGE 2 NEEDS TO BE COMPLETED. BUT WANTS SOMEONE TO DOUBLE CHECK. THANK YOU.    Rec'd, completed, attached MR, & placed in MD box.    12/16/2019  Rec'd with MD signature.  Called pt to let her know that pw is completed and ready to pick-up.  Pt verbalized understanding and will pick-up this Friday when she comes in for tx.

## 2019-12-20 ENCOUNTER — HOSPITAL ENCOUNTER (OUTPATIENT)
Dept: ONCOLOGY | Facility: HOSPITAL | Age: 59
Setting detail: INFUSION SERIES
Discharge: HOME | End: 2019-12-20

## 2019-12-20 VITALS
SYSTOLIC BLOOD PRESSURE: 131 MMHG | WEIGHT: 224 LBS | DIASTOLIC BLOOD PRESSURE: 50 MMHG | TEMPERATURE: 97.6 F | BODY MASS INDEX: 39.69 KG/M2 | RESPIRATION RATE: 16 BRPM | HEART RATE: 70 BPM | HEIGHT: 63 IN

## 2019-12-20 DIAGNOSIS — Z95.828 PORT-A-CATH IN PLACE: Primary | ICD-10-CM

## 2019-12-20 DIAGNOSIS — C50.411 MALIGNANT NEOPLASM OF UPPER-OUTER QUADRANT OF RIGHT BREAST IN FEMALE, ESTROGEN RECEPTOR POSITIVE (HCC): ICD-10-CM

## 2019-12-20 DIAGNOSIS — Z17.0 MALIGNANT NEOPLASM OF UPPER-OUTER QUADRANT OF RIGHT BREAST IN FEMALE, ESTROGEN RECEPTOR POSITIVE (HCC): ICD-10-CM

## 2019-12-20 LAB
ALBUMIN SERPL-MCNC: 3.5 G/DL (ref 3.5–5.2)
ALBUMIN/GLOB SERPL: 1.3 G/DL
ALP SERPL-CCNC: 59 U/L (ref 39–117)
ALT SERPL W P-5'-P-CCNC: 10 U/L (ref 1–33)
ANION GAP SERPL CALCULATED.3IONS-SCNC: 10 MMOL/L (ref 5–15)
AST SERPL-CCNC: 15 U/L (ref 1–32)
BILIRUB SERPL-MCNC: 0.3 MG/DL (ref 0.2–1.2)
BUN BLD-MCNC: 10 MG/DL (ref 6–20)
BUN/CREAT SERPL: 16.1 (ref 7–25)
CALCIUM SPEC-SCNC: 8.9 MG/DL (ref 8.6–10.5)
CHLORIDE SERPL-SCNC: 105 MMOL/L (ref 98–107)
CO2 SERPL-SCNC: 25 MMOL/L (ref 22–29)
CREAT BLD-MCNC: 0.62 MG/DL (ref 0.57–1)
ERYTHROCYTE [DISTWIDTH] IN BLOOD BY AUTOMATED COUNT: 19.6 % (ref 12.3–15.4)
GFR SERPL CREATININE-BSD FRML MDRD: 99 ML/MIN/1.73
GLOBULIN UR ELPH-MCNC: 2.6 GM/DL
GLUCOSE BLD-MCNC: 77 MG/DL (ref 65–99)
HCT VFR BLD AUTO: 30.5 % (ref 34–46.6)
HGB BLD-MCNC: 9.9 G/DL (ref 12–15.9)
LYMPHOCYTES # BLD AUTO: 1.2 10*3/MM3 (ref 0.7–3.1)
LYMPHOCYTES NFR BLD AUTO: 30 % (ref 19.6–45.3)
MCH RBC QN AUTO: 33.5 PG (ref 26.6–33)
MCHC RBC AUTO-ENTMCNC: 32.6 G/DL (ref 31.5–35.7)
MCV RBC AUTO: 102.9 FL (ref 79–97)
MONOCYTES # BLD AUTO: 0.3 10*3/MM3 (ref 0.1–0.9)
MONOCYTES NFR BLD AUTO: 7 % (ref 5–12)
NEUTROPHILS # BLD AUTO: 2.6 10*3/MM3 (ref 1.7–7)
NEUTROPHILS NFR BLD AUTO: 63 % (ref 42.7–76)
PLATELET # BLD AUTO: 181 10*3/MM3 (ref 140–450)
PMV BLD AUTO: 8.1 FL (ref 6–12)
POTASSIUM BLD-SCNC: 3.9 MMOL/L (ref 3.5–5.2)
PROT SERPL-MCNC: 6.1 G/DL (ref 6–8.5)
RBC # BLD AUTO: 2.97 10*6/MM3 (ref 3.77–5.28)
SODIUM BLD-SCNC: 140 MMOL/L (ref 136–145)
WBC NRBC COR # BLD: 4.1 10*3/MM3 (ref 3.4–10.8)

## 2019-12-20 PROCEDURE — 96375 TX/PRO/DX INJ NEW DRUG ADDON: CPT

## 2019-12-20 PROCEDURE — 25010000002 DEXAMETHASONE PER 1 MG: Performed by: INTERNAL MEDICINE

## 2019-12-20 PROCEDURE — 80053 COMPREHEN METABOLIC PANEL: CPT | Performed by: INTERNAL MEDICINE

## 2019-12-20 PROCEDURE — 85025 COMPLETE CBC W/AUTO DIFF WBC: CPT | Performed by: INTERNAL MEDICINE

## 2019-12-20 PROCEDURE — 25010000002 PACLITAXEL PER 30 MG: Performed by: INTERNAL MEDICINE

## 2019-12-20 PROCEDURE — 96413 CHEMO IV INFUSION 1 HR: CPT

## 2019-12-20 PROCEDURE — 25010000002 DIPHENHYDRAMINE PER 50 MG: Performed by: INTERNAL MEDICINE

## 2019-12-20 RX ORDER — SODIUM CHLORIDE 9 MG/ML
250 INJECTION, SOLUTION INTRAVENOUS ONCE
Status: COMPLETED | OUTPATIENT
Start: 2019-12-20 | End: 2019-12-20

## 2019-12-20 RX ORDER — FAMOTIDINE 10 MG/ML
20 INJECTION, SOLUTION INTRAVENOUS ONCE
Status: COMPLETED | OUTPATIENT
Start: 2019-12-20 | End: 2019-12-20

## 2019-12-20 RX ADMIN — SODIUM CHLORIDE 250 ML: 9 INJECTION, SOLUTION INTRAVENOUS at 10:21

## 2019-12-20 RX ADMIN — HEPARIN 500 UNITS: 100 SYRINGE at 11:48

## 2019-12-20 RX ADMIN — FAMOTIDINE 20 MG: 10 INJECTION, SOLUTION INTRAVENOUS at 10:20

## 2019-12-20 RX ADMIN — DIPHENHYDRAMINE HYDROCHLORIDE 25 MG: 50 INJECTION INTRAMUSCULAR; INTRAVENOUS at 10:22

## 2019-12-20 RX ADMIN — SODIUM CHLORIDE 160 MG: 9 INJECTION, SOLUTION INTRAVENOUS at 10:43

## 2019-12-20 RX ADMIN — DEXAMETHASONE SODIUM PHOSPHATE 12 MG: 4 INJECTION, SOLUTION INTRAMUSCULAR; INTRAVENOUS at 10:22

## 2019-12-27 ENCOUNTER — APPOINTMENT (OUTPATIENT)
Dept: ONCOLOGY | Facility: HOSPITAL | Age: 59
End: 2019-12-27

## 2019-12-27 ENCOUNTER — OFFICE VISIT (OUTPATIENT)
Dept: ONCOLOGY | Facility: CLINIC | Age: 59
End: 2019-12-27

## 2019-12-27 ENCOUNTER — HOSPITAL ENCOUNTER (OUTPATIENT)
Dept: ONCOLOGY | Facility: HOSPITAL | Age: 59
Setting detail: INFUSION SERIES
Discharge: HOME | End: 2019-12-27

## 2019-12-27 VITALS
WEIGHT: 223 LBS | BODY MASS INDEX: 39.51 KG/M2 | TEMPERATURE: 98.2 F | SYSTOLIC BLOOD PRESSURE: 122 MMHG | DIASTOLIC BLOOD PRESSURE: 67 MMHG | HEART RATE: 80 BPM | RESPIRATION RATE: 16 BRPM | HEIGHT: 63 IN

## 2019-12-27 VITALS
HEART RATE: 91 BPM | RESPIRATION RATE: 16 BRPM | TEMPERATURE: 98.2 F | OXYGEN SATURATION: 100 % | HEIGHT: 63 IN | BODY MASS INDEX: 39.51 KG/M2 | DIASTOLIC BLOOD PRESSURE: 69 MMHG | WEIGHT: 223 LBS | SYSTOLIC BLOOD PRESSURE: 138 MMHG

## 2019-12-27 DIAGNOSIS — C50.411 MALIGNANT NEOPLASM OF UPPER-OUTER QUADRANT OF RIGHT BREAST IN FEMALE, ESTROGEN RECEPTOR POSITIVE (HCC): ICD-10-CM

## 2019-12-27 DIAGNOSIS — Z17.0 MALIGNANT NEOPLASM OF UPPER-OUTER QUADRANT OF RIGHT BREAST IN FEMALE, ESTROGEN RECEPTOR POSITIVE (HCC): ICD-10-CM

## 2019-12-27 DIAGNOSIS — Z95.828 PORT-A-CATH IN PLACE: Primary | ICD-10-CM

## 2019-12-27 LAB
ALBUMIN SERPL-MCNC: 3.3 G/DL (ref 3.5–5.2)
ALBUMIN/GLOB SERPL: 1.2 G/DL
ALP SERPL-CCNC: 58 U/L (ref 39–117)
ALT SERPL W P-5'-P-CCNC: 9 U/L (ref 1–33)
ANION GAP SERPL CALCULATED.3IONS-SCNC: 11 MMOL/L (ref 5–15)
AST SERPL-CCNC: 12 U/L (ref 1–32)
BILIRUB SERPL-MCNC: 0.3 MG/DL (ref 0.2–1.2)
BUN BLD-MCNC: 12 MG/DL (ref 6–20)
BUN/CREAT SERPL: 20 (ref 7–25)
CALCIUM SPEC-SCNC: 8.7 MG/DL (ref 8.6–10.5)
CHLORIDE SERPL-SCNC: 108 MMOL/L (ref 98–107)
CO2 SERPL-SCNC: 22 MMOL/L (ref 22–29)
CREAT BLD-MCNC: 0.6 MG/DL (ref 0.57–1)
ERYTHROCYTE [DISTWIDTH] IN BLOOD BY AUTOMATED COUNT: 18.5 % (ref 12.3–15.4)
GFR SERPL CREATININE-BSD FRML MDRD: 103 ML/MIN/1.73
GLOBULIN UR ELPH-MCNC: 2.7 GM/DL
GLUCOSE BLD-MCNC: 107 MG/DL (ref 65–99)
HCT VFR BLD AUTO: 29.4 % (ref 34–46.6)
HGB BLD-MCNC: 9.6 G/DL (ref 12–15.9)
LYMPHOCYTES # BLD AUTO: 1 10*3/MM3 (ref 0.7–3.1)
LYMPHOCYTES NFR BLD AUTO: 29.5 % (ref 19.6–45.3)
MCH RBC QN AUTO: 34 PG (ref 26.6–33)
MCHC RBC AUTO-ENTMCNC: 32.5 G/DL (ref 31.5–35.7)
MCV RBC AUTO: 104.7 FL (ref 79–97)
MONOCYTES # BLD AUTO: 0.2 10*3/MM3 (ref 0.1–0.9)
MONOCYTES NFR BLD AUTO: 6.2 % (ref 5–12)
NEUTROPHILS # BLD AUTO: 2.1 10*3/MM3 (ref 1.7–7)
NEUTROPHILS NFR BLD AUTO: 64.3 % (ref 42.7–76)
PLATELET # BLD AUTO: 210 10*3/MM3 (ref 140–450)
PMV BLD AUTO: 7.8 FL (ref 6–12)
POTASSIUM BLD-SCNC: 3.5 MMOL/L (ref 3.5–5.2)
PROT SERPL-MCNC: 6 G/DL (ref 6–8.5)
RBC # BLD AUTO: 2.81 10*6/MM3 (ref 3.77–5.28)
SODIUM BLD-SCNC: 141 MMOL/L (ref 136–145)
WBC NRBC COR # BLD: 3.3 10*3/MM3 (ref 3.4–10.8)

## 2019-12-27 PROCEDURE — 25010000002 DIPHENHYDRAMINE PER 50 MG: Performed by: INTERNAL MEDICINE

## 2019-12-27 PROCEDURE — 99214 OFFICE O/P EST MOD 30 MIN: CPT | Performed by: INTERNAL MEDICINE

## 2019-12-27 PROCEDURE — 96375 TX/PRO/DX INJ NEW DRUG ADDON: CPT

## 2019-12-27 PROCEDURE — 25010000002 PACLITAXEL PER 30 MG: Performed by: INTERNAL MEDICINE

## 2019-12-27 PROCEDURE — 25010000002 DEXAMETHASONE PER 1 MG: Performed by: INTERNAL MEDICINE

## 2019-12-27 PROCEDURE — 96413 CHEMO IV INFUSION 1 HR: CPT

## 2019-12-27 PROCEDURE — 85025 COMPLETE CBC W/AUTO DIFF WBC: CPT | Performed by: INTERNAL MEDICINE

## 2019-12-27 PROCEDURE — 80053 COMPREHEN METABOLIC PANEL: CPT | Performed by: INTERNAL MEDICINE

## 2019-12-27 RX ORDER — FAMOTIDINE 10 MG/ML
20 INJECTION, SOLUTION INTRAVENOUS ONCE
Status: CANCELLED | OUTPATIENT
Start: 2019-12-27

## 2019-12-27 RX ORDER — DIPHENHYDRAMINE HYDROCHLORIDE 50 MG/ML
50 INJECTION INTRAMUSCULAR; INTRAVENOUS AS NEEDED
Status: CANCELLED | OUTPATIENT
Start: 2020-01-17

## 2019-12-27 RX ORDER — FAMOTIDINE 10 MG/ML
20 INJECTION, SOLUTION INTRAVENOUS AS NEEDED
Status: CANCELLED | OUTPATIENT
Start: 2020-01-17

## 2019-12-27 RX ORDER — SODIUM CHLORIDE 9 MG/ML
250 INJECTION, SOLUTION INTRAVENOUS ONCE
Status: CANCELLED | OUTPATIENT
Start: 2020-01-03

## 2019-12-27 RX ORDER — FAMOTIDINE 10 MG/ML
20 INJECTION, SOLUTION INTRAVENOUS ONCE
Status: CANCELLED | OUTPATIENT
Start: 2020-01-17

## 2019-12-27 RX ORDER — SODIUM CHLORIDE 9 MG/ML
250 INJECTION, SOLUTION INTRAVENOUS ONCE
Status: CANCELLED | OUTPATIENT
Start: 2020-01-17

## 2019-12-27 RX ORDER — DIPHENHYDRAMINE HYDROCHLORIDE 50 MG/ML
50 INJECTION INTRAMUSCULAR; INTRAVENOUS AS NEEDED
Status: CANCELLED | OUTPATIENT
Start: 2019-12-27

## 2019-12-27 RX ORDER — SODIUM CHLORIDE 9 MG/ML
250 INJECTION, SOLUTION INTRAVENOUS ONCE
Status: CANCELLED | OUTPATIENT
Start: 2020-01-10

## 2019-12-27 RX ORDER — SODIUM CHLORIDE 9 MG/ML
250 INJECTION, SOLUTION INTRAVENOUS ONCE
Status: COMPLETED | OUTPATIENT
Start: 2019-12-27 | End: 2019-12-27

## 2019-12-27 RX ORDER — FAMOTIDINE 10 MG/ML
20 INJECTION, SOLUTION INTRAVENOUS AS NEEDED
Status: CANCELLED | OUTPATIENT
Start: 2020-01-03

## 2019-12-27 RX ORDER — SODIUM CHLORIDE 9 MG/ML
250 INJECTION, SOLUTION INTRAVENOUS ONCE
Status: CANCELLED | OUTPATIENT
Start: 2019-12-27

## 2019-12-27 RX ORDER — FAMOTIDINE 10 MG/ML
20 INJECTION, SOLUTION INTRAVENOUS AS NEEDED
Status: CANCELLED | OUTPATIENT
Start: 2020-01-10

## 2019-12-27 RX ORDER — FAMOTIDINE 10 MG/ML
20 INJECTION, SOLUTION INTRAVENOUS AS NEEDED
Status: CANCELLED | OUTPATIENT
Start: 2019-12-27

## 2019-12-27 RX ORDER — FAMOTIDINE 10 MG/ML
20 INJECTION, SOLUTION INTRAVENOUS ONCE
Status: COMPLETED | OUTPATIENT
Start: 2019-12-27 | End: 2019-12-27

## 2019-12-27 RX ORDER — FAMOTIDINE 10 MG/ML
20 INJECTION, SOLUTION INTRAVENOUS ONCE
Status: CANCELLED | OUTPATIENT
Start: 2020-01-10

## 2019-12-27 RX ORDER — DIPHENHYDRAMINE HYDROCHLORIDE 50 MG/ML
50 INJECTION INTRAMUSCULAR; INTRAVENOUS AS NEEDED
Status: CANCELLED | OUTPATIENT
Start: 2020-01-10

## 2019-12-27 RX ORDER — DIPHENHYDRAMINE HYDROCHLORIDE 50 MG/ML
50 INJECTION INTRAMUSCULAR; INTRAVENOUS AS NEEDED
Status: CANCELLED | OUTPATIENT
Start: 2020-01-03

## 2019-12-27 RX ORDER — FAMOTIDINE 10 MG/ML
20 INJECTION, SOLUTION INTRAVENOUS ONCE
Status: CANCELLED | OUTPATIENT
Start: 2020-01-03

## 2019-12-27 RX ADMIN — SODIUM CHLORIDE 250 ML: 9 INJECTION, SOLUTION INTRAVENOUS at 09:53

## 2019-12-27 RX ADMIN — DEXAMETHASONE SODIUM PHOSPHATE 12 MG: 4 INJECTION, SOLUTION INTRAMUSCULAR; INTRAVENOUS at 09:55

## 2019-12-27 RX ADMIN — SODIUM CHLORIDE 160 MG: 9 INJECTION, SOLUTION INTRAVENOUS at 10:15

## 2019-12-27 RX ADMIN — DIPHENHYDRAMINE HYDROCHLORIDE 25 MG: 50 INJECTION INTRAMUSCULAR; INTRAVENOUS at 09:54

## 2019-12-27 RX ADMIN — FAMOTIDINE 20 MG: 10 INJECTION, SOLUTION INTRAVENOUS at 09:54

## 2019-12-27 RX ADMIN — HEPARIN 500 UNITS: 100 SYRINGE at 11:18

## 2019-12-27 NOTE — PROGRESS NOTES
PROBLEM LIST:  Oncology/Hematology History    Lab Results   Component Value Date    FINALDX  08/19/2019     1. RIGHT BREAST LUMPECTOMY WITH NEEDLE LOCALIZATION:   Infiltrating and in-situ intermediate grade lobular carcinoma.  Bloom Denis score 6/9.  Infiltrating tumor size 2.2x1.5x1.0 cm.   Margins of resection negative for tumor with closest infiltrating margin inferior and superior measuring 4 mm each.   No lymphvascular invasion identified.   Presence of previous biopsy site identified.  See Template.  2. SENTINEL LYMPH NODE #1, RIGHT AXILLA, EXCISION:   Lymph node x1; positive for metastatic lobular carcinoma (1/1).  3. SENTINEL LYMPH NODE #2, RIGHT AXILLA, EXCISION:  Lymph node x1; positive for metastatic lobular carcinoma (1/1).  4. NONSENTINEL LYMPH NODE, RIGHT AXILLA, EXCISION:  Lymph node x1; positive for metastatic lobular carcinoma (1/1).     INVASIVE BREAST CANCER STAGING TEMPLATE:  TYPE OF SPECIMEN/PROCEDURE:  Needle localized lumpectomy  SPECIMEN LATERALITY: Right breast  TUMOR SITE: 12 o'clock   TUMOR SIZE: (Size of Largest Invasive Carcinoma): 2.2x1.5x1.0 cm   HISTOLOGIC TYPE OF INVASIVE CARCINOMA:  Lobular   GRADE: Intermediate   TUBULAR FORMATION:  3  MITOTIC ACTIVITY:  1  PLEOMORPHISM:  2  OVERALL SCORE: 6/9   DUCTAL CARCINOMA IN SITU (DCIS) EXTENT: 0  TUMOR EXTENSION (Only if structures present and involved):    SKIN: N/A    NIPPLE: N/A    SKELETAL MUSCLE: N/A   SURGICAL MARGINS (Uninvolved/positive): Uninvolved   INVASIVE CARCINOMA MARGINS (Uninvolved/Positive) Uninvolved   DISTANCE FROM CLOSEST MARGIN: 4 mm   SPECIFIC CLOSEST MARGIN: Inferior and superior   LCIS MARGINS (Uninvolved/Positive/No LCIS): Uninvolved   DISTANCE FROM CLOSEST MARGIN: 1 mm  SPECIFIC CLOSEST MARGIN: Inferior   LYMPH NODES (required only if lymph nodes are present in the specimen):  Present   NUMBER OF LYMPH NODES WITH MACROMETASTASIS: 3  NUMBER OF LYMPH NODES WITH MICROMETASTASIS: 0  NUMBER OF LYMPH NODES WITH  ISOLATED TUMOR CELLS: 0  TOTAL NUMBER OF LYMPH NODES EXAMINED (Including sentinel nodes): 3  NUMBER OF SENTINEL NODES EXAMINED: 2  EXTRANODAL EXTENSION OF TUMOR:  0  VASCULAR/LYMPHATIC INVASION:  Not identified     ESTROGEN RECEPTOR STATUS BY IHC METHOD: Positive   PROGESTERONE RECEPTOR STATUS BY IHC METHOD: Positive    HER-2/ysabel ONCOPROTEIN STATUS BY IHC METHOD:  Negative   HER-2/ysabel ONCOGENE STATUS BY IN SITU HYBRIDIZATION ANALYSIS:  Not performed   TREATMENT EFFECT (BREAST): None  TREATMENT EFFECT (NODES): None   ADDITIONAL PATHOLOGIC FINDINGS:  Previous biopsy site identified   OTHER STUDIES:  None  AJCC PATHOLOGIC STAGE:  (COMPLETED BY PATHOLOGIST, BASED ONLY ON TISSUE FINDINGS, MORE EXTENSIVE DISEASE MAY NOT BE KNOWN TO THE PATHOLOGIST)  pT=  2  pN=  2  pM=  Unknown    DGD/mbc                Malignant neoplasm of upper-outer quadrant of right breast in female, estrogen receptor positive (CMS/HCC)    8/16/2019 Initial Diagnosis     Malignant neoplasm of upper-outer quadrant of right breast in female, estrogen receptor positive (CMS/HCC)      8/19/2019 Surgery     Surgery       Right breast lumpectomy with sentinel node biopsy by Dr. Stephanie Fraire      8/19/2019 Cancer Staged     Cancer Staging  Malignant neoplasm of upper-outer quadrant of right breast in female, estrogen receptor positive (CMS/HCC)  Staging form: Breast, AJCC 8th Edition  - Pathologic stage from 9/12/2019: Stage IB (pT2, pN1a, cM0, G2, ER: Positive, CA: Positive, HER2: Negative) - Signed by Laila Thacker MD on 9/12/2019 9/19/2019 Imaging     Negative work-up for metastatic disease by CAT scan of the chest abdomen pelvis and bone scan      9/20/2019 -  Other Event     ECHO - Normal EF      9/23/2019 Surgery     Surgery       Port Placement      9/25/2019 -  Chemotherapy     OP BREAST AC DD DOXOrubicin / Cyclophosphamide           REASON FOR VISIT: Management of my breast cancer    HISTORY OF PRESENT ILLNESS:   59 y.o.  female  presents today for management of her breast cancer.  She completed 4 cycles of dose dense Adriamycin and Cytoxan.  She is completed 3 doses of Taxol.  Tolerated it well.  Denies any neuropathy.  Does have some insomnia.  Denies any issues with pain.  Denies any infections.    Past Medical History:   Diagnosis Date   • Cardiomegaly     stable on CXR   • Dyspnea on exertion    • Elevated LDL cholesterol level    • Fatigue    • GERD (gastroesophageal reflux disease)     on daily Protonix, EGD 10/2016. Sx's resolved since AGBR, even if doesn't take her PPI. serum h. pyl neg.  EGD GDW 10/16 prior to AGBR  36 cm   • Hypertension    • Hypoalbuminemia     admits to chronic undereating   • Low HDL (under 40)    • Malignant neoplasm of upper-outer quadrant of right breast in female, estrogen receptor positive (CMS/HCC) 9/12/2019   • Microcytic red blood cells     H/H 12.6/38.4   • Morbid obesity (CMS/HCC)    • OAB (overactive bladder)    • Peripheral edema    • Post-menopause on HRT (hormone replacement therapy)    • Rosacea     on Doxycycline   • Stress incontinence    • Vaginal atrophy    • Vitamin D deficiency    • Wears glasses      Social History     Socioeconomic History   • Marital status:      Spouse name: Not on file   • Number of children: Not on file   • Years of education: Not on file   • Highest education level: Not on file   Tobacco Use   • Smoking status: Never Smoker   • Smokeless tobacco: Never Used   Substance and Sexual Activity   • Alcohol use: No   • Drug use: No   • Sexual activity: Defer     Comment: spouse   Social History Narrative     w/2 children.  Lives in [place].  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      Family History   Problem Relation Age of Onset   • Hypertension Mother    • Sleep apnea Mother    • Hypertension Father    • Lung cancer Father    • Hypertension Maternal Grandfather    • Stroke Maternal Grandfather    • Hypertension Paternal Grandmother     • Breast cancer Paternal Aunt 45   • No Known Problems Brother    • BRCA 1/2 Neg Hx    • Ovarian cancer Neg Hx        Review of Systems:    Review of Systems   Constitutional: Positive for fatigue.   HENT:  Negative.         Mouth Sores   Eyes: Negative.    Respiratory: Negative.    Cardiovascular: Negative.    Gastrointestinal: Negative.    Endocrine: Negative.    Genitourinary: Negative.     Musculoskeletal: Negative.    Skin: Negative.    Neurological: Negative.    Hematological: Negative.    Psychiatric/Behavioral: Negative.       A comprehensive 14 point review of systems was performed and was negative except as mentioned.      Medications:        Current Outpatient Medications:   •  Biotin 5000 MCG tablet, Take  by mouth., Disp: , Rfl:   •  Cholecalciferol (VITAMIN D3) 1.25 MG (82919 UT) tablet, Take 1 tablet by mouth 1 (One) Time Per Week for 12 doses., Disp: 12 tablet, Rfl: 0  •  Ferrous Gluconate (IRON 27 PO), Take  by mouth., Disp: , Rfl:   •  hydrochlorothiazide (MICROZIDE) 12.5 MG capsule, Take 12.5 mg by mouth Daily., Disp: , Rfl:   •  lidocaine-prilocaine (EMLA) 2.5-2.5 % cream, Apply  topically to the appropriate area as directed As Needed (45-60 minutes prior to port access.  Cover with saran/plastic wrap)., Disp: 30 g, Rfl: 2  •  LISINOPRIL PO, Take 40 mg by mouth Daily., Disp: , Rfl:   •  ondansetron (ZOFRAN) 8 MG tablet, Take 1 tablet by mouth 3 (Three) Times a Day As Needed for Nausea or Vomiting., Disp: 30 tablet, Rfl: 5  •  pantoprazole (PROTONIX) 40 MG EC tablet, Take 40 mg by mouth Daily., Disp: , Rfl:   •  tolterodine LA (DETROL LA) 4 MG 24 hr capsule, Take 1 capsule by mouth Daily., Disp: 30 capsule, Rfl: 11  •  vitamin D (ERGOCALCIFEROL) 97846 UNITS capsule capsule, 50,000 Units 1 (One) Time Per Week. sundays, Disp: , Rfl:   No current facility-administered medications for this visit.     Facility-Administered Medications Ordered in Other Visits:   •  heparin flush (porcine) 100 UNIT/ML  "injection 500 Units, 500 Units, Intravenous, PRN, Laila Thacker MD      ALLERGIES:    Allergies   Allergen Reactions   • Penicillins Hives and Swelling     Invanz given for AGBR surgery         Physical Exam    VITAL SIGNS:  /69   Pulse 91   Temp 98.2 °F (36.8 °C) (Temporal)   Resp 16   Ht 160 cm (63\")   Wt 101 kg (223 lb)   SpO2 100% Comment: RA  BMI 39.50 kg/m²     Wt Readings from Last 3 Encounters:   12/27/19 101 kg (223 lb)   12/27/19 101 kg (223 lb)   12/20/19 102 kg (224 lb)       Body mass index is 39.5 kg/m². Body surface area is 2.02 meters squared.         Performance Status: 0    General: well appearing, in no acute distress  HEENT: sclera anicteric, oropharynx clear, neck is supple  Lymphatics: no cervical, supraclavicular, or axillary adenopathy  Cardiovascular: regular rate and rhythm, no murmurs, rubs or gallops  Lungs: clear to auscultation bilaterally  Abdomen: soft, nontender, nondistended.  No palpable organomegaly  Extremities: no lower extremity edema  Skin: no rashes, lesions, bruising, or petechiae  Msk:  Shows no weakness of the large muscle groups  Psych: Mood is stable  Port in the left chest wall      RECENT LABS:    Lab Results   Component Value Date    HGB 9.6 (L) 12/27/2019    HCT 29.4 (L) 12/27/2019    .7 (H) 12/27/2019     12/27/2019    WBC 3.30 (L) 12/27/2019    NEUTROABS 2.10 12/27/2019    LYMPHSABS 1.00 12/27/2019    MONOSABS 0.20 12/27/2019    EOSABS 0.1 05/08/2019    BASOSABS 0.0 05/08/2019       Lab Results   Component Value Date    GLUCOSE 107 (H) 12/27/2019    BUN 12 12/27/2019    CREATININE 0.60 12/27/2019     12/27/2019    K 3.5 12/27/2019     (H) 12/27/2019    CO2 22.0 12/27/2019    CALCIUM 8.7 12/27/2019    PROTEINTOT 6.0 12/27/2019    ALBUMIN 3.30 (L) 12/27/2019    BILITOT 0.3 12/27/2019    ALKPHOS 58 12/27/2019    AST 12 12/27/2019    ALT 9 12/27/2019       Ct Chest With Contrast    Result Date: 9/22/2019  No CT evidence of " metastatic disease. No evidence of acute intrathoracic, intra-abdominal or pelvic abnormality. Postsurgical changes identified within the right axillary region. No definite remaining lymph nodes identified.  DICTATED:   09/20/2019 EDITED/ls :   09/21/2019  This report was finalized on 9/22/2019 9:00 AM by Dr. Paz Hubbard MD.      Nm Bone Scan Whole Body    Result Date: 9/13/2019  Normal examination.  D:  09/13/2019 E:  09/13/2019     This report was finalized on 9/13/2019 4:23 PM by Dr. César Paiz MD.      Ct Abdomen Pelvis With Contrast    Result Date: 9/22/2019  No CT evidence of metastatic disease. No evidence of acute intrathoracic, intra-abdominal or pelvic abnormality. Postsurgical changes identified within the right axillary region. No definite remaining lymph nodes identified.  DICTATED:   09/20/2019 EDITED/ls :   09/21/2019  This report was finalized on 9/22/2019 9:00 AM by Dr. Paz Hubbard MD.              Assessment/Plan    1. Stage IB infiltrating intermediate grade lobular carcinoma of the right breast with node positivity.  Completed 4 doses of dose dense Adriamycin and Cytoxan.  She is completed 3 of 12 cycles of Taxol.  She is tolerating it well.  Plan to continue with no dose reduction today.  We will see her back in my clinic in 4 weeks.  If she is having any neuropathy in the interim she can always give me a call.    2.  Oral mucositis.  On valtrex as needed    3.  Chemotherapy-induced nausea.  She is well controlled on the current regiment of Zofran.          I spent a total of 25 minutes in direct patient care, greater than 20 minutes (greater than 50%) were spent in coordination of care, and counseling the patient regarding breast cancer. Answered any questions patient had with medication and plan.      Laila Thacker MD  [facility] Hematology and Oncology    Return on: 01/24/20  Return in (Approximately): 1 month, Schedule with next infusion    Orders Placed This  Encounter   Procedures   • Comprehensive metabolic panel   • Comprehensive metabolic panel   • Comprehensive metabolic panel   • CBC and Differential   • CBC and Differential   • CBC and Differential       12/27/2019         Please note that portions of this note may have been completed with a voice recognition program. Efforts were made to edit the dictations, but occasionally words are mistranscribed.

## 2020-01-03 ENCOUNTER — HOSPITAL ENCOUNTER (OUTPATIENT)
Dept: ONCOLOGY | Facility: HOSPITAL | Age: 60
Setting detail: INFUSION SERIES
Discharge: HOME | End: 2020-01-03

## 2020-01-03 VITALS
TEMPERATURE: 98.2 F | RESPIRATION RATE: 16 BRPM | BODY MASS INDEX: 40.22 KG/M2 | HEIGHT: 63 IN | HEART RATE: 81 BPM | DIASTOLIC BLOOD PRESSURE: 79 MMHG | SYSTOLIC BLOOD PRESSURE: 141 MMHG | WEIGHT: 227 LBS

## 2020-01-03 DIAGNOSIS — Z17.0 MALIGNANT NEOPLASM OF UPPER-OUTER QUADRANT OF RIGHT BREAST IN FEMALE, ESTROGEN RECEPTOR POSITIVE (HCC): Primary | ICD-10-CM

## 2020-01-03 DIAGNOSIS — Z95.828 PORT-A-CATH IN PLACE: ICD-10-CM

## 2020-01-03 DIAGNOSIS — C50.411 MALIGNANT NEOPLASM OF UPPER-OUTER QUADRANT OF RIGHT BREAST IN FEMALE, ESTROGEN RECEPTOR POSITIVE (HCC): Primary | ICD-10-CM

## 2020-01-03 LAB
ALBUMIN SERPL-MCNC: 3.5 G/DL (ref 3.5–5.2)
ALBUMIN/GLOB SERPL: 1.4 G/DL
ALP SERPL-CCNC: 61 U/L (ref 39–117)
ALT SERPL W P-5'-P-CCNC: 11 U/L (ref 1–33)
ANION GAP SERPL CALCULATED.3IONS-SCNC: 10 MMOL/L (ref 5–15)
AST SERPL-CCNC: 12 U/L (ref 1–32)
BILIRUB SERPL-MCNC: 0.2 MG/DL (ref 0.2–1.2)
BUN BLD-MCNC: 10 MG/DL (ref 6–20)
BUN/CREAT SERPL: 16.7 (ref 7–25)
CALCIUM SPEC-SCNC: 8.5 MG/DL (ref 8.6–10.5)
CHLORIDE SERPL-SCNC: 108 MMOL/L (ref 98–107)
CO2 SERPL-SCNC: 25 MMOL/L (ref 22–29)
CREAT BLD-MCNC: 0.6 MG/DL (ref 0.57–1)
ERYTHROCYTE [DISTWIDTH] IN BLOOD BY AUTOMATED COUNT: 17.3 % (ref 12.3–15.4)
GFR SERPL CREATININE-BSD FRML MDRD: 103 ML/MIN/1.73
GLOBULIN UR ELPH-MCNC: 2.5 GM/DL
GLUCOSE BLD-MCNC: 78 MG/DL (ref 65–99)
HCT VFR BLD AUTO: 29.5 % (ref 34–46.6)
HGB BLD-MCNC: 9.6 G/DL (ref 12–15.9)
LYMPHOCYTES # BLD AUTO: 1.2 10*3/MM3 (ref 0.7–3.1)
LYMPHOCYTES NFR BLD AUTO: 31.5 % (ref 19.6–45.3)
MCH RBC QN AUTO: 34.1 PG (ref 26.6–33)
MCHC RBC AUTO-ENTMCNC: 32.5 G/DL (ref 31.5–35.7)
MCV RBC AUTO: 104.9 FL (ref 79–97)
MONOCYTES # BLD AUTO: 0.3 10*3/MM3 (ref 0.1–0.9)
MONOCYTES NFR BLD AUTO: 8.4 % (ref 5–12)
NEUTROPHILS # BLD AUTO: 2.3 10*3/MM3 (ref 1.7–7)
NEUTROPHILS NFR BLD AUTO: 60.1 % (ref 42.7–76)
PLATELET # BLD AUTO: 217 10*3/MM3 (ref 140–450)
PMV BLD AUTO: 7.8 FL (ref 6–12)
POTASSIUM BLD-SCNC: 3.4 MMOL/L (ref 3.5–5.2)
PROT SERPL-MCNC: 6 G/DL (ref 6–8.5)
RBC # BLD AUTO: 2.81 10*6/MM3 (ref 3.77–5.28)
SODIUM BLD-SCNC: 143 MMOL/L (ref 136–145)
WBC NRBC COR # BLD: 3.9 10*3/MM3 (ref 3.4–10.8)

## 2020-01-03 PROCEDURE — 85025 COMPLETE CBC W/AUTO DIFF WBC: CPT | Performed by: INTERNAL MEDICINE

## 2020-01-03 PROCEDURE — 25010000002 DEXAMETHASONE PER 1 MG: Performed by: INTERNAL MEDICINE

## 2020-01-03 PROCEDURE — 25010000002 PACLITAXEL PER 30 MG: Performed by: INTERNAL MEDICINE

## 2020-01-03 PROCEDURE — 80053 COMPREHEN METABOLIC PANEL: CPT | Performed by: INTERNAL MEDICINE

## 2020-01-03 PROCEDURE — 25010000002 DIPHENHYDRAMINE PER 50 MG: Performed by: INTERNAL MEDICINE

## 2020-01-03 PROCEDURE — 96413 CHEMO IV INFUSION 1 HR: CPT

## 2020-01-03 PROCEDURE — 96375 TX/PRO/DX INJ NEW DRUG ADDON: CPT

## 2020-01-03 RX ORDER — FAMOTIDINE 10 MG/ML
20 INJECTION, SOLUTION INTRAVENOUS ONCE
Status: COMPLETED | OUTPATIENT
Start: 2020-01-03 | End: 2020-01-03

## 2020-01-03 RX ADMIN — HEPARIN 500 UNITS: 100 SYRINGE at 13:33

## 2020-01-03 RX ADMIN — DEXAMETHASONE SODIUM PHOSPHATE 12 MG: 4 INJECTION, SOLUTION INTRAMUSCULAR; INTRAVENOUS at 12:05

## 2020-01-03 RX ADMIN — FAMOTIDINE 20 MG: 10 INJECTION, SOLUTION INTRAVENOUS at 12:05

## 2020-01-03 RX ADMIN — SODIUM CHLORIDE 160 MG: 9 INJECTION, SOLUTION INTRAVENOUS at 12:29

## 2020-01-03 RX ADMIN — DIPHENHYDRAMINE HYDROCHLORIDE 25 MG: 50 INJECTION INTRAMUSCULAR; INTRAVENOUS at 12:05

## 2020-01-08 ENCOUNTER — TRANSCRIBE ORDERS (OUTPATIENT)
Dept: MAMMOGRAPHY | Facility: HOSPITAL | Age: 60
End: 2020-01-08

## 2020-01-08 DIAGNOSIS — C50.211 MALIGNANT NEOPLASM OF UPPER-INNER QUADRANT OF RIGHT FEMALE BREAST, UNSPECIFIED ESTROGEN RECEPTOR STATUS (HCC): Primary | ICD-10-CM

## 2020-01-10 ENCOUNTER — HOSPITAL ENCOUNTER (OUTPATIENT)
Dept: ONCOLOGY | Facility: HOSPITAL | Age: 60
Setting detail: INFUSION SERIES
Discharge: HOME | End: 2020-01-10

## 2020-01-10 VITALS
DIASTOLIC BLOOD PRESSURE: 75 MMHG | RESPIRATION RATE: 16 BRPM | BODY MASS INDEX: 39.87 KG/M2 | WEIGHT: 225 LBS | TEMPERATURE: 97.4 F | HEIGHT: 63 IN | SYSTOLIC BLOOD PRESSURE: 141 MMHG | HEART RATE: 79 BPM

## 2020-01-10 DIAGNOSIS — C50.411 MALIGNANT NEOPLASM OF UPPER-OUTER QUADRANT OF RIGHT BREAST IN FEMALE, ESTROGEN RECEPTOR POSITIVE (HCC): Primary | ICD-10-CM

## 2020-01-10 DIAGNOSIS — Z17.0 MALIGNANT NEOPLASM OF UPPER-OUTER QUADRANT OF RIGHT BREAST IN FEMALE, ESTROGEN RECEPTOR POSITIVE (HCC): Primary | ICD-10-CM

## 2020-01-10 DIAGNOSIS — Z95.828 PORT-A-CATH IN PLACE: ICD-10-CM

## 2020-01-10 LAB
ALBUMIN SERPL-MCNC: 3.5 G/DL (ref 3.5–5.2)
ALBUMIN/GLOB SERPL: 1.5 G/DL
ALP SERPL-CCNC: 55 U/L (ref 39–117)
ALT SERPL W P-5'-P-CCNC: 9 U/L (ref 1–33)
ANION GAP SERPL CALCULATED.3IONS-SCNC: 8 MMOL/L (ref 5–15)
AST SERPL-CCNC: 12 U/L (ref 1–32)
BILIRUB SERPL-MCNC: 0.3 MG/DL (ref 0.2–1.2)
BUN BLD-MCNC: 8 MG/DL (ref 6–20)
BUN/CREAT SERPL: 13.3 (ref 7–25)
CALCIUM SPEC-SCNC: 8.8 MG/DL (ref 8.6–10.5)
CHLORIDE SERPL-SCNC: 109 MMOL/L (ref 98–107)
CO2 SERPL-SCNC: 26 MMOL/L (ref 22–29)
CREAT BLD-MCNC: 0.6 MG/DL (ref 0.57–1)
ERYTHROCYTE [DISTWIDTH] IN BLOOD BY AUTOMATED COUNT: 17.5 % (ref 12.3–15.4)
GFR SERPL CREATININE-BSD FRML MDRD: 103 ML/MIN/1.73
GLOBULIN UR ELPH-MCNC: 2.4 GM/DL
GLUCOSE BLD-MCNC: 97 MG/DL (ref 65–99)
HCT VFR BLD AUTO: 29.2 % (ref 34–46.6)
HGB BLD-MCNC: 9.4 G/DL (ref 12–15.9)
LYMPHOCYTES # BLD AUTO: 1 10*3/MM3 (ref 0.7–3.1)
LYMPHOCYTES NFR BLD AUTO: 31 % (ref 19.6–45.3)
MCH RBC QN AUTO: 34.4 PG (ref 26.6–33)
MCHC RBC AUTO-ENTMCNC: 32.3 G/DL (ref 31.5–35.7)
MCV RBC AUTO: 106.6 FL (ref 79–97)
MONOCYTES # BLD AUTO: 0.3 10*3/MM3 (ref 0.1–0.9)
MONOCYTES NFR BLD AUTO: 8.8 % (ref 5–12)
NEUTROPHILS # BLD AUTO: 1.9 10*3/MM3 (ref 1.7–7)
NEUTROPHILS NFR BLD AUTO: 60.2 % (ref 42.7–76)
PLATELET # BLD AUTO: 201 10*3/MM3 (ref 140–450)
PMV BLD AUTO: 8 FL (ref 6–12)
POTASSIUM BLD-SCNC: 3.6 MMOL/L (ref 3.5–5.2)
PROT SERPL-MCNC: 5.9 G/DL (ref 6–8.5)
RBC # BLD AUTO: 2.74 10*6/MM3 (ref 3.77–5.28)
SODIUM BLD-SCNC: 143 MMOL/L (ref 136–145)
WBC NRBC COR # BLD: 3.2 10*3/MM3 (ref 3.4–10.8)

## 2020-01-10 PROCEDURE — 25010000002 DIPHENHYDRAMINE PER 50 MG: Performed by: INTERNAL MEDICINE

## 2020-01-10 PROCEDURE — 96375 TX/PRO/DX INJ NEW DRUG ADDON: CPT

## 2020-01-10 PROCEDURE — 85025 COMPLETE CBC W/AUTO DIFF WBC: CPT | Performed by: INTERNAL MEDICINE

## 2020-01-10 PROCEDURE — 25010000002 PACLITAXEL PER 30 MG: Performed by: INTERNAL MEDICINE

## 2020-01-10 PROCEDURE — 96413 CHEMO IV INFUSION 1 HR: CPT

## 2020-01-10 PROCEDURE — 80053 COMPREHEN METABOLIC PANEL: CPT | Performed by: INTERNAL MEDICINE

## 2020-01-10 PROCEDURE — 25010000002 DEXAMETHASONE PER 1 MG: Performed by: INTERNAL MEDICINE

## 2020-01-10 RX ORDER — SODIUM CHLORIDE 9 MG/ML
250 INJECTION, SOLUTION INTRAVENOUS ONCE
Status: COMPLETED | OUTPATIENT
Start: 2020-01-10 | End: 2020-01-10

## 2020-01-10 RX ORDER — FAMOTIDINE 10 MG/ML
20 INJECTION, SOLUTION INTRAVENOUS ONCE
Status: COMPLETED | OUTPATIENT
Start: 2020-01-10 | End: 2020-01-10

## 2020-01-10 RX ADMIN — SODIUM CHLORIDE 160 MG: 9 INJECTION, SOLUTION INTRAVENOUS at 11:13

## 2020-01-10 RX ADMIN — FAMOTIDINE 20 MG: 10 INJECTION, SOLUTION INTRAVENOUS at 10:45

## 2020-01-10 RX ADMIN — DIPHENHYDRAMINE HYDROCHLORIDE 25 MG: 50 INJECTION INTRAMUSCULAR; INTRAVENOUS at 10:45

## 2020-01-10 RX ADMIN — SODIUM CHLORIDE 250 ML: 9 INJECTION, SOLUTION INTRAVENOUS at 10:43

## 2020-01-10 RX ADMIN — DEXAMETHASONE SODIUM PHOSPHATE 12 MG: 4 INJECTION, SOLUTION INTRAMUSCULAR; INTRAVENOUS at 10:45

## 2020-01-10 RX ADMIN — HEPARIN 500 UNITS: 100 SYRINGE at 12:17

## 2020-01-13 ENCOUNTER — TRANSCRIBE ORDERS (OUTPATIENT)
Dept: MAMMOGRAPHY | Facility: HOSPITAL | Age: 60
End: 2020-01-13

## 2020-01-17 ENCOUNTER — TELEPHONE (OUTPATIENT)
Dept: ONCOLOGY | Facility: CLINIC | Age: 60
End: 2020-01-17

## 2020-01-17 ENCOUNTER — APPOINTMENT (OUTPATIENT)
Dept: GENERAL RADIOLOGY | Facility: HOSPITAL | Age: 60
End: 2020-01-17

## 2020-01-17 ENCOUNTER — HOSPITAL ENCOUNTER (OUTPATIENT)
Dept: ONCOLOGY | Facility: HOSPITAL | Age: 60
Setting detail: INFUSION SERIES
Discharge: HOME | End: 2020-01-17

## 2020-01-17 VITALS
SYSTOLIC BLOOD PRESSURE: 108 MMHG | DIASTOLIC BLOOD PRESSURE: 73 MMHG | HEART RATE: 83 BPM | BODY MASS INDEX: 40.04 KG/M2 | TEMPERATURE: 97.6 F | HEIGHT: 63 IN | WEIGHT: 226 LBS | RESPIRATION RATE: 16 BRPM

## 2020-01-17 DIAGNOSIS — Z17.0 MALIGNANT NEOPLASM OF UPPER-OUTER QUADRANT OF RIGHT BREAST IN FEMALE, ESTROGEN RECEPTOR POSITIVE (HCC): ICD-10-CM

## 2020-01-17 DIAGNOSIS — C50.411 MALIGNANT NEOPLASM OF UPPER-OUTER QUADRANT OF RIGHT BREAST IN FEMALE, ESTROGEN RECEPTOR POSITIVE (HCC): Primary | ICD-10-CM

## 2020-01-17 DIAGNOSIS — Z17.0 MALIGNANT NEOPLASM OF UPPER-OUTER QUADRANT OF RIGHT BREAST IN FEMALE, ESTROGEN RECEPTOR POSITIVE (HCC): Primary | ICD-10-CM

## 2020-01-17 DIAGNOSIS — Z95.828 PORT-A-CATH IN PLACE: Primary | ICD-10-CM

## 2020-01-17 DIAGNOSIS — C50.411 MALIGNANT NEOPLASM OF UPPER-OUTER QUADRANT OF RIGHT BREAST IN FEMALE, ESTROGEN RECEPTOR POSITIVE (HCC): ICD-10-CM

## 2020-01-17 DIAGNOSIS — Z95.828 PORT-A-CATH IN PLACE: ICD-10-CM

## 2020-01-17 LAB
CREAT BLDA-MCNC: 0.6 MG/DL (ref 0.6–1.3)
ERYTHROCYTE [DISTWIDTH] IN BLOOD BY AUTOMATED COUNT: 17.1 % (ref 12.3–15.4)
HCT VFR BLD AUTO: 32 % (ref 34–46.6)
HGB BLD-MCNC: 10.3 G/DL (ref 12–15.9)
LYMPHOCYTES # BLD AUTO: 1.3 10*3/MM3 (ref 0.7–3.1)
LYMPHOCYTES NFR BLD AUTO: 36.2 % (ref 19.6–45.3)
MCH RBC QN AUTO: 34.4 PG (ref 26.6–33)
MCHC RBC AUTO-ENTMCNC: 32.2 G/DL (ref 31.5–35.7)
MCV RBC AUTO: 106.7 FL (ref 79–97)
MONOCYTES # BLD AUTO: 0.3 10*3/MM3 (ref 0.1–0.9)
MONOCYTES NFR BLD AUTO: 9.2 % (ref 5–12)
NEUTROPHILS # BLD AUTO: 1.9 10*3/MM3 (ref 1.7–7)
NEUTROPHILS NFR BLD AUTO: 54.6 % (ref 42.7–76)
PLATELET # BLD AUTO: 196 10*3/MM3 (ref 140–450)
PMV BLD AUTO: 8.4 FL (ref 6–12)
RBC # BLD AUTO: 3 10*6/MM3 (ref 3.77–5.28)
WBC NRBC COR # BLD: 3.5 10*3/MM3 (ref 3.4–10.8)

## 2020-01-17 PROCEDURE — 25010000002 DIPHENHYDRAMINE PER 50 MG: Performed by: INTERNAL MEDICINE

## 2020-01-17 PROCEDURE — 96413 CHEMO IV INFUSION 1 HR: CPT

## 2020-01-17 PROCEDURE — 96415 CHEMO IV INFUSION ADDL HR: CPT

## 2020-01-17 PROCEDURE — 25010000002 ALTEPLASE 2 MG RECONSTITUTED SOLUTION: Performed by: INTERNAL MEDICINE

## 2020-01-17 PROCEDURE — 82565 ASSAY OF CREATININE: CPT

## 2020-01-17 PROCEDURE — 25010000002 PACLITAXEL PER 30 MG: Performed by: INTERNAL MEDICINE

## 2020-01-17 PROCEDURE — 25010000002 DEXAMETHASONE PER 1 MG: Performed by: INTERNAL MEDICINE

## 2020-01-17 PROCEDURE — 36593 DECLOT VASCULAR DEVICE: CPT

## 2020-01-17 PROCEDURE — 85025 COMPLETE CBC W/AUTO DIFF WBC: CPT | Performed by: INTERNAL MEDICINE

## 2020-01-17 PROCEDURE — 96375 TX/PRO/DX INJ NEW DRUG ADDON: CPT

## 2020-01-17 RX ORDER — SODIUM CHLORIDE 9 MG/ML
250 INJECTION, SOLUTION INTRAVENOUS ONCE
Status: COMPLETED | OUTPATIENT
Start: 2020-01-17 | End: 2020-01-17

## 2020-01-17 RX ORDER — HEPARIN SODIUM (PORCINE) LOCK FLUSH IV SOLN 100 UNIT/ML 100 UNIT/ML
500 SOLUTION INTRAVENOUS AS NEEDED
Status: CANCELLED | OUTPATIENT
Start: 2020-01-17

## 2020-01-17 RX ORDER — FAMOTIDINE 10 MG/ML
20 INJECTION, SOLUTION INTRAVENOUS ONCE
Status: COMPLETED | OUTPATIENT
Start: 2020-01-17 | End: 2020-01-17

## 2020-01-17 RX ADMIN — DEXAMETHASONE SODIUM PHOSPHATE 12 MG: 4 INJECTION, SOLUTION INTRA-ARTICULAR; INTRALESIONAL; INTRAMUSCULAR; INTRAVENOUS; SOFT TISSUE at 13:39

## 2020-01-17 RX ADMIN — SODIUM CHLORIDE 250 ML: 9 INJECTION, SOLUTION INTRAVENOUS at 13:37

## 2020-01-17 RX ADMIN — FAMOTIDINE 20 MG: 10 INJECTION, SOLUTION INTRAVENOUS at 13:39

## 2020-01-17 RX ADMIN — DIPHENHYDRAMINE HYDROCHLORIDE 25 MG: 50 INJECTION INTRAMUSCULAR; INTRAVENOUS at 13:39

## 2020-01-17 RX ADMIN — ALTEPLASE: 2.2 INJECTION, POWDER, LYOPHILIZED, FOR SOLUTION INTRAVENOUS at 12:23

## 2020-01-17 RX ADMIN — ALTEPLASE: 2.2 INJECTION, POWDER, LYOPHILIZED, FOR SOLUTION INTRAVENOUS at 10:26

## 2020-01-17 RX ADMIN — HEPARIN 500 UNITS: 100 SYRINGE at 15:50

## 2020-01-17 RX ADMIN — SODIUM CHLORIDE 160 MG: 9 INJECTION, SOLUTION INTRAVENOUS at 14:01

## 2020-01-17 NOTE — TELEPHONE ENCOUNTER
----- Message from Fay Mares RN sent at 1/17/2020 12:05 PM EST -----  Regarding: ROSALINE - port study today pe   We consistently have trouble with blood return from pt's port.  Today we activased for 1.5hrs and still no blood return.  She does not have good veins.  We would like a port stufy today stat if possible so she can get treated today.    Thanks    Mando Kebede [phone]

## 2020-01-17 NOTE — TELEPHONE ENCOUNTER
Discussed with SOLITARIO carbajal, and stat portogram ordered.  Message sent to scheduling to get for patient today

## 2020-01-23 ENCOUNTER — HOSPITAL ENCOUNTER (OUTPATIENT)
Dept: GENERAL RADIOLOGY | Facility: HOSPITAL | Age: 60
Discharge: HOME | End: 2020-01-23
Admitting: RADIOLOGY

## 2020-01-23 DIAGNOSIS — Z95.828 PORT-A-CATH IN PLACE: ICD-10-CM

## 2020-01-23 DIAGNOSIS — Z17.0 MALIGNANT NEOPLASM OF UPPER-OUTER QUADRANT OF RIGHT BREAST IN FEMALE, ESTROGEN RECEPTOR POSITIVE (HCC): ICD-10-CM

## 2020-01-23 DIAGNOSIS — C50.411 MALIGNANT NEOPLASM OF UPPER-OUTER QUADRANT OF RIGHT BREAST IN FEMALE, ESTROGEN RECEPTOR POSITIVE (HCC): ICD-10-CM

## 2020-01-23 PROCEDURE — 36598 INJ W/FLUOR EVAL CV DEVICE: CPT

## 2020-01-23 PROCEDURE — 25010000002 IOPAMIDOL 61 % SOLUTION: Performed by: NURSE PRACTITIONER

## 2020-01-23 RX ADMIN — IOPAMIDOL 15 ML: 612 INJECTION, SOLUTION INTRAVENOUS at 14:38

## 2020-01-24 ENCOUNTER — APPOINTMENT (OUTPATIENT)
Dept: ONCOLOGY | Facility: HOSPITAL | Age: 60
End: 2020-01-24

## 2020-01-24 ENCOUNTER — OFFICE VISIT (OUTPATIENT)
Dept: ONCOLOGY | Facility: CLINIC | Age: 60
End: 2020-01-24

## 2020-01-24 ENCOUNTER — HOSPITAL ENCOUNTER (OUTPATIENT)
Dept: ONCOLOGY | Facility: HOSPITAL | Age: 60
Setting detail: INFUSION SERIES
Discharge: HOME | End: 2020-01-24

## 2020-01-24 VITALS
WEIGHT: 222 LBS | DIASTOLIC BLOOD PRESSURE: 57 MMHG | RESPIRATION RATE: 16 BRPM | HEIGHT: 63 IN | BODY MASS INDEX: 39.34 KG/M2 | OXYGEN SATURATION: 99 % | HEART RATE: 83 BPM | SYSTOLIC BLOOD PRESSURE: 131 MMHG | TEMPERATURE: 96.7 F

## 2020-01-24 VITALS
RESPIRATION RATE: 16 BRPM | SYSTOLIC BLOOD PRESSURE: 144 MMHG | WEIGHT: 222 LBS | TEMPERATURE: 96.7 F | HEART RATE: 85 BPM | BODY MASS INDEX: 39.34 KG/M2 | HEIGHT: 63 IN | DIASTOLIC BLOOD PRESSURE: 74 MMHG

## 2020-01-24 DIAGNOSIS — Z17.0 MALIGNANT NEOPLASM OF UPPER-OUTER QUADRANT OF RIGHT BREAST IN FEMALE, ESTROGEN RECEPTOR POSITIVE (HCC): Primary | ICD-10-CM

## 2020-01-24 DIAGNOSIS — C50.411 MALIGNANT NEOPLASM OF UPPER-OUTER QUADRANT OF RIGHT BREAST IN FEMALE, ESTROGEN RECEPTOR POSITIVE (HCC): Primary | ICD-10-CM

## 2020-01-24 DIAGNOSIS — C50.411 MALIGNANT NEOPLASM OF UPPER-OUTER QUADRANT OF RIGHT BREAST IN FEMALE, ESTROGEN RECEPTOR POSITIVE (HCC): ICD-10-CM

## 2020-01-24 DIAGNOSIS — Z17.0 MALIGNANT NEOPLASM OF UPPER-OUTER QUADRANT OF RIGHT BREAST IN FEMALE, ESTROGEN RECEPTOR POSITIVE (HCC): ICD-10-CM

## 2020-01-24 LAB
ALBUMIN SERPL-MCNC: 3.5 G/DL (ref 3.5–5.2)
ALBUMIN/GLOB SERPL: 1.3 G/DL
ALP SERPL-CCNC: 58 U/L (ref 39–117)
ALT SERPL W P-5'-P-CCNC: 10 U/L (ref 1–33)
ANION GAP SERPL CALCULATED.3IONS-SCNC: 12 MMOL/L (ref 5–15)
AST SERPL-CCNC: 12 U/L (ref 1–32)
BILIRUB SERPL-MCNC: 0.4 MG/DL (ref 0.2–1.2)
BUN BLD-MCNC: 10 MG/DL (ref 6–20)
BUN/CREAT SERPL: 16.4 (ref 7–25)
CALCIUM SPEC-SCNC: 8.6 MG/DL (ref 8.6–10.5)
CHLORIDE SERPL-SCNC: 106 MMOL/L (ref 98–107)
CO2 SERPL-SCNC: 24 MMOL/L (ref 22–29)
CREAT BLD-MCNC: 0.61 MG/DL (ref 0.57–1)
ERYTHROCYTE [DISTWIDTH] IN BLOOD BY AUTOMATED COUNT: 16.3 % (ref 12.3–15.4)
GFR SERPL CREATININE-BSD FRML MDRD: 101 ML/MIN/1.73
GLOBULIN UR ELPH-MCNC: 2.6 GM/DL
GLUCOSE BLD-MCNC: 83 MG/DL (ref 65–99)
HCT VFR BLD AUTO: 32.9 % (ref 34–46.6)
HGB BLD-MCNC: 11.3 G/DL (ref 12–15.9)
LYMPHOCYTES # BLD AUTO: 1.4 10*3/MM3 (ref 0.7–3.1)
LYMPHOCYTES NFR BLD AUTO: 37.8 % (ref 19.6–45.3)
MCH RBC QN AUTO: 36.2 PG (ref 26.6–33)
MCHC RBC AUTO-ENTMCNC: 34.2 G/DL (ref 31.5–35.7)
MCV RBC AUTO: 105.9 FL (ref 79–97)
MONOCYTES # BLD AUTO: 0.2 10*3/MM3 (ref 0.1–0.9)
MONOCYTES NFR BLD AUTO: 5.8 % (ref 5–12)
NEUTROPHILS # BLD AUTO: 2 10*3/MM3 (ref 1.7–7)
NEUTROPHILS NFR BLD AUTO: 56.4 % (ref 42.7–76)
PLATELET # BLD AUTO: 171 10*3/MM3 (ref 140–450)
PMV BLD AUTO: 8.6 FL (ref 6–12)
POTASSIUM BLD-SCNC: 3.8 MMOL/L (ref 3.5–5.2)
PROT SERPL-MCNC: 6.1 G/DL (ref 6–8.5)
RBC # BLD AUTO: 3.11 10*6/MM3 (ref 3.77–5.28)
SODIUM BLD-SCNC: 142 MMOL/L (ref 136–145)
WBC NRBC COR # BLD: 3.6 10*3/MM3 (ref 3.4–10.8)

## 2020-01-24 PROCEDURE — 96413 CHEMO IV INFUSION 1 HR: CPT

## 2020-01-24 PROCEDURE — 80053 COMPREHEN METABOLIC PANEL: CPT | Performed by: INTERNAL MEDICINE

## 2020-01-24 PROCEDURE — 99214 OFFICE O/P EST MOD 30 MIN: CPT | Performed by: INTERNAL MEDICINE

## 2020-01-24 PROCEDURE — 25010000002 DIPHENHYDRAMINE PER 50 MG: Performed by: INTERNAL MEDICINE

## 2020-01-24 PROCEDURE — 85025 COMPLETE CBC W/AUTO DIFF WBC: CPT | Performed by: INTERNAL MEDICINE

## 2020-01-24 PROCEDURE — 25010000002 DEXAMETHASONE PER 1 MG: Performed by: INTERNAL MEDICINE

## 2020-01-24 PROCEDURE — 96375 TX/PRO/DX INJ NEW DRUG ADDON: CPT

## 2020-01-24 PROCEDURE — 25010000002 PACLITAXEL PER 30 MG: Performed by: INTERNAL MEDICINE

## 2020-01-24 RX ORDER — SODIUM CHLORIDE 9 MG/ML
250 INJECTION, SOLUTION INTRAVENOUS ONCE
Status: CANCELLED | OUTPATIENT
Start: 2020-01-24

## 2020-01-24 RX ORDER — FAMOTIDINE 10 MG/ML
20 INJECTION, SOLUTION INTRAVENOUS AS NEEDED
Status: CANCELLED | OUTPATIENT
Start: 2020-01-24

## 2020-01-24 RX ORDER — DIPHENHYDRAMINE HYDROCHLORIDE 50 MG/ML
50 INJECTION INTRAMUSCULAR; INTRAVENOUS AS NEEDED
Status: CANCELLED | OUTPATIENT
Start: 2020-01-31

## 2020-01-24 RX ORDER — SODIUM CHLORIDE 9 MG/ML
250 INJECTION, SOLUTION INTRAVENOUS ONCE
Status: COMPLETED | OUTPATIENT
Start: 2020-01-24 | End: 2020-01-24

## 2020-01-24 RX ORDER — DIPHENHYDRAMINE HYDROCHLORIDE 50 MG/ML
50 INJECTION INTRAMUSCULAR; INTRAVENOUS AS NEEDED
Status: CANCELLED | OUTPATIENT
Start: 2020-02-14

## 2020-01-24 RX ORDER — SODIUM CHLORIDE 9 MG/ML
250 INJECTION, SOLUTION INTRAVENOUS ONCE
Status: CANCELLED | OUTPATIENT
Start: 2020-02-14

## 2020-01-24 RX ORDER — FAMOTIDINE 10 MG/ML
20 INJECTION, SOLUTION INTRAVENOUS ONCE
Status: CANCELLED | OUTPATIENT
Start: 2020-02-14

## 2020-01-24 RX ORDER — SODIUM CHLORIDE 9 MG/ML
250 INJECTION, SOLUTION INTRAVENOUS ONCE
Status: CANCELLED | OUTPATIENT
Start: 2020-01-31

## 2020-01-24 RX ORDER — DIPHENHYDRAMINE HYDROCHLORIDE 50 MG/ML
50 INJECTION INTRAMUSCULAR; INTRAVENOUS AS NEEDED
Status: CANCELLED | OUTPATIENT
Start: 2020-02-07

## 2020-01-24 RX ORDER — FAMOTIDINE 10 MG/ML
20 INJECTION, SOLUTION INTRAVENOUS AS NEEDED
Status: CANCELLED | OUTPATIENT
Start: 2020-02-14

## 2020-01-24 RX ORDER — SODIUM CHLORIDE 9 MG/ML
250 INJECTION, SOLUTION INTRAVENOUS ONCE
Status: CANCELLED | OUTPATIENT
Start: 2020-02-07

## 2020-01-24 RX ORDER — FAMOTIDINE 10 MG/ML
20 INJECTION, SOLUTION INTRAVENOUS ONCE
Status: CANCELLED | OUTPATIENT
Start: 2020-01-31

## 2020-01-24 RX ORDER — DIPHENHYDRAMINE HYDROCHLORIDE 50 MG/ML
50 INJECTION INTRAMUSCULAR; INTRAVENOUS AS NEEDED
Status: CANCELLED | OUTPATIENT
Start: 2020-01-24

## 2020-01-24 RX ORDER — FAMOTIDINE 10 MG/ML
20 INJECTION, SOLUTION INTRAVENOUS AS NEEDED
Status: CANCELLED | OUTPATIENT
Start: 2020-02-07

## 2020-01-24 RX ORDER — FAMOTIDINE 10 MG/ML
20 INJECTION, SOLUTION INTRAVENOUS ONCE
Status: COMPLETED | OUTPATIENT
Start: 2020-01-24 | End: 2020-01-24

## 2020-01-24 RX ORDER — FAMOTIDINE 10 MG/ML
20 INJECTION, SOLUTION INTRAVENOUS AS NEEDED
Status: CANCELLED | OUTPATIENT
Start: 2020-01-31

## 2020-01-24 RX ORDER — FAMOTIDINE 10 MG/ML
20 INJECTION, SOLUTION INTRAVENOUS ONCE
Status: CANCELLED | OUTPATIENT
Start: 2020-01-24

## 2020-01-24 RX ORDER — FAMOTIDINE 10 MG/ML
20 INJECTION, SOLUTION INTRAVENOUS ONCE
Status: CANCELLED | OUTPATIENT
Start: 2020-02-07

## 2020-01-24 RX ADMIN — FAMOTIDINE 20 MG: 10 INJECTION, SOLUTION INTRAVENOUS at 11:27

## 2020-01-24 RX ADMIN — DIPHENHYDRAMINE HYDROCHLORIDE 25 MG: 50 INJECTION INTRAMUSCULAR; INTRAVENOUS at 11:27

## 2020-01-24 RX ADMIN — DEXAMETHASONE SODIUM PHOSPHATE 12 MG: 4 INJECTION, SOLUTION INTRAMUSCULAR; INTRAVENOUS at 11:28

## 2020-01-24 RX ADMIN — SODIUM CHLORIDE 250 ML: 9 INJECTION, SOLUTION INTRAVENOUS at 11:24

## 2020-01-24 RX ADMIN — SODIUM CHLORIDE 160 MG: 9 INJECTION, SOLUTION INTRAVENOUS at 11:50

## 2020-01-24 NOTE — PROGRESS NOTES
PROBLEM LIST:  Oncology/Hematology History    Lab Results   Component Value Date    FINALDX  08/19/2019     1. RIGHT BREAST LUMPECTOMY WITH NEEDLE LOCALIZATION:   Infiltrating and in-situ intermediate grade lobular carcinoma.  Bloom Denis score 6/9.  Infiltrating tumor size 2.2x1.5x1.0 cm.   Margins of resection negative for tumor with closest infiltrating margin inferior and superior measuring 4 mm each.   No lymphvascular invasion identified.   Presence of previous biopsy site identified.  See Template.  2. SENTINEL LYMPH NODE #1, RIGHT AXILLA, EXCISION:   Lymph node x1; positive for metastatic lobular carcinoma (1/1).  3. SENTINEL LYMPH NODE #2, RIGHT AXILLA, EXCISION:  Lymph node x1; positive for metastatic lobular carcinoma (1/1).  4. NONSENTINEL LYMPH NODE, RIGHT AXILLA, EXCISION:  Lymph node x1; positive for metastatic lobular carcinoma (1/1).     INVASIVE BREAST CANCER STAGING TEMPLATE:  TYPE OF SPECIMEN/PROCEDURE:  Needle localized lumpectomy  SPECIMEN LATERALITY: Right breast  TUMOR SITE: 12 o'clock   TUMOR SIZE: (Size of Largest Invasive Carcinoma): 2.2x1.5x1.0 cm   HISTOLOGIC TYPE OF INVASIVE CARCINOMA:  Lobular   GRADE: Intermediate   TUBULAR FORMATION:  3  MITOTIC ACTIVITY:  1  PLEOMORPHISM:  2  OVERALL SCORE: 6/9   DUCTAL CARCINOMA IN SITU (DCIS) EXTENT: 0  TUMOR EXTENSION (Only if structures present and involved):    SKIN: N/A    NIPPLE: N/A    SKELETAL MUSCLE: N/A   SURGICAL MARGINS (Uninvolved/positive): Uninvolved   INVASIVE CARCINOMA MARGINS (Uninvolved/Positive) Uninvolved   DISTANCE FROM CLOSEST MARGIN: 4 mm   SPECIFIC CLOSEST MARGIN: Inferior and superior   LCIS MARGINS (Uninvolved/Positive/No LCIS): Uninvolved   DISTANCE FROM CLOSEST MARGIN: 1 mm  SPECIFIC CLOSEST MARGIN: Inferior   LYMPH NODES (required only if lymph nodes are present in the specimen):  Present   NUMBER OF LYMPH NODES WITH MACROMETASTASIS: 3  NUMBER OF LYMPH NODES WITH MICROMETASTASIS: 0  NUMBER OF LYMPH NODES WITH  ISOLATED TUMOR CELLS: 0  TOTAL NUMBER OF LYMPH NODES EXAMINED (Including sentinel nodes): 3  NUMBER OF SENTINEL NODES EXAMINED: 2  EXTRANODAL EXTENSION OF TUMOR:  0  VASCULAR/LYMPHATIC INVASION:  Not identified     ESTROGEN RECEPTOR STATUS BY IHC METHOD: Positive   PROGESTERONE RECEPTOR STATUS BY IHC METHOD: Positive    HER-2/ysabel ONCOPROTEIN STATUS BY IHC METHOD:  Negative   HER-2/ysabel ONCOGENE STATUS BY IN SITU HYBRIDIZATION ANALYSIS:  Not performed   TREATMENT EFFECT (BREAST): None  TREATMENT EFFECT (NODES): None   ADDITIONAL PATHOLOGIC FINDINGS:  Previous biopsy site identified   OTHER STUDIES:  None  AJCC PATHOLOGIC STAGE:  (COMPLETED BY PATHOLOGIST, BASED ONLY ON TISSUE FINDINGS, MORE EXTENSIVE DISEASE MAY NOT BE KNOWN TO THE PATHOLOGIST)  pT=  2  pN=  2  pM=  Unknown    DGD/mbc                Malignant neoplasm of upper-outer quadrant of right breast in female, estrogen receptor positive (CMS/HCC)    8/16/2019 Initial Diagnosis     Malignant neoplasm of upper-outer quadrant of right breast in female, estrogen receptor positive (CMS/HCC)      8/19/2019 Surgery     Surgery       Right breast lumpectomy with sentinel node biopsy by Dr. Stephanie Fraire      8/19/2019 Cancer Staged     Cancer Staging  Malignant neoplasm of upper-outer quadrant of right breast in female, estrogen receptor positive (CMS/HCC)  Staging form: Breast, AJCC 8th Edition  - Pathologic stage from 9/12/2019: Stage IB (pT2, pN1a, cM0, G2, ER: Positive, IL: Positive, HER2: Negative) - Signed by Laila Thacker MD on 9/12/2019 9/19/2019 Imaging     Negative work-up for metastatic disease by CAT scan of the chest abdomen pelvis and bone scan      9/20/2019 -  Other Event     ECHO - Normal EF      9/23/2019 Surgery     Surgery       Port Placement      9/25/2019 -  Chemotherapy     OP BREAST AC DD DOXOrubicin / Cyclophosphamide           REASON FOR VISIT: Management of my breast cancer    HISTORY OF PRESENT ILLNESS:   59 y.o.  female  presents today for management of her breast cancer.  She completed 4 cycles of dose dense Adriamycin and Cytoxan.  She is completed 7 doses of Taxol.  Tolerated it well.  Denies any neuropathy.  Does have some insomnia.  Denies any issues with pain.  Denies any infections.  She does not have port return.  But it is flushing reasonably well.    Past Medical History:   Diagnosis Date   • Cardiomegaly     stable on CXR   • Dyspnea on exertion    • Elevated LDL cholesterol level    • Fatigue    • GERD (gastroesophageal reflux disease)     on daily Protonix, EGD 10/2016. Sx's resolved since AGBR, even if doesn't take her PPI. serum h. pyl neg.  EGD GDW 10/16 prior to AGBR  36 cm   • Hypertension    • Hypoalbuminemia     admits to chronic undereating   • Low HDL (under 40)    • Malignant neoplasm of upper-outer quadrant of right breast in female, estrogen receptor positive (CMS/HCC) 9/12/2019   • Microcytic red blood cells     H/H 12.6/38.4   • Morbid obesity (CMS/HCC)    • OAB (overactive bladder)    • Peripheral edema    • Post-menopause on HRT (hormone replacement therapy)    • Rosacea     on Doxycycline   • Stress incontinence    • Vaginal atrophy    • Vitamin D deficiency    • Wears glasses      Social History     Socioeconomic History   • Marital status:      Spouse name: Not on file   • Number of children: Not on file   • Years of education: Not on file   • Highest education level: Not on file   Tobacco Use   • Smoking status: Never Smoker   • Smokeless tobacco: Never Used   Substance and Sexual Activity   • Alcohol use: No   • Drug use: No   • Sexual activity: Defer     Comment: spouse   Social History Narrative     w/2 children.  Lives in [place].  Retired speech pathologist x 28yrs. Now working w/[employer].      Family History   Problem Relation Age of Onset   • Hypertension Mother    • Sleep apnea Mother    • Hypertension Father    • Lung cancer Father    • Hypertension Maternal Grandfather    •  Stroke Maternal Grandfather    • Hypertension Paternal Grandmother    • Breast cancer Paternal Aunt 45   • No Known Problems Brother    • BRCA 1/2 Neg Hx    • Ovarian cancer Neg Hx        Review of Systems:    Review of Systems   Constitutional: Positive for fatigue.   HENT:  Negative.         Mouth Sores   Eyes: Negative.    Respiratory: Negative.    Cardiovascular: Negative.    Gastrointestinal: Negative.    Endocrine: Negative.    Genitourinary: Negative.     Musculoskeletal: Negative.    Skin: Negative.    Neurological: Negative.    Hematological: Negative.    Psychiatric/Behavioral: Negative.       A comprehensive 14 point review of systems was performed and was negative except as mentioned.      Medications:        Current Outpatient Medications:   •  Biotin 5000 MCG tablet, Take  by mouth., Disp: , Rfl:   •  Cholecalciferol (VITAMIN D3) 1.25 MG (10352 UT) tablet, Take 1 tablet by mouth 1 (One) Time Per Week for 12 doses., Disp: 12 tablet, Rfl: 0  •  Ferrous Gluconate (IRON 27 PO), Take  by mouth., Disp: , Rfl:   •  hydrochlorothiazide (MICROZIDE) 12.5 MG capsule, Take 12.5 mg by mouth Daily., Disp: , Rfl:   •  lidocaine-prilocaine (EMLA) 2.5-2.5 % cream, Apply  topically to the appropriate area as directed As Needed (45-60 minutes prior to port access.  Cover with saran/plastic wrap)., Disp: 30 g, Rfl: 2  •  LISINOPRIL PO, Take 40 mg by mouth Daily., Disp: , Rfl:   •  ondansetron (ZOFRAN) 8 MG tablet, Take 1 tablet by mouth 3 (Three) Times a Day As Needed for Nausea or Vomiting., Disp: 30 tablet, Rfl: 5  •  pantoprazole (PROTONIX) 40 MG EC tablet, Take 40 mg by mouth Daily., Disp: , Rfl:   •  tolterodine LA (DETROL LA) 4 MG 24 hr capsule, Take 1 capsule by mouth Daily., Disp: 30 capsule, Rfl: 11  •  vitamin D (ERGOCALCIFEROL) 21438 UNITS capsule capsule, 50,000 Units 1 (One) Time Per Week. sundays, Disp: , Rfl:   No current facility-administered medications for this visit.     Facility-Administered  "Medications Ordered in Other Visits:   •  dexamethasone (DECADRON) 12 mg in sodium chloride 0.9 % IVPB, 12 mg, Intravenous, Once, Laila Thacker MD  •  diphenhydrAMINE (BENADRYL) 25 mg in sodium chloride 0.9 % 50 mL IVPB, 25 mg, Intravenous, Once, Laila Thacker MD  •  famotidine (PEPCID) injection 20 mg, 20 mg, Intravenous, Once, Laila Thacker MD  •  PACLitaxel (TAXOL) 160 mg in sodium chloride 0.9 % 276.7 mL chemo IVPB, 80 mg/m2 (Treatment Plan Recorded), Intravenous, Once, Laila Thacker MD  •  sodium chloride 0.9 % infusion 250 mL, 250 mL, Intravenous, Once, Laila Thacker MD      ALLERGIES:    Allergies   Allergen Reactions   • Penicillins Hives and Swelling     Invanz given for AGBR surgery         Physical Exam    VITAL SIGNS:  /57   Pulse 83   Temp 96.7 °F (35.9 °C) (Temporal)   Resp 16   Ht 160 cm (63\")   Wt 101 kg (222 lb)   SpO2 99% Comment: RA  BMI 39.33 kg/m²     Wt Readings from Last 3 Encounters:   01/24/20 101 kg (222 lb)   01/24/20 101 kg (222 lb)   01/17/20 103 kg (226 lb)       Body mass index is 39.33 kg/m². Body surface area is 2.02 meters squared.         Performance Status: 0    General: well appearing, in no acute distress  HEENT: sclera anicteric, oropharynx clear, neck is supple  Lymphatics: no cervical, supraclavicular, or axillary adenopathy  Cardiovascular: regular rate and rhythm, no murmurs, rubs or gallops  Lungs: clear to auscultation bilaterally  Abdomen: soft, nontender, nondistended.  No palpable organomegaly  Extremities: no lower extremity edema  Skin: no rashes, lesions, bruising, or petechiae  Msk:  Shows no weakness of the large muscle groups  Psych: Mood is stable  Port in the left chest wall      RECENT LABS:    Lab Results   Component Value Date    HGB 11.3 (L) 01/24/2020    HCT 32.9 (L) 01/24/2020    .9 (H) 01/24/2020     01/24/2020    WBC 3.60 01/24/2020    NEUTROABS 2.00 01/24/2020    LYMPHSABS 1.40 " 01/24/2020    MONOSABS 0.20 01/24/2020    EOSABS 0.1 05/08/2019    BASOSABS 0.0 05/08/2019       Lab Results   Component Value Date    GLUCOSE 97 01/10/2020    BUN 8 01/10/2020    CREATININE 0.60 01/17/2020     01/10/2020    K 3.6 01/10/2020     (H) 01/10/2020    CO2 26.0 01/10/2020    CALCIUM 8.8 01/10/2020    PROTEINTOT 5.9 (L) 01/10/2020    ALBUMIN 3.50 01/10/2020    BILITOT 0.3 01/10/2020    ALKPHOS 55 01/10/2020    AST 12 01/10/2020    ALT 9 01/10/2020       Ct Chest With Contrast    Result Date: 9/22/2019  No CT evidence of metastatic disease. No evidence of acute intrathoracic, intra-abdominal or pelvic abnormality. Postsurgical changes identified within the right axillary region. No definite remaining lymph nodes identified.  DICTATED:   09/20/2019 EDITED/ls :   09/21/2019  This report was finalized on 9/22/2019 9:00 AM by Dr. Paz Hubbard MD.      Nm Bone Scan Whole Body    Result Date: 9/13/2019  Normal examination.  D:  09/13/2019 E:  09/13/2019     This report was finalized on 9/13/2019 4:23 PM by Dr. César Paiz MD.      Ct Abdomen Pelvis With Contrast    Result Date: 9/22/2019  No CT evidence of metastatic disease. No evidence of acute intrathoracic, intra-abdominal or pelvic abnormality. Postsurgical changes identified within the right axillary region. No definite remaining lymph nodes identified.  DICTATED:   09/20/2019 EDITED/ls :   09/21/2019  This report was finalized on 9/22/2019 9:00 AM by Dr. Paz Hubbard MD.              Assessment/Plan    1. Stage IB infiltrating intermediate grade lobular carcinoma of the right breast with node positivity.  Completed 4 doses of dose dense Adriamycin and Cytoxan.  She is completed 7 of 12 cycles of Taxol.  She is tolerating it well.  Plan to continue with no dose reduction today.  We will see her back in my clinic in 4 weeks.  If she is having any neuropathy in the interim she can always give me a call.  She will also need to schedule  her follow-up with radiation oncology for February 21.    2.  Oral mucositis.  On valtrex as needed    3.  Chemotherapy-induced nausea.  She is well controlled on the current regiment of Zofran.    4.  Port issues.  We have no blood return on the port.  Though it flushes easily.  I am okay with her using it.  Once we complete chemotherapy I will have that removed.          I spent a total of 25 minutes in direct patient care, greater than 20 minutes (greater than 50%) were spent in coordination of care, and counseling the patient regarding breast cancer. Answered any questions patient had with medication and plan.      Laila Thacker MD  [facility]    Return on: 02/21/20  Return in (Approximately): 1 month, Schedule with next infusion    Orders Placed This Encounter   Procedures   • Comprehensive metabolic panel   • Comprehensive metabolic panel   • Comprehensive metabolic panel   • CBC and Differential   • CBC and Differential   • CBC and Differential       1/24/2020         Please note that portions of this note may have been completed with a voice recognition program. Efforts were made to edit the dictations, but occasionally words are mistranscribed.

## 2020-01-31 ENCOUNTER — HOSPITAL ENCOUNTER (OUTPATIENT)
Dept: ONCOLOGY | Facility: HOSPITAL | Age: 60
Setting detail: INFUSION SERIES
Discharge: HOME | End: 2020-01-31

## 2020-01-31 VITALS
DIASTOLIC BLOOD PRESSURE: 71 MMHG | BODY MASS INDEX: 39.34 KG/M2 | TEMPERATURE: 97.4 F | HEART RATE: 76 BPM | SYSTOLIC BLOOD PRESSURE: 138 MMHG | RESPIRATION RATE: 16 BRPM | HEIGHT: 63 IN | WEIGHT: 222 LBS

## 2020-01-31 DIAGNOSIS — Z17.0 MALIGNANT NEOPLASM OF UPPER-OUTER QUADRANT OF RIGHT BREAST IN FEMALE, ESTROGEN RECEPTOR POSITIVE (HCC): Primary | ICD-10-CM

## 2020-01-31 DIAGNOSIS — Z95.828 PORT-A-CATH IN PLACE: ICD-10-CM

## 2020-01-31 DIAGNOSIS — C50.411 MALIGNANT NEOPLASM OF UPPER-OUTER QUADRANT OF RIGHT BREAST IN FEMALE, ESTROGEN RECEPTOR POSITIVE (HCC): Primary | ICD-10-CM

## 2020-01-31 LAB
ALBUMIN SERPL-MCNC: 3.9 G/DL (ref 3.5–5.2)
ALBUMIN/GLOB SERPL: 1.4 G/DL
ALP SERPL-CCNC: 57 U/L (ref 39–117)
ALT SERPL W P-5'-P-CCNC: 10 U/L (ref 1–33)
ANION GAP SERPL CALCULATED.3IONS-SCNC: 9 MMOL/L (ref 5–15)
AST SERPL-CCNC: 11 U/L (ref 1–32)
BILIRUB SERPL-MCNC: 0.4 MG/DL (ref 0.2–1.2)
BUN BLD-MCNC: 11 MG/DL (ref 6–20)
BUN/CREAT SERPL: 17.2 (ref 7–25)
CALCIUM SPEC-SCNC: 8.9 MG/DL (ref 8.6–10.5)
CHLORIDE SERPL-SCNC: 106 MMOL/L (ref 98–107)
CO2 SERPL-SCNC: 27 MMOL/L (ref 22–29)
CREAT BLD-MCNC: 0.64 MG/DL (ref 0.57–1)
ERYTHROCYTE [DISTWIDTH] IN BLOOD BY AUTOMATED COUNT: 16 % (ref 12.3–15.4)
GFR SERPL CREATININE-BSD FRML MDRD: 96 ML/MIN/1.73
GLOBULIN UR ELPH-MCNC: 2.8 GM/DL
GLUCOSE BLD-MCNC: 76 MG/DL (ref 65–99)
HCT VFR BLD AUTO: 35 % (ref 34–46.6)
HGB BLD-MCNC: 11.3 G/DL (ref 12–15.9)
LYMPHOCYTES # BLD AUTO: 1.7 10*3/MM3 (ref 0.7–3.1)
LYMPHOCYTES NFR BLD AUTO: 38.4 % (ref 19.6–45.3)
MCH RBC QN AUTO: 33 PG (ref 26.6–33)
MCHC RBC AUTO-ENTMCNC: 32.2 G/DL (ref 31.5–35.7)
MCV RBC AUTO: 102.6 FL (ref 79–97)
MONOCYTES # BLD AUTO: 0.3 10*3/MM3 (ref 0.1–0.9)
MONOCYTES NFR BLD AUTO: 7.3 % (ref 5–12)
NEUTROPHILS # BLD AUTO: 2.4 10*3/MM3 (ref 1.7–7)
NEUTROPHILS NFR BLD AUTO: 54.3 % (ref 42.7–76)
PLATELET # BLD AUTO: 186 10*3/MM3 (ref 140–450)
PMV BLD AUTO: 8.4 FL (ref 6–12)
POTASSIUM BLD-SCNC: 3.7 MMOL/L (ref 3.5–5.2)
PROT SERPL-MCNC: 6.7 G/DL (ref 6–8.5)
RBC # BLD AUTO: 3.42 10*6/MM3 (ref 3.77–5.28)
SODIUM BLD-SCNC: 142 MMOL/L (ref 136–145)
WBC NRBC COR # BLD: 4.4 10*3/MM3 (ref 3.4–10.8)

## 2020-01-31 PROCEDURE — 85025 COMPLETE CBC W/AUTO DIFF WBC: CPT | Performed by: INTERNAL MEDICINE

## 2020-01-31 PROCEDURE — 80053 COMPREHEN METABOLIC PANEL: CPT | Performed by: INTERNAL MEDICINE

## 2020-01-31 PROCEDURE — 25010000002 DIPHENHYDRAMINE PER 50 MG: Performed by: INTERNAL MEDICINE

## 2020-01-31 PROCEDURE — 25010000002 PACLITAXEL PER 30 MG: Performed by: INTERNAL MEDICINE

## 2020-01-31 PROCEDURE — 96413 CHEMO IV INFUSION 1 HR: CPT

## 2020-01-31 PROCEDURE — 96375 TX/PRO/DX INJ NEW DRUG ADDON: CPT

## 2020-01-31 PROCEDURE — 25010000003 HEPARIN LOCK FLUCH PER 10 UNITS: Performed by: INTERNAL MEDICINE

## 2020-01-31 PROCEDURE — 25010000002 DEXAMETHASONE PER 1 MG: Performed by: INTERNAL MEDICINE

## 2020-01-31 RX ORDER — HEPARIN SODIUM (PORCINE) LOCK FLUSH IV SOLN 100 UNIT/ML 100 UNIT/ML
500 SOLUTION INTRAVENOUS AS NEEDED
Status: DISCONTINUED | OUTPATIENT
Start: 2020-01-31 | End: 2020-02-01 | Stop reason: HOSPADM

## 2020-01-31 RX ORDER — HEPARIN SODIUM (PORCINE) LOCK FLUSH IV SOLN 100 UNIT/ML 100 UNIT/ML
500 SOLUTION INTRAVENOUS AS NEEDED
Status: CANCELLED | OUTPATIENT
Start: 2020-01-31

## 2020-01-31 RX ORDER — SODIUM CHLORIDE 9 MG/ML
250 INJECTION, SOLUTION INTRAVENOUS ONCE
Status: COMPLETED | OUTPATIENT
Start: 2020-01-31 | End: 2020-01-31

## 2020-01-31 RX ORDER — FAMOTIDINE 10 MG/ML
20 INJECTION, SOLUTION INTRAVENOUS ONCE
Status: COMPLETED | OUTPATIENT
Start: 2020-01-31 | End: 2020-01-31

## 2020-01-31 RX ADMIN — SODIUM CHLORIDE 250 ML: 9 INJECTION, SOLUTION INTRAVENOUS at 11:24

## 2020-01-31 RX ADMIN — FAMOTIDINE 20 MG: 10 INJECTION, SOLUTION INTRAVENOUS at 11:24

## 2020-01-31 RX ADMIN — SODIUM CHLORIDE 160 MG: 9 INJECTION, SOLUTION INTRAVENOUS at 11:45

## 2020-01-31 RX ADMIN — HEPARIN 500 UNITS: 100 SYRINGE at 12:45

## 2020-01-31 RX ADMIN — DEXAMETHASONE SODIUM PHOSPHATE 12 MG: 4 INJECTION, SOLUTION INTRAMUSCULAR; INTRAVENOUS at 11:24

## 2020-01-31 RX ADMIN — DIPHENHYDRAMINE HYDROCHLORIDE 25 MG: 50 INJECTION INTRAMUSCULAR; INTRAVENOUS at 11:24

## 2020-02-07 ENCOUNTER — TRANSCRIBE ORDERS (OUTPATIENT)
Dept: MAMMOGRAPHY | Facility: HOSPITAL | Age: 60
End: 2020-02-07

## 2020-02-07 ENCOUNTER — HOSPITAL ENCOUNTER (OUTPATIENT)
Dept: MAMMOGRAPHY | Facility: HOSPITAL | Age: 60
Discharge: HOME | End: 2020-02-07
Admitting: SURGERY

## 2020-02-07 ENCOUNTER — HOSPITAL ENCOUNTER (OUTPATIENT)
Dept: ONCOLOGY | Facility: HOSPITAL | Age: 60
Setting detail: INFUSION SERIES
Discharge: HOME | End: 2020-02-07

## 2020-02-07 VITALS
DIASTOLIC BLOOD PRESSURE: 66 MMHG | HEIGHT: 63 IN | SYSTOLIC BLOOD PRESSURE: 112 MMHG | WEIGHT: 222.44 LBS | BODY MASS INDEX: 39.41 KG/M2 | HEART RATE: 83 BPM | RESPIRATION RATE: 18 BRPM | TEMPERATURE: 97.6 F

## 2020-02-07 DIAGNOSIS — R92.8 ABNORMAL MAMMOGRAM: Primary | ICD-10-CM

## 2020-02-07 DIAGNOSIS — Z95.828 PORT-A-CATH IN PLACE: ICD-10-CM

## 2020-02-07 DIAGNOSIS — C50.211 MALIGNANT NEOPLASM OF UPPER-INNER QUADRANT OF RIGHT FEMALE BREAST, UNSPECIFIED ESTROGEN RECEPTOR STATUS (HCC): ICD-10-CM

## 2020-02-07 DIAGNOSIS — C50.411 MALIGNANT NEOPLASM OF UPPER-OUTER QUADRANT OF RIGHT BREAST IN FEMALE, ESTROGEN RECEPTOR POSITIVE (HCC): Primary | ICD-10-CM

## 2020-02-07 DIAGNOSIS — Z17.0 MALIGNANT NEOPLASM OF UPPER-OUTER QUADRANT OF RIGHT BREAST IN FEMALE, ESTROGEN RECEPTOR POSITIVE (HCC): Primary | ICD-10-CM

## 2020-02-07 LAB
ALBUMIN SERPL-MCNC: 3.7 G/DL (ref 3.5–5.2)
ALBUMIN/GLOB SERPL: 1.3 G/DL
ALP SERPL-CCNC: 58 U/L (ref 39–117)
ALT SERPL W P-5'-P-CCNC: 11 U/L (ref 1–33)
ANION GAP SERPL CALCULATED.3IONS-SCNC: 12 MMOL/L (ref 5–15)
AST SERPL-CCNC: 13 U/L (ref 1–32)
BILIRUB SERPL-MCNC: 0.2 MG/DL (ref 0.2–1.2)
BUN BLD-MCNC: 10 MG/DL (ref 6–20)
BUN/CREAT SERPL: 13 (ref 7–25)
CALCIUM SPEC-SCNC: 8.7 MG/DL (ref 8.6–10.5)
CHLORIDE SERPL-SCNC: 106 MMOL/L (ref 98–107)
CO2 SERPL-SCNC: 25 MMOL/L (ref 22–29)
CREAT BLD-MCNC: 0.77 MG/DL (ref 0.57–1)
ERYTHROCYTE [DISTWIDTH] IN BLOOD BY AUTOMATED COUNT: 16.2 % (ref 12.3–15.4)
GFR SERPL CREATININE-BSD FRML MDRD: 77 ML/MIN/1.73
GLOBULIN UR ELPH-MCNC: 2.8 GM/DL
GLUCOSE BLD-MCNC: 98 MG/DL (ref 65–99)
HCT VFR BLD AUTO: 37.6 % (ref 34–46.6)
HGB BLD-MCNC: 11.9 G/DL (ref 12–15.9)
LYMPHOCYTES # BLD AUTO: 1.9 10*3/MM3 (ref 0.7–3.1)
LYMPHOCYTES NFR BLD AUTO: 35.9 % (ref 19.6–45.3)
MCH RBC QN AUTO: 32.5 PG (ref 26.6–33)
MCHC RBC AUTO-ENTMCNC: 31.7 G/DL (ref 31.5–35.7)
MCV RBC AUTO: 102.7 FL (ref 79–97)
MONOCYTES # BLD AUTO: 0.1 10*3/MM3 (ref 0.1–0.9)
MONOCYTES NFR BLD AUTO: 2.7 % (ref 5–12)
NEUTROPHILS # BLD AUTO: 3.3 10*3/MM3 (ref 1.7–7)
NEUTROPHILS NFR BLD AUTO: 61.4 % (ref 42.7–76)
PLATELET # BLD AUTO: 197 10*3/MM3 (ref 140–450)
PMV BLD AUTO: 8.5 FL (ref 6–12)
POTASSIUM BLD-SCNC: 4.1 MMOL/L (ref 3.5–5.2)
PROT SERPL-MCNC: 6.5 G/DL (ref 6–8.5)
RBC # BLD AUTO: 3.66 10*6/MM3 (ref 3.77–5.28)
SODIUM BLD-SCNC: 143 MMOL/L (ref 136–145)
WBC NRBC COR # BLD: 5.3 10*3/MM3 (ref 3.4–10.8)

## 2020-02-07 PROCEDURE — 96375 TX/PRO/DX INJ NEW DRUG ADDON: CPT

## 2020-02-07 PROCEDURE — 77061 BREAST TOMOSYNTHESIS UNI: CPT | Performed by: RADIOLOGY

## 2020-02-07 PROCEDURE — 25010000002 PACLITAXEL PER 30 MG: Performed by: INTERNAL MEDICINE

## 2020-02-07 PROCEDURE — 25010000003 HEPARIN LOCK FLUCH PER 10 UNITS: Performed by: INTERNAL MEDICINE

## 2020-02-07 PROCEDURE — 85025 COMPLETE CBC W/AUTO DIFF WBC: CPT | Performed by: INTERNAL MEDICINE

## 2020-02-07 PROCEDURE — 77065 DX MAMMO INCL CAD UNI: CPT

## 2020-02-07 PROCEDURE — 77065 DX MAMMO INCL CAD UNI: CPT | Performed by: RADIOLOGY

## 2020-02-07 PROCEDURE — 36415 COLL VENOUS BLD VENIPUNCTURE: CPT

## 2020-02-07 PROCEDURE — 96413 CHEMO IV INFUSION 1 HR: CPT

## 2020-02-07 PROCEDURE — 80053 COMPREHEN METABOLIC PANEL: CPT | Performed by: INTERNAL MEDICINE

## 2020-02-07 PROCEDURE — G0279 TOMOSYNTHESIS, MAMMO: HCPCS

## 2020-02-07 PROCEDURE — 25010000002 DIPHENHYDRAMINE PER 50 MG: Performed by: INTERNAL MEDICINE

## 2020-02-07 PROCEDURE — 25010000002 DEXAMETHASONE PER 1 MG: Performed by: INTERNAL MEDICINE

## 2020-02-07 RX ORDER — SODIUM CHLORIDE 9 MG/ML
250 INJECTION, SOLUTION INTRAVENOUS ONCE
Status: COMPLETED | OUTPATIENT
Start: 2020-02-07 | End: 2020-02-07

## 2020-02-07 RX ORDER — FAMOTIDINE 10 MG/ML
20 INJECTION, SOLUTION INTRAVENOUS ONCE
Status: COMPLETED | OUTPATIENT
Start: 2020-02-07 | End: 2020-02-07

## 2020-02-07 RX ORDER — HEPARIN SODIUM (PORCINE) LOCK FLUSH IV SOLN 100 UNIT/ML 100 UNIT/ML
500 SOLUTION INTRAVENOUS AS NEEDED
Status: CANCELLED | OUTPATIENT
Start: 2020-02-07

## 2020-02-07 RX ORDER — HEPARIN SODIUM (PORCINE) LOCK FLUSH IV SOLN 100 UNIT/ML 100 UNIT/ML
500 SOLUTION INTRAVENOUS AS NEEDED
Status: DISCONTINUED | OUTPATIENT
Start: 2020-02-07 | End: 2020-02-08 | Stop reason: HOSPADM

## 2020-02-07 RX ADMIN — SODIUM CHLORIDE 160 MG: 9 INJECTION, SOLUTION INTRAVENOUS at 13:01

## 2020-02-07 RX ADMIN — DIPHENHYDRAMINE HYDROCHLORIDE 25 MG: 50 INJECTION INTRAMUSCULAR; INTRAVENOUS at 12:45

## 2020-02-07 RX ADMIN — HEPARIN 500 UNITS: 100 SYRINGE at 14:04

## 2020-02-07 RX ADMIN — SODIUM CHLORIDE 250 ML: 9 INJECTION, SOLUTION INTRAVENOUS at 12:44

## 2020-02-07 RX ADMIN — DEXAMETHASONE SODIUM PHOSPHATE 12 MG: 4 INJECTION, SOLUTION INTRAMUSCULAR; INTRAVENOUS at 12:44

## 2020-02-07 RX ADMIN — FAMOTIDINE 20 MG: 10 INJECTION, SOLUTION INTRAVENOUS at 12:44

## 2020-02-14 ENCOUNTER — HOSPITAL ENCOUNTER (OUTPATIENT)
Dept: ONCOLOGY | Facility: HOSPITAL | Age: 60
Setting detail: INFUSION SERIES
Discharge: HOME | End: 2020-02-14

## 2020-02-14 VITALS
RESPIRATION RATE: 16 BRPM | HEIGHT: 63 IN | WEIGHT: 221 LBS | SYSTOLIC BLOOD PRESSURE: 137 MMHG | BODY MASS INDEX: 39.16 KG/M2 | TEMPERATURE: 98.1 F | DIASTOLIC BLOOD PRESSURE: 71 MMHG | HEART RATE: 81 BPM

## 2020-02-14 DIAGNOSIS — Z95.828 PORT-A-CATH IN PLACE: Primary | ICD-10-CM

## 2020-02-14 DIAGNOSIS — C50.411 MALIGNANT NEOPLASM OF UPPER-OUTER QUADRANT OF RIGHT BREAST IN FEMALE, ESTROGEN RECEPTOR POSITIVE (HCC): ICD-10-CM

## 2020-02-14 DIAGNOSIS — Z17.0 MALIGNANT NEOPLASM OF UPPER-OUTER QUADRANT OF RIGHT BREAST IN FEMALE, ESTROGEN RECEPTOR POSITIVE (HCC): ICD-10-CM

## 2020-02-14 LAB
ALBUMIN SERPL-MCNC: 3.6 G/DL (ref 3.5–5.2)
ALBUMIN/GLOB SERPL: 1.4 G/DL
ALP SERPL-CCNC: 54 U/L (ref 39–117)
ALT SERPL W P-5'-P-CCNC: 11 U/L (ref 1–33)
ANION GAP SERPL CALCULATED.3IONS-SCNC: 9 MMOL/L (ref 5–15)
AST SERPL-CCNC: 11 U/L (ref 1–32)
BILIRUB SERPL-MCNC: 0.3 MG/DL (ref 0.2–1.2)
BUN BLD-MCNC: 10 MG/DL (ref 6–20)
BUN/CREAT SERPL: 15.2 (ref 7–25)
CALCIUM SPEC-SCNC: 8.8 MG/DL (ref 8.6–10.5)
CHLORIDE SERPL-SCNC: 109 MMOL/L (ref 98–107)
CO2 SERPL-SCNC: 25 MMOL/L (ref 22–29)
CREAT BLD-MCNC: 0.66 MG/DL (ref 0.57–1)
ERYTHROCYTE [DISTWIDTH] IN BLOOD BY AUTOMATED COUNT: 16 % (ref 12.3–15.4)
GFR SERPL CREATININE-BSD FRML MDRD: 92 ML/MIN/1.73
GLOBULIN UR ELPH-MCNC: 2.5 GM/DL
GLUCOSE BLD-MCNC: 76 MG/DL (ref 65–99)
HCT VFR BLD AUTO: 35 % (ref 34–46.6)
HGB BLD-MCNC: 11.5 G/DL (ref 12–15.9)
LYMPHOCYTES # BLD AUTO: 1.3 10*3/MM3 (ref 0.7–3.1)
LYMPHOCYTES NFR BLD AUTO: 29.6 % (ref 19.6–45.3)
MCH RBC QN AUTO: 33.3 PG (ref 26.6–33)
MCHC RBC AUTO-ENTMCNC: 32.9 G/DL (ref 31.5–35.7)
MCV RBC AUTO: 101.5 FL (ref 79–97)
MONOCYTES # BLD AUTO: 0.4 10*3/MM3 (ref 0.1–0.9)
MONOCYTES NFR BLD AUTO: 9.4 % (ref 5–12)
NEUTROPHILS # BLD AUTO: 2.7 10*3/MM3 (ref 1.7–7)
NEUTROPHILS NFR BLD AUTO: 61 % (ref 42.7–76)
PLATELET # BLD AUTO: 186 10*3/MM3 (ref 140–450)
PMV BLD AUTO: 8.4 FL (ref 6–12)
POTASSIUM BLD-SCNC: 3.2 MMOL/L (ref 3.5–5.2)
PROT SERPL-MCNC: 6.1 G/DL (ref 6–8.5)
RBC # BLD AUTO: 3.45 10*6/MM3 (ref 3.77–5.28)
SODIUM BLD-SCNC: 143 MMOL/L (ref 136–145)
WBC NRBC COR # BLD: 4.4 10*3/MM3 (ref 3.4–10.8)

## 2020-02-14 PROCEDURE — 25010000002 PACLITAXEL PER 30 MG: Performed by: INTERNAL MEDICINE

## 2020-02-14 PROCEDURE — 36415 COLL VENOUS BLD VENIPUNCTURE: CPT

## 2020-02-14 PROCEDURE — 85025 COMPLETE CBC W/AUTO DIFF WBC: CPT | Performed by: INTERNAL MEDICINE

## 2020-02-14 PROCEDURE — 80053 COMPREHEN METABOLIC PANEL: CPT | Performed by: INTERNAL MEDICINE

## 2020-02-14 PROCEDURE — 96375 TX/PRO/DX INJ NEW DRUG ADDON: CPT

## 2020-02-14 PROCEDURE — 96413 CHEMO IV INFUSION 1 HR: CPT

## 2020-02-14 PROCEDURE — 25010000002 DEXAMETHASONE PER 1 MG: Performed by: INTERNAL MEDICINE

## 2020-02-14 PROCEDURE — 25010000002 DIPHENHYDRAMINE PER 50 MG: Performed by: INTERNAL MEDICINE

## 2020-02-14 PROCEDURE — 25010000003 HEPARIN LOCK FLUCH PER 10 UNITS: Performed by: INTERNAL MEDICINE

## 2020-02-14 RX ORDER — FAMOTIDINE 10 MG/ML
20 INJECTION, SOLUTION INTRAVENOUS ONCE
Status: COMPLETED | OUTPATIENT
Start: 2020-02-14 | End: 2020-02-14

## 2020-02-14 RX ORDER — SODIUM CHLORIDE 9 MG/ML
250 INJECTION, SOLUTION INTRAVENOUS ONCE
Status: COMPLETED | OUTPATIENT
Start: 2020-02-14 | End: 2020-02-14

## 2020-02-14 RX ORDER — HEPARIN SODIUM (PORCINE) LOCK FLUSH IV SOLN 100 UNIT/ML 100 UNIT/ML
500 SOLUTION INTRAVENOUS AS NEEDED
Status: CANCELLED | OUTPATIENT
Start: 2020-02-14

## 2020-02-14 RX ORDER — DIPHENHYDRAMINE HYDROCHLORIDE 50 MG/ML
50 INJECTION INTRAMUSCULAR; INTRAVENOUS AS NEEDED
Status: DISCONTINUED | OUTPATIENT
Start: 2020-02-14 | End: 2020-02-15 | Stop reason: HOSPADM

## 2020-02-14 RX ORDER — FAMOTIDINE 10 MG/ML
20 INJECTION, SOLUTION INTRAVENOUS AS NEEDED
Status: DISCONTINUED | OUTPATIENT
Start: 2020-02-14 | End: 2020-02-15 | Stop reason: HOSPADM

## 2020-02-14 RX ORDER — HEPARIN SODIUM (PORCINE) LOCK FLUSH IV SOLN 100 UNIT/ML 100 UNIT/ML
500 SOLUTION INTRAVENOUS AS NEEDED
Status: DISCONTINUED | OUTPATIENT
Start: 2020-02-14 | End: 2020-02-15 | Stop reason: HOSPADM

## 2020-02-14 RX ADMIN — HEPARIN 500 UNITS: 100 SYRINGE at 12:35

## 2020-02-14 RX ADMIN — DEXAMETHASONE SODIUM PHOSPHATE 12 MG: 4 INJECTION, SOLUTION INTRAMUSCULAR; INTRAVENOUS at 10:59

## 2020-02-14 RX ADMIN — SODIUM CHLORIDE 160 MG: 9 INJECTION, SOLUTION INTRAVENOUS at 11:29

## 2020-02-14 RX ADMIN — FAMOTIDINE 20 MG: 10 INJECTION, SOLUTION INTRAVENOUS at 10:59

## 2020-02-14 RX ADMIN — SODIUM CHLORIDE 250 ML: 9 INJECTION, SOLUTION INTRAVENOUS at 10:57

## 2020-02-14 RX ADMIN — DIPHENHYDRAMINE HYDROCHLORIDE 25 MG: 50 INJECTION INTRAMUSCULAR; INTRAVENOUS at 10:59

## 2020-02-21 ENCOUNTER — HOSPITAL ENCOUNTER (OUTPATIENT)
Dept: ONCOLOGY | Facility: HOSPITAL | Age: 60
Setting detail: INFUSION SERIES
Discharge: HOME | End: 2020-02-21

## 2020-02-21 ENCOUNTER — OFFICE VISIT (OUTPATIENT)
Dept: ONCOLOGY | Facility: CLINIC | Age: 60
End: 2020-02-21

## 2020-02-21 ENCOUNTER — APPOINTMENT (OUTPATIENT)
Dept: MAMMOGRAPHY | Facility: HOSPITAL | Age: 60
End: 2020-02-21

## 2020-02-21 VITALS
DIASTOLIC BLOOD PRESSURE: 63 MMHG | WEIGHT: 220 LBS | HEART RATE: 109 BPM | HEIGHT: 63 IN | OXYGEN SATURATION: 96 % | TEMPERATURE: 98.2 F | BODY MASS INDEX: 38.98 KG/M2 | SYSTOLIC BLOOD PRESSURE: 114 MMHG | RESPIRATION RATE: 20 BRPM

## 2020-02-21 VITALS
DIASTOLIC BLOOD PRESSURE: 84 MMHG | RESPIRATION RATE: 16 BRPM | SYSTOLIC BLOOD PRESSURE: 132 MMHG | WEIGHT: 220 LBS | HEART RATE: 64 BPM | BODY MASS INDEX: 38.98 KG/M2 | HEIGHT: 63 IN | TEMPERATURE: 98.2 F

## 2020-02-21 DIAGNOSIS — C50.411 MALIGNANT NEOPLASM OF UPPER-OUTER QUADRANT OF RIGHT BREAST IN FEMALE, ESTROGEN RECEPTOR POSITIVE (HCC): Primary | ICD-10-CM

## 2020-02-21 DIAGNOSIS — Z95.828 PORT-A-CATH IN PLACE: ICD-10-CM

## 2020-02-21 DIAGNOSIS — Z17.0 MALIGNANT NEOPLASM OF UPPER-OUTER QUADRANT OF RIGHT BREAST IN FEMALE, ESTROGEN RECEPTOR POSITIVE (HCC): Primary | ICD-10-CM

## 2020-02-21 DIAGNOSIS — Z17.0 MALIGNANT NEOPLASM OF UPPER-OUTER QUADRANT OF RIGHT BREAST IN FEMALE, ESTROGEN RECEPTOR POSITIVE (HCC): ICD-10-CM

## 2020-02-21 DIAGNOSIS — C50.411 MALIGNANT NEOPLASM OF UPPER-OUTER QUADRANT OF RIGHT BREAST IN FEMALE, ESTROGEN RECEPTOR POSITIVE (HCC): ICD-10-CM

## 2020-02-21 LAB
ALBUMIN SERPL-MCNC: 3.5 G/DL (ref 3.5–5.2)
ALBUMIN/GLOB SERPL: 1.3 G/DL
ALP SERPL-CCNC: 56 U/L (ref 39–117)
ALT SERPL W P-5'-P-CCNC: 9 U/L (ref 1–33)
ANION GAP SERPL CALCULATED.3IONS-SCNC: 9 MMOL/L (ref 5–15)
AST SERPL-CCNC: 12 U/L (ref 1–32)
BILIRUB SERPL-MCNC: 0.3 MG/DL (ref 0.2–1.2)
BUN BLD-MCNC: 8 MG/DL (ref 6–20)
BUN/CREAT SERPL: 12.7 (ref 7–25)
CALCIUM SPEC-SCNC: 8.9 MG/DL (ref 8.6–10.5)
CHLORIDE SERPL-SCNC: 109 MMOL/L (ref 98–107)
CO2 SERPL-SCNC: 26 MMOL/L (ref 22–29)
CREAT BLD-MCNC: 0.63 MG/DL (ref 0.57–1)
ERYTHROCYTE [DISTWIDTH] IN BLOOD BY AUTOMATED COUNT: 15.3 % (ref 12.3–15.4)
GFR SERPL CREATININE-BSD FRML MDRD: 97 ML/MIN/1.73
GLOBULIN UR ELPH-MCNC: 2.8 GM/DL
GLUCOSE BLD-MCNC: 75 MG/DL (ref 65–99)
HCT VFR BLD AUTO: 35 % (ref 34–46.6)
HGB BLD-MCNC: 11.4 G/DL (ref 12–15.9)
LYMPHOCYTES # BLD AUTO: 1 10*3/MM3 (ref 0.7–3.1)
LYMPHOCYTES NFR BLD AUTO: 27.3 % (ref 19.6–45.3)
MCH RBC QN AUTO: 33 PG (ref 26.6–33)
MCHC RBC AUTO-ENTMCNC: 32.7 G/DL (ref 31.5–35.7)
MCV RBC AUTO: 100.9 FL (ref 79–97)
MONOCYTES # BLD AUTO: 0.3 10*3/MM3 (ref 0.1–0.9)
MONOCYTES NFR BLD AUTO: 8.8 % (ref 5–12)
NEUTROPHILS # BLD AUTO: 2.3 10*3/MM3 (ref 1.7–7)
NEUTROPHILS NFR BLD AUTO: 63.9 % (ref 42.7–76)
PLATELET # BLD AUTO: 188 10*3/MM3 (ref 140–450)
PMV BLD AUTO: 8.3 FL (ref 6–12)
POTASSIUM BLD-SCNC: 3.4 MMOL/L (ref 3.5–5.2)
PROT SERPL-MCNC: 6.3 G/DL (ref 6–8.5)
RBC # BLD AUTO: 3.46 10*6/MM3 (ref 3.77–5.28)
SODIUM BLD-SCNC: 144 MMOL/L (ref 136–145)
WBC NRBC COR # BLD: 3.6 10*3/MM3 (ref 3.4–10.8)

## 2020-02-21 PROCEDURE — 85025 COMPLETE CBC W/AUTO DIFF WBC: CPT | Performed by: INTERNAL MEDICINE

## 2020-02-21 PROCEDURE — 99214 OFFICE O/P EST MOD 30 MIN: CPT | Performed by: INTERNAL MEDICINE

## 2020-02-21 PROCEDURE — 96413 CHEMO IV INFUSION 1 HR: CPT

## 2020-02-21 PROCEDURE — 96375 TX/PRO/DX INJ NEW DRUG ADDON: CPT

## 2020-02-21 PROCEDURE — 25010000002 PACLITAXEL PER 30 MG: Performed by: INTERNAL MEDICINE

## 2020-02-21 PROCEDURE — 25010000003 HEPARIN LOCK FLUCH PER 10 UNITS: Performed by: INTERNAL MEDICINE

## 2020-02-21 PROCEDURE — 80053 COMPREHEN METABOLIC PANEL: CPT | Performed by: INTERNAL MEDICINE

## 2020-02-21 PROCEDURE — 25010000002 DIPHENHYDRAMINE PER 50 MG: Performed by: INTERNAL MEDICINE

## 2020-02-21 PROCEDURE — 36415 COLL VENOUS BLD VENIPUNCTURE: CPT

## 2020-02-21 PROCEDURE — 25010000002 DEXAMETHASONE PER 1 MG: Performed by: INTERNAL MEDICINE

## 2020-02-21 RX ORDER — SODIUM CHLORIDE 9 MG/ML
250 INJECTION, SOLUTION INTRAVENOUS ONCE
Status: CANCELLED | OUTPATIENT
Start: 2020-02-21

## 2020-02-21 RX ORDER — IBUPROFEN 200 MG
2 CAPSULE ORAL DAILY
Qty: 180 TABLET | Refills: 3 | Status: SHIPPED | OUTPATIENT
Start: 2020-02-21 | End: 2020-06-01

## 2020-02-21 RX ORDER — HEPARIN SODIUM (PORCINE) LOCK FLUSH IV SOLN 100 UNIT/ML 100 UNIT/ML
500 SOLUTION INTRAVENOUS AS NEEDED
Status: DISCONTINUED | OUTPATIENT
Start: 2020-02-21 | End: 2020-02-22 | Stop reason: HOSPADM

## 2020-02-21 RX ORDER — SODIUM CHLORIDE 9 MG/ML
250 INJECTION, SOLUTION INTRAVENOUS ONCE
Status: COMPLETED | OUTPATIENT
Start: 2020-02-21 | End: 2020-02-21

## 2020-02-21 RX ORDER — HEPARIN SODIUM (PORCINE) LOCK FLUSH IV SOLN 100 UNIT/ML 100 UNIT/ML
500 SOLUTION INTRAVENOUS AS NEEDED
OUTPATIENT
Start: 2020-02-21

## 2020-02-21 RX ORDER — FAMOTIDINE 10 MG/ML
20 INJECTION, SOLUTION INTRAVENOUS ONCE
Status: CANCELLED | OUTPATIENT
Start: 2020-02-21

## 2020-02-21 RX ORDER — ANASTROZOLE 1 MG/1
1 TABLET ORAL DAILY
Qty: 90 TABLET | Refills: 3 | Status: SHIPPED | OUTPATIENT
Start: 2020-02-21 | End: 2020-05-21

## 2020-02-21 RX ORDER — DIPHENHYDRAMINE HYDROCHLORIDE 50 MG/ML
50 INJECTION INTRAMUSCULAR; INTRAVENOUS AS NEEDED
Status: CANCELLED | OUTPATIENT
Start: 2020-02-21

## 2020-02-21 RX ORDER — FAMOTIDINE 10 MG/ML
20 INJECTION, SOLUTION INTRAVENOUS ONCE
Status: COMPLETED | OUTPATIENT
Start: 2020-02-21 | End: 2020-02-21

## 2020-02-21 RX ORDER — FAMOTIDINE 10 MG/ML
20 INJECTION, SOLUTION INTRAVENOUS AS NEEDED
Status: CANCELLED | OUTPATIENT
Start: 2020-02-21

## 2020-02-21 RX ADMIN — SODIUM CHLORIDE 160 MG: 9 INJECTION, SOLUTION INTRAVENOUS at 12:17

## 2020-02-21 RX ADMIN — SODIUM CHLORIDE 250 ML: 9 INJECTION, SOLUTION INTRAVENOUS at 11:56

## 2020-02-21 RX ADMIN — DEXAMETHASONE SODIUM PHOSPHATE 12 MG: 4 INJECTION, SOLUTION INTRAMUSCULAR; INTRAVENOUS at 11:58

## 2020-02-21 RX ADMIN — DIPHENHYDRAMINE HYDROCHLORIDE 25 MG: 50 INJECTION INTRAMUSCULAR; INTRAVENOUS at 11:58

## 2020-02-21 RX ADMIN — FAMOTIDINE 20 MG: 10 INJECTION, SOLUTION INTRAVENOUS at 11:58

## 2020-02-21 RX ADMIN — HEPARIN 500 UNITS: 100 SYRINGE at 13:20

## 2020-02-21 NOTE — PROGRESS NOTES
PROBLEM LIST:  Oncology/Hematology History    Lab Results   Component Value Date    FINALDX  08/19/2019     1. RIGHT BREAST LUMPECTOMY WITH NEEDLE LOCALIZATION:   Infiltrating and in-situ intermediate grade lobular carcinoma.  Bloom Denis score 6/9.  Infiltrating tumor size 2.2x1.5x1.0 cm.   Margins of resection negative for tumor with closest infiltrating margin inferior and superior measuring 4 mm each.   No lymphvascular invasion identified.   Presence of previous biopsy site identified.  See Template.  2. SENTINEL LYMPH NODE #1, RIGHT AXILLA, EXCISION:   Lymph node x1; positive for metastatic lobular carcinoma (1/1).  3. SENTINEL LYMPH NODE #2, RIGHT AXILLA, EXCISION:  Lymph node x1; positive for metastatic lobular carcinoma (1/1).  4. NONSENTINEL LYMPH NODE, RIGHT AXILLA, EXCISION:  Lymph node x1; positive for metastatic lobular carcinoma (1/1).     INVASIVE BREAST CANCER STAGING TEMPLATE:  TYPE OF SPECIMEN/PROCEDURE:  Needle localized lumpectomy  SPECIMEN LATERALITY: Right breast  TUMOR SITE: 12 o'clock   TUMOR SIZE: (Size of Largest Invasive Carcinoma): 2.2x1.5x1.0 cm   HISTOLOGIC TYPE OF INVASIVE CARCINOMA:  Lobular   GRADE: Intermediate   TUBULAR FORMATION:  3  MITOTIC ACTIVITY:  1  PLEOMORPHISM:  2  OVERALL SCORE: 6/9   DUCTAL CARCINOMA IN SITU (DCIS) EXTENT: 0  TUMOR EXTENSION (Only if structures present and involved):    SKIN: N/A    NIPPLE: N/A    SKELETAL MUSCLE: N/A   SURGICAL MARGINS (Uninvolved/positive): Uninvolved   INVASIVE CARCINOMA MARGINS (Uninvolved/Positive) Uninvolved   DISTANCE FROM CLOSEST MARGIN: 4 mm   SPECIFIC CLOSEST MARGIN: Inferior and superior   LCIS MARGINS (Uninvolved/Positive/No LCIS): Uninvolved   DISTANCE FROM CLOSEST MARGIN: 1 mm  SPECIFIC CLOSEST MARGIN: Inferior   LYMPH NODES (required only if lymph nodes are present in the specimen):  Present   NUMBER OF LYMPH NODES WITH MACROMETASTASIS: 3  NUMBER OF LYMPH NODES WITH MICROMETASTASIS: 0  NUMBER OF LYMPH NODES WITH  ISOLATED TUMOR CELLS: 0  TOTAL NUMBER OF LYMPH NODES EXAMINED (Including sentinel nodes): 3  NUMBER OF SENTINEL NODES EXAMINED: 2  EXTRANODAL EXTENSION OF TUMOR:  0  VASCULAR/LYMPHATIC INVASION:  Not identified     ESTROGEN RECEPTOR STATUS BY IHC METHOD: Positive   PROGESTERONE RECEPTOR STATUS BY IHC METHOD: Positive    HER-2/ysabel ONCOPROTEIN STATUS BY IHC METHOD:  Negative   HER-2/ysabel ONCOGENE STATUS BY IN SITU HYBRIDIZATION ANALYSIS:  Not performed   TREATMENT EFFECT (BREAST): None  TREATMENT EFFECT (NODES): None   ADDITIONAL PATHOLOGIC FINDINGS:  Previous biopsy site identified   OTHER STUDIES:  None  AJCC PATHOLOGIC STAGE:  (COMPLETED BY PATHOLOGIST, BASED ONLY ON TISSUE FINDINGS, MORE EXTENSIVE DISEASE MAY NOT BE KNOWN TO THE PATHOLOGIST)  pT=  2  pN=  2  pM=  Unknown    DGD/mbc                Malignant neoplasm of upper-outer quadrant of right breast in female, estrogen receptor positive (CMS/HCC)    8/16/2019 Initial Diagnosis     Malignant neoplasm of upper-outer quadrant of right breast in female, estrogen receptor positive (CMS/HCC)      8/19/2019 Surgery     Surgery       Right breast lumpectomy with sentinel node biopsy by Dr. Stephanie Fraire      8/19/2019 Cancer Staged     Cancer Staging  Malignant neoplasm of upper-outer quadrant of right breast in female, estrogen receptor positive (CMS/HCC)  Staging form: Breast, AJCC 8th Edition  - Pathologic stage from 9/12/2019: Stage IB (pT2, pN1a, cM0, G2, ER: Positive, HI: Positive, HER2: Negative) - Signed by Laila Thacker MD on 9/12/2019 9/19/2019 Imaging     Negative work-up for metastatic disease by CAT scan of the chest abdomen pelvis and bone scan      9/20/2019 -  Other Event     ECHO - Normal EF      9/23/2019 Surgery     Surgery       Port Placement      9/25/2019 -  Chemotherapy     OP BREAST AC DD DOXOrubicin / Cyclophosphamide           REASON FOR VISIT: Management of my breast cancer    HISTORY OF PRESENT ILLNESS:   59 y.o.  female  presents today for management of her breast cancer.  She completed 4 cycles of dose dense Adriamycin and Cytoxan.  She completes her 12th cycle of Taxol today.  After that she will complete adjuvant chemotherapy.  She is scheduled to see radiation early next month and start that.  Clinically she did remarkably well with treatment.  She is doing well otherwise.    Past Medical History:   Diagnosis Date   • Cardiomegaly     stable on CXR   • Dyspnea on exertion    • Elevated LDL cholesterol level    • Fatigue    • GERD (gastroesophageal reflux disease)     on daily Protonix, EGD 10/2016. Sx's resolved since AGBR, even if doesn't take her PPI. serum h. pyl neg.  EGD GDW 10/16 prior to AGBR  36 cm   • Hypertension    • Hypoalbuminemia     admits to chronic undereating   • Low HDL (under 40)    • Malignant neoplasm of upper-outer quadrant of right breast in female, estrogen receptor positive (CMS/HCC) 9/12/2019   • Microcytic red blood cells     H/H 12.6/38.4   • Morbid obesity (CMS/HCC)    • OAB (overactive bladder)    • Peripheral edema    • Post-menopause on HRT (hormone replacement therapy)    • Rosacea     on Doxycycline   • Stress incontinence    • Vaginal atrophy    • Vitamin D deficiency    • Wears glasses      Social History     Socioeconomic History   • Marital status:      Spouse name: Not on file   • Number of children: Not on file   • Years of education: Not on file   • Highest education level: Not on file   Tobacco Use   • Smoking status: Never Smoker   • Smokeless tobacco: Never Used   Substance and Sexual Activity   • Alcohol use: No   • Drug use: No   • Sexual activity: Defer     Comment: spouse   Social History Narrative     w/2 children.  Lives in [place].  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      Family History   Problem Relation Age of Onset   • Hypertension Mother    • Sleep apnea Mother    • Hypertension Father    • Lung cancer Father    • Hypertension  Maternal Grandfather    • Stroke Maternal Grandfather    • Hypertension Paternal Grandmother    • Breast cancer Paternal Aunt 45   • No Known Problems Brother    • BRCA 1/2 Neg Hx    • Ovarian cancer Neg Hx        Review of Systems:    Review of Systems   Constitutional: Positive for fatigue.   HENT:  Negative.         Mouth Sores   Eyes: Negative.    Respiratory: Negative.    Cardiovascular: Negative.    Gastrointestinal: Negative.    Endocrine: Negative.    Genitourinary: Negative.     Musculoskeletal: Negative.    Skin: Negative.    Neurological: Negative.    Hematological: Negative.    Psychiatric/Behavioral: Negative.       A comprehensive 14 point review of systems was performed and was negative except as mentioned.      Medications:        Current Outpatient Medications:   •  Biotin 5000 MCG tablet, Take  by mouth., Disp: , Rfl:   •  Cholecalciferol (VITAMIN D3) 1.25 MG (81002 UT) tablet, Take 1 tablet by mouth 1 (One) Time Per Week for 12 doses., Disp: 12 tablet, Rfl: 0  •  Ferrous Gluconate (IRON 27 PO), Take  by mouth., Disp: , Rfl:   •  hydrochlorothiazide (MICROZIDE) 12.5 MG capsule, Take 12.5 mg by mouth Daily., Disp: , Rfl:   •  lidocaine-prilocaine (EMLA) 2.5-2.5 % cream, Apply  topically to the appropriate area as directed As Needed (45-60 minutes prior to port access.  Cover with saran/plastic wrap)., Disp: 30 g, Rfl: 2  •  LISINOPRIL PO, Take 40 mg by mouth Daily., Disp: , Rfl:   •  ondansetron (ZOFRAN) 8 MG tablet, Take 1 tablet by mouth 3 (Three) Times a Day As Needed for Nausea or Vomiting., Disp: 30 tablet, Rfl: 5  •  pantoprazole (PROTONIX) 40 MG EC tablet, Take 40 mg by mouth Daily., Disp: , Rfl:   •  tolterodine LA (DETROL LA) 4 MG 24 hr capsule, Take 1 capsule by mouth Daily., Disp: 30 capsule, Rfl: 11  •  vitamin D (ERGOCALCIFEROL) 76941 UNITS capsule capsule, 50,000 Units 1 (One) Time Per Week. sundays, Disp: , Rfl:   No current facility-administered medications for this visit.  "    Facility-Administered Medications Ordered in Other Visits:   •  dexamethasone (DECADRON) 12 mg in sodium chloride 0.9 % IVPB, 12 mg, Intravenous, Once, Laila Thacker MD  •  diphenhydrAMINE (BENADRYL) 25 mg in sodium chloride 0.9 % 50 mL IVPB, 25 mg, Intravenous, Once, Laila Thacker MD  •  famotidine (PEPCID) injection 20 mg, 20 mg, Intravenous, Once, Laila Thacker MD  •  PACLitaxel (TAXOL) 160 mg in sodium chloride 0.9 % 301.7 mL chemo IVPB, 80 mg/m2 (Treatment Plan Recorded), Intravenous, Once, Laila Thacker MD      ALLERGIES:    Allergies   Allergen Reactions   • Penicillins Hives and Swelling     Invanz given for AGBR surgery         Physical Exam    VITAL SIGNS:  /63   Pulse 109   Temp 98.2 °F (36.8 °C) (Temporal)   Resp 20   Ht 160 cm (63\")   Wt 99.8 kg (220 lb)   SpO2 96% Comment: RA  BMI 38.97 kg/m²     Wt Readings from Last 3 Encounters:   02/21/20 99.8 kg (220 lb)   02/21/20 99.8 kg (220 lb)   02/14/20 100 kg (221 lb)       Body mass index is 38.97 kg/m². Body surface area is 2.01 meters squared.         Performance Status: 0    General: well appearing, in no acute distress  HEENT: sclera anicteric, oropharynx clear, neck is supple  Lymphatics: no cervical, supraclavicular, or axillary adenopathy  Cardiovascular: regular rate and rhythm, no murmurs, rubs or gallops  Lungs: clear to auscultation bilaterally  Abdomen: soft, nontender, nondistended.  No palpable organomegaly  Extremities: no lower extremity edema  Skin: no rashes, lesions, bruising, or petechiae  Msk:  Shows no weakness of the large muscle groups  Psych: Mood is stable  Port in the left chest wall      RECENT LABS:    Lab Results   Component Value Date    HGB 11.4 (L) 02/21/2020    HCT 35.0 02/21/2020    .9 (H) 02/21/2020     02/21/2020    WBC 3.60 02/21/2020    NEUTROABS 2.30 02/21/2020    LYMPHSABS 1.00 02/21/2020    MONOSABS 0.30 02/21/2020    EOSABS 0.1 05/08/2019    BASRONNYBS " 0.0 05/08/2019       Lab Results   Component Value Date    GLUCOSE 75 02/21/2020    BUN 8 02/21/2020    CREATININE 0.63 02/21/2020     02/21/2020    K 3.4 (L) 02/21/2020     (H) 02/21/2020    CO2 26.0 02/21/2020    CALCIUM 8.9 02/21/2020    PROTEINTOT 6.3 02/21/2020    ALBUMIN 3.50 02/21/2020    BILITOT 0.3 02/21/2020    ALKPHOS 56 02/21/2020    AST 12 02/21/2020    ALT 9 02/21/2020       Ct Chest With Contrast    Result Date: 9/22/2019  No CT evidence of metastatic disease. No evidence of acute intrathoracic, intra-abdominal or pelvic abnormality. Postsurgical changes identified within the right axillary region. No definite remaining lymph nodes identified.  DICTATED:   09/20/2019 EDITED/ls :   09/21/2019  This report was finalized on 9/22/2019 9:00 AM by Dr. Paz Hubbard MD.      Nm Bone Scan Whole Body    Result Date: 9/13/2019  Normal examination.  D:  09/13/2019 E:  09/13/2019     This report was finalized on 9/13/2019 4:23 PM by Dr. César Paiz MD.      Ct Abdomen Pelvis With Contrast    Result Date: 9/22/2019  No CT evidence of metastatic disease. No evidence of acute intrathoracic, intra-abdominal or pelvic abnormality. Postsurgical changes identified within the right axillary region. No definite remaining lymph nodes identified.  DICTATED:   09/20/2019 EDITED/ls :   09/21/2019  This report was finalized on 9/22/2019 9:00 AM by Dr. Paz Hubbard MD.              Assessment/Plan    1. Stage IB infiltrating intermediate grade lobular carcinoma of the right breast with node positivity.  She will complete adjuvant chemotherapy with her final dose of Taxol today.  We will proceed with treatment.  She will start radiotherapy in couple weeks.  She can have her port removed after her treatment today.  I will schedule that with Dr. LANDA.  She will be placed on hormone blockade with anastrozole and calcium and vitamin D.  We discussed the side effects of the treatment.  I will see her back in my  clinic in 3 months to make sure she is tolerating things well.    2.  Oral mucositis.  On valtrex as needed    3.  Chemotherapy-induced nausea.  She is well controlled on the current regiment of Zofran.    4.  Port will be removed by Dr. LANDA in the next several weeks if possible.          I spent a total of 25 minutes in direct patient care, greater than 20 minutes (greater than 50%) were spent in coordination of care, and counseling the patient regarding breast cancer. Answered any questions patient had with medication and plan.      Laila Thacker MD  Psychiatric Hematology and Oncology    Return on: 05/22/20    Orders Placed This Encounter   Procedures   • Ambulatory Referral to [facility] for Port-a-Cath       2/21/2020         Please note that portions of this note may have been completed with a voice recognition program. Efforts were made to edit the dictations, but occasionally words are mistranscribed.

## 2020-03-02 ENCOUNTER — HOSPITAL ENCOUNTER (OUTPATIENT)
Dept: RADIATION ONCOLOGY | Facility: HOSPITAL | Age: 60
Discharge: HOME | End: 2020-03-02

## 2020-03-02 ENCOUNTER — HOSPITAL ENCOUNTER (OUTPATIENT)
Dept: RADIATION ONCOLOGY | Facility: HOSPITAL | Age: 60
Setting detail: RADIATION/ONCOLOGY SERIES
Discharge: HOME | End: 2020-03-02

## 2020-03-02 ENCOUNTER — OFFICE VISIT (OUTPATIENT)
Dept: RADIATION ONCOLOGY | Facility: HOSPITAL | Age: 60
End: 2020-03-02

## 2020-03-02 VITALS
DIASTOLIC BLOOD PRESSURE: 66 MMHG | OXYGEN SATURATION: 95 % | BODY MASS INDEX: 39.3 KG/M2 | TEMPERATURE: 97.6 F | HEIGHT: 63 IN | WEIGHT: 221.8 LBS | HEART RATE: 83 BPM | RESPIRATION RATE: 18 BRPM | SYSTOLIC BLOOD PRESSURE: 120 MMHG

## 2020-03-02 DIAGNOSIS — Z17.0 MALIGNANT NEOPLASM OF UPPER-OUTER QUADRANT OF RIGHT BREAST IN FEMALE, ESTROGEN RECEPTOR POSITIVE (HCC): Primary | ICD-10-CM

## 2020-03-02 DIAGNOSIS — C50.411 MALIGNANT NEOPLASM OF UPPER-OUTER QUADRANT OF RIGHT BREAST IN FEMALE, ESTROGEN RECEPTOR POSITIVE (HCC): Primary | ICD-10-CM

## 2020-03-02 PROCEDURE — 77332 RADIATION TREATMENT AID(S): CPT | Performed by: RADIOLOGY

## 2020-03-02 PROCEDURE — 77280 THER RAD SIMULAJ FIELD SMPL: CPT | Performed by: RADIOLOGY

## 2020-03-02 PROCEDURE — G0463 HOSPITAL OUTPT CLINIC VISIT: HCPCS

## 2020-03-02 NOTE — PROGRESS NOTES
CONSULTATION NOTE    NAME:      Francoise Kamara  :                                                          1960  DATE OF CONSULTATION:                       20  REQUESTING PHYSICIAN:                   Dr. Thacker  REASON FOR CONSULTATION:           Cancer Staging  Malignant neoplasm of upper-outer quadrant of right breast in female, estrogen receptor positive (CMS/HCC)  Staging form: Breast, AJCC 8th Edition  - Pathologic stage from 2019: Stage IB (pT2, pN1a, cM0, G2, ER: Positive, SD: Positive, HER2: Negative)          BRIEF HISTORY:  Francoise Kamara  is a very pleasant 59 y.o. female  who underwent right breast lumpectomy on 2019 at the 12 o'clock position by Dr. Yvrose Fraire for a 2.2 x 1.5 x 1 cm infiltrating lobular carcinoma.  There was no lymphovascular invasion and 3/3 lymph nodes were positive for tumor without extracapsular extension.  Surgical margins were negative by 4 mm.  CTs of the chest abdomen pelvis were negative for metastatic disease. She has completed chemotherapy of AC DD doxorubicin/cyclophosphamide and taxol. Dr. Rodriguez plans to give her hormone blockade with anastrozole.  She is here to discuss postoperative radiation and for simulation for treatment planning.  She complains today only fatigue and some numbness in her feet.  She had a recent negative mammogram on 2020.  Six-month follow-up was recommended.      Allergies   Allergen Reactions   • Penicillins Hives and Swelling     Invanz given for AGBR surgery       Social History     Tobacco Use   • Smoking status: Never Smoker   • Smokeless tobacco: Never Used   Substance Use Topics   • Alcohol use: No   • Drug use: No         Past Medical History:   Diagnosis Date   • Cardiomegaly     stable on CXR   • Dyspnea on exertion    • Elevated LDL cholesterol level    • Fatigue    • GERD (gastroesophageal reflux disease)     on daily Protonix, EGD 10/2016. Sx's resolved since AGBR, even if doesn't take  her PPI. serum h. pyl neg.  EGD GDW 10/16 prior to AGBR  36 cm   • Hypertension    • Hypoalbuminemia     admits to chronic undereating   • Low HDL (under 40)    • Malignant neoplasm of upper-outer quadrant of right breast in female, estrogen receptor positive (CMS/HCC) 9/12/2019   • Microcytic red blood cells     H/H 12.6/38.4   • Morbid obesity (CMS/HCC)    • OAB (overactive bladder)    • Peripheral edema    • Post-menopause on HRT (hormone replacement therapy)    • Rosacea     on Doxycycline   • Stress incontinence    • Vaginal atrophy    • Vitamin D deficiency    • Wears glasses        family history includes Breast cancer (age of onset: 45) in her paternal aunt; Hypertension in her father, maternal grandfather, mother, and paternal grandmother; Lung cancer in her father; No Known Problems in her brother; Sleep apnea in her mother; Stroke in her maternal grandfather.     Past Surgical History:   Procedure Laterality Date   • BREAST BIOPSY      ? LATERALITY   • BREAST BIOPSY Right 07/2019    [facility]    • BREAST LUMPECTOMY Right 08/19/2019   • CHOLECYSTECTOMY  2015    for stones w/ Dr. Barboza @ [facility]   • COLONOSCOPY  2014   • COLONOSCOPY  2017   • ENDOSCOPY  2016    by Dr. Urias   • ENDOSCOPY N/A 10/20/2017    Procedure: ESOPHAGOGASTRODUODENOSCOPY;  Surgeon: Guille Urias MD;  Location:  [facility];  Service:    • GASTRIC BANDING REMOVAL N/A 12/21/2016    Procedure: GASTRIC BANDING REMOVAL LAPAROSCOPIC;  Surgeon: Guille Urias MD;  Location:  [facility];  Service:    • GASTRIC SLEEVE LAPAROSCOPIC N/A 10/20/2017    Procedure: GASTRIC SLEEVE LAPAROSCOPIC, ;  Surgeon: Guille Urias MD;  Location:  [facility];  Service:    • LAPAROSCOPIC GASTRIC BANDING  2008    s/p LAGB APS w/ HHR 11/2008 by W @ [facility].  full dissection hiatus, single stitch ant repair   • VENOUS ACCESS DEVICE (PORT) INSERTION Left 09/24/2019   • VENOUS ACCESS DEVICE (PORT) INSERTION Left 10/24/2019   • WISDOM TOOTH EXTRACTION          Review  "of Systems   Constitutional: Positive for fatigue.   Neurological: Positive for numbness (a little in bottoms of feet from chemo).   All other systems reviewed and are negative.          Objective   VITAL SIGNS:   Vitals:    03/02/20 0933   BP: 120/66   Pulse: 83   Resp: 18   Temp: 97.6 °F (36.4 °C)   TempSrc: Temporal   SpO2: 95%  Comment: RA   Weight: 101 kg (221 lb 12.8 oz)   Height: 160 cm (63\")   PainSc: 0-No pain        KPS       90%    Physical Exam   Constitutional: She is oriented to person, place, and time. She appears well-developed and well-nourished. No distress.   HENT:   Head: Normocephalic and atraumatic.   Eyes: No scleral icterus.   Cardiovascular: Normal rate and regular rhythm.   Pulmonary/Chest: Effort normal and breath sounds normal.   Lymphadenopathy:     She has no cervical adenopathy.   Neurological: She is alert and oriented to person, place, and time.   Nursing note and vitals reviewed.  The breast exam reveals no masses or suspicious lesions in either breast.  The right breast has a well-healed surgical incision at the 12 o'clock position and a well-healed incision in the right axilla.  She has no axillary adenopathy bilaterally.         The following portions of the patient's history were reviewed and updated as appropriate: allergies, current medications, past family history, past medical history, past social history, past surgical history and problem list.    Assessment      IMPRESSION:  Francoise Kamara  is a very pleasant 59 y.o. female  who underwent right breast lumpectomy on 8/19/2019 at the 12 o'clock position by Dr. Yvrose Fraire for a 2.2 x 1.5 x 1 cm infiltrating lobular carcinoma.  There was no lymphovascular invasion and 3/3 lymph nodes were positive for tumor without extracapsular extension.  Surgical margins were negative by 4 mm.  CTs of the chest abdomen pelvis were negative for metastatic disease. She has completed chemotherapy of AC DD " doxorubicin/cyclophosphamide and taxol. Dr. Rodriguez plans to give her hormone blockade with anastrozole.  She is here to discuss postoperative radiation and for simulation for treatment planning.  She complains today only fatigue and some numbness in her feet.      RECOMMENDATIONS:  I recommend postoperative radiotherapy to decrease the risk of local recurrence.  She had 3 of 3+ lymph nodes.  The pros and cons, risks and benefits were discussed.  She lives in [place] but would like to undergo treatment here at [facility].  I anticipate 40-45 Gray in 15-25 fractions to the right breast and regional lymphatics and a tumor boost of 10 Gray in 5 fractions.    We will simulate the fields today and anticipate starting as soon as possible.  She has a trip planned to [place] the end of April.  It is a pleasure to see Ms. Kamara.                  Karen Everett MD      Errors in dictation may reflect use of voice recognition software and not all errors in transcription may have been detected prior to signing.

## 2020-03-04 PROCEDURE — 77334 RADIATION TREATMENT AID(S): CPT | Performed by: RADIOLOGY

## 2020-03-04 PROCEDURE — 77300 RADIATION THERAPY DOSE PLAN: CPT | Performed by: RADIOLOGY

## 2020-03-04 PROCEDURE — 77295 3-D RADIOTHERAPY PLAN: CPT | Performed by: RADIOLOGY

## 2020-03-06 ENCOUNTER — HOSPITAL ENCOUNTER (OUTPATIENT)
Dept: RADIATION ONCOLOGY | Facility: HOSPITAL | Age: 60
Discharge: HOME | End: 2020-03-06

## 2020-03-06 PROCEDURE — 77280 THER RAD SIMULAJ FIELD SMPL: CPT | Performed by: RADIOLOGY

## 2020-03-09 ENCOUNTER — HOSPITAL ENCOUNTER (OUTPATIENT)
Dept: RADIATION ONCOLOGY | Facility: HOSPITAL | Age: 60
Discharge: HOME | End: 2020-03-09

## 2020-03-09 PROCEDURE — 77412 RADIATION TX DELIVERY LVL 3: CPT | Performed by: RADIOLOGY

## 2020-03-10 ENCOUNTER — HOSPITAL ENCOUNTER (OUTPATIENT)
Dept: RADIATION ONCOLOGY | Facility: HOSPITAL | Age: 60
Discharge: HOME | End: 2020-03-10

## 2020-03-10 VITALS — WEIGHT: 221.7 LBS | BODY MASS INDEX: 39.27 KG/M2

## 2020-03-10 PROCEDURE — 77412 RADIATION TX DELIVERY LVL 3: CPT | Performed by: RADIOLOGY

## 2020-03-11 ENCOUNTER — HOSPITAL ENCOUNTER (OUTPATIENT)
Dept: RADIATION ONCOLOGY | Facility: HOSPITAL | Age: 60
Discharge: HOME | End: 2020-03-11

## 2020-03-11 PROCEDURE — 77412 RADIATION TX DELIVERY LVL 3: CPT | Performed by: RADIOLOGY

## 2020-03-12 ENCOUNTER — HOSPITAL ENCOUNTER (OUTPATIENT)
Dept: RADIATION ONCOLOGY | Facility: HOSPITAL | Age: 60
Discharge: HOME | End: 2020-03-12

## 2020-03-12 PROCEDURE — 77412 RADIATION TX DELIVERY LVL 3: CPT | Performed by: RADIOLOGY

## 2020-03-13 ENCOUNTER — HOSPITAL ENCOUNTER (OUTPATIENT)
Dept: RADIATION ONCOLOGY | Facility: HOSPITAL | Age: 60
Discharge: HOME | End: 2020-03-13

## 2020-03-13 PROCEDURE — 77412 RADIATION TX DELIVERY LVL 3: CPT | Performed by: RADIOLOGY

## 2020-03-13 PROCEDURE — 77336 RADIATION PHYSICS CONSULT: CPT | Performed by: RADIOLOGY

## 2020-03-13 PROCEDURE — 77417 THER RADIOLOGY PORT IMAGE(S): CPT | Performed by: RADIOLOGY

## 2020-03-16 ENCOUNTER — HOSPITAL ENCOUNTER (OUTPATIENT)
Dept: RADIATION ONCOLOGY | Facility: HOSPITAL | Age: 60
Discharge: HOME | End: 2020-03-16

## 2020-03-16 PROCEDURE — 77412 RADIATION TX DELIVERY LVL 3: CPT | Performed by: RADIOLOGY

## 2020-03-17 ENCOUNTER — HOSPITAL ENCOUNTER (OUTPATIENT)
Dept: RADIATION ONCOLOGY | Facility: HOSPITAL | Age: 60
Discharge: HOME | End: 2020-03-17

## 2020-03-17 VITALS — WEIGHT: 218.4 LBS | BODY MASS INDEX: 38.69 KG/M2

## 2020-03-17 PROCEDURE — 77412 RADIATION TX DELIVERY LVL 3: CPT | Performed by: RADIOLOGY

## 2020-03-18 ENCOUNTER — HOSPITAL ENCOUNTER (OUTPATIENT)
Dept: RADIATION ONCOLOGY | Facility: HOSPITAL | Age: 60
Discharge: HOME | End: 2020-03-18

## 2020-03-18 PROCEDURE — 77412 RADIATION TX DELIVERY LVL 3: CPT | Performed by: RADIOLOGY

## 2020-03-19 ENCOUNTER — HOSPITAL ENCOUNTER (OUTPATIENT)
Dept: RADIATION ONCOLOGY | Facility: HOSPITAL | Age: 60
Discharge: HOME | End: 2020-03-19

## 2020-03-19 PROCEDURE — 77336 RADIATION PHYSICS CONSULT: CPT | Performed by: RADIOLOGY

## 2020-03-19 PROCEDURE — 77412 RADIATION TX DELIVERY LVL 3: CPT | Performed by: RADIOLOGY

## 2020-03-20 ENCOUNTER — HOSPITAL ENCOUNTER (OUTPATIENT)
Dept: RADIATION ONCOLOGY | Facility: HOSPITAL | Age: 60
Discharge: HOME | End: 2020-03-20

## 2020-03-20 PROCEDURE — 77412 RADIATION TX DELIVERY LVL 3: CPT | Performed by: RADIOLOGY

## 2020-03-20 PROCEDURE — 77417 THER RADIOLOGY PORT IMAGE(S): CPT | Performed by: RADIOLOGY

## 2020-03-23 ENCOUNTER — HOSPITAL ENCOUNTER (OUTPATIENT)
Dept: RADIATION ONCOLOGY | Facility: HOSPITAL | Age: 60
Discharge: HOME | End: 2020-03-23

## 2020-03-23 PROCEDURE — 77412 RADIATION TX DELIVERY LVL 3: CPT | Performed by: RADIOLOGY

## 2020-03-24 ENCOUNTER — HOSPITAL ENCOUNTER (OUTPATIENT)
Dept: RADIATION ONCOLOGY | Facility: HOSPITAL | Age: 60
Discharge: HOME | End: 2020-03-24

## 2020-03-24 VITALS — BODY MASS INDEX: 38.62 KG/M2 | WEIGHT: 218 LBS

## 2020-03-24 PROCEDURE — 77412 RADIATION TX DELIVERY LVL 3: CPT | Performed by: RADIOLOGY

## 2020-03-25 ENCOUNTER — HOSPITAL ENCOUNTER (OUTPATIENT)
Dept: RADIATION ONCOLOGY | Facility: HOSPITAL | Age: 60
Discharge: HOME | End: 2020-03-25

## 2020-03-25 PROCEDURE — 77412 RADIATION TX DELIVERY LVL 3: CPT | Performed by: RADIOLOGY

## 2020-03-26 ENCOUNTER — HOSPITAL ENCOUNTER (OUTPATIENT)
Dept: RADIATION ONCOLOGY | Facility: HOSPITAL | Age: 60
Discharge: HOME | End: 2020-03-26

## 2020-03-26 PROCEDURE — 77412 RADIATION TX DELIVERY LVL 3: CPT | Performed by: RADIOLOGY

## 2020-03-27 ENCOUNTER — HOSPITAL ENCOUNTER (OUTPATIENT)
Dept: RADIATION ONCOLOGY | Facility: HOSPITAL | Age: 60
Discharge: HOME | End: 2020-03-27

## 2020-03-27 PROCEDURE — 77417 THER RADIOLOGY PORT IMAGE(S): CPT | Performed by: RADIOLOGY

## 2020-03-27 PROCEDURE — 77336 RADIATION PHYSICS CONSULT: CPT | Performed by: RADIOLOGY

## 2020-03-27 PROCEDURE — 77412 RADIATION TX DELIVERY LVL 3: CPT | Performed by: RADIOLOGY

## 2020-03-30 ENCOUNTER — HOSPITAL ENCOUNTER (OUTPATIENT)
Dept: RADIATION ONCOLOGY | Facility: HOSPITAL | Age: 60
Discharge: HOME | End: 2020-03-30

## 2020-03-30 PROCEDURE — 77412 RADIATION TX DELIVERY LVL 3: CPT | Performed by: RADIOLOGY

## 2020-03-31 ENCOUNTER — HOSPITAL ENCOUNTER (OUTPATIENT)
Dept: RADIATION ONCOLOGY | Facility: HOSPITAL | Age: 60
Discharge: HOME | End: 2020-03-31

## 2020-03-31 PROCEDURE — 77412 RADIATION TX DELIVERY LVL 3: CPT | Performed by: RADIOLOGY

## 2020-04-01 ENCOUNTER — HOSPITAL ENCOUNTER (OUTPATIENT)
Dept: RADIATION ONCOLOGY | Facility: HOSPITAL | Age: 60
Setting detail: RADIATION/ONCOLOGY SERIES
Discharge: HOME | End: 2020-04-01

## 2020-04-02 ENCOUNTER — HOSPITAL ENCOUNTER (OUTPATIENT)
Dept: RADIATION ONCOLOGY | Facility: HOSPITAL | Age: 60
Discharge: HOME | End: 2020-04-02

## 2020-04-02 PROCEDURE — 77412 RADIATION TX DELIVERY LVL 3: CPT | Performed by: RADIOLOGY

## 2020-04-02 PROCEDURE — 77336 RADIATION PHYSICS CONSULT: CPT | Performed by: RADIOLOGY

## 2020-04-03 ENCOUNTER — HOSPITAL ENCOUNTER (OUTPATIENT)
Dept: RADIATION ONCOLOGY | Facility: HOSPITAL | Age: 60
Discharge: HOME | End: 2020-04-03

## 2020-04-03 PROCEDURE — 77412 RADIATION TX DELIVERY LVL 3: CPT | Performed by: RADIOLOGY

## 2020-04-06 ENCOUNTER — HOSPITAL ENCOUNTER (OUTPATIENT)
Dept: RADIATION ONCOLOGY | Facility: HOSPITAL | Age: 60
Discharge: HOME | End: 2020-04-06

## 2020-04-06 PROCEDURE — 77417 THER RADIOLOGY PORT IMAGE(S): CPT | Performed by: RADIOLOGY

## 2020-04-06 PROCEDURE — 77412 RADIATION TX DELIVERY LVL 3: CPT | Performed by: RADIOLOGY

## 2020-04-06 PROCEDURE — 77336 RADIATION PHYSICS CONSULT: CPT | Performed by: RADIOLOGY

## 2020-04-07 ENCOUNTER — HOSPITAL ENCOUNTER (OUTPATIENT)
Dept: RADIATION ONCOLOGY | Facility: HOSPITAL | Age: 60
Discharge: HOME | End: 2020-04-07

## 2020-04-07 VITALS — BODY MASS INDEX: 38.16 KG/M2 | WEIGHT: 215.4 LBS

## 2020-04-07 PROCEDURE — 77412 RADIATION TX DELIVERY LVL 3: CPT | Performed by: RADIOLOGY

## 2020-04-08 ENCOUNTER — HOSPITAL ENCOUNTER (OUTPATIENT)
Dept: RADIATION ONCOLOGY | Facility: HOSPITAL | Age: 60
Discharge: HOME | End: 2020-04-08

## 2020-04-08 PROCEDURE — 77412 RADIATION TX DELIVERY LVL 3: CPT | Performed by: RADIOLOGY

## 2020-04-09 ENCOUNTER — HOSPITAL ENCOUNTER (OUTPATIENT)
Dept: RADIATION ONCOLOGY | Facility: HOSPITAL | Age: 60
Discharge: HOME | End: 2020-04-09

## 2020-04-09 PROCEDURE — 77412 RADIATION TX DELIVERY LVL 3: CPT | Performed by: RADIOLOGY

## 2020-04-09 PROCEDURE — 77336 RADIATION PHYSICS CONSULT: CPT | Performed by: RADIOLOGY

## 2020-04-10 ENCOUNTER — HOSPITAL ENCOUNTER (OUTPATIENT)
Dept: RADIATION ONCOLOGY | Facility: HOSPITAL | Age: 60
Discharge: HOME | End: 2020-04-10

## 2020-04-10 PROCEDURE — 77412 RADIATION TX DELIVERY LVL 3: CPT | Performed by: RADIOLOGY

## 2020-04-10 PROCEDURE — 77334 RADIATION TREATMENT AID(S): CPT | Performed by: RADIOLOGY

## 2020-04-10 PROCEDURE — 77300 RADIATION THERAPY DOSE PLAN: CPT | Performed by: RADIOLOGY

## 2020-04-13 ENCOUNTER — HOSPITAL ENCOUNTER (OUTPATIENT)
Dept: RADIATION ONCOLOGY | Facility: HOSPITAL | Age: 60
Discharge: HOME | End: 2020-04-13

## 2020-04-13 PROCEDURE — 77412 RADIATION TX DELIVERY LVL 3: CPT | Performed by: RADIOLOGY

## 2020-04-13 PROCEDURE — 77280 THER RAD SIMULAJ FIELD SMPL: CPT | Performed by: RADIOLOGY

## 2020-04-14 ENCOUNTER — HOSPITAL ENCOUNTER (OUTPATIENT)
Dept: RADIATION ONCOLOGY | Facility: HOSPITAL | Age: 60
Discharge: HOME | End: 2020-04-14

## 2020-04-14 VITALS — WEIGHT: 216.9 LBS | BODY MASS INDEX: 38.42 KG/M2

## 2020-04-14 PROCEDURE — 77412 RADIATION TX DELIVERY LVL 3: CPT | Performed by: RADIOLOGY

## 2020-04-15 ENCOUNTER — HOSPITAL ENCOUNTER (OUTPATIENT)
Dept: RADIATION ONCOLOGY | Facility: HOSPITAL | Age: 60
Discharge: HOME | End: 2020-04-15

## 2020-04-15 PROCEDURE — 77412 RADIATION TX DELIVERY LVL 3: CPT | Performed by: RADIOLOGY

## 2020-04-16 ENCOUNTER — HOSPITAL ENCOUNTER (OUTPATIENT)
Dept: RADIATION ONCOLOGY | Facility: HOSPITAL | Age: 60
Discharge: HOME | End: 2020-04-16

## 2020-04-16 PROCEDURE — 77412 RADIATION TX DELIVERY LVL 3: CPT | Performed by: RADIOLOGY

## 2020-04-16 PROCEDURE — 77336 RADIATION PHYSICS CONSULT: CPT | Performed by: RADIOLOGY

## 2020-04-17 ENCOUNTER — TELEPHONE (OUTPATIENT)
Dept: ONCOLOGY | Facility: CLINIC | Age: 60
End: 2020-04-17

## 2020-04-17 ENCOUNTER — HOSPITAL ENCOUNTER (OUTPATIENT)
Dept: RADIATION ONCOLOGY | Facility: HOSPITAL | Age: 60
Discharge: HOME | End: 2020-04-17

## 2020-04-17 PROCEDURE — 77412 RADIATION TX DELIVERY LVL 3: CPT | Performed by: RADIOLOGY

## 2020-04-17 NOTE — TELEPHONE ENCOUNTER
----- Message from Rakan Navarro sent at 4/17/2020 10:37 AM EDT -----  Regarding: DR WAGGONER-medication clarification  Contact: [phone]  Pt stopped by desk asking if she is to start taking her medication since she's finished her radiation and she's not sure of the medication.

## 2020-04-17 NOTE — TELEPHONE ENCOUNTER
Returned call to patient after discussing with Dr. Rodriguez. Informing patient that Dr. Rodriguez is fine with patient going ahead and starting anastrozole.

## 2020-04-20 ENCOUNTER — HOSPITAL ENCOUNTER (OUTPATIENT)
Dept: RADIATION ONCOLOGY | Facility: HOSPITAL | Age: 60
Discharge: HOME | End: 2020-04-20

## 2020-04-20 PROCEDURE — 77412 RADIATION TX DELIVERY LVL 3: CPT | Performed by: RADIOLOGY

## 2020-04-20 NOTE — PROGRESS NOTES
COMPLETION NOTE    PATIENT:   Francoise Kamara  :    1960  COMPLETION DATE:   2020   DIAGNOSIS:   Malignant neoplasm of upper-outer quadrant of right breast in female, estrogen receptor positive (CMS/HCC)  Staging form: Breast, AJCC 8th Edition  - Pathologic stage from 2019: Stage IB (pT2, pN1a, cM0, G2, ER: Positive, ND: Positive, HER2: Negative)            Subjective      BRIEF HISTORY:  Francoise Kamara  is a very pleasant 59 y.o. female  who underwent right breast lumpectomy on 2019 at the 12 o'clock position by Dr. Yvrose Fraire for a 2.2 x 1.5 x 1 cm infiltrating lobular carcinoma.  There was no lymphovascular invasion and 3/3 lymph nodes were positive for tumor without extracapsular extension.  Surgical margins were negative by 4 mm.  CTs of the chest abdomen pelvis were negative for metastatic disease. She has completed chemotherapy of AC DD doxorubicin/cyclophosphamide and taxol. Dr. Rodriguez plans to give her hormone blockade with anastrozole.   She received radiotherapy in our department as follows:    TREATMENT COURSE:   -20 right breast received 50 Gy in 25 fractions with 10 and 15 MV photons  -2020 the tumor bed was boosted with an additional 10 Gray in 5 fractions with 15 MV photons.      TOLERANCE:       Ms. Kamara had little difficulty tolerating treatment. She developed mild erythema and then brisk erythema with pruruitis.  She had one small area of moist desquamation and used silvadene/lidocaine.          DISPOSITION:  At the completion of therapy an appointment was made for her to return on 2020 at 930.  She knows to call if she has any problems sooner.        Karen Everett MD    Dictated using dragon dictation

## 2020-05-20 ENCOUNTER — OFFICE VISIT (OUTPATIENT)
Dept: RADIATION ONCOLOGY | Facility: HOSPITAL | Age: 60
End: 2020-05-20

## 2020-05-20 ENCOUNTER — HOSPITAL ENCOUNTER (OUTPATIENT)
Dept: RADIATION ONCOLOGY | Facility: HOSPITAL | Age: 60
Setting detail: RADIATION/ONCOLOGY SERIES
Discharge: HOME | End: 2020-05-20

## 2020-05-20 DIAGNOSIS — C50.411 MALIGNANT NEOPLASM OF UPPER-OUTER QUADRANT OF RIGHT BREAST IN FEMALE, ESTROGEN RECEPTOR POSITIVE (HCC): Primary | ICD-10-CM

## 2020-05-20 DIAGNOSIS — Z17.0 MALIGNANT NEOPLASM OF UPPER-OUTER QUADRANT OF RIGHT BREAST IN FEMALE, ESTROGEN RECEPTOR POSITIVE (HCC): Primary | ICD-10-CM

## 2020-05-20 PROCEDURE — G0463 HOSPITAL OUTPT CLINIC VISIT: HCPCS

## 2020-05-21 ENCOUNTER — TELEPHONE (OUTPATIENT)
Dept: ONCOLOGY | Facility: CLINIC | Age: 60
End: 2020-05-21

## 2020-05-21 VITALS
SYSTOLIC BLOOD PRESSURE: 139 MMHG | DIASTOLIC BLOOD PRESSURE: 75 MMHG | HEIGHT: 63 IN | TEMPERATURE: 97.3 F | WEIGHT: 215.9 LBS | RESPIRATION RATE: 16 BRPM | BODY MASS INDEX: 38.25 KG/M2 | HEART RATE: 69 BPM | OXYGEN SATURATION: 94 %

## 2020-05-21 NOTE — PROGRESS NOTES
FOLLOW UP NOTE    PATIENT:                                                      Francoise Kamara  MEDICAL RECORD #:                        [id number]  :                                                          1960  COMPLETION DATE:   2020  DIAGNOSIS:     Malignant neoplasm of upper-outer quadrant of right breast in female, estrogen receptor positive (CMS/HCC)  - Stage IB (pT2, pN1a, cM0, G2, ER: Positive, KS: Positive, HER2: Negative)      BRIEF HISTORY:   Mrs. Kamara is a 60 y.o. female with history of a stage IB infiltrating intermediate grade lobular carcinoma of the right breast with node positivity.  She underwent right breast lumpectomy, followed by adjuvant chemotherapy with dd AC plus Taxol and a course of radiotherapy.  The right breast received 50 Gy in 25 fractions with a tumor bed boost of an additional 10 Gy.  She tolerated treatment well.  She did develop erythema as well as an area of moist desquamation within the inframammary fold.  Skin is now healed, though she continues to have dryness of the right nipple and areola.  She applies moisturizer daily.  She notes an occasional blotchy skin rash to her chest, which she correlates to the timing of beginning anastrazole.  She is otherwise tolerating hormone therapy without any difficulty.  She did develop grade 1 fatigue but continued to remain active, walking at least 1 mile daily.  She is doing daily range of motion exercises to increase the strength in her right arm, and denies edema.  She was pleased to have her Port-A-Cath removed and overall feels well.      MEDICATIONS: Medication reconciliation for the patient was reviewed and confirmed in the electronic medical record.    Review of Systems   Constitutional: Positive for fatigue.   Skin: Positive for itching and rash.   Neurological: Positive for numbness (Intermittently to bilateral feet s/p chemo).   All other systems reviewed and are negative.      KPS 90%    Physical  "Exam   Constitutional: She is oriented to person, place, and time. She appears well-developed and well-nourished. No distress.   HENT:   Head: Normocephalic and atraumatic.   Eyes: Pupils are equal, round, and reactive to light. Conjunctivae are normal.   Neck: Normal range of motion. Neck supple.   Cardiovascular: Normal rate and regular rhythm. Exam reveals no friction rub.   No murmur heard.  Pulmonary/Chest: Effort normal and breath sounds normal. She has no wheezes. Right breast exhibits no mass. Left breast exhibits no mass. No breast swelling.   The breast exam reveals no masses or suspicious lesions in either breast.  The right breast has a well-healed surgical incision at the 12 o'clock position and a well-healed incision in the right axilla. Mild cracking of the right areola without associated erythema, warmth, or edema.   Abdominal: Soft. Bowel sounds are normal. She exhibits no distension and no mass. There is no tenderness.   Genitourinary: No breast swelling.   Musculoskeletal: Normal range of motion. She exhibits no edema.   Lymphadenopathy:     She has no cervical adenopathy.     She has no axillary adenopathy.        Right: No supraclavicular adenopathy present.        Left: No supraclavicular adenopathy present.   Neurological: She is alert and oriented to person, place, and time.   Skin: Skin is warm and dry. Rash noted.   Faint, erythematous, blotchy rash to anterior chest wall sparing both breasts.  Faint hyperpigmented area to right posterior back correlating with treatment field.   Psychiatric: She has a normal mood and affect. Her behavior is normal. Judgment and thought content normal.   Nursing note and vitals reviewed.      VITAL SIGNS:   Vitals:    05/21/20 0604   BP: 139/75   Pulse: 69   Resp: 16   Temp: 97.3 °F (36.3 °C)   TempSrc: Temporal   SpO2: 94%  Comment: RA   Weight: 97.9 kg (215 lb 14.4 oz)   Height: 160 cm (63\")   PainSc: 0-No pain       The following portions of the patient's " history were reviewed and updated as appropriate: allergies, current medications, past family history, past medical history, past social history, past surgical history and problem list.         Francoise was seen today for malignant neoplasm of upper-outer quadrant of right breast.    Diagnoses and all orders for this visit:    Malignant neoplasm of upper-outer quadrant of right breast in female, estrogen receptor positive (CMS/[facility])      IMPRESSION: Anh is a 60-year-old female with history of stage IB infiltrating intermediate grade lobular carcinoma of the right breast with node positivity.  Following breast conservation surgery, she underwent adjuvant chemoradiation, completing all treatments 1 month ago.  She tolerated radiotherapy well, and acute radiation-related toxicities continue to ramana.  She is scheduled to see her dermatologist later today, and I have given her samples of CeraVe anti-itch cream to trial.  She will continue on anastrozole for an anticipated period of 5 years.  I counseled her related to the survivorship model including the timeframe for clinical breast exams and mammograms.  Bilateral mammogram is scheduled for August 2020.  She is already scheduled to return to [facility] for repeat exams and mammogram, and she can continue to be seen in our clinic on an as-needed basis.    RECOMMENDATIONS:      Return if symptoms worsen or fail to improve, for Office Visit.    SOLITARIO Bustillos

## 2020-05-22 ENCOUNTER — OFFICE VISIT (OUTPATIENT)
Dept: ONCOLOGY | Facility: CLINIC | Age: 60
End: 2020-05-22

## 2020-05-22 VITALS
TEMPERATURE: 97.5 F | DIASTOLIC BLOOD PRESSURE: 82 MMHG | HEART RATE: 74 BPM | OXYGEN SATURATION: 98 % | BODY MASS INDEX: 38.12 KG/M2 | SYSTOLIC BLOOD PRESSURE: 128 MMHG | WEIGHT: 215.2 LBS | RESPIRATION RATE: 16 BRPM

## 2020-05-22 DIAGNOSIS — Z17.0 MALIGNANT NEOPLASM OF UPPER-OUTER QUADRANT OF RIGHT BREAST IN FEMALE, ESTROGEN RECEPTOR POSITIVE (HCC): Primary | ICD-10-CM

## 2020-05-22 DIAGNOSIS — C50.411 MALIGNANT NEOPLASM OF UPPER-OUTER QUADRANT OF RIGHT BREAST IN FEMALE, ESTROGEN RECEPTOR POSITIVE (HCC): Primary | ICD-10-CM

## 2020-05-22 PROCEDURE — 99213 OFFICE O/P EST LOW 20 MIN: CPT | Performed by: INTERNAL MEDICINE

## 2020-05-22 NOTE — PROGRESS NOTES
PROBLEM LIST:  Oncology/Hematology History    Lab Results   Component Value Date    FINALDX  08/19/2019     1. RIGHT BREAST LUMPECTOMY WITH NEEDLE LOCALIZATION:   Infiltrating and in-situ intermediate grade lobular carcinoma.  Bloom Denis score 6/9.  Infiltrating tumor size 2.2x1.5x1.0 cm.   Margins of resection negative for tumor with closest infiltrating margin inferior and superior measuring 4 mm each.   No lymphvascular invasion identified.   Presence of previous biopsy site identified.  See Template.  2. SENTINEL LYMPH NODE #1, RIGHT AXILLA, EXCISION:   Lymph node x1; positive for metastatic lobular carcinoma (1/1).  3. SENTINEL LYMPH NODE #2, RIGHT AXILLA, EXCISION:  Lymph node x1; positive for metastatic lobular carcinoma (1/1).  4. NONSENTINEL LYMPH NODE, RIGHT AXILLA, EXCISION:  Lymph node x1; positive for metastatic lobular carcinoma (1/1).     INVASIVE BREAST CANCER STAGING TEMPLATE:  TYPE OF SPECIMEN/PROCEDURE:  Needle localized lumpectomy  SPECIMEN LATERALITY: Right breast  TUMOR SITE: 12 o'clock   TUMOR SIZE: (Size of Largest Invasive Carcinoma): 2.2x1.5x1.0 cm   HISTOLOGIC TYPE OF INVASIVE CARCINOMA:  Lobular   GRADE: Intermediate   TUBULAR FORMATION:  3  MITOTIC ACTIVITY:  1  PLEOMORPHISM:  2  OVERALL SCORE: 6/9   DUCTAL CARCINOMA IN SITU (DCIS) EXTENT: 0  TUMOR EXTENSION (Only if structures present and involved):    SKIN: N/A    NIPPLE: N/A    SKELETAL MUSCLE: N/A   SURGICAL MARGINS (Uninvolved/positive): Uninvolved   INVASIVE CARCINOMA MARGINS (Uninvolved/Positive) Uninvolved   DISTANCE FROM CLOSEST MARGIN: 4 mm   SPECIFIC CLOSEST MARGIN: Inferior and superior   LCIS MARGINS (Uninvolved/Positive/No LCIS): Uninvolved   DISTANCE FROM CLOSEST MARGIN: 1 mm  SPECIFIC CLOSEST MARGIN: Inferior   LYMPH NODES (required only if lymph nodes are present in the specimen):  Present   NUMBER OF LYMPH NODES WITH MACROMETASTASIS: 3  NUMBER OF LYMPH NODES WITH MICROMETASTASIS: 0  NUMBER OF LYMPH NODES WITH  ISOLATED TUMOR CELLS: 0  TOTAL NUMBER OF LYMPH NODES EXAMINED (Including sentinel nodes): 3  NUMBER OF SENTINEL NODES EXAMINED: 2  EXTRANODAL EXTENSION OF TUMOR:  0  VASCULAR/LYMPHATIC INVASION:  Not identified     ESTROGEN RECEPTOR STATUS BY IHC METHOD: Positive   PROGESTERONE RECEPTOR STATUS BY IHC METHOD: Positive    HER-2/ysabel ONCOPROTEIN STATUS BY IHC METHOD:  Negative   HER-2/ysabel ONCOGENE STATUS BY IN SITU HYBRIDIZATION ANALYSIS:  Not performed   TREATMENT EFFECT (BREAST): None  TREATMENT EFFECT (NODES): None   ADDITIONAL PATHOLOGIC FINDINGS:  Previous biopsy site identified   OTHER STUDIES:  None  AJCC PATHOLOGIC STAGE:  (COMPLETED BY PATHOLOGIST, BASED ONLY ON TISSUE FINDINGS, MORE EXTENSIVE DISEASE MAY NOT BE KNOWN TO THE PATHOLOGIST)  pT=  2  pN=  2  pM=  Unknown    DGD/mbc                Malignant neoplasm of upper-outer quadrant of right breast in female, estrogen receptor positive (CMS/HCC)    8/16/2019 Initial Diagnosis     Malignant neoplasm of upper-outer quadrant of right breast in female, estrogen receptor positive (CMS/HCC)      8/19/2019 Surgery     Surgery       Right breast lumpectomy with sentinel node biopsy by Dr. Stephanie Fraire      8/19/2019 Cancer Staged     Cancer Staging  Malignant neoplasm of upper-outer quadrant of right breast in female, estrogen receptor positive (CMS/HCC)  Staging form: Breast, AJCC 8th Edition  - Pathologic stage from 9/12/2019: Stage IB (pT2, pN1a, cM0, G2, ER: Positive, [place]: Positive, HER2: Negative) - Signed by Laila Thacker MD on 9/12/2019 9/19/2019 Imaging     Negative work-up for metastatic disease by CAT scan of the chest abdomen pelvis and bone scan      9/20/2019 -  Other Event     ECHO - Normal EF      9/23/2019 Surgery     Surgery       Port Placement      9/25/2019 - 11/22/2019 Chemotherapy     OP BREAST AC DD DOXOrubicin / Cyclophosphamide        12/6/2019 - 2/21/2020 Chemotherapy     OP BREAST PACLitaxel (weekly X 12)        3/9/2020 -  4/20/2020 Radiation     Radiation OncologyTreatment Course:  Francoise Kamara received 6000 cGy in 30 fractions to right breast & s/c via External Beam Radiation - EBRT.         REASON FOR VISIT: Management of my breast cancer    HISTORY OF PRESENT ILLNESS:   60 y.o.  female presents today for management of her breast cancer.  She completed 4 cycles of dose dense Adriamycin and Cytoxan and Weekly Taxol in Feb/2020.  She has completed  adjuvant radiation in April 2020.  Clinically she did remarkably well with treatment.  She is doing well otherwise.    Past Medical History:   Diagnosis Date   • Cardiomegaly     stable on CXR   • Dyspnea on exertion    • Elevated LDL cholesterol level    • Fatigue    • GERD (gastroesophageal reflux disease)     on daily Protonix, EGD 10/2016. Sx's resolved since AGBR, even if doesn't take her PPI. serum h. pyl neg.  EGD GDW 10/16 prior to AGBR  36 cm   • History of radiation therapy 04/20/2020    right breast   • Hypertension    • Hypoalbuminemia     admits to chronic undereating   • Low HDL (under 40)    • Malignant neoplasm of upper-outer quadrant of right breast in female, estrogen receptor positive (CMS/HCC) 9/12/2019   • Microcytic red blood cells     H/H 12.6/38.4   • Morbid obesity (CMS/HCC)    • OAB (overactive bladder)    • Peripheral edema    • Post-menopause on HRT (hormone replacement therapy)    • Rosacea     on Doxycycline   • Stress incontinence    • Vaginal atrophy    • Vitamin D deficiency    • Wears glasses      Social History     Socioeconomic History   • Marital status:      Spouse name: Not on file   • Number of children: Not on file   • Years of education: Not on file   • Highest education level: Not on file   Tobacco Use   • Smoking status: Never Smoker   • Smokeless tobacco: Never Used   Substance and Sexual Activity   • Alcohol use: No   • Drug use: No   • Sexual activity: Defer     Comment: spouse   Social History Narrative     w/2  children.  Lives in [place].  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      Family History   Problem Relation Age of Onset   • Hypertension Mother    • Sleep apnea Mother    • Hypertension Father    • Lung cancer Father    • Hypertension Maternal Grandfather    • Stroke Maternal Grandfather    • Hypertension Paternal Grandmother    • Breast cancer Paternal Aunt 45   • No Known Problems Brother    • BRCA 1/2 Neg Hx    • Ovarian cancer Neg Hx        Review of Systems:    Review of Systems   Constitutional: Positive for fatigue.   HENT:  Negative.         Mouth Sores   Eyes: Negative.    Respiratory: Negative.    Cardiovascular: Negative.    Gastrointestinal: Negative.    Endocrine: Negative.    Genitourinary: Negative.     Musculoskeletal: Negative.    Skin: Negative.    Neurological: Negative.    Hematological: Negative.    Psychiatric/Behavioral: Negative.       A comprehensive 14 point review of systems was performed and was negative except as mentioned.      Medications:        Current Outpatient Medications:   •  Biotin 5000 MCG tablet, Take  by mouth., Disp: , Rfl:   •  calcium carbonate (OS-ARBEN) 1250 (500 Ca) MG tablet, Take 2 tablets by mouth Daily. Two tabs daily, Disp: 180 tablet, Rfl: 3  •  Ferrous Gluconate (IRON 27 PO), Take  by mouth., Disp: , Rfl:   •  hydrochlorothiazide (MICROZIDE) 12.5 MG capsule, Take 12.5 mg by mouth Daily., Disp: , Rfl:   •  LISINOPRIL PO, Take 40 mg by mouth Daily., Disp: , Rfl:   •  ondansetron (ZOFRAN) 8 MG tablet, Take 1 tablet by mouth 3 (Three) Times a Day As Needed for Nausea or Vomiting., Disp: 30 tablet, Rfl: 5  •  pantoprazole (PROTONIX) 40 MG EC tablet, Take 40 mg by mouth Daily., Disp: , Rfl:   •  vitamin D (ERGOCALCIFEROL) 31043 UNITS capsule capsule, 50,000 Units 1 (One) Time Per Week. sundays, Disp: , Rfl:       ALLERGIES:    Allergies   Allergen Reactions   • Penicillins Hives and Swelling     Invanz given for AGBR surgery         Physical  Exam    VITAL SIGNS:  /82   Pulse 74   Temp 97.5 °F (36.4 °C) (Skin)   Resp 16   Wt 97.6 kg (215 lb 3.2 oz)   SpO2 98%   BMI 38.12 kg/m²     Wt Readings from Last 3 Encounters:   05/22/20 97.6 kg (215 lb 3.2 oz)   05/21/20 97.9 kg (215 lb 14.4 oz)   04/14/20 98.4 kg (216 lb 14.4 oz)       Body mass index is 38.12 kg/m². Body surface area is 1.99 meters squared.         Performance Status: 0    General: well appearing, in no acute distress  HEENT: sclera anicteric, oropharynx clear, neck is supple  Lymphatics: no cervical, supraclavicular, or axillary adenopathy  Cardiovascular: regular rate and rhythm, no murmurs, rubs or gallops  Lungs: clear to auscultation bilaterally  Abdomen: soft, nontender, nondistended.  No palpable organomegaly  Extremities: no lower extremity edema  Skin: no rashes, lesions, bruising, or petechiae  Msk:  Shows no weakness of the large muscle groups  Psych: Mood is stable  Port in the left chest wall      RECENT LABS:    Lab Results   Component Value Date    HGB 11.4 (L) 02/21/2020    HCT 35.0 02/21/2020    .9 (H) 02/21/2020     02/21/2020    WBC 3.60 02/21/2020    NEUTROABS 2.30 02/21/2020    LYMPHSABS 1.00 02/21/2020    MONOSABS 0.30 02/21/2020    EOSABS 0.1 05/08/2019    BASOSABS 0.0 05/08/2019       Lab Results   Component Value Date    GLUCOSE 75 02/21/2020    BUN 8 02/21/2020    CREATININE 0.63 02/21/2020     02/21/2020    K 3.4 (L) 02/21/2020     (H) 02/21/2020    CO2 26.0 02/21/2020    CALCIUM 8.9 02/21/2020    PROTEINTOT 6.3 02/21/2020    ALBUMIN 3.50 02/21/2020    BILITOT 0.3 02/21/2020    ALKPHOS 56 02/21/2020    AST 12 02/21/2020    ALT 9 02/21/2020       Ct Chest With Contrast    Result Date: 9/22/2019  No CT evidence of metastatic disease. No evidence of acute intrathoracic, intra-abdominal or pelvic abnormality. Postsurgical changes identified within the right axillary region. No definite remaining lymph nodes identified.  DICTATED:    09/20/2019 EDITED/ls :   09/21/2019  This report was finalized on 9/22/2019 9:00 AM by Dr. Paz Hubbard MD.      Nm Bone Scan Whole Body    Result Date: 9/13/2019  Normal examination.  D:  09/13/2019 E:  09/13/2019     This report was finalized on 9/13/2019 4:23 PM by Dr. César Paiz MD.      Ct Abdomen Pelvis With Contrast    Result Date: 9/22/2019  No CT evidence of metastatic disease. No evidence of acute intrathoracic, intra-abdominal or pelvic abnormality. Postsurgical changes identified within the right axillary region. No definite remaining lymph nodes identified.  DICTATED:   09/20/2019 EDITED/ls :   09/21/2019  This report was finalized on 9/22/2019 9:00 AM by Dr. Paz Hubbard MD.              Assessment/Plan    1. Stage IB infiltrating intermediate grade lobular carcinoma of the right breast with node positivity.  She is tolerating her oral hormone blocker.  Her hair is starting to grow back in.  She is doing well otherwise.  Continue with no changes.  Plan for mammograms in September.  I will see her in my clinic in early December.          I spent a total of 15 minutes in direct patient care, greater than 10 minutes (greater than 50%) were spent in coordination of care, and counseling the patient regarding breast cancer. Answered any questions patient had with medication and plan.      Laila Thacker MD  [facility] Hematology and Oncology    Return on: 11/06/20  Return in (Approximately): 6 months    No orders of the defined types were placed in this encounter.      5/22/2020

## 2020-06-01 ENCOUNTER — OFFICE VISIT (OUTPATIENT)
Dept: OBSTETRICS AND GYNECOLOGY | Facility: CLINIC | Age: 60
End: 2020-06-01

## 2020-06-01 VITALS
DIASTOLIC BLOOD PRESSURE: 72 MMHG | SYSTOLIC BLOOD PRESSURE: 110 MMHG | BODY MASS INDEX: 39.12 KG/M2 | WEIGHT: 220.8 LBS | HEIGHT: 63 IN

## 2020-06-01 DIAGNOSIS — N32.81 OAB (OVERACTIVE BLADDER): ICD-10-CM

## 2020-06-01 DIAGNOSIS — Z85.3 PERSONAL HISTORY OF BREAST CANCER: ICD-10-CM

## 2020-06-01 DIAGNOSIS — Z78.0 MENOPAUSE: Primary | ICD-10-CM

## 2020-06-01 DIAGNOSIS — Z01.419 ENCOUNTER FOR GYNECOLOGICAL EXAMINATION WITHOUT ABNORMAL FINDING: ICD-10-CM

## 2020-06-01 PROBLEM — Z79.890 POST-MENOPAUSE ON HRT (HORMONE REPLACEMENT THERAPY): Status: RESOLVED | Noted: 2019-04-02 | Resolved: 2020-06-01

## 2020-06-01 PROCEDURE — 99396 PREV VISIT EST AGE 40-64: CPT | Performed by: OBSTETRICS & GYNECOLOGY

## 2020-06-01 RX ORDER — LISINOPRIL 40 MG/1
20 TABLET ORAL DAILY
COMMUNITY
Start: 2020-05-26

## 2020-06-01 RX ORDER — CALCIUM CARBONATE 500(1250)
500 TABLET ORAL 2 TIMES DAILY
COMMUNITY
End: 2021-06-02 | Stop reason: SDUPTHER

## 2020-06-01 RX ORDER — ANASTROZOLE 1 MG/1
1 TABLET ORAL DAILY
COMMUNITY
End: 2020-11-05 | Stop reason: SDUPTHER

## 2020-06-01 RX ORDER — TOLTERODINE 4 MG/1
1 CAPSULE, EXTENDED RELEASE ORAL DAILY
COMMUNITY
Start: 2020-05-26

## 2020-06-01 RX ORDER — ESOMEPRAZOLE MAGNESIUM 40 MG/1
40 CAPSULE, DELAYED RELEASE ORAL
COMMUNITY

## 2020-06-01 NOTE — PROGRESS NOTES
Chief Complaint   Patient presents with   • Annual Exam       Francoise Kamara is a 60 y.o. year old  presenting to be seen for her annual exam.  This patient has had a diagnosis of stage Ib infiltrating lobular carcinoma of the right breast which was ER and NE positive and HER-2 negative.  She underwent a lumpectomy by Dr. Victoria, and subsequently had chemotherapy with Taxol and postoperative radiation therapy. She is followed by Dr. Thacker.  Dr. Everett supervised her radiation therapy.  She is doing well.  She does have some radiation effect to the right breast.  She has had no gynecologic surgery.  She has had a cholecystectomy.  She had a gastric band placed in , removed in , and had a gastric sleeve procedure in 2017.  She has a history of overactive bladder and is treated with tolterodine LA, 4 mg daily with success.  She denies bowel symptoms.    SCREENING TESTS    Year 2012   Age                         PAP                         HPV high risk                         Mammogram        abnormal benign                MIKAYLA score                         Breast MRI                         Lipids                         Vitamin D                         Colonoscopy                         DEXA  Frax (hip/any)                         Ovarian Screen                             She exercises regularly: yes.  She wears her seat belt: yes.  She has concerns about domestic violence: no.  She has noticed changes in height: no    GYN screening history:  · Last mammogram: was done on approximately 2020 and the result was: Birads III (Probably benign)..    No Additional Complaints Reported    The following portions of the patient's history were reviewed and updated as appropriate:vital signs and   She  has a past medical history of Cardiomegaly, Dyspnea on exertion, Elevated LDL  cholesterol level, Fatigue, GERD (gastroesophageal reflux disease), History of radiation therapy (04/20/2020), Hypertension, Hypoalbuminemia, Low HDL (under 40), Malignant neoplasm of upper-outer quadrant of right breast in female, estrogen receptor positive (CMS/HCC) (9/12/2019), Menopause, Microcytic red blood cells, Morbid obesity (CMS/HCC), OAB (overactive bladder), Peripheral edema, Rosacea, Stress incontinence, Vaginal atrophy, Vitamin D deficiency, and Wears glasses.  She does not have any pertinent problems on file.  She  has a past surgical history that includes Laparoscopic gastric banding (2008); Cholecystectomy (2015); Gastric Banding Removal (N/A, 12/21/2016); Esophagogastroduodenoscopy (2016); [place] tooth extraction; Gastric Sleeve (N/A, 10/20/2017); Esophagogastroduodenoscopy (N/A, 10/20/2017); Colonoscopy (2014); Colonoscopy (2017); Venous Access Device (Port) (Left, 09/24/2019); Venous Access Device (Port) (Left, 10/24/2019); Breast lumpectomy (Right, 08/19/2019); Breast biopsy; and Breast biopsy (Right, 07/2019).  Her family history includes Breast cancer (age of onset: 45) in her paternal aunt; Hypertension in her father, maternal grandfather, mother, and paternal grandmother; Lung cancer in her father; No Known Problems in her brother; Sleep apnea in her mother; Stroke in her maternal grandfather.  She  reports that she has never smoked. She has never used smokeless tobacco. She reports that she does not drink alcohol or use drugs.  Current Outpatient Medications   Medication Sig Dispense Refill   • anastrozole (ARIMIDEX) 1 MG tablet Take 1 mg by mouth Daily.     • calcium carbonate, oyster shell, 500 MG tablet tablet Take 500 mg by mouth 2 (Two) Times a Day.     • esomeprazole (nexIUM) 40 MG capsule Take 40 mg by mouth Every Morning Before Breakfast.     • Biotin 5000 MCG tablet Take  by mouth.     • Ferrous Gluconate (IRON 27 PO) Take  by mouth.     • hydrochlorothiazide (MICROZIDE) 12.5 MG  "capsule Take 12.5 mg by mouth As Needed.     • lisinopril (PRINIVIL,ZESTRIL) 40 MG tablet Take 1 tablet by mouth Daily.     • tolterodine LA (DETROL LA) 4 MG 24 hr capsule Take 1 capsule by mouth Daily.     • vitamin D (ERGOCALCIFEROL) 65764 UNITS capsule capsule 50,000 Units 1 (One) Time Per Week. sundays       No current facility-administered medications for this visit.      She is allergic to penicillins..    Review of Systems  A comprehensive review of systems was taken.  Constitutional: negative for fever, chills, activity change, appetite change, fatigue and unexpected weight change.  Respiratory: negative  Cardiovascular: negative  Gastrointestinal: negative  Genitourinary:negative  Musculoskeletal:negative  Behavioral/Psych: negative       /72   Ht 160 cm (63\")   Wt 100 kg (220 lb 12.8 oz)   Breastfeeding No   BMI 39.11 kg/m²     Physical Exam    General:  alert; cooperative; well developed; well nourished   Skin:  No suspicious lesions seen   Thyroid: normal to inspection and palpation   Lungs:  clear to auscultation bilaterally   Heart:  regular rate and rhythm, S1, S2 normal, no murmur, click, rub or gallop   Breasts:  Examined in supine position  Nipples normal without inversion, lesions or discharge  There are no palpable axillary nodes  There is a scar in the UOQ of the right breast with radiation effects noted.. the left breast is normal.   Abdomen: soft, non-tender; no masses  no umbilical or inguinal hernias are present  no hepato-splenomegaly   Pelvis: Clinical staff was present for exam  External genitalia:  normal appearance of the external genitalia including Bartholin's and [place]'s glands.  Vaginal:  atrophic mucosal changes are present; severe  Cervix:  normal appearance.  Uterus:  normal size, shape and consistency. anteverted;  Adnexa:  non palpable bilaterally.  Rectal:  anus visually normal appearing. recto-vaginal exam unremarkable and confirms findings;     Lab Review   CBC " results and CMP results    Imaging  Mammogram results         ASSESSMENT  Problems Addressed this Visit        Genitourinary    OAB (overactive bladder)    Relevant Medications    tolterodine LA (DETROL LA) 4 MG 24 hr capsule    Menopause - Primary      Other Visit Diagnoses     Personal history of breast cancer        Relevant Orders    US Non-ob Transvaginal    Encounter for gynecological examination without abnormal finding        Relevant Orders    Liquid-based Pap Smear, Screening              Substance History:   reports that she has never smoked. She has never used smokeless tobacco.   reports that she does not drink alcohol.   reports that she does not use drugs.    Substance use counseling is not indicated based on patient history.      PLAN    · Medications prescribed this encounter:  No orders of the defined types were placed in this encounter.  · I have recommended that the patient use Replens intravaginally on a daily basis due to the severe dryness and atrophy.  · Monthly self breast assessment, follow-up breast imaging in August 2020.  · Due to her history of breast cancer I will schedule a pelvic ultrasound to evaluate her ovaries in August 2020.  · She will continue taking tolterodine LA, 4 mg daily for OAB.  · Calcium, 600 mg/ Vit. D, 400 IU daily; regular weight-bearing exercise  · Follow up: 2 month(s)  *Please note that portions of this documentation may have been completed with a voice recognition program.  Efforts were made to edit this dictation, but occasional words may have been mistranscribed.       This note was electronically signed.    POLLO Oseguera MD  June 1, 2020  15:04

## 2020-08-10 ENCOUNTER — HOSPITAL ENCOUNTER (OUTPATIENT)
Dept: MAMMOGRAPHY | Facility: HOSPITAL | Age: 60
Discharge: HOME | End: 2020-08-10
Admitting: SURGERY

## 2020-08-10 ENCOUNTER — OFFICE VISIT (OUTPATIENT)
Dept: OBSTETRICS AND GYNECOLOGY | Facility: CLINIC | Age: 60
End: 2020-08-10

## 2020-08-10 VITALS
WEIGHT: 217.6 LBS | SYSTOLIC BLOOD PRESSURE: 110 MMHG | BODY MASS INDEX: 38.55 KG/M2 | TEMPERATURE: 97.3 F | HEIGHT: 63 IN | DIASTOLIC BLOOD PRESSURE: 78 MMHG

## 2020-08-10 DIAGNOSIS — Z17.0 MALIGNANT NEOPLASM OF UPPER-OUTER QUADRANT OF RIGHT BREAST IN FEMALE, ESTROGEN RECEPTOR POSITIVE (HCC): ICD-10-CM

## 2020-08-10 DIAGNOSIS — Z78.0 MENOPAUSE: Primary | ICD-10-CM

## 2020-08-10 DIAGNOSIS — C50.411 MALIGNANT NEOPLASM OF UPPER-OUTER QUADRANT OF RIGHT BREAST IN FEMALE, ESTROGEN RECEPTOR POSITIVE (HCC): ICD-10-CM

## 2020-08-10 DIAGNOSIS — R92.8 ABNORMAL MAMMOGRAM: ICD-10-CM

## 2020-08-10 PROCEDURE — 77066 DX MAMMO INCL CAD BI: CPT

## 2020-08-10 PROCEDURE — 77062 BREAST TOMOSYNTHESIS BI: CPT | Performed by: RADIOLOGY

## 2020-08-10 PROCEDURE — 99213 OFFICE O/P EST LOW 20 MIN: CPT | Performed by: OBSTETRICS & GYNECOLOGY

## 2020-08-10 PROCEDURE — 77066 DX MAMMO INCL CAD BI: CPT | Performed by: RADIOLOGY

## 2020-08-10 PROCEDURE — G0279 TOMOSYNTHESIS, MAMMO: HCPCS

## 2020-08-10 NOTE — PROGRESS NOTES
Chief Complaint   Patient presents with   • Results     pelvic ultrasound today       Francoise Kamara is a 60 y.o. year old  presenting to be seen because of follow-up for a pelvic ultrasound for ovarian screening.  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  She underwent a lumpectomy of the right breast by Dr. Silvestre, followed by postoperative radiation therapy.  I had recommended ovarian screening by pelvic ultrasound to rule out any ovarian disease.  She is asymptomatic.  She does not use any hormonal therapy.    Social History     Substance and Sexual Activity   Sexual Activity Defer    Comment: spouse     SCREENING TESTS    Year 2012   Age                         PAP                         HPV high risk                         Mammogram         benign                MIKAYLA score                         Breast MRI                         Lipids                         Vitamin D                         Colonoscopy                         DEXA  Frax (hip/any)                         Ovarian Screen                             No Additional Complaints Reported    The following portions of the patient's history were reviewed and updated as appropriate:vital signs and   She  has a past medical history of Cardiomegaly, Dyspnea on exertion, Elevated LDL cholesterol level, Fatigue, GERD (gastroesophageal reflux disease), History of radiation therapy (2020), Hypertension, Hypoalbuminemia, Low HDL (under 40), Malignant neoplasm of upper-outer quadrant of right breast in female, estrogen receptor positive (CMS/HCC) (2019), Menopause, Microcytic red blood cells, Morbid obesity (CMS/HCC), OAB (overactive bladder), Peripheral edema, Rosacea, Stress incontinence, Vaginal atrophy,  Vitamin D deficiency, and Wears glasses.  She does not have any pertinent problems on file.  She  has a past surgical history that includes Laparoscopic gastric banding (2008); Cholecystectomy (2015); Gastric Banding Removal (N/A, 12/21/2016); Esophagogastroduodenoscopy (2016); [place] tooth extraction; Gastric Sleeve (N/A, 10/20/2017); Esophagogastroduodenoscopy (N/A, 10/20/2017); Colonoscopy (2014); Colonoscopy (2017); Venous Access Device (Port) (Left, 09/24/2019); Venous Access Device (Port) (Left, 10/24/2019); Breast lumpectomy (Right, 08/19/2019); Breast biopsy; and Breast biopsy (Right, 07/2019).  Her family history includes Breast cancer (age of onset: 45) in her paternal aunt; Hypertension in her father, maternal grandfather, mother, and paternal grandmother; Lung cancer in her father; No Known Problems in her brother; Sleep apnea in her mother; Stroke in her maternal grandfather.  She  reports that she has never smoked. She has never used smokeless tobacco. She reports that she does not drink alcohol or use drugs.  Current Outpatient Medications   Medication Sig Dispense Refill   • anastrozole (ARIMIDEX) 1 MG tablet Take 1 mg by mouth Daily.     • Biotin 5000 MCG tablet Take  by mouth.     • calcium carbonate, oyster shell, 500 MG tablet tablet Take 500 mg by mouth 2 (Two) Times a Day.     • esomeprazole (nexIUM) 40 MG capsule Take 40 mg by mouth Every Morning Before Breakfast.     • Ferrous Gluconate (IRON 27 PO) Take  by mouth.     • hydrochlorothiazide (MICROZIDE) 12.5 MG capsule Take 12.5 mg by mouth As Needed.     • lisinopril (PRINIVIL,ZESTRIL) 40 MG tablet Take 1 tablet by mouth Daily.     • tolterodine LA (DETROL LA) 4 MG 24 hr capsule Take 1 capsule by mouth Daily.     • vitamin D (ERGOCALCIFEROL) 85719 UNITS capsule capsule 50,000 Units 1 (One) Time Per Week. sundays       No current facility-administered medications for this visit.      She is allergic to penicillins..        Review of Systems  A  "review of systems was done.  She denies cough, fever, shortness of breath, or any sense of loss of taste or smell.  Constitutional: negative for fever, chills, activity change, appetite change, fatigue and unexpected weight change.  Respiratory: negative  Cardiovascular: negative  Gastrointestinal: negative  Genitourinary:negative  Musculoskeletal:negative  Behavioral/Psych: negative          /78   Temp 97.3 °F (36.3 °C)   Ht 160 cm (63\")   Wt 98.7 kg (217 lb 9.6 oz)   Breastfeeding No   BMI 38.55 kg/m²     Physical Exam    General:  alert; cooperative; well developed; well nourished   Skin:  No suspicious lesions seen   Thyroid: normal to inspection and palpation   Lungs:  clear to auscultation bilaterally   Heart:  regular rate and rhythm, S1, S2 normal, no murmur, click, rub or gallop   Breasts:  Not performed.   Abdomen: soft, non-tender; no masses  no umbilical or inguinal hernias are present  no hepato-splenomegaly   Pelvis: Not performed.     Lab Review   No data reviewed    Imaging   Pelvic ultrasound results-reveals a normal size anteverted uterus with a normal endometrial stripe of 3.0 mm.  Her left ovary is not visualized and her right ovary is small and normal.  There is no pelvic fluid seen    Medical counseling was given to patient for the following topics: diagnostic results, instructions for management, risk factor reductions and impressions . Total time of the encounter was 15 minute(s) and 13 minute(s)  was spent in face to face counseling.  I have reviewed the patient's pelvic ultrasound findings with her.  I have counseled her that her ovaries are completely normal.  I have recommended a repeat pelvic ultrasound in 3 years.  I have counseled the patient in monthly self breast assessment and the need for continued breast imaging according to radiology.  I have advised her to follow with Dr. Victoria.          ASSESSMENT  Problems Addressed this Visit        Genitourinary    Menopause " - Primary       Other    Malignant neoplasm of upper-outer quadrant of right breast in female, estrogen receptor positive (CMS/HCC)           Substance History:    reports that she has never smoked. She has never used smokeless tobacco.    reports that she does not drink alcohol.    reports that she does not use drugs.    Substance use counseling is not indicated based on patient history.      PLAN    · Medications prescribed this encounter:  No orders of the defined types were placed in this encounter.  · Monthly self breast assessment and breast imaging today  · Follow up: 11 month(s)  · Calcium, 600 mg/ Vit. D, 400 IU daily, regular, weight-bearing exercise 4 days a week     *Please note that portions of this documentation may have been completed with a voice recognition program.  Efforts were made to edit this dictation, but occasional words may have been mistranscribed.     This note was electronically signed.    POLLO Oseguera MD  August 10, 2020  09:40

## 2020-08-18 ENCOUNTER — TRANSCRIBE ORDERS (OUTPATIENT)
Dept: OCCUPATIONAL THERAPY | Facility: HOSPITAL | Age: 60
End: 2020-08-18

## 2020-08-18 ENCOUNTER — TRANSCRIBE ORDERS (OUTPATIENT)
Dept: MAMMOGRAPHY | Facility: HOSPITAL | Age: 60
End: 2020-08-18

## 2020-08-18 DIAGNOSIS — C50.211 MALIGNANT NEOPLASM OF UPPER-INNER QUADRANT OF RIGHT FEMALE BREAST, UNSPECIFIED ESTROGEN RECEPTOR STATUS (HCC): Primary | ICD-10-CM

## 2020-08-20 ENCOUNTER — HOSPITAL ENCOUNTER (OUTPATIENT)
Dept: OCCUPATIONAL THERAPY | Facility: HOSPITAL | Age: 60
Setting detail: THERAPIES SERIES
Discharge: HOME | End: 2020-08-20

## 2020-08-20 DIAGNOSIS — I89.0 LYMPHEDEMA OF BREAST: Primary | ICD-10-CM

## 2020-08-20 PROCEDURE — 97166 OT EVAL MOD COMPLEX 45 MIN: CPT

## 2020-08-20 NOTE — THERAPY EVALUATION
Outpatient Occupational Therapy Lymphedema Initial Evaluation  [facility]     Patient Name: Francoise Kamara  : 1960  MRN: [id number]  Today's Date: 2020      Visit Date: 2020    Patient Active Problem List   Diagnosis   • Urinary frequency   • Stress incontinence   • Hypertension   • GERD (gastroesophageal reflux disease)   • Vaginal atrophy   • Rosacea   • OAB (overactive bladder)   • Vitamin D deficiency   • Peripheral edema   • Fatigue   • Dyspnea on exertion   • Wears glasses   • Cardiomegaly   • Malignant neoplasm of upper-outer quadrant of right breast in female, estrogen receptor positive (CMS/HCC)   • Port-A-Cath in place   • Menopause        Past Medical History:   Diagnosis Date   • Cardiomegaly     stable on CXR   • Drug therapy    • Dyspnea on exertion    • Elevated LDL cholesterol level    • Fatigue    • GERD (gastroesophageal reflux disease)     on daily Protonix, EGD 10/2016. Sx's resolved since AGBR, even if doesn't take her PPI. serum h. pyl neg.  EGD [facility] 10/16 prior to AGBR  36 cm   • History of radiation therapy 2020    right breast   • Hx of radiation therapy    • Hypertension    • Hypoalbuminemia     admits to chronic undereating   • Low HDL (under 40)    • Malignant neoplasm of upper-outer quadrant of right breast in female, estrogen receptor positive (CMS/HCC) 2019   • Menopause    • Microcytic red blood cells     H/H 12.6/38.4   • Morbid obesity (CMS/HCC)    • OAB (overactive bladder)    • Peripheral edema    • Rosacea     on Doxycycline   • Stress incontinence    • Vaginal atrophy    • Vitamin D deficiency    • Wears glasses         Past Surgical History:   Procedure Laterality Date   • BREAST BIOPSY      ? LATERALITY   • BREAST BIOPSY Right 2019    [facility]    • BREAST LUMPECTOMY Right 2019   • CHOLECYSTECTOMY      for stones w/ Dr. Barboza @ [facility]   • COLONOSCOPY     • COLONOSCOPY     • ENDOSCOPY      by Dr. Urias   • ENDOSCOPY N/A  10/20/2017    Procedure: ESOPHAGOGASTRODUODENOSCOPY;  Surgeon: Guille Urias MD;  Location:  [facility];  Service:    • GASTRIC BANDING REMOVAL N/A 12/21/2016    Procedure: GASTRIC BANDING REMOVAL LAPAROSCOPIC;  Surgeon: Guille Urias MD;  Location:  [facility];  Service:    • GASTRIC SLEEVE LAPAROSCOPIC N/A 10/20/2017    Procedure: GASTRIC SLEEVE LAPAROSCOPIC, ;  Surgeon: Guille Urias MD;  Location:  [facility];  Service:    • LAPAROSCOPIC GASTRIC BANDING  2008    s/p LAGB APS w/ HHR 11/2008 by [facility] @ [facility].  full dissection hiatus, single stitch ant repair   • VENOUS ACCESS DEVICE (PORT) INSERTION Left 09/24/2019   • VENOUS ACCESS DEVICE (PORT) INSERTION Left 10/24/2019   • WISDOM TOOTH EXTRACTION           Visit Dx:     ICD-10-CM ICD-9-CM   1. Lymphedema of breast I89.0 457.1       Patient History     Row Name 08/20/20 1300             History    Chief Complaint  Swelling  -SG      Date Current Problem(s) Began  -- Post radiation, approx April 2020  -SG      Brief Description of Current Complaint  Mrs. Kamara is a 60 y.o. female with history of a stage IB infiltrating intermediate grade lobular carcinoma of the right breast with node positivity.  She underwent right breast lumpectomy, followed by adjuvant chemotherapy with dd AC plus Taxol and a course of radiotherapy.  The right breast received 50 Gy in 25 fractions with a tumor bed boost of an additional 10 Gy. She developed edema in R breast at this time, approx April 2020. She reports to OP OT for tx of breast edema, as well as some decreased ROM and pain in R axilla when reaching overhead.  -SG      Patient/Caregiver Goals  Decrease swelling;Know what to do to help the symptoms;Relieve pain;Return to prior level of function  -SG      Current Tobacco Use  none  -SG      Smoking Status  none  -SG      Hand Dominance  right-handed  -SG      Occupation/sports/leisure activities  [employer]  -SG         Pain     Pain Location  Breast Axilla  -SG       Pain at Present  2  -SG      Pain at Best  0  -SG      Pain at Worst  5  -SG      Pain Description  Discomfort;Heaviness;Tender;Tightness  -SG      Pain Comments  Pt experiences pain/discomfort in breast d/t edema; pain around incision from SLNB when reaching overhead  -SG      Difficulties at work?  Yes  -SG      Difficulties with ADL's?  Yes  -SG      Difficulties with recreational activities?  Yes  -SG         Daily Activities    Teaching needs identified  Home Exercise Program;Management of Condition  -SG      Patient is concerned about/has problems with  Reaching over head;Repetitive movements of the hand, arm, shoulder;Flexibility;Difficulty with self care (i.e. bathing, dressing, toileting:  -SG      Pt Participated in POC and Goals  Yes  -SG        User Key  (r) = Recorded By, (t) = Taken By, (c) = Cosigned By    Initials Name Provider Type    Tiffanie Alexis OTR/L Occupational Therapist          Lymphedema     Row Name 08/20/20 1300             Subjective Pain    Able to rate subjective pain?  yes  -SG      Pre-Treatment Pain Level  0  -SG      Post-Treatment Pain Level  0  -SG         Lymphedema Assessment    Lymphedema Classification  RUE:;at risk/stage 0  -SG      Lymphedema Cancer Related Sx  right;lumpectomy;sentinel node biopsy  -SG      Lymph Nodes Removed #  3  -SG      Positive Lymph Nodes #  3  -SG      Chemo Received  yes  -SG      Radiation Therapy Received  yes  -SG      Infections or Cellulitis?  no  -SG         Posture/Observations    Alignment Options  Rounded shoulders  -SG      Rounded Shoulders  Moderate  -SG         General ROM    GENERAL ROM COMMENTS  BUE is WFL; R side is not full AROM, functionally 140 degrees flexion; pt reporting having a difficult time reaching up into cabinets  -SG         MMT (Manual Muscle Testing)    General MMT Comments  MMT is WFL; R side is weaker, 3/5  -SG         Lymphedema Edema Assessment    Edema Assessment Comment  Edema present in R breast,  "areas of pitting and fibrosis  -SG         Skin Changes/Observations    Location/Assessment  Upper Quadrant  -SG      Upper Quadrant Conditions  right:;intact;clean  -SG      Upper Quadrant Color/Pigment  right:;radiation fibrosis;other (comment) residual redness from radiation  -SG         Lymphedema Sensation    Lymphedema Sensation Comments  Areas of numbness and/or hyposensitivty; also 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  -SG         Compression/Skin Care    Compression/Skin Care Comments  Ktape to right breast in lypmhatic drainage technique  -SG         L-Dex Bioimpedence Screening    L-Dex Measurement Extremity  RUE  -SG      L-Dex Patient Position  Standing  -SG      L-Dex UE Dominate Side  Right  -SG      L-Dex UE At Risk Side  Right  -SG      L-Dex UE Pre Surgical Value  No  -SG      L-Dex UE Score  -0.9  -SG      L-Dex UE Comment  -0.9 is within the normal range  -SG        User Key  (r) = Recorded By, (t) = Taken By, (c) = Cosigned By    Initials Name Provider Type    Tiffanie Alexis OTR/L Occupational Therapist                  Therapy Education  Education Details: Pt was edu on lymphedema and tx including CDT: MLD, compression, skin care, and exercise. Pt edu on compression bras and lymph pads. Pt edu on compression pump for home management of condition.   Given: HEP, Symptoms/condition management, Edema management  Program: New  How Provided: Written, Demonstration, Verbal  Provided to: Patient  Level of Understanding: Verbalized, Demonstrated, Teach back education performed        OT Goals     Row Name 08/20/20 1300          OT Short Term Goals    STG Date to Achieve  09/19/20  -SG     STG 1  \"Pt will understand lymphedema precautions to decrease the risk of infection and exacerbation of the lymphedema.  -SG     STG 2  \"Pt will perform HEP w/ minimal assistance to help improve lymphatic flow   -SG     STG 3  \"Pt will perform a self-MLD protocol with minimal assistance to help reduce swelling " "and thus improve ROM and mobility.  -SG        Long Term Goals    LTG 1  \"Pt will obtain appropriate compression and be ind w/ donning/doffing which will enable regular daily garment wear  -SG     LTG 2  \"Treatment will achieve maximum edema and/or lymphedema reduction to enable functional improvements and a return to PLOF  -SG     LTG 3  \"Pt and/or caregiver will be ind w/ HEP and lymphedema management to help prevent edema relapse and reduce the risk of infection  -SG     LTG 4  \"If appropriate, pt will be set up and ind w/ compression pump for long term management of lymphedema  -SG        Time Calculation    OT Goal Re-Cert Due Date  11/18/20  -SG       User Key  (r) = Recorded By, (t) = Taken By, (c) = Cosigned By    Initials Name Provider Type    Tiffanie Alexis, OTR/L Occupational Therapist          OT Assessment/Plan     Row Name 08/20/20 1300          OT Assessment    Functional Limitations  Limitations in functional capacity and performance;Performance in self-care ADL  -SG     Impairments  Edema;Impaired flexibility;Impaired lymphatic circulation;Range of motion;Pain  -SG     Assessment Comments  Mrs. Kamara is a 60 y.o. female with history of a stage IB infiltrating intermediate grade lobular carcinoma of the right breast with node positivity.  She underwent right breast lumpectomy, followed by adjuvant chemotherapy with dd AC plus Taxol and a course of radiotherapy.  The right breast received 50 Gy in 25 fractions with a tumor bed boost of an additional 10 Gy. She developed edema in R breast at this time, approx April 2020. Today, she presents w/ residual redness in breast from radiation tx, radiation fibrosis w/ hardening of skin tissue, edema including pitting edema especially lateral breast. She is measuring a size C in left, unaffected breast, and measuring a size DD in affected R breast. She will benefit from OP OT for lymphedema tx to reduce edema, improve health of skin tissue, and promote a " return to [facility].  -SG     Please refer to paper survey for additional self-reported information  Yes  -SG     OT Rehab Potential  Good  -SG     Patient/caregiver participated in establishment of treatment plan and goals  Yes  -SG     Patient would benefit from skilled therapy intervention  Yes  -SG        OT Plan    OT Frequency  1x/week  -SG     Predicted Duration of Therapy Intervention (Therapy Eval)  10 visits  -SG     Planned CPT's?  OT EVAL MOD COMPLEXITY: 02071;OT THER ACT EA 15 MIN: [id number];OT THER PROC EA 15 MIN: 57143GP;OT SELF CARE/MGMT/TRAIN 15 MIN: 62489;OT MANUAL THERAPY EA 15 MIN: 11354  -SG     Planned Therapy Interventions (Optional Details)  home exercise program;manual therapy techniques;patient/family education;postural re-education;ROM (Range of Motion);stair training  -SG     OT Plan Comments  Continue with CDT including MLD, compression, skin care, and exercise. Pursue compression garments and compression pump.  -SG       User Key  (r) = Recorded By, (t) = Taken By, (c) = Cosigned By    Initials Name Provider Type    Tiffanie Alexis OTR/L Occupational Therapist          Outcome Measure Options: Disabilities of the Arm, Shoulder, and Hand (DASH)  DASH  Open a tight or new jar.: Mild Difficulty  Write: No Difficulty  Turn a key: No Difficulty  Prepare a meal: No Difficulty  Push open a heavy door: Mild Difficulty  Place an object on a shelf above your head: Moderate Difficulty  Do heavy household chores (e.g., wash walls, wash floors): Mild Difficulty  Garden or do yard work: Mild Difficulty  Make a bed: Mild Difficulty  Carry a shopping bag or briefcase: Mild Difficulty  Carry a heavy object (over 10 lbs): Mild Difficulty  Change a lightbulb overhead: Mild Difficulty  Wash or blow dry your hair: No Difficulty  Wash your back: No Difficulty  Put on a pullover sweater: No Difficulty  Use a knife to cut food: No Difficulty  Recreational activities in which require little effort (e.g.,  cardplaying, knitting, etc.): No Difficulty  Recreational activities in which you take some force or impact through your arm, should or hand (e.g. golf, hammering, tennis, etc.): Mild Difficulty  Recreational Activities in which you move your arm freely (e.g., frisbee, badminton, etc.): Mild Difficulty  Manage transportation needs (getting from one place to another): No Difficulty  Sexual Activities: No Difficulty  During the past week, to what extent has your arm, shoulder, or hand problem interfered with your normal social activites with family, friends, neighbors or groups?: Slightly  During the past week, were you limited in your work or other regular daily activities as a result of your arm, shoulder or hand problem?: Slightly Limited  Arm, Shoulder, or hand pain: None  Arm, shoulder or hand pain when you performed any specific activity: Mild  Tingling (pins and needles) in your arm, shoulder, or hand: Mild  Weakness in your arm, shoulder or hand: Mild  Stiffness in your arm, shoulder or hand: None  During the past week, how much difficulty have you had sleeping because of the pain in your arm, shoulder or hand?: No difficulty  I feel less capable, less confident or less useful because of my arm, shoulder or hand problem: Disagree  DASH Sum : 48  Number of Questions Answered: 30  DASH Score: 15         Time Calculation:   OT Start Time: 1300     Therapy Charges for Today     Code Description Service Date Service Provider Modifiers Qty    [id number]  OT EVAL MOD COMPLEXITY 4 8/20/2020 Tiffanie Mitchell OTR/L GO 1                    ALFONSO Benavides/MEGHAN  8/20/2020

## 2020-08-28 ENCOUNTER — HOSPITAL ENCOUNTER (OUTPATIENT)
Dept: OCCUPATIONAL THERAPY | Facility: HOSPITAL | Age: 60
Setting detail: THERAPIES SERIES
Discharge: HOME | End: 2020-08-28

## 2020-08-28 DIAGNOSIS — I89.0 LYMPHEDEMA OF BREAST: Primary | ICD-10-CM

## 2020-08-28 PROCEDURE — 97140 MANUAL THERAPY 1/> REGIONS: CPT

## 2020-08-28 PROCEDURE — 97535 SELF CARE MNGMENT TRAINING: CPT

## 2020-08-28 NOTE — THERAPY TREATMENT NOTE
Outpatient Occupational Therapy Lymphedema Treatment Note  [facility]     Patient Name: Francoise Kamara  : 1960  MRN: [id number]  Today's Date: 2020      Visit Date: 2020    Patient Active Problem List   Diagnosis   • Urinary frequency   • Stress incontinence   • Hypertension   • GERD (gastroesophageal reflux disease)   • Vaginal atrophy   • Rosacea   • OAB (overactive bladder)   • Vitamin D deficiency   • Peripheral edema   • Fatigue   • Dyspnea on exertion   • Wears glasses   • Cardiomegaly   • Malignant neoplasm of upper-outer quadrant of right breast in female, estrogen receptor positive (CMS/HCC)   • Port-A-Cath in place   • Menopause        Past Medical History:   Diagnosis Date   • Cardiomegaly     stable on CXR   • Drug therapy    • Dyspnea on exertion    • Elevated LDL cholesterol level    • Fatigue    • GERD (gastroesophageal reflux disease)     on daily Protonix, EGD 10/2016. Sx's resolved since AGBR, even if doesn't take her PPI. serum h. pyl neg.  EGD [facility] 10/16 prior to AGBR  36 cm   • History of radiation therapy 2020    right breast   • Hx of radiation therapy    • Hypertension    • Hypoalbuminemia     admits to chronic undereating   • Low HDL (under 40)    • Malignant neoplasm of upper-outer quadrant of right breast in female, estrogen receptor positive (CMS/HCC) 2019   • Menopause    • Microcytic red blood cells     H/H 12.6/38.4   • Morbid obesity (CMS/HCC)    • OAB (overactive bladder)    • Peripheral edema    • Rosacea     on Doxycycline   • Stress incontinence    • Vaginal atrophy    • Vitamin D deficiency    • Wears glasses         Past Surgical History:   Procedure Laterality Date   • BREAST BIOPSY      ? LATERALITY   • BREAST BIOPSY Right 2019    [facility]    • BREAST LUMPECTOMY Right 2019   • CHOLECYSTECTOMY      for stones w/ Dr. Barboza @ [facility]   • COLONOSCOPY     • COLONOSCOPY     • ENDOSCOPY      by Dr. Urias   • ENDOSCOPY N/A  10/20/2017    Procedure: ESOPHAGOGASTRODUODENOSCOPY;  Surgeon: Guille Urias MD;  Location:  [facility];  Service:    • GASTRIC BANDING REMOVAL N/A 12/21/2016    Procedure: GASTRIC BANDING REMOVAL LAPAROSCOPIC;  Surgeon: Guille Urias MD;  Location:  [facility];  Service:    • GASTRIC SLEEVE LAPAROSCOPIC N/A 10/20/2017    Procedure: GASTRIC SLEEVE LAPAROSCOPIC, ;  Surgeon: Guille Urias MD;  Location:  [facility];  Service:    • LAPAROSCOPIC GASTRIC BANDING  2008    s/p LAGB APS w/ HHR 11/2008 by GDW @ [facility].  full dissection hiatus, single stitch ant repair   • VENOUS ACCESS DEVICE (PORT) INSERTION Left 09/24/2019   • VENOUS ACCESS DEVICE (PORT) INSERTION Left 10/24/2019   • WISDOM TOOTH EXTRACTION           Visit Dx:      ICD-10-CM ICD-9-CM   1. Lymphedema of breast I89.0 457.1       Lymphedema     Row Name 08/28/20 1000             Subjective Pain    Able to rate subjective pain?  yes  -SG      Pre-Treatment Pain Level  0  -SG      Post-Treatment Pain Level  0  -SG         Subjective Comments    Subjective Comments  Pt reports that she is doing well, ktape was helpful.  -SG         Lymphedema Assessment    Lymphedema Classification  RUE:;at risk/stage 0  -SG      Lymphedema Cancer Related Sx  right;lumpectomy;sentinel node biopsy  -SG      Lymph Nodes Removed #  3  -SG      Positive Lymph Nodes #  3  -SG      Chemo Received  yes  -SG      Radiation Therapy Received  yes  -SG      Infections or Cellulitis?  no  -SG         Posture/Observations    Alignment Options  Rounded shoulders  -SG      Rounded Shoulders  Moderate  -SG         General ROM    GENERAL ROM COMMENTS  BUE is WFL; R side is not full AROM, functionally 140 degrees flexion; pt reporting having a difficult time reaching up into cabinets  -SG         MMT (Manual Muscle Testing)    General MMT Comments  MMT is WFL; R side is weaker, 3/5  -SG         Lymphedema Edema Assessment    Edema Assessment Comment  Edema present in R breast, areas of  pitting and fibrosis  -SG         Skin Changes/Observations    Location/Assessment  Upper Quadrant  -SG      Upper Quadrant Conditions  right:;intact;clean  -SG      Upper Quadrant Color/Pigment  right:;radiation fibrosis;other (comment) residual redness from radiation  -SG         Lymphedema Sensation    Lymphedema Sensation Comments  Areas of numbness and/or hyposensitivty; also 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  -SG         Manual Lymphatic Drainage    Manual Lymphatic Drainage  initial sequence;opened regional lymph nodes;opened anastamoses  -SG      Initial Sequence  short neck;cervical;supraclavicular;shoulder collectors;abdomen;diaphragmatic breathing  -SG      Cervical  right;left  -SG      Supraclavicular  right;left  -SG      Shoulder Collectors  right;left  -SG      Abdomen  superficial  -SG      Diaphragmatic Breathing  Yes  -SG      Opened Regional Lymph Nodes  axillary  -SG      Axillary  right  -SG      Opened Anastamoses  anterior axillo-axillary;axillo-inguinal  -SG      Anterior Axillo-Axillary  right to left  -SG      Axillo-Inguinal  right  -SG      Manual Lymphatic Drainage Comments  Full MLD sequence to R breast  -SG         L-Dex Bioimpedence Screening    L-Dex Measurement Extremity  RUE  -SG      L-Dex Patient Position  Standing  -SG      L-Dex UE Dominate Side  Right  -SG      L-Dex UE At Risk Side  Right  -SG      L-Dex UE Pre Surgical Value  No  -SG        User Key  (r) = Recorded By, (t) = Taken By, (c) = Cosigned By    Initials Name Provider Type    Tiffanie Alexis OTR/L Occupational Therapist                  OT Assessment/Plan     Row Name 08/28/20 1000          OT Assessment    Functional Limitations  Limitations in functional capacity and performance;Performance in self-care ADL  -SG     Impairments  Edema;Impaired flexibility;Impaired lymphatic circulation;Range of motion;Pain  -SG     Assessment Comments  Pt is progressing well. She continues with edema in R breast. This  session we progressed her self-MLD, compression, and exercises. She will benefit from continuing with CDT for lymphedema tx.   -SG     OT Rehab Potential  Good  -SG     Patient/caregiver participated in establishment of treatment plan and goals  Yes  -SG     Patient would benefit from skilled therapy intervention  Yes  -SG        OT Plan    OT Frequency  1x/week  -SG     Planned CPT's?  OT EVAL MOD COMPLEXITY: 11668;OT THER ACT EA 15 MIN: [id number];OT THER PROC EA 15 MIN: [id number];OT SELF CARE/MGMT/TRAIN 15 MIN: 50713;OT MANUAL THERAPY EA 15 MIN: 26804  -SG     Planned Therapy Interventions (Optional Details)  home exercise program;manual therapy techniques;patient/family education;postural re-education;ROM (Range of Motion);stair training  -SG     OT Plan Comments  Continue with CDT including MLD, compression, skin care, and exercise. Pursue compression garments and compression pump.  -SG       User Key  (r) = Recorded By, (t) = Taken By, (c) = Cosigned By    Initials Name Provider Type    Tiffanie Alexis OTR/L Occupational Therapist                 Manual Rx (last 36 hours)      Manual Treatments     Row Name 08/28/20 1000             Total Minutes    12691 - OT Manual Therapy Minutes  30  -SG        User Key  (r) = Recorded By, (t) = Taken By, (c) = Cosigned By    Initials Name Provider Type    Tiffanie Alexis OTR/L Occupational Therapist            Therapy Education  Education Details: Pt and daughter were edu for majority of session on lymphatic system, breast lymphedema, and how to tx including compression options, self-MLD, and exercise. Pt provided with handout for MLD, handout for compression, and HEP including chest opening stretch, supine hands behind head, supine hands over head, and shoulder pulley.  Given: HEP, Symptoms/condition management, Edema management  Program: New, Reinforced, Progressed  How Provided: Written, Demonstration, Verbal  Provided to: Patient, Caregiver  Level of Understanding:  Verbalized, Demonstrated, Teach back education performed  52934 - OT Self Care/Mgmt Minutes: 25                Time Calculation:   OT Start Time: 1000     Therapy Charges for Today     Code Description Service Date Service Provider Modifiers Qty    93616446553 HC OT MANUAL THERAPY EA 15 MIN 8/28/2020 Tiffanie Mitchell OTR/L GO 2    [id number]  OT SELF CARE/MGMT/TRAIN EA 15 MIN 8/28/2020 Tiffanie Mitchell OTR/L GO 2                      Tiffanie Mitchell OTR/L  8/28/2020

## 2020-09-04 ENCOUNTER — HOSPITAL ENCOUNTER (OUTPATIENT)
Dept: OCCUPATIONAL THERAPY | Facility: HOSPITAL | Age: 60
Setting detail: THERAPIES SERIES
Discharge: HOME | End: 2020-09-04

## 2020-09-04 DIAGNOSIS — I89.0 LYMPHEDEMA OF BREAST: Primary | ICD-10-CM

## 2020-09-04 PROCEDURE — 97140 MANUAL THERAPY 1/> REGIONS: CPT

## 2020-09-04 NOTE — THERAPY TREATMENT NOTE
Outpatient Occupational Therapy Lymphedema Treatment Note  [facility]     Patient Name: Francoise Kamara  : 1960  MRN: [id number]  Today's Date: 2020      Visit Date: 2020    Patient Active Problem List   Diagnosis   • Urinary frequency   • Stress incontinence   • Hypertension   • GERD (gastroesophageal reflux disease)   • Vaginal atrophy   • Rosacea   • OAB (overactive bladder)   • Vitamin D deficiency   • Peripheral edema   • Fatigue   • Dyspnea on exertion   • Wears glasses   • Cardiomegaly   • Malignant neoplasm of upper-outer quadrant of right breast in female, estrogen receptor positive (CMS/HCC)   • Port-A-Cath in place   • Menopause        Past Medical History:   Diagnosis Date   • Cardiomegaly     stable on CXR   • Drug therapy    • Dyspnea on exertion    • Elevated LDL cholesterol level    • Fatigue    • GERD (gastroesophageal reflux disease)     on daily Protonix, EGD 10/2016. Sx's resolved since AGBR, even if doesn't take her PPI. serum h. pyl neg.  EGD [facility] 10/16 prior to AGBR  36 cm   • History of radiation therapy 2020    right breast   • Hx of radiation therapy    • Hypertension    • Hypoalbuminemia     admits to chronic undereating   • Low HDL (under 40)    • Malignant neoplasm of upper-outer quadrant of right breast in female, estrogen receptor positive (CMS/HCC) 2019   • Menopause    • Microcytic red blood cells     H/H 12.6/38.4   • Morbid obesity (CMS/HCC)    • OAB (overactive bladder)    • Peripheral edema    • Rosacea     on Doxycycline   • Stress incontinence    • Vaginal atrophy    • Vitamin D deficiency    • Wears glasses         Past Surgical History:   Procedure Laterality Date   • BREAST BIOPSY      ? LATERALITY   • BREAST BIOPSY Right 2019    [facility]    • BREAST LUMPECTOMY Right 2019   • CHOLECYSTECTOMY      for stones w/ Dr. Barboza @ [facility]   • COLONOSCOPY     • COLONOSCOPY     • ENDOSCOPY      by Dr. Urias   • ENDOSCOPY N/A  10/20/2017    Procedure: ESOPHAGOGASTRODUODENOSCOPY;  Surgeon: Guille Urias MD;  Location:  [facility];  Service:    • GASTRIC BANDING REMOVAL N/A 12/21/2016    Procedure: GASTRIC BANDING REMOVAL LAPAROSCOPIC;  Surgeon: Guille Urias MD;  Location:  [facility];  Service:    • GASTRIC SLEEVE LAPAROSCOPIC N/A 10/20/2017    Procedure: GASTRIC SLEEVE LAPAROSCOPIC, ;  Surgeon: Guille Urias MD;  Location:  [facility];  Service:    • LAPAROSCOPIC GASTRIC BANDING  2008    s/p LAGB APS w/ HHR 11/2008 by [facility] @ [facility].  full dissection hiatus, single stitch ant repair   • VENOUS ACCESS DEVICE (PORT) INSERTION Left 09/24/2019   • VENOUS ACCESS DEVICE (PORT) INSERTION Left 10/24/2019   • WISDOM TOOTH EXTRACTION           Visit Dx:      ICD-10-CM ICD-9-CM   1. Lymphedema of breast I89.0 457.1       Lymphedema     Row Name 09/04/20 1000             Subjective Pain    Able to rate subjective pain?  yes  -SG      Pre-Treatment Pain Level  0  -SG      Post-Treatment Pain Level  0  -SG         Subjective Comments    Subjective Comments  Pt reports that she got two compression bras at [employer]. She reports that she ordered the Swell Spot post lumpectomy lymph pad as well. Pt reports that she has been compliant w/ HEP and self-MLD protocol and has noticed that her breast is not as full and skin tissue not as firm.  -SG         Lymphedema Assessment    Lymphedema Classification  RUE:;at risk/stage 0  -SG      Lymphedema Cancer Related Sx  right;lumpectomy;sentinel node biopsy  -SG      Lymph Nodes Removed #  3  -SG      Positive Lymph Nodes #  3  -SG      Chemo Received  yes  -SG      Radiation Therapy Received  yes  -SG      Infections or Cellulitis?  no  -SG         Posture/Observations    Alignment Options  Rounded shoulders  -SG      Rounded Shoulders  Moderate  -SG         General ROM    GENERAL ROM COMMENTS  BUE is WFL; R side is not full AROM, functionally 140 degrees flexion; pt reporting having a  difficult time reaching up into cabinets  -SG         MMT (Manual Muscle Testing)    General MMT Comments  MMT is WFL; R side is weaker, 3/5  -SG         Lymphedema Edema Assessment    Edema Assessment Comment  Edema present in R breast, areas of pitting and fibrosis  -SG         Skin Changes/Observations    Location/Assessment  Upper Quadrant  -SG      Upper Quadrant Conditions  right:;intact;clean  -SG      Upper Quadrant Color/Pigment  right:;radiation fibrosis;other (comment) residual redness from radiation  -SG         Lymphedema Sensation    Lymphedema Sensation Comments  Areas of numbness and/or hyposensitivty; also 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  -SG         Manual Lymphatic Drainage    Manual Lymphatic Drainage  initial sequence;opened regional lymph nodes;opened anastamoses  -SG      Initial Sequence  short neck;cervical;supraclavicular;shoulder collectors;abdomen;diaphragmatic breathing  -SG      Cervical  right;left  -SG      Supraclavicular  right;left  -SG      Shoulder Collectors  right;left  -SG      Abdomen  superficial  -SG      Opened Regional Lymph Nodes  axillary  -SG      Axillary  right  -SG      Opened Anastamoses  anterior axillo-axillary;axillo-inguinal  -SG      Anterior Axillo-Axillary  right to left  -SG      Axillo-Inguinal  right  -SG      Manual Lymphatic Drainage Comments  Full MLD sequence to R breast  -SG         Compression/Skin Care    Compression/Skin Care Comments  Ktape to right breast in lymphatic drainage technique  -SG         L-Dex Bioimpedence Screening    L-Dex Measurement Extremity  RUE  -SG      L-Dex Patient Position  Standing  -SG      L-Dex UE Dominate Side  Right  -SG      L-Dex UE At Risk Side  Right  -SG      L-Dex UE Pre Surgical Value  No  -SG        User Key  (r) = Recorded By, (t) = Taken By, (c) = Cosigned By    Initials Name Provider Type    Tiffanie Alexis OTR/L Occupational Therapist                  OT Assessment/Plan     Row Name 09/04/20  1000          OT Assessment    Functional Limitations  Limitations in functional capacity and performance;Performance in self-care ADL  -SG     Impairments  Edema;Impaired flexibility;Impaired lymphatic circulation;Range of motion;Pain  -SG     Assessment Comments  Pt is progressing well. She continues with edema in R breast. She is compliant w/ CDT at home including self-MLD, compression, and exercises. She will benefit from continuing with CDT for lymphedema tx.   -SG     OT Rehab Potential  Good  -SG     Patient/caregiver participated in establishment of treatment plan and goals  Yes  -SG     Patient would benefit from skilled therapy intervention  Yes  -SG        OT Plan    OT Frequency  1x/week  -SG     Planned CPT's?  OT EVAL MOD COMPLEXITY: 39346;OT THER ACT EA 15 MIN: [id number];OT THER PROC EA 15 MIN: [id number];OT SELF CARE/MGMT/TRAIN 15 MIN: 06009;OT MANUAL THERAPY EA 15 MIN: 94642  -SG     Planned Therapy Interventions (Optional Details)  home exercise program;manual therapy techniques;patient/family education;postural re-education;ROM (Range of Motion);stair training  -SG     OT Plan Comments  Continue with CDT including MLD, compression, skin care, and exercise. Pursue compression garments and compression pump.  -SG       User Key  (r) = Recorded By, (t) = Taken By, (c) = Cosigned By    Initials Name Provider Type    Tiffanie Alexis OTR/L Occupational Therapist                 Manual Rx (last 36 hours)      Manual Treatments     Row Name 09/04/20 1000             Total Minutes    35172 - OT Manual Therapy Minutes  45  -SG        User Key  (r) = Recorded By, (t) = Taken By, (c) = Cosigned By    Initials Name Provider Type    Tiffanie Alexis OTR/L Occupational Therapist            Therapy Education  Education Details:  present for session; edu through verbal and demo MLD, compression, Ktape, and exercises.  Given: HEP, Symptoms/condition management, Edema management  Program: New, Reinforced,  Progressed  How Provided: Written, Demonstration, Verbal  Provided to: Patient, Caregiver  Level of Understanding: Verbalized, Demonstrated, Teach back education performed                Time Calculation:   OT Start Time: 1000     Therapy Charges for Today     Code Description Service Date Service Provider Modifiers Qty    73406486636 HC OT MANUAL THERAPY EA 15 MIN 9/4/2020 Tiffanie Mitchell, OTR/L GO 3                      Tiffanie Mitchell OTR/L  9/4/2020

## 2020-09-11 ENCOUNTER — HOSPITAL ENCOUNTER (OUTPATIENT)
Dept: OCCUPATIONAL THERAPY | Facility: HOSPITAL | Age: 60
Setting detail: THERAPIES SERIES
Discharge: HOME | End: 2020-09-11

## 2020-09-11 DIAGNOSIS — I89.0 LYMPHEDEMA OF BREAST: Primary | ICD-10-CM

## 2020-09-11 PROCEDURE — 97140 MANUAL THERAPY 1/> REGIONS: CPT

## 2020-09-11 NOTE — THERAPY TREATMENT NOTE
Outpatient Occupational Therapy Lymphedema Treatment Note  [facility]     Patient Name: Francoise Kamara  : 1960  MRN: [id number]  Today's Date: 2020      Visit Date: 2020    Patient Active Problem List   Diagnosis   • Urinary frequency   • Stress incontinence   • Hypertension   • GERD (gastroesophageal reflux disease)   • Vaginal atrophy   • Rosacea   • OAB (overactive bladder)   • Vitamin D deficiency   • Peripheral edema   • Fatigue   • Dyspnea on exertion   • Wears glasses   • Cardiomegaly   • Malignant neoplasm of upper-outer quadrant of right breast in female, estrogen receptor positive (CMS/HCC)   • Port-A-Cath in place   • Menopause        Past Medical History:   Diagnosis Date   • Cardiomegaly     stable on CXR   • Drug therapy    • Dyspnea on exertion    • Elevated LDL cholesterol level    • Fatigue    • GERD (gastroesophageal reflux disease)     on daily Protonix, EGD 10/2016. Sx's resolved since AGBR, even if doesn't take her PPI. serum h. pyl neg.  EGD [facility] 10/16 prior to AGBR  36 cm   • History of radiation therapy 2020    right breast   • Hx of radiation therapy    • Hypertension    • Hypoalbuminemia     admits to chronic undereating   • Low HDL (under 40)    • Malignant neoplasm of upper-outer quadrant of right breast in female, estrogen receptor positive (CMS/HCC) 2019   • Menopause    • Microcytic red blood cells     H/H 12.6/38.4   • Morbid obesity (CMS/HCC)    • OAB (overactive bladder)    • Peripheral edema    • Rosacea     on Doxycycline   • Stress incontinence    • Vaginal atrophy    • Vitamin D deficiency    • Wears glasses         Past Surgical History:   Procedure Laterality Date   • BREAST BIOPSY      ? LATERALITY   • BREAST BIOPSY Right 2019    [facility]    • BREAST LUMPECTOMY Right 2019   • CHOLECYSTECTOMY      for stones w/ Dr. Barboza @ [facility]   • COLONOSCOPY     • COLONOSCOPY     • ENDOSCOPY      by Dr. Urias   • ENDOSCOPY N/A  10/20/2017    Procedure: ESOPHAGOGASTRODUODENOSCOPY;  Surgeon: Guille Urias MD;  Location:  [facility];  Service:    • GASTRIC BANDING REMOVAL N/A 12/21/2016    Procedure: GASTRIC BANDING REMOVAL LAPAROSCOPIC;  Surgeon: Guille Urias MD;  Location:  [facility];  Service:    • GASTRIC SLEEVE LAPAROSCOPIC N/A 10/20/2017    Procedure: GASTRIC SLEEVE LAPAROSCOPIC, ;  Surgeon: Guille Urias MD;  Location:  [facility];  Service:    • LAPAROSCOPIC GASTRIC BANDING  2008    s/p LAGB APS w/ HHR 11/2008 by [facility] @ [facility].  full dissection hiatus, single stitch ant repair   • VENOUS ACCESS DEVICE (PORT) INSERTION Left 09/24/2019   • VENOUS ACCESS DEVICE (PORT) INSERTION Left 10/24/2019   • WISDOM TOOTH EXTRACTION           Visit Dx:      ICD-10-CM ICD-9-CM   1. Lymphedema of breast I89.0 457.1       Lymphedema     Row Name 09/11/20 1000             Subjective Pain    Able to rate subjective pain?  yes  -SG      Pre-Treatment Pain Level  0  -SG      Post-Treatment Pain Level  0  -SG         Subjective Comments    Subjective Comments  Pt reports compliance w/ CDT including compression and self-MLD  -SG         Lymphedema Assessment    Lymphedema Classification  RUE:;at risk/stage 0  -SG      Lymphedema Cancer Related Sx  right;lumpectomy;sentinel node biopsy  -SG      Lymph Nodes Removed #  3  -SG      Positive Lymph Nodes #  3  -SG      Chemo Received  yes  -SG      Radiation Therapy Received  yes  -SG      Infections or Cellulitis?  no  -SG         Posture/Observations    Alignment Options  Rounded shoulders  -SG      Rounded Shoulders  Moderate  -SG         General ROM    GENERAL ROM COMMENTS  BUE is WFL; R side is not full AROM, functionally 140 degrees flexion; pt reporting having a difficult time reaching up into cabinets  -SG         MMT (Manual Muscle Testing)    General MMT Comments  MMT is WFL; R side is weaker, 3/5  -SG         Lymphedema Edema Assessment    Edema Assessment Comment  Edema present in R breast,  areas of pitting and fibrosis  -SG         Skin Changes/Observations    Location/Assessment  Upper Quadrant  -SG      Upper Quadrant Conditions  right:;intact;clean  -SG      Upper Quadrant Color/Pigment  right:;radiation fibrosis;other (comment) residual redness from radiation  -SG         Lymphedema Sensation    Lymphedema Sensation Comments  Areas of numbness and/or hyposensitivty; also 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  -SG         Manual Lymphatic Drainage    Manual Lymphatic Drainage  initial sequence;opened regional lymph nodes;opened anastamoses;astym  -SG      Initial Sequence  short neck;cervical;supraclavicular;shoulder collectors;abdomen;diaphragmatic breathing  -SG      Cervical  right;left  -SG      Supraclavicular  right;left  -SG      Shoulder Collectors  right;left  -SG      Abdomen  superficial  -SG      Opened Regional Lymph Nodes  axillary  -SG      Axillary  right  -SG      Opened Anastamoses  anterior axillo-axillary;axillo-inguinal  -SG      Axillo-Inguinal  right  -SG      Astym  scar(s) / incision line(s)  -SG      Manual Lymphatic Drainage Comments  Full MLD sequence to R breast  -SG         L-Dex Bioimpedence Screening    L-Dex Measurement Extremity  RUE  -SG      L-Dex Patient Position  Standing  -SG      L-Dex UE Dominate Side  Right  -SG      L-Dex UE At Risk Side  Right  -SG      L-Dex UE Pre Surgical Value  No  -SG        User Key  (r) = Recorded By, (t) = Taken By, (c) = Cosigned By    Initials Name Provider Type    Tiffanie Alexis, OTR/L Occupational Therapist                  OT Assessment/Plan     Row Name 09/11/20 1000          OT Assessment    Functional Limitations  Limitations in functional capacity and performance;Performance in self-care ADL  -SG     Impairments  Edema;Impaired flexibility;Impaired lymphatic circulation;Range of motion;Pain  -SG     Assessment Comments  Pt is progressing well. She continues with edema in R breast, edema still present but skin tissue  health improving and becoming softer. She is compliant w/ CDT at home including self-MLD, compression, and exercises. She will benefit from continuing with CDT for lymphedema tx.   -SG     OT Rehab Potential  Good  -SG     Patient/caregiver participated in establishment of treatment plan and goals  Yes  -SG     Patient would benefit from skilled therapy intervention  Yes  -SG        OT Plan    OT Frequency  1x/week  -SG     Planned CPT's?  OT EVAL MOD COMPLEXITY: 04498;OT THER ACT EA 15 MIN: [id number];OT THER PROC EA 15 MIN: 37323YR;OT SELF CARE/MGMT/TRAIN 15 MIN: 14628;OT MANUAL THERAPY EA 15 MIN: 24275  -SG     Planned Therapy Interventions (Optional Details)  home exercise program;manual therapy techniques;patient/family education;postural re-education;ROM (Range of Motion);stair training  -SG     OT Plan Comments  Continue with CDT including MLD, compression, skin care, and exercise. Pursue compression garments and compression pump.  -SG       User Key  (r) = Recorded By, (t) = Taken By, (c) = Cosigned By    Initials Name Provider Type    Tiffanie Alexis OTR/L Occupational Therapist                 Manual Rx (last 36 hours)      Manual Treatments     Row Name 09/11/20 1000             Total Minutes    61508 - OT Manual Therapy Minutes  50  -SG        User Key  (r) = Recorded By, (t) = Taken By, (c) = Cosigned By    Initials Name Provider Type    Tiffanie Alexis OTR/L Occupational Therapist            Therapy Education  Education Details: Pt was edu on ASTYM, benefits. Continue with HEP and OT recommendations including CDT  Given: HEP, Symptoms/condition management, Edema management  Program: New, Reinforced, Progressed  How Provided: Written, Demonstration, Verbal  Provided to: Patient, Caregiver  Level of Understanding: Verbalized, Demonstrated, Teach back education performed                Time Calculation:   OT Start Time: 1000     Therapy Charges for Today     Code Description Service Date Service Provider  Modifiers Qty    47642009432 HC OT MANUAL THERAPY EA 15 MIN 9/11/2020 Tiffanie Mitchell, OTR/L GO 3                      Tiffanie Mitchell OTR/L  9/11/2020

## 2020-09-14 ENCOUNTER — HOSPITAL ENCOUNTER (OUTPATIENT)
Dept: OCCUPATIONAL THERAPY | Facility: HOSPITAL | Age: 60
Setting detail: THERAPIES SERIES
Discharge: HOME | End: 2020-09-14

## 2020-09-14 DIAGNOSIS — I89.0 LYMPHEDEMA OF BREAST: Primary | ICD-10-CM

## 2020-09-14 PROCEDURE — 97535 SELF CARE MNGMENT TRAINING: CPT

## 2020-09-14 PROCEDURE — 97140 MANUAL THERAPY 1/> REGIONS: CPT

## 2020-09-14 NOTE — THERAPY TREATMENT NOTE
Outpatient Occupational Therapy Lymphedema Treatment Note  [facility]     Patient Name: Francoise Kamara  : 1960  MRN: [id number]  Today's Date: 2020      Visit Date: 2020    Patient Active Problem List   Diagnosis   • Urinary frequency   • Stress incontinence   • Hypertension   • GERD (gastroesophageal reflux disease)   • Vaginal atrophy   • Rosacea   • OAB (overactive bladder)   • Vitamin D deficiency   • Peripheral edema   • Fatigue   • Dyspnea on exertion   • Wears glasses   • Cardiomegaly   • Malignant neoplasm of upper-outer quadrant of right breast in female, estrogen receptor positive (CMS/HCC)   • Port-A-Cath in place   • Menopause        Past Medical History:   Diagnosis Date   • Cardiomegaly     stable on CXR   • Drug therapy    • Dyspnea on exertion    • Elevated LDL cholesterol level    • Fatigue    • GERD (gastroesophageal reflux disease)     on daily Protonix, EGD 10/2016. Sx's resolved since AGBR, even if doesn't take her PPI. serum h. pyl neg.  EGD [facility] 10/16 prior to AGBR  36 cm   • History of radiation therapy 2020    right breast   • Hx of radiation therapy    • Hypertension    • Hypoalbuminemia     admits to chronic undereating   • Low HDL (under 40)    • Malignant neoplasm of upper-outer quadrant of right breast in female, estrogen receptor positive (CMS/HCC) 2019   • Menopause    • Microcytic red blood cells     H/H 12.6/38.4   • Morbid obesity (CMS/HCC)    • OAB (overactive bladder)    • Peripheral edema    • Rosacea     on Doxycycline   • Stress incontinence    • Vaginal atrophy    • Vitamin D deficiency    • Wears glasses         Past Surgical History:   Procedure Laterality Date   • BREAST BIOPSY      ? LATERALITY   • BREAST BIOPSY Right 2019    usg    • BREAST LUMPECTOMY Right 2019   • CHOLECYSTECTOMY      for stones w/ Dr. Barboza @ [facility]   • COLONOSCOPY     • COLONOSCOPY     • ENDOSCOPY      by Dr. Urias   • ENDOSCOPY N/A  10/20/2017    Procedure: ESOPHAGOGASTRODUODENOSCOPY;  Surgeon: Guille Urias MD;  Location:  [facility];  Service:    • GASTRIC BANDING REMOVAL N/A 12/21/2016    Procedure: GASTRIC BANDING REMOVAL LAPAROSCOPIC;  Surgeon: Guille Urias MD;  Location:  [facility];  Service:    • GASTRIC SLEEVE LAPAROSCOPIC N/A 10/20/2017    Procedure: GASTRIC SLEEVE LAPAROSCOPIC, ;  Surgeon: Guille Urias MD;  Location:  [facility];  Service:    • LAPAROSCOPIC GASTRIC BANDING  2008    s/p LAGB APS w/ HHR 11/2008 by [facility] @ [facility] ([place]  full dissection hiatus, single stitch ant repair   • VENOUS ACCESS DEVICE (PORT) INSERTION Left 09/24/2019   • VENOUS ACCESS DEVICE (PORT) INSERTION Left 10/24/2019   • WISDOM TOOTH EXTRACTION           Visit Dx:      ICD-10-CM ICD-9-CM   1. Lymphedema of breast  I89.0 457.1       Lymphedema     Row Name 09/14/20 1100             Subjective Pain    Able to rate subjective pain?  yes  -SG      Pre-Treatment Pain Level  0  -SG      Post-Treatment Pain Level  0  -SG         Subjective Comments    Subjective Comments  Pt reports that she has been compliant w/ shoulder pulley for active assist stretching, and that she has noticed it is easier to reach up into her cabinets now, but continues with some tightness.  -SG         Lymphedema Assessment    Lymphedema Classification  RUE:;at risk/stage 0  -SG      Lymphedema Cancer Related Sx  right;lumpectomy;sentinel node biopsy  -SG      Lymph Nodes Removed #  3  -SG      Positive Lymph Nodes #  3  -SG      Chemo Received  yes  -SG      Radiation Therapy Received  yes  -SG      Infections or Cellulitis?  no  -SG         Posture/Observations    Alignment Options  Rounded shoulders  -SG      Rounded Shoulders  Moderate  -SG         General ROM    GENERAL ROM COMMENTS  BUE is WFL; R side is not full AROM, functionally 140 degrees flexion; pt reporting having a difficult time reaching up into cabinets  -SG         MMT (Manual Muscle Testing)    General MMT Comments   MMT is WFL; R side is weaker, 3/5  -SG         Lymphedema Edema Assessment    Edema Assessment Comment  Edema present in R breast, areas of pitting and fibrosis  -SG         Skin Changes/Observations    Location/Assessment  Upper Quadrant  -SG      Upper Quadrant Conditions  right:;intact;clean  -SG      Upper Quadrant Color/Pigment  right:;radiation fibrosis;other (comment) residual redness from radiation  -SG         Lymphedema Sensation    Lymphedema Sensation Comments  Areas of numbness and/or hyposensitivty; also 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  -SG         Manual Lymphatic Drainage    Manual Lymphatic Drainage  initial sequence;opened regional lymph nodes;opened anastamoses;astym  -SG      Initial Sequence  short neck;cervical;supraclavicular;shoulder collectors;abdomen;diaphragmatic breathing  -SG      Cervical  right;left  -SG      Supraclavicular  right;left  -SG      Shoulder Collectors  right;left  -SG      Abdomen  superficial  -SG      Opened Regional Lymph Nodes  axillary  -SG      Axillary  right  -SG      Opened Anastamoses  anterior axillo-axillary;axillo-inguinal  -SG      Axillo-Inguinal  right  -SG      Astym  scar(s) / incision line(s);anterior-lateral chest;upper traps  -SG      Anterior-Lateral Chest  right  -SG      Upper Traps  right  -SG      Manual Lymphatic Drainage Comments  Full MLD sequence to R breast  -SG      Manual Therapy  ASTYM/STM following MLD; followed by stretching  -SG         L-Dex Bioimpedence Screening    L-Dex Measurement Extremity  RUE  -SG      L-Dex Patient Position  Standing  -SG      L-Dex UE Dominate Side  Right  -SG      L-Dex UE At Risk Side  Right  -SG      L-Dex UE Pre Surgical Value  No  -SG      L-Dex UE Score  -3.6  -SG      L-Dex UE Comment  Still within normal range, had trouble  getting an accurate reading  -SG        User Key  (r) = Recorded By, (t) = Taken By, (c) = Cosigned By    Initials Name Provider Type    Tiffanie Alexis OTR/MEGHAN  "Occupational Therapist                  OT Assessment/Plan     Row Name 09/14/20 1100          OT Assessment    Functional Limitations  Limitations in functional capacity and performance;Performance in self-care ADL  -SG     Impairments  Edema;Impaired flexibility;Impaired lymphatic circulation;Range of motion;Pain  -SG     Assessment Comments  Pt is progressing well. She continues with edema in R breast, edema still present but skin tissue health improving and becoming softer. She is compliant w/ CDT at home including self-MLD, compression, and exercises. We progressed her stretching and movement today. She will benefit from continuing with CDT for lymphedema tx.  -SG     OT Rehab Potential  Good  -SG     Patient/caregiver participated in establishment of treatment plan and goals  Yes  -SG     Patient would benefit from skilled therapy intervention  Yes  -SG        OT Plan    OT Frequency  1x/week  -SG     Planned CPT's?  OT EVAL MOD COMPLEXITY: 76444;OT THER ACT EA 15 MIN: [id number];OT THER PROC EA 15 MIN: 63397CT;OT SELF CARE/MGMT/TRAIN 15 MIN: 99337;OT MANUAL THERAPY EA 15 MIN: 86899  -SG     Planned Therapy Interventions (Optional Details)  home exercise program;manual therapy techniques;patient/family education;postural re-education;ROM (Range of Motion);stair training  -SG     OT Plan Comments  Continue with CDT including MLD, compression, skin care, and exercise. Pursue compression garments and compression pump.  -SG       User Key  (r) = Recorded By, (t) = Taken By, (c) = Cosigned By    Initials Name Provider Type    Tiffanie Alexis OTR/L Occupational Therapist            OT Exercises     Row Name 09/14/20 1100             Exercise 1    Exercise Name 1  \"Chicken wings\" w/ OT STM  -SG      Cueing 1  Tactile;Demo;Verbal  -SG      Sets 1  1  -SG      Reps 1  10  -SG        User Key  (r) = Recorded By, (t) = Taken By, (c) = Cosigned By    Initials Name Provider Type    Tiffanie Alexis OTR/L Occupational " "Therapist           Manual Rx (last 36 hours)      Manual Treatments     Row Name 09/14/20 1100             Total Minutes    01026 - OT Manual Therapy Minutes  40  -SG        User Key  (r) = Recorded By, (t) = Taken By, (c) = Cosigned By    Initials Name Provider Type    Tiffanie Alexis OTR/L Occupational Therapist            Therapy Education  Education Details: Progressed HEP: supine hands behind head, supine hands overhead, hands behind back chest opening stretch, overhead \"moose\" stretch, reach and roll, supine hands overhead w/ LTR  Given: HEP, Symptoms/condition management, Edema management  Program: New, Reinforced, Progressed  How Provided: Written, Demonstration, Verbal  Provided to: Patient, Caregiver  Level of Understanding: Verbalized, Demonstrated, Teach back education performed  30026 - OT Self Care/Mgmt Minutes: 15                Time Calculation:   OT Start Time: 1100     Therapy Charges for Today     Code Description Service Date Service Provider Modifiers Qty    22730133982 HC OT MANUAL THERAPY EA 15 MIN 9/14/2020 Tiffanie Mitchell OTR/L GO 3    03932506301 HC OT SELF CARE/MGMT/TRAIN EA 15 MIN 9/14/2020 Tiffanie Mitchell OTR/L GO 1                      ALFONSO Benavides/L  9/14/2020  "

## 2020-09-25 ENCOUNTER — HOSPITAL ENCOUNTER (OUTPATIENT)
Dept: OCCUPATIONAL THERAPY | Facility: HOSPITAL | Age: 60
Setting detail: THERAPIES SERIES
Discharge: HOME | End: 2020-09-25

## 2020-09-25 DIAGNOSIS — I89.0 LYMPHEDEMA OF BREAST: Primary | ICD-10-CM

## 2020-09-25 PROCEDURE — 97140 MANUAL THERAPY 1/> REGIONS: CPT

## 2020-09-25 PROCEDURE — 97535 SELF CARE MNGMENT TRAINING: CPT

## 2020-09-25 NOTE — THERAPY PROGRESS REPORT/RE-CERT
Outpatient Occupational Therapy Lymphedema Progress Note  [facility]     Patient Name: Francoise Kamara  : 1960  MRN: [id number]  Today's Date: 2020      Visit Date: 2020    Patient Active Problem List   Diagnosis   • Urinary frequency   • Stress incontinence   • Hypertension   • GERD (gastroesophageal reflux disease)   • Vaginal atrophy   • Rosacea   • OAB (overactive bladder)   • Vitamin D deficiency   • Peripheral edema   • Fatigue   • Dyspnea on exertion   • Wears glasses   • Cardiomegaly   • Malignant neoplasm of upper-outer quadrant of right breast in female, estrogen receptor positive (CMS/HCC)   • Port-A-Cath in place   • Menopause        Past Medical History:   Diagnosis Date   • Cardiomegaly     stable on CXR   • Drug therapy    • Dyspnea on exertion    • Elevated LDL cholesterol level    • Fatigue    • GERD (gastroesophageal reflux disease)     on daily Protonix, EGD 10/2016. Sx's resolved since AGBR, even if doesn't take her PPI. serum h. pyl neg.  EGD [facility] 10/16 prior to AGBR  36 cm   • History of radiation therapy 2020    right breast   • Hx of radiation therapy    • Hypertension    • Hypoalbuminemia     admits to chronic undereating   • Low HDL (under 40)    • Malignant neoplasm of upper-outer quadrant of right breast in female, estrogen receptor positive (CMS/HCC) 2019   • Menopause    • Microcytic red blood cells     H/H 12.6/38.4   • Morbid obesity (CMS/HCC)    • OAB (overactive bladder)    • Peripheral edema    • Rosacea     on Doxycycline   • Stress incontinence    • Vaginal atrophy    • Vitamin D deficiency    • Wears glasses         Past Surgical History:   Procedure Laterality Date   • BREAST BIOPSY      ? LATERALITY   • BREAST BIOPSY Right 2019    [facility]    • BREAST LUMPECTOMY Right 2019   • CHOLECYSTECTOMY      for stones w/ Dr. Barboza @ [facility]   • COLONOSCOPY     • COLONOSCOPY     • ENDOSCOPY      by Dr. Urias   • ENDOSCOPY N/A  10/20/2017    Procedure: ESOPHAGOGASTRODUODENOSCOPY;  Surgeon: Guille Urias MD;  Location:  [facility];  Service:    • GASTRIC BANDING REMOVAL N/A 12/21/2016    Procedure: GASTRIC BANDING REMOVAL LAPAROSCOPIC;  Surgeon: Guille Urias MD;  Location:  [facility];  Service:    • GASTRIC SLEEVE LAPAROSCOPIC N/A 10/20/2017    Procedure: GASTRIC SLEEVE LAPAROSCOPIC, ;  Surgeon: Guille Urias MD;  Location:  [facility];  Service:    • LAPAROSCOPIC GASTRIC BANDING  2008    s/p LAGB APS w/ HHR 11/2008 by GDW @ [facility].  full dissection hiatus, single stitch ant repair   • VENOUS ACCESS DEVICE (PORT) INSERTION Left 09/24/2019   • VENOUS ACCESS DEVICE (PORT) INSERTION Left 10/24/2019   • WISDOM TOOTH EXTRACTION           Visit Dx:      ICD-10-CM ICD-9-CM   1. Lymphedema of breast  I89.0 457.1       Lymphedema     Row Name 09/25/20 1000             Subjective Pain    Able to rate subjective pain?  yes  -SG      Pre-Treatment Pain Level  0  -SG      Post-Treatment Pain Level  0  -SG         Subjective Comments    Subjective Comments  Pt reports that she received her compression pump, it is on an extensive setting, so she is going to call rep to change settings that will be more appropriate for her.  -SG         Lymphedema Assessment    Lymphedema Classification  RUE:;at risk/stage 0  -SG      Lymphedema Cancer Related Sx  right;lumpectomy;sentinel node biopsy  -SG      Lymph Nodes Removed #  3  -SG      Positive Lymph Nodes #  3  -SG      Chemo Received  yes  -SG      Radiation Therapy Received  yes  -SG      Infections or Cellulitis?  no  -SG         Posture/Observations    Alignment Options  Rounded shoulders  -SG      Rounded Shoulders  Moderate  -SG         General ROM    GENERAL ROM COMMENTS  BUE is WFL; R side is not full AROM, functionally 140 degrees flexion; pt reporting having a difficult time reaching up into cabinets  -SG         MMT (Manual Muscle Testing)    General MMT Comments  MMT is WFL; R side is  weaker, 3/5  -SG         Lymphedema Edema Assessment    Edema Assessment Comment  Edema present in R breast, areas of pitting and fibrosis  -SG         Skin Changes/Observations    Location/Assessment  Upper Quadrant  -SG      Upper Quadrant Conditions  right:;intact;clean  -SG      Upper Quadrant Color/Pigment  right:;radiation fibrosis;other (comment) residual redness from radiation  -SG         Lymphedema Sensation    Lymphedema Sensation Comments  Areas of numbness and/or hyposensitivty; also 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  -SG         Manual Lymphatic Drainage    Manual Lymphatic Drainage  initial sequence;opened regional lymph nodes;opened anastamoses;astym  -SG      Initial Sequence  short neck;cervical;supraclavicular;shoulder collectors;abdomen;diaphragmatic breathing  -SG      Cervical  right;left  -SG      Supraclavicular  right;left  -SG      Shoulder Collectors  right;left  -SG      Abdomen  superficial  -SG      Opened Regional Lymph Nodes  axillary  -SG      Axillary  right  -SG      Opened Anastamoses  anterior axillo-axillary;axillo-inguinal  -SG      Axillo-Inguinal  right  -SG      Astym  scar(s) / incision line(s);anterior-lateral chest;upper traps  -SG      Anterior-Lateral Chest  right  -SG      Upper Traps  right  -SG      Manual Lymphatic Drainage Comments  Full MLD sequence to R breast  -SG      Manual Therapy  ASTYM/STM following MLD; followed by stretching  -SG         L-Dex Bioimpedence Screening    L-Dex Measurement Extremity  RUE  -SG      L-Dex Patient Position  Standing  -SG      L-Dex UE Dominate Side  Right  -SG      L-Dex UE At Risk Side  Right  -SG      L-Dex UE Pre Surgical Value  No  -SG        User Key  (r) = Recorded By, (t) = Taken By, (c) = Cosigned By    Initials Name Provider Type    Tiffanie Alexis OTR/L Occupational Therapist                  OT Assessment/Plan     Row Name 09/25/20 1000          OT Assessment    Functional Limitations  Limitations in  functional capacity and performance;Performance in self-care ADL  -SG     Impairments  Edema;Impaired flexibility;Impaired lymphatic circulation;Range of motion;Pain  -SG     Assessment Comments  Pt is progressing well. She continues with edema in R breast, edema still present but skin tissue health improving and becoming softer. She is compliant w/ CDT at home including self-MLD, compression, and exercises. We progressed her stretching and movement today. She will begin with compression pump use at home, and return for follow up in a couple of weeks. She will benefit from continuing with CDT for lymphedema tx.  -SG     OT Rehab Potential  Good  -SG     Patient/caregiver participated in establishment of treatment plan and goals  Yes  -SG     Patient would benefit from skilled therapy intervention  Yes  -SG        OT Plan    OT Frequency  1x/week  -SG     Planned CPT's?  OT EVAL MOD COMPLEXITY: 24508;OT THER ACT EA 15 MIN: 72019AH;OT THER PROC EA 15 MIN: 85935QM;OT SELF CARE/MGMT/TRAIN 15 MIN: 81489;OT MANUAL THERAPY EA 15 MIN: 57433  -SG     Planned Therapy Interventions (Optional Details)  home exercise program;manual therapy techniques;patient/family education;postural re-education;ROM (Range of Motion);stair training  -SG     OT Plan Comments  Continue with CDT including MLD, compression, skin care, and exercise. Continue with compression garments and compression pump.  -SG       User Key  (r) = Recorded By, (t) = Taken By, (c) = Cosigned By    Initials Name Provider Type    Tiffanie Alexis, OTR/L Occupational Therapist                 Manual Rx (last 36 hours)      Manual Treatments     Row Name 09/25/20 1000             Total Minutes    60959 - OT Manual Therapy Minutes  45  -SG        User Key  (r) = Recorded By, (t) = Taken By, (c) = Cosigned By    Initials Name Provider Type    Tiffanie Alexis, OTR/L Occupational Therapist        OT Goals     Row Name 09/25/20 0100          OT Short Term Goals    STG Date  "to Achieve  09/19/20  -SG     STG 1  \"Pt will understand lymphedema precautions to decrease the risk of infection and exacerbation of the lymphedema.  -SG     STG 1 Progress  Met  -SG     STG 2  \"Pt will perform HEP w/ minimal assistance to help improve lymphatic flow   -SG     STG 2 Progress  Met  -SG     STG 3  \"Pt will perform a self-MLD protocol with minimal assistance to help reduce swelling and thus improve ROM and mobility.  -SG     STG 3 Progress  Met  -SG        Long Term Goals    LTG 1  \"Pt will obtain appropriate compression and be ind w/ donning/doffing which will enable regular daily garment wear  -SG     LTG 1 Progress  Progressing  -SG     LTG 2  \"Treatment will achieve maximum edema and/or lymphedema reduction to enable functional improvements and a return to PLOF  -SG     LTG 2 Progress  Progressing  -SG     LTG 3  \"Pt and/or caregiver will be ind w/ HEP and lymphedema management to help prevent edema relapse and reduce the risk of infection  -SG     LTG 3 Progress  Met  -SG     LTG 4  \"If appropriate, pt will be set up and ind w/ compression pump for long term management of lymphedema  -SG     LTG 4 Progress  Met  -SG        Time Calculation    OT Goal Re-Cert Due Date  11/18/20  -SG       User Key  (r) = Recorded By, (t) = Taken By, (c) = Cosigned By    Initials Name Provider Type    Tiffanie Alexis OTR/L Occupational Therapist          Therapy Education  Education Details: Continue with HEP and OT recommendations; pt was edu to call tactile med rep to adjust settings on compression pump for greater accuracy  Given: HEP, Symptoms/condition management, Edema management  Program: New, Reinforced, Progressed  How Provided: Written, Demonstration, Verbal  Provided to: Patient, Caregiver  Level of Understanding: Verbalized, Demonstrated, Teach back education performed  54189 - OT Self Care/Mgmt Minutes: 10                Time Calculation:   OT Start Time: 1000     Therapy Charges for Today     Code " Description Service Date Service Provider Modifiers Qty    78256716292 HC OT MANUAL THERAPY EA 15 MIN 9/25/2020 Tiffanie Mitchell, TENNILLER/L GO 3    12223388317 HC OT SELF CARE/MGMT/TRAIN EA 15 MIN 9/25/2020 Tiffanie Mitchell, TENNILLER/L GO 1                      Tiffanie Mitchell OTR/L  9/25/2020

## 2020-10-13 ENCOUNTER — TELEPHONE (OUTPATIENT)
Dept: ONCOLOGY | Facility: CLINIC | Age: 60
End: 2020-10-13

## 2020-10-13 NOTE — TELEPHONE ENCOUNTER
Bull Recio - I just talked to Ms. Kamara and she is asking if Dr. Rodriguez would prepare a letter for her in regards to her diagnosis, treatment, outcomes, etc and how she continued to work and was willing to work during her treatment.  She meets with someone in [place] on 10/28 to hopefully resolve the issue that has kept her from receiving unemployment/stimulus benefits of $135/week (??).  She wants this letter to have on hand in case they ask for a statement from MD, she might not have to use it, just wants her bases covered.  She would like the completed letter mailed to her at her home address: [address] [place] [id number].  Please let me know if I can do anything to help.  Thanks!

## 2020-10-16 ENCOUNTER — HOSPITAL ENCOUNTER (OUTPATIENT)
Dept: OCCUPATIONAL THERAPY | Facility: HOSPITAL | Age: 60
Setting detail: THERAPIES SERIES
Discharge: HOME | End: 2020-10-16

## 2020-10-16 DIAGNOSIS — I89.0 LYMPHEDEMA OF BREAST: Primary | ICD-10-CM

## 2020-10-16 PROCEDURE — 97535 SELF CARE MNGMENT TRAINING: CPT

## 2020-10-16 PROCEDURE — 97140 MANUAL THERAPY 1/> REGIONS: CPT

## 2020-10-16 NOTE — THERAPY PROGRESS REPORT/RE-CERT
Outpatient Occupational Therapy Lymphedema Treatment Note  [facility]     Patient Name: Francoise Kamara  : 1960  MRN: [id number]  Today's Date: 10/16/2020      Visit Date: 10/16/2020    Patient Active Problem List   Diagnosis   • Urinary frequency   • Stress incontinence   • Hypertension   • GERD (gastroesophageal reflux disease)   • Vaginal atrophy   • Rosacea   • OAB (overactive bladder)   • Vitamin D deficiency   • Peripheral edema   • Fatigue   • Dyspnea on exertion   • Wears glasses   • Cardiomegaly   • Malignant neoplasm of upper-outer quadrant of right breast in female, estrogen receptor positive (CMS/HCC)   • Port-A-Cath in place   • Menopause        Past Medical History:   Diagnosis Date   • Cardiomegaly     stable on CXR   • Drug therapy    • Dyspnea on exertion    • Elevated LDL cholesterol level    • Fatigue    • GERD (gastroesophageal reflux disease)     on daily Protonix, EGD 10/2016. Sx's resolved since AGBR, even if doesn't take her PPI. serum h. pyl neg.  EGD [facility] 10/16 prior to AGBR  36 cm   • History of radiation therapy 2020    right breast   • Hx of radiation therapy    • Hypertension    • Hypoalbuminemia     admits to chronic undereating   • Low HDL (under 40)    • Malignant neoplasm of upper-outer quadrant of right breast in female, estrogen receptor positive (CMS/HCC) 2019   • Menopause    • Microcytic red blood cells     H/H 12.6/38.4   • Morbid obesity (CMS/HCC)    • OAB (overactive bladder)    • Peripheral edema    • Rosacea     on Doxycycline   • Stress incontinence    • Vaginal atrophy    • Vitamin D deficiency    • Wears glasses         Past Surgical History:   Procedure Laterality Date   • BREAST BIOPSY      ? LATERALITY   • BREAST BIOPSY Right 2019    [facility]    • BREAST LUMPECTOMY Right 2019   • CHOLECYSTECTOMY      for stones w/ Dr. Barboza @ [facility]   • COLONOSCOPY     • COLONOSCOPY     • ENDOSCOPY      by Dr. Urias   • ENDOSCOPY N/A  10/20/2017    Procedure: ESOPHAGOGASTRODUODENOSCOPY;  Surgeon: Guille Urias MD;  Location:  [facility];  Service:    • GASTRIC BANDING REMOVAL N/A 12/21/2016    Procedure: GASTRIC BANDING REMOVAL LAPAROSCOPIC;  Surgeon: Guille Urias MD;  Location:  [facility];  Service:    • GASTRIC SLEEVE LAPAROSCOPIC N/A 10/20/2017    Procedure: GASTRIC SLEEVE LAPAROSCOPIC, ;  Surgeon: Guille Urias MD;  Location:  [facility];  Service:    • LAPAROSCOPIC GASTRIC BANDING  2008    s/p LAGB APS w/ HHR 11/2008 by [facility] @ [facility].  full dissection hiatus, single stitch ant repair   • VENOUS ACCESS DEVICE (PORT) INSERTION Left 09/24/2019   • VENOUS ACCESS DEVICE (PORT) INSERTION Left 10/24/2019   • WISDOM TOOTH EXTRACTION           Visit Dx:      ICD-10-CM ICD-9-CM   1. Lymphedema of breast  I89.0 457.1       Lymphedema     Row Name 10/16/20 1300             Subjective Pain    Able to rate subjective pain?  yes  -SG      Pre-Treatment Pain Level  0  -SG      Post-Treatment Pain Level  0  -SG         Subjective Comments    Subjective Comments  Pt reports that she received her new compression pump, and that it is more appropriate for her breast swelling. She reports that her skin tissue is becoming looser.  -SG         Lymphedema Assessment    Lymphedema Classification  RUE:;at risk/stage 0  -SG      Lymphedema Cancer Related Sx  right;lumpectomy;sentinel node biopsy  -SG      Lymph Nodes Removed #  3  -SG      Positive Lymph Nodes #  3  -SG      Chemo Received  yes  -SG      Radiation Therapy Received  yes  -SG      Infections or Cellulitis?  no  -SG         Posture/Observations    Alignment Options  Rounded shoulders  -SG      Rounded Shoulders  Moderate  -SG         General ROM    GENERAL ROM COMMENTS  BUE is WFL; R side is not full AROM, functionally 140 degrees flexion; pt reporting having a difficult time reaching up into cabinets  -SG         MMT (Manual Muscle Testing)    General MMT Comments  MMT is WFL; R side is weaker,  3/5  -SG         Lymphedema Edema Assessment    Edema Assessment Comment  Edema present in R breast, areas of pitting and fibrosis  -SG         Skin Changes/Observations    Location/Assessment  Upper Quadrant  -SG      Upper Quadrant Conditions  right:;intact;clean  -SG      Upper Quadrant Color/Pigment  right:;radiation fibrosis;other (comment) residual redness from radiation  -SG         Lymphedema Sensation    Lymphedema Sensation Comments  Areas of numbness and/or hyposensitivty; also 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  -SG         Manual Lymphatic Drainage    Manual Lymphatic Drainage  --  -SG      Initial Sequence  --  -SG      Cervical  --  -SG      Supraclavicular  --  -SG      Shoulder Collectors  --  -SG      Abdomen  --  -SG      Opened Regional Lymph Nodes  --  -SG      Axillary  --  -SG      Opened Anastamoses  --  -SG      Axillo-Inguinal  --  -SG      Astym  --  -SG      Anterior-Lateral Chest  --  -SG      Upper Traps  --  -SG         L-Dex Bioimpedence Screening    L-Dex Measurement Extremity  RUE  -SG      L-Dex Patient Position  Standing  -SG      L-Dex UE Dominate Side  Right  -SG      L-Dex UE At Risk Side  Right  -SG      L-Dex UE Pre Surgical Value  No  -SG      L-Dex UE Score  -4.1  -SG        User Key  (r) = Recorded By, (t) = Taken By, (c) = Cosigned By    Initials Name Provider Type    Tiffanie Alexis OTR/L Occupational Therapist                  OT Assessment/Plan     Row Name 10/16/20 1300          OT Assessment    Functional Limitations  Limitations in functional capacity and performance;Performance in self-care ADL  -SG     Impairments  Edema;Impaired flexibility;Impaired lymphatic circulation;Range of motion;Pain  -SG     Assessment Comments  Pt presents w the following: limited ROM R shoulder for overhead tasks; pain and stiffness in chest and shoulders which limits ROM for dressing and reaching and other basic ADLs; strength: decreased core stabilization and decreaed UE/  strength; posture: forward neck and shoulder posture, disrupted scapulo-humeral rhythm; increased tissue tightness over chest, stress across pectoralis major muscle, s-c joint line, c-v joint line, and associated ST through shoulders and trunk; w/ tight soft tissue restrictions and scar tissue; swelling in breast/lateral trunk (lymphedema of the breast), at risk for lymphedema of RUE. She will benefit from continuing with OP OT to address symptoms.  -     OT Rehab Potential  Good  -SG     Patient/caregiver participated in establishment of treatment plan and goals  Yes  -SG     Patient would benefit from skilled therapy intervention  Yes  -SG        OT Plan    OT Frequency  1x/week  -     Planned CPT's?  OT EVAL MOD COMPLEXITY: 88463;OT THER ACT EA 15 MIN: [id number];OT THER PROC EA 15 MIN: 12217VR;OT SELF CARE/MGMT/TRAIN 15 MIN: 62908;OT MANUAL THERAPY EA 15 MIN: 15072  -     Planned Therapy Interventions (Optional Details)  home exercise program;manual therapy techniques;patient/family education;postural re-education;ROM (Range of Motion);stair training  -     OT Plan Comments  Continue w/ PORi protocol  -       User Key  (r) = Recorded By, (t) = Taken By, (c) = Cosigned By    Initials Name Provider Type    Tiffanie Alexis OTR/L Occupational Therapist                 Manual Rx (last 36 hours)      Manual Treatments     Row Name 10/16/20 1300             Total Minutes    82204 - OT Manual Therapy Minutes  45  -         Manual Rx 1    Manual Rx 1 Location  Arm--Orthopedic: Forearm extensors, biceps muscles, lateral intermuscular septum tension line upper arm, pectoralis major  -      Manual Rx 1 Type  Trigger Point Release, Myofascial Bending  -      Manual Rx 1 Duration  Trigger Point Release for count of 3 seconds, repeat each section 5 times; Myofascial stretching w/ tissue/muscle bending 3-5 times  -         Manual Rx 2    Manual Rx 2 Location  Axilla--Orthopedic: Medial Intermuscular Septum  tension line upper arm, pectoralis minor, subscapularis, latissimus dorsi, teres major  -SG      Manual Rx 2 Type  Trigger Point Release; Myofascial bending  -SG      Manual Rx 2 Duration  Trigger Point Release for count of 3 seconds, repeat each section 5 times; Myofascial stretching w/ tissue/muscle bending 3-5 times  -SG         Manual Rx 3    Manual Rx 3 Location  Breast: Lymph System: Zone 1--pectoral region, Zone 2--superior breast, Zone 3--inferior breast  -SG      Manual Rx 3 Type  Fluid movement/ MLD; parasternal LN node pumps  -SG      Manual Rx 3 Duration  Repeat each Line x1 and corresponding parasternal node pumps x5, repeat each zone x3  -SG         Manual Rx 4    Manual Rx 4 Location  Lateral chest/trunk--Lymph System: zone 1--lateral, zone 2--inferior, zone 3--central, zone 4-superior  -SG      Manual Rx 4 Type  Intercostal Lymph Node Pumps  -SG      Manual Rx 4 Duration  Repeat all 4 zones x 3  -SG         Manual Rx 5    Manual Rx 5 Location  Upper Arm: Lymph System  -SG      Manual Rx 5 Type  Upper arm fluid clearance of axillary pathway; medial confluence fluid clearance--antecubital fossa; lateral upper arm-cephalic pathway  -SG      Manual Rx 5 Duration  x3 lines of treatment  -SG         Manual Rx 6    Manual Rx 6 Location  Forearm--Orthopedic: radius and ulna-interosseous membrane; carpals-joint capsules-metacarpals-interosseous membranes; thenar muscles  -SG      Manual Rx 6 Type  Joint mobilization, Trigger Point Release, Drainage of forearm  -SG      Manual Rx 6 Duration  Joint Mob: each section x5 in each position; drainage of forearm: perform x3-5 lines of treatment  -SG         Manual Rx 7    Manual Rx 7 Location  Back--orthopedic: upper trapezius, infraspinatus, teres minor, rhomboids, quadratus lumborum; T12, L1, L2 mobilization  -SG      Manual Rx 7 Type  Trigger Point Release, Joint mobilization  -SG      Manual Rx 7 Duration  Trigger Point Release for count of 3 seconds, repeat each  "section 5 times; perform 5-10 oscillations of each vertebra successively  -SG      Additional Treatment?  Yes  -SG         Manual Rx 8    Manual Rx 8 Location  Back--lymph system  -SG      Manual Rx 8 Type  Fluid movement; paraspinal lymph node pumps  -SG      Manual Rx 8 Duration  Repeat each line x1 and corresponding paraspinal node pumps x5; repeat each zone x3  -SG        User Key  (r) = Recorded By, (t) = Taken By, (c) = Cosigned By    Initials Name Provider Type    Tiffanie Alexis OTR/L Occupational Therapist        OT Goals     Row Name 10/16/20 1300          OT Short Term Goals    STG Date to Achieve  09/19/20  -SG     STG 1  \"Pt will understand lymphedema precautions to decrease the risk of infection and exacerbation of the lymphedema.  -SG     STG 1 Progress  Met  -SG     STG 2  \"Pt will perform HEP w/ minimal assistance to help improve lymphatic flow   -SG     STG 2 Progress  Met  -SG     STG 3  \"Pt will perform a self-MLD protocol with minimal assistance to help reduce swelling and thus improve ROM and mobility.  -SG     STG 3 Progress  Met  -SG        Long Term Goals    LTG 1  \"Pt will obtain appropriate compression and be ind w/ donning/doffing which will enable regular daily garment wear  -SG     LTG 1 Progress  Met  -SG     LTG 2  \"Treatment will achieve maximum edema and/or lymphedema reduction to enable functional improvements and a return to PLOF  -SG     LTG 2 Progress  Progressing  -SG     LTG 3  \"Pt and/or caregiver will be ind w/ HEP and lymphedema management to help prevent edema relapse and reduce the risk of infection  -SG     LTG 3 Progress  Met  -SG     LTG 4  \"If appropriate, pt will be set up and ind w/ compression pump for long term management of lymphedema  -SG     LTG 4 Progress  Met  -SG        Time Calculation    OT Goal Re-Cert Due Date  11/18/20  -SG       User Key  (r) = Recorded By, (t) = Taken By, (c) = Cosigned By    Initials Name Provider Type    Tiffanie Alexis, TENNILLER/L " Occupational Therapist          Therapy Education  Education Details: Pt was edu to continue with HEP and OT recommendations  Given: HEP, Symptoms/condition management, Edema management  Program: New, Reinforced, Progressed  How Provided: Written, Demonstration, Verbal  Provided to: Patient, Caregiver  Level of Understanding: Verbalized, Demonstrated, Teach back education performed  01831 - OT Self Care/Mgmt Minutes: 10                Time Calculation:   OT Start Time: 1300     Therapy Charges for Today     Code Description Service Date Service Provider Modifiers Qty    [id number] HC OT MANUAL THERAPY EA 15 MIN 10/16/2020 Tiffanie Mitchell, OTR/L GO 3    [id number]  OT SELF CARE/MGMT/TRAIN EA 15 MIN 10/16/2020 Tiffanie Mitchell OTR/L GO 1                      Tiffanie Mitchell OTR/MEGHAN  10/16/2020   I will STOP taking the medications listed below when I get home from the hospital:  None

## 2020-11-05 ENCOUNTER — OFFICE VISIT (OUTPATIENT)
Dept: ONCOLOGY | Facility: CLINIC | Age: 60
End: 2020-11-05

## 2020-11-05 VITALS
HEART RATE: 74 BPM | DIASTOLIC BLOOD PRESSURE: 62 MMHG | HEIGHT: 63 IN | OXYGEN SATURATION: 95 % | RESPIRATION RATE: 16 BRPM | WEIGHT: 221 LBS | BODY MASS INDEX: 39.16 KG/M2 | TEMPERATURE: 97.8 F | SYSTOLIC BLOOD PRESSURE: 140 MMHG

## 2020-11-05 DIAGNOSIS — C50.411 MALIGNANT NEOPLASM OF UPPER-OUTER QUADRANT OF RIGHT BREAST IN FEMALE, ESTROGEN RECEPTOR POSITIVE (HCC): Primary | ICD-10-CM

## 2020-11-05 DIAGNOSIS — Z17.0 MALIGNANT NEOPLASM OF UPPER-OUTER QUADRANT OF RIGHT BREAST IN FEMALE, ESTROGEN RECEPTOR POSITIVE (HCC): Primary | ICD-10-CM

## 2020-11-05 PROCEDURE — 99213 OFFICE O/P EST LOW 20 MIN: CPT | Performed by: INTERNAL MEDICINE

## 2020-11-05 RX ORDER — ANASTROZOLE 1 MG/1
1 TABLET ORAL DAILY
Qty: 90 TABLET | Refills: 4 | Status: SHIPPED | OUTPATIENT
Start: 2020-11-05 | End: 2020-11-18

## 2020-11-05 NOTE — PROGRESS NOTES
PROBLEM LIST:  Oncology/Hematology History Overview Note   Lab Results   Component Value Date    FINALDX  08/19/2019     1. RIGHT BREAST LUMPECTOMY WITH NEEDLE LOCALIZATION:   Infiltrating and in-situ intermediate grade lobular carcinoma.  Bloom Denis score 6/9.  Infiltrating tumor size 2.2x1.5x1.0 cm.   Margins of resection negative for tumor with closest infiltrating margin inferior and superior measuring 4 mm each.   No lymphvascular invasion identified.   Presence of previous biopsy site identified.  See Template.  2. SENTINEL LYMPH NODE #1, RIGHT AXILLA, EXCISION:   Lymph node x1; positive for metastatic lobular carcinoma (1/1).  3. SENTINEL LYMPH NODE #2, RIGHT AXILLA, EXCISION:  Lymph node x1; positive for metastatic lobular carcinoma (1/1).  4. NONSENTINEL LYMPH NODE, RIGHT AXILLA, EXCISION:  Lymph node x1; positive for metastatic lobular carcinoma (1/1).     INVASIVE BREAST CANCER STAGING TEMPLATE:  TYPE OF SPECIMEN/PROCEDURE:  Needle localized lumpectomy  SPECIMEN LATERALITY: Right breast  TUMOR SITE: 12 o'clock   TUMOR SIZE: (Size of Largest Invasive Carcinoma): 2.2x1.5x1.0 cm   HISTOLOGIC TYPE OF INVASIVE CARCINOMA:  Lobular   GRADE: Intermediate   TUBULAR FORMATION:  3  MITOTIC ACTIVITY:  1  PLEOMORPHISM:  2  OVERALL SCORE: 6/9   DUCTAL CARCINOMA IN SITU (DCIS) EXTENT: 0  TUMOR EXTENSION (Only if structures present and involved):    SKIN: N/A    NIPPLE: N/A    SKELETAL MUSCLE: N/A   SURGICAL MARGINS (Uninvolved/positive): Uninvolved   INVASIVE CARCINOMA MARGINS (Uninvolved/Positive) Uninvolved   DISTANCE FROM CLOSEST MARGIN: 4 mm   SPECIFIC CLOSEST MARGIN: Inferior and superior   LCIS MARGINS (Uninvolved/Positive/No LCIS): Uninvolved   DISTANCE FROM CLOSEST MARGIN: 1 mm  SPECIFIC CLOSEST MARGIN: Inferior   LYMPH NODES (required only if lymph nodes are present in the specimen):  Present   NUMBER OF LYMPH NODES WITH MACROMETASTASIS: 3  NUMBER OF LYMPH NODES WITH MICROMETASTASIS: 0  NUMBER OF LYMPH  NODES WITH ISOLATED TUMOR CELLS: 0  TOTAL NUMBER OF LYMPH NODES EXAMINED (Including sentinel nodes): 3  NUMBER OF SENTINEL NODES EXAMINED: 2  EXTRANODAL EXTENSION OF TUMOR:  0  VASCULAR/LYMPHATIC INVASION:  Not identified     ESTROGEN RECEPTOR STATUS BY IHC METHOD: Positive   PROGESTERONE RECEPTOR STATUS BY IHC METHOD: Positive    HER-2/ysabel ONCOPROTEIN STATUS BY IHC METHOD:  Negative   HER-2/ysabel ONCOGENE STATUS BY IN SITU HYBRIDIZATION ANALYSIS:  Not performed   TREATMENT EFFECT (BREAST): None  TREATMENT EFFECT (NODES): None   ADDITIONAL PATHOLOGIC FINDINGS:  Previous biopsy site identified   OTHER STUDIES:  None  AJCC PATHOLOGIC STAGE:  (COMPLETED BY PATHOLOGIST, BASED ONLY ON TISSUE FINDINGS, MORE EXTENSIVE DISEASE MAY NOT BE KNOWN TO THE PATHOLOGIST)  pT=  2  pN=  2  pM=  Unknown    DGD/mbc             Malignant neoplasm of upper-outer quadrant of right breast in female, estrogen receptor positive (CMS/HCC)   7/18/2019 Biopsy    Right breast     8/16/2019 Initial Diagnosis    Malignant neoplasm of upper-outer quadrant of right breast in female, estrogen receptor positive (CMS/HCC)     8/19/2019 Surgery    Surgery       Right breast lumpectomy with sentinel node biopsy by Dr. Stephanie Fraire     8/19/2019 Cancer Staged    Cancer Staging  Malignant neoplasm of upper-outer quadrant of right breast in female, estrogen receptor positive (CMS/HCC)  Staging form: Breast, AJCC 8th Edition  - Pathologic stage from 9/12/2019: Stage IB (pT2, pN1a, cM0, G2, ER: Positive, IL: Positive, HER2: Negative) - Signed by Laila Thacker MD on 9/12/2019 9/19/2019 Imaging    Negative work-up for metastatic disease by CAT scan of the chest abdomen pelvis and bone scan     9/20/2019 -  Other Event    ECHO - Normal EF     9/23/2019 Surgery    Surgery       Port Placement     9/25/2019 - 11/22/2019 Chemotherapy    OP BREAST AC DD DOXOrubicin / Cyclophosphamide       12/6/2019 - 2/21/2020 Chemotherapy    OP BREAST PACLitaxel (weekly X  12)       3/9/2020 - 4/20/2020 Radiation    Radiation OncologyTreatment Course:  Francoise Kamara received 6000 cGy in 30 fractions to right breast & s/c via External Beam Radiation - EBRT.         REASON FOR VISIT: Management of my breast cancer    HISTORY OF PRESENT ILLNESS:   60 y.o.  female presents today for management of her breast cancer.  She completed 4 cycles of dose dense Adriamycin and Cytoxan and Weekly Taxol in Feb/2020.  She has completed  adjuvant radiation in April 2020.  Clinically she did remarkably well with treatment.  She is doing well otherwise.  She is currently on anastrozole.  She is doing well.  She does have some issues with stiffness.  But overall no major side effects.    Past Medical History:   Diagnosis Date   • Cardiomegaly     stable on CXR   • Drug therapy    • Dyspnea on exertion    • Elevated LDL cholesterol level    • Fatigue    • GERD (gastroesophageal reflux disease)     on daily Protonix, EGD 10/2016. Sx's resolved since AGBR, even if doesn't take her PPI. serum h. pyl neg.  EGD [facility] 10/16 prior to AGBR  36 cm   • History of radiation therapy 04/20/2020    right breast   • Hx of radiation therapy    • Hypertension    • Hypoalbuminemia     admits to chronic undereating   • Low HDL (under 40)    • Malignant neoplasm of upper-outer quadrant of right breast in female, estrogen receptor positive (CMS/HCC) 9/12/2019   • Menopause    • Microcytic red blood cells     H/H 12.6/38.4   • Morbid obesity (CMS/HCC)    • OAB (overactive bladder)    • Peripheral edema    • Rosacea     on Doxycycline   • Stress incontinence    • Vaginal atrophy    • Vitamin D deficiency    • Wears glasses      Social History     Socioeconomic History   • Marital status:      Spouse name: Not on file   • Number of children: Not on file   • Years of education: Not on file   • Highest education level: Not on file   Tobacco Use   • Smoking status: Never Smoker   • Smokeless tobacco: Never Used    Substance and Sexual Activity   • Alcohol use: No   • Drug use: No   • Sexual activity: Defer     Comment: spouse   Social History Narrative     w/2 children.  Lives in [place].  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      Family History   Problem Relation Age of Onset   • Hypertension Mother    • Sleep apnea Mother    • Hypertension Father    • Lung cancer Father    • Hypertension Maternal Grandfather    • Stroke Maternal Grandfather    • Hypertension Paternal Grandmother    • Breast cancer Paternal Aunt 45   • No Known Problems Brother    • BRCA 1/2 Neg Hx    • Ovarian cancer Neg Hx        Review of Systems:    Review of Systems   Constitutional: Positive for fatigue.   HENT:  Negative.    Eyes: Negative.    Respiratory: Negative.    Cardiovascular: Negative.    Gastrointestinal: Negative.    Endocrine: Negative.    Genitourinary: Negative.     Musculoskeletal: Positive for arthralgias.   Skin: Negative.    Neurological: Negative.    Hematological: Negative.    Psychiatric/Behavioral: Negative.       A comprehensive 14 point review of systems was performed and was negative except as mentioned.      Medications:        Current Outpatient Medications:   •  anastrozole (ARIMIDEX) 1 MG tablet, Take 1 tablet by mouth Daily., Disp: 90 tablet, Rfl: 4  •  Biotin 5000 MCG tablet, Take  by mouth., Disp: , Rfl:   •  calcium carbonate, oyster shell, 500 MG tablet tablet, Take 500 mg by mouth 2 (Two) Times a Day., Disp: , Rfl:   •  esomeprazole (nexIUM) 40 MG capsule, Take 40 mg by mouth Every Morning Before Breakfast., Disp: , Rfl:   •  Ferrous Gluconate (IRON 27 PO), Take  by mouth., Disp: , Rfl:   •  hydrochlorothiazide (MICROZIDE) 12.5 MG capsule, Take 12.5 mg by mouth As Needed., Disp: , Rfl:   •  lisinopril (PRINIVIL,ZESTRIL) 40 MG tablet, Take 1 tablet by mouth Daily., Disp: , Rfl:   •  tolterodine LA (DETROL LA) 4 MG 24 hr capsule, Take 1 capsule by mouth Daily., Disp: , Rfl:   •   "vitamin D (ERGOCALCIFEROL) 22688 UNITS capsule capsule, 50,000 Units 1 (One) Time Per Week. sundays, Disp: , Rfl:       ALLERGIES:    Allergies   Allergen Reactions   • Penicillins Hives and Swelling     Invanz given for AGBR surgery         Physical Exam    VITAL SIGNS:  /62 Comment: LUE  Pulse 74   Temp 97.8 °F (36.6 °C) (Temporal)   Resp 16   Ht 160 cm (63\")   Wt 100 kg (221 lb)   SpO2 95% Comment: RA  BMI 39.15 kg/m²     Wt Readings from Last 3 Encounters:   11/05/20 100 kg (221 lb)   08/10/20 98.7 kg (217 lb 9.6 oz)   06/01/20 100 kg (220 lb 12.8 oz)       Body mass index is 39.15 kg/m². Body surface area is 2.02 meters squared.         Performance Status: 0    General: well appearing, in no acute distress  HEENT: sclera anicteric, oropharynx clear, neck is supple  Lymphatics: no cervical, supraclavicular, or axillary adenopathy  Cardiovascular: regular rate and rhythm, no murmurs, rubs or gallops  Lungs: clear to auscultation bilaterally  Abdomen: soft, nontender, nondistended.  No palpable organomegaly  Extremities: no lower extremity edema  Skin: no rashes, lesions, bruising, or petechiae  Msk:  Shows no weakness of the large muscle groups  Psych: Mood is stable  Port in the left chest wall      RECENT LABS:    Lab Results   Component Value Date    HGB 11.4 (L) 02/21/2020    HCT 35.0 02/21/2020    .9 (H) 02/21/2020     02/21/2020    WBC 3.60 02/21/2020    NEUTROABS 2.30 02/21/2020    LYMPHSABS 1.00 02/21/2020    MONOSABS 0.30 02/21/2020    EOSABS 0.1 05/08/2019    BASOSABS 0.0 05/08/2019       Lab Results   Component Value Date    GLUCOSE 75 02/21/2020    BUN 8 02/21/2020    CREATININE 0.63 02/21/2020     02/21/2020    K 3.4 (L) 02/21/2020     (H) 02/21/2020    CO2 26.0 02/21/2020    CALCIUM 8.9 02/21/2020    PROTEINTOT 6.3 02/21/2020    ALBUMIN 3.50 02/21/2020    BILITOT 0.3 02/21/2020    ALKPHOS 56 02/21/2020    AST 12 02/21/2020    ALT 9 02/21/2020     Mammo " Diagnostic Digital Tomosynthesis Bilateral With Cad    Result Date: 8/10/2020  Findings consistent with conservation therapy on the right with increasing skin thickening since the previous exam, likely related to evolving radiation changes, weight loss, and edema. Clinical correlation is recommended. Unremarkable left mammogram.   BI-RADS CATEGORY:  3, PROBABLY BENIGN   RECOMMENDATION:  6 month right mammographic follow-up  CAD was utilized.  The standard false-negative rate of mammography is between 10% and 25%. Complex patterns or increased breast density will markedly elevate the false-negative rate of mammography.    A letter, in lay terminology, with the results of this exam was given to the patient at the time of the visit.   Physician Order  Diagnostic 6 Month follow up Mammogram with Breast Ultrasound if needed.    Diagnosis: Abnormal Mammogram _ Physician Order  This report was finalized on 8/10/2020 10:51 AM by Dr. Bruna Lundberg MD.              Assessment/Plan    1. Stage IB infiltrating intermediate grade lobular carcinoma of the right breast with node positivity.  She is tolerating her oral hormone blocker.    Plan for 10 years. She is doing well otherwise.  Continue with no changes.  Plan for mammograms in February of 2021.  I will see her in my clinic in 6 months.         Laila Thacker MD  [facility] Hematology and On[facility]    Return in (Approximately): 6 months    No orders of the defined types were placed in this encounter.      11/5/2020

## 2020-11-13 ENCOUNTER — HOSPITAL ENCOUNTER (OUTPATIENT)
Dept: OCCUPATIONAL THERAPY | Facility: HOSPITAL | Age: 60
Setting detail: THERAPIES SERIES
Discharge: HOME | End: 2020-11-13

## 2020-11-13 DIAGNOSIS — I89.0 LYMPHEDEMA OF BREAST: Primary | ICD-10-CM

## 2020-11-13 PROCEDURE — 97140 MANUAL THERAPY 1/> REGIONS: CPT

## 2020-11-13 NOTE — THERAPY PROGRESS REPORT/RE-CERT
Outpatient Occupational Therapy Lymphedema Progress Note  [facility]     Patient Name: Francoise Kamara  : 1960  MRN: [id number]  Today's Date: 2020      Visit Date: 2020    Patient Active Problem List   Diagnosis   • Urinary frequency   • Stress incontinence   • Hypertension   • GERD (gastroesophageal reflux disease)   • Vaginal atrophy   • Rosacea   • OAB (overactive bladder)   • Vitamin D deficiency   • Peripheral edema   • Fatigue   • Dyspnea on exertion   • Wears glasses   • Cardiomegaly   • Malignant neoplasm of upper-outer quadrant of right breast in female, estrogen receptor positive (CMS/HCC)   • Port-A-Cath in place   • Menopause        Past Medical History:   Diagnosis Date   • Cardiomegaly     stable on CXR   • Drug therapy    • Dyspnea on exertion    • Elevated LDL cholesterol level    • Fatigue    • GERD (gastroesophageal reflux disease)     on daily Protonix, EGD 10/2016. Sx's resolved since AGBR, even if doesn't take her PPI. serum h. pyl neg.  EGD [facility] 10/16 prior to AGBR  36 cm   • History of radiation therapy 2020    right breast   • Hx of radiation therapy    • Hypertension    • Hypoalbuminemia     admits to chronic undereating   • Low HDL (under 40)    • Malignant neoplasm of upper-outer quadrant of right breast in female, estrogen receptor positive (CMS/HCC) 2019   • Menopause    • Microcytic red blood cells     H/H 12.6/38.4   • Morbid obesity (CMS/HCC)    • OAB (overactive bladder)    • Peripheral edema    • Rosacea     on Doxycycline   • Stress incontinence    • Vaginal atrophy    • Vitamin D deficiency    • Wears glasses         Past Surgical History:   Procedure Laterality Date   • BREAST BIOPSY      ? LATERALITY   • BREAST BIOPSY Right 2019    [facility]    • BREAST LUMPECTOMY Right 2019   • CHOLECYSTECTOMY      for stones w/ Dr. Barboza @ [facility]   • COLONOSCOPY     • COLONOSCOPY     • ENDOSCOPY      by Dr. Urias   • ENDOSCOPY N/A  10/20/2017    Procedure: ESOPHAGOGASTRODUODENOSCOPY;  Surgeon: Guille Urias MD;  Location:  [facility];  Service:    • GASTRIC BANDING REMOVAL N/A 12/21/2016    Procedure: GASTRIC BANDING REMOVAL LAPAROSCOPIC;  Surgeon: Guille Urias MD;  Location:  [facility];  Service:    • GASTRIC SLEEVE LAPAROSCOPIC N/A 10/20/2017    Procedure: GASTRIC SLEEVE LAPAROSCOPIC, ;  Surgeon: Guille Urias MD;  Location:  [facility];  Service:    • LAPAROSCOPIC GASTRIC BANDING  2008    s/p LAGB APS w/ HHR 11/2008 by [facility] @ [facility].  full dissection hiatus, single stitch ant repair   • VENOUS ACCESS DEVICE (PORT) INSERTION Left 09/24/2019   • VENOUS ACCESS DEVICE (PORT) INSERTION Left 10/24/2019   • WISDOM TOOTH EXTRACTION           Visit Dx:      ICD-10-CM ICD-9-CM   1. Lymphedema of breast  I89.0 457.1       Lymphedema     Row Name 11/13/20 1300             Subjective Pain    Able to rate subjective pain?  yes  -SG      Pre-Treatment Pain Level  0  -SG      Post-Treatment Pain Level  0  -SG         Subjective Comments    Subjective Comments  Pt reports that she is doing well, continuing with HEP and CDT at home including compression, compression pump, and wearing swell spot  -SG         Lymphedema Assessment    Lymphedema Classification  RUE:;at risk/stage 0  -SG      Lymphedema Cancer Related Sx  right;lumpectomy;sentinel node biopsy  -SG      Lymph Nodes Removed #  3  -SG      Positive Lymph Nodes #  3  -SG      Chemo Received  yes  -SG      Radiation Therapy Received  yes  -SG      Infections or Cellulitis?  no  -SG         Posture/Observations    Alignment Options  Rounded shoulders  -SG      Rounded Shoulders  Moderate  -SG         General ROM    GENERAL ROM COMMENTS  BUE WFL  -SG         MMT (Manual Muscle Testing)    General MMT Comments  BUE WFL  -SG         Skin Changes/Observations    Location/Assessment  Upper Quadrant  -SG      Upper Quadrant Conditions  right:;intact;clean  -SG      Upper Quadrant Color/Pigment   right:;radiation fibrosis;other (comment) residual redness from radiation  -SG      Skin Observations Comment  Tight soft tissue restrictions and scar tissue R breast  -SG         Manual Lymphatic Drainage    Astym  scar(s) / incision line(s);anterior-lateral chest  -SG      Anterior-Lateral Chest  right  -SG      Manual Lymphatic Drainage Comments  MLD to R breast throughout PORi protocol  -SG      Manual Therapy  STM post ASTYM post PORi  -SG         L-Dex Bioimpedence Screening    L-Dex Measurement Extremity  RUE  -SG      L-Dex Patient Position  Standing  -SG      L-Dex UE Dominate Side  Right  -SG      L-Dex UE At Risk Side  Right  -SG      L-Dex UE Pre Surgical Value  No  -SG        User Key  (r) = Recorded By, (t) = Taken By, (c) = Cosigned By    Initials Name Provider Type    Tiffanie Alexis OTR/L Occupational Therapist                  OT Assessment/Plan     Row Name 11/13/20 1300          OT Assessment    Functional Limitations  Limitations in functional capacity and performance;Performance in self-care ADL  -SG     Impairments  Edema;Impaired flexibility;Impaired lymphatic circulation;Range of motion;Pain  -SG     Assessment Comments  Pt presents w the following: limited ROM R shoulder for overhead tasks; pain and stiffness in chest and shoulders which limits ROM for dressing and reaching and other basic ADLs; strength: decreased core stabilization and decreaed UE/ strength; posture: forward neck and shoulder posture, disrupted scapulo-humeral rhythm; increased tissue tightness over chest, stress across pectoralis major muscle, s-c joint line, c-v joint line, and associated ST through shoulders and trunk; w/ tight soft tissue restrictions and scar tissue; swelling in breast/lateral trunk (lymphedema of the breast), at risk for lymphedema of RUE. She will benefit from continuing with OP OT to address symptoms.  -SG     OT Rehab Potential  Good  -SG     Patient/caregiver participated in establishment  of treatment plan and goals  Yes  -SG     Patient would benefit from skilled therapy intervention  Yes  -SG        OT Plan    OT Frequency  1x/week  -     Planned CPT's?  OT EVAL MOD COMPLEXITY: 17762;OT THER ACT EA 15 MIN: [id number];OT THER PROC EA 15 MIN: 82614RJ;OT SELF CARE/MGMT/TRAIN 15 MIN: 32486;OT MANUAL THERAPY EA 15 MIN: 36557  -     Planned Therapy Interventions (Optional Details)  home exercise program;manual therapy techniques;patient/family education;postural re-education;ROM (Range of Motion);stair training  -     OT Plan Comments  Continue w/ PORi protocol  -       User Key  (r) = Recorded By, (t) = Taken By, (c) = Cosigned By    Initials Name Provider Type    Tiffanie Alexis OTR/L Occupational Therapist                 Manual Rx (last 36 hours)      Manual Treatments     Row Name 11/13/20 1300             Total Minutes    78375 - OT Manual Therapy Minutes  45  -         Manual Rx 1    Manual Rx 1 Location  Arm--Orthopedic: Forearm extensors, biceps muscles, lateral intermuscular septum tension line upper arm, pectoralis major  -      Manual Rx 1 Type  Trigger Point Release, Myofascial Bending  -      Manual Rx 1 Duration  Trigger Point Release for count of 3 seconds, repeat each section 5 times; Myofascial stretching w/ tissue/muscle bending 3-5 times  -         Manual Rx 2    Manual Rx 2 Location  Axilla--Orthopedic: Medial Intermuscular Septum tension line upper arm, pectoralis minor, subscapularis, latissimus dorsi, teres major  -      Manual Rx 2 Type  Trigger Point Release; Myofascial bending  -      Manual Rx 2 Duration  Trigger Point Release for count of 3 seconds, repeat each section 5 times; Myofascial stretching w/ tissue/muscle bending 3-5 times  -         Manual Rx 3    Manual Rx 3 Location  Breast: Lymph System: Zone 1--pectoral region, Zone 2--superior breast, Zone 3--inferior breast  -      Manual Rx 3 Type  Fluid movement/ MLD; parasternal LN node pumps  -       Manual Rx 3 Duration  Repeat each Line x1 and corresponding parasternal node pumps x5, repeat each zone x3  -SG         Manual Rx 4    Manual Rx 4 Location  Lateral chest/trunk--Lymph System: zone 1--lateral, zone 2--inferior, zone 3--central, zone 4-superior  -      Manual Rx 4 Type  Intercostal Lymph Node Pumps  -SG      Manual Rx 4 Duration  Repeat all 4 zones x 3  -SG         Manual Rx 5    Manual Rx 5 Location  Upper Arm: Lymph System  -SG      Manual Rx 5 Type  Upper arm fluid clearance of axillary pathway; medial confluence fluid clearance--antecubital fossa; lateral upper arm-cephalic pathway  -      Manual Rx 5 Duration  x3 lines of treatment  -         Manual Rx 6    Manual Rx 6 Location  Forearm--Orthopedic: radius and ulna-interosseous membrane; carpals-joint capsules-metacarpals-interosseous membranes; thenar muscles  -      Manual Rx 6 Type  Joint mobilization, Trigger Point Release, Drainage of forearm  -      Manual Rx 6 Duration  Joint Mob: each section x5 in each position; drainage of forearm: perform x3-5 lines of treatment  -         Manual Rx 7    Manual Rx 7 Location  Back--orthopedic: upper trapezius, infraspinatus, teres minor, rhomboids, quadratus lumborum; T12, L1, L2 mobilization  -      Manual Rx 7 Type  Trigger Point Release, Joint mobilization  -      Manual Rx 7 Duration  Trigger Point Release for count of 3 seconds, repeat each section 5 times; perform 5-10 oscillations of each vertebra successively  -      Additional Treatment?  Yes  -         Manual Rx 8    Manual Rx 8 Location  Back--lymph system  -      Manual Rx 8 Type  Fluid movement; paraspinal lymph node pumps  -      Manual Rx 8 Duration  Repeat each line x1 and corresponding paraspinal node pumps x5; repeat each zone x3  -SG        User Key  (r) = Recorded By, (t) = Taken By, (c) = Cosigned By    Initials Name Provider Type    Tiffanie Alexis OTR/L Occupational Therapist        OT Goals     Row  "Name 11/13/20 1300          OT Short Term Goals    STG Date to Achieve  09/19/20  -SG     STG 1  \"Pt will understand lymphedema precautions to decrease the risk of infection and exacerbation of the lymphedema.  -SG     STG 1 Progress  Met  -SG     STG 2  \"Pt will perform HEP w/ minimal assistance to help improve lymphatic flow   -SG     STG 2 Progress  Met  -SG     STG 3  \"Pt will perform a self-MLD protocol with minimal assistance to help reduce swelling and thus improve ROM and mobility.  -SG     STG 3 Progress  Met  -SG        Long Term Goals    LTG 1  \"Pt will obtain appropriate compression and be ind w/ donning/doffing which will enable regular daily garment wear  -SG     LTG 1 Progress  Met  -SG     LTG 2  \"Treatment will achieve maximum edema and/or lymphedema reduction to enable functional improvements and a return to PLOF  -SG     LTG 2 Progress  Progressing  -SG     LTG 3  \"Pt and/or caregiver will be ind w/ HEP and lymphedema management to help prevent edema relapse and reduce the risk of infection  -SG     LTG 3 Progress  Progressing  -SG     LTG 4  \"If appropriate, pt will be set up and ind w/ compression pump for long term management of lymphedema  -SG     LTG 4 Progress  Met  -SG        Time Calculation    OT Goal Re-Cert Due Date  02/11/21  -SG       User Key  (r) = Recorded By, (t) = Taken By, (c) = Cosigned By    Initials Name Provider Type    Tiffanie Alexis, OTR/L Occupational Therapist          Therapy Education  Education Details: Continue with HEP and OT recommendations  Given: HEP, Symptoms/condition management, Edema management  Program: New, Reinforced, Progressed  How Provided: Written, Demonstration, Verbal  Provided to: Patient, Caregiver  Level of Understanding: Verbalized, Demonstrated, Teach back education performed                Time Calculation:   OT Start Time: 1300     Therapy Charges for Today     Code Description Service Date Service Provider Modifiers Qty    [id number] HC OT " MANUAL THERAPY EA 15 MIN 11/13/2020 Tiffanie Mitchell, OTR/L  3                      Tiffanie Mitchell OTR/L  11/13/2020

## 2020-11-18 ENCOUNTER — OFFICE VISIT (OUTPATIENT)
Dept: BARIATRICS/WEIGHT MGMT | Facility: CLINIC | Age: 60
End: 2020-11-18

## 2020-11-18 VITALS
HEIGHT: 63 IN | OXYGEN SATURATION: 98 % | HEART RATE: 89 BPM | SYSTOLIC BLOOD PRESSURE: 132 MMHG | WEIGHT: 217.5 LBS | BODY MASS INDEX: 38.54 KG/M2 | TEMPERATURE: 97.7 F | DIASTOLIC BLOOD PRESSURE: 78 MMHG | RESPIRATION RATE: 18 BRPM

## 2020-11-18 DIAGNOSIS — E55.9 VITAMIN D DEFICIENCY: ICD-10-CM

## 2020-11-18 DIAGNOSIS — E66.9 OBESITY, CLASS II, BMI 35-39.9: Primary | ICD-10-CM

## 2020-11-18 DIAGNOSIS — D64.9 ANEMIA, UNSPECIFIED TYPE: ICD-10-CM

## 2020-11-18 DIAGNOSIS — R79.0 ABNORMAL BLOOD LEVEL OF IRON: ICD-10-CM

## 2020-11-18 DIAGNOSIS — R10.13 DYSPEPSIA: ICD-10-CM

## 2020-11-18 DIAGNOSIS — R53.83 FATIGUE, UNSPECIFIED TYPE: ICD-10-CM

## 2020-11-18 PROCEDURE — 99213 OFFICE O/P EST LOW 20 MIN: CPT | Performed by: PHYSICIAN ASSISTANT

## 2020-11-18 RX ORDER — ANASTROZOLE 1 MG/1
1 TABLET ORAL DAILY
COMMUNITY
End: 2021-11-11 | Stop reason: SDUPTHER

## 2020-11-25 LAB
25(OH)D3+25(OH)D2 SERPL-MCNC: 51.2 NG/ML (ref 30–100)
ALBUMIN SERPL-MCNC: 4 G/DL (ref 3.8–4.9)
ALBUMIN/GLOB SERPL: 1.5 {RATIO} (ref 1.2–2.2)
ALP SERPL-CCNC: 86 IU/L (ref 39–117)
ALT SERPL-CCNC: 10 IU/L (ref 0–32)
AST SERPL-CCNC: 13 IU/L (ref 0–40)
BASOPHILS # BLD AUTO: 0.1 X10E3/UL (ref 0–0.2)
BASOPHILS NFR BLD AUTO: 1 %
BILIRUB SERPL-MCNC: 0.3 MG/DL (ref 0–1.2)
BUN SERPL-MCNC: 16 MG/DL (ref 6–24)
BUN/CREAT SERPL: 18 (ref 9–23)
CALCIUM SERPL-MCNC: 9.3 MG/DL (ref 8.7–10.2)
CHLORIDE SERPL-SCNC: 107 MMOL/L (ref 96–106)
CO2 SERPL-SCNC: 24 MMOL/L (ref 20–29)
CREAT SERPL-MCNC: 0.91 MG/DL (ref 0.57–1)
EOSINOPHIL # BLD AUTO: 0.1 X10E3/UL (ref 0–0.4)
EOSINOPHIL NFR BLD AUTO: 2 %
ERYTHROCYTE [DISTWIDTH] IN BLOOD BY AUTOMATED COUNT: 11.9 % (ref 11.7–15.4)
FERRITIN SERPL-MCNC: 115 NG/ML (ref 15–150)
FOLATE SERPL-MCNC: 7 NG/ML
GLOBULIN SER CALC-MCNC: 2.6 G/DL (ref 1.5–4.5)
GLUCOSE SERPL-MCNC: 93 MG/DL (ref 65–99)
HCT VFR BLD AUTO: 37.4 % (ref 34–46.6)
HGB BLD-MCNC: 12.6 G/DL (ref 11.1–15.9)
IMM GRANULOCYTES # BLD AUTO: 0 X10E3/UL (ref 0–0.1)
IMM GRANULOCYTES NFR BLD AUTO: 1 %
IRON SERPL-MCNC: 80 UG/DL (ref 27–159)
LYMPHOCYTES # BLD AUTO: 1.7 X10E3/UL (ref 0.7–3.1)
LYMPHOCYTES NFR BLD AUTO: 23 %
Lab: NORMAL
MCH RBC QN AUTO: 30.7 PG (ref 26.6–33)
MCHC RBC AUTO-ENTMCNC: 33.7 G/DL (ref 31.5–35.7)
MCV RBC AUTO: 91 FL (ref 79–97)
METHYLMALONATE SERPL-SCNC: 153 NMOL/L (ref 0–378)
MONOCYTES # BLD AUTO: 0.5 X10E3/UL (ref 0.1–0.9)
MONOCYTES NFR BLD AUTO: 7 %
NEUTROPHILS # BLD AUTO: 4.8 X10E3/UL (ref 1.4–7)
NEUTROPHILS NFR BLD AUTO: 66 %
PLATELET # BLD AUTO: 206 X10E3/UL (ref 150–450)
POTASSIUM SERPL-SCNC: 3.9 MMOL/L (ref 3.5–5.2)
PREALB SERPL-MCNC: 22 MG/DL (ref 10–36)
PROT SERPL-MCNC: 6.6 G/DL (ref 6–8.5)
RBC # BLD AUTO: 4.1 X10E6/UL (ref 3.77–5.28)
SODIUM SERPL-SCNC: 144 MMOL/L (ref 134–144)
VIT B1 BLD-SCNC: 129.6 NMOL/L (ref 66.5–200)
WBC # BLD AUTO: 7.2 X10E3/UL (ref 3.4–10.8)

## 2020-12-11 ENCOUNTER — HOSPITAL ENCOUNTER (OUTPATIENT)
Dept: OCCUPATIONAL THERAPY | Facility: HOSPITAL | Age: 60
Setting detail: THERAPIES SERIES
Discharge: HOME | End: 2020-12-11

## 2020-12-11 DIAGNOSIS — I89.0 LYMPHEDEMA OF BREAST: Primary | ICD-10-CM

## 2020-12-11 PROCEDURE — 97140 MANUAL THERAPY 1/> REGIONS: CPT

## 2020-12-11 NOTE — THERAPY DISCHARGE NOTE
Outpatient Occupational Therapy Lymphedema Progress Note/Discharge Summary  [facility]     Patient Name: Francoise Kamara  : 1960  MRN: [id number]  Today's Date: 2020      Visit Date: 2020    Patient Active Problem List   Diagnosis   • Urinary frequency   • Stress incontinence   • Hypertension   • GERD (gastroesophageal reflux disease)   • Vaginal atrophy   • Rosacea   • OAB (overactive bladder)   • Vitamin D deficiency   • Peripheral edema   • Fatigue   • Dyspnea on exertion   • Wears glasses   • Cardiomegaly   • Malignant neoplasm of upper-outer quadrant of right breast in female, estrogen receptor positive (CMS/HCC)   • Port-A-Cath in place   • Menopause        Past Medical History:   Diagnosis Date   • Cardiomegaly     stable on CXR   • Drug therapy    • Dyspnea on exertion    • Elevated LDL cholesterol level    • Fatigue    • GERD (gastroesophageal reflux disease)     on daily Protonix, EGD 10/2016. Sx's resolved since AGBR, even if doesn't take her PPI. serum h. pyl neg.  EGD [facility] 10/16 prior to AGBR  36 cm   • History of radiation therapy 2020    right breast   • Hx of radiation therapy    • Hypertension    • Hypoalbuminemia     admits to chronic undereating   • Low HDL (under 40)    • Malignant neoplasm of upper-outer quadrant of right breast in female, estrogen receptor positive (CMS/HCC) 2019   • Menopause    • Microcytic red blood cells     H/H 12.6/38.4   • Morbid obesity (CMS/HCC)    • OAB (overactive bladder)    • Peripheral edema    • Rosacea     on Doxycycline   • Stress incontinence    • Vaginal atrophy    • Vitamin D deficiency    • Wears glasses         Past Surgical History:   Procedure Laterality Date   • BREAST BIOPSY      ? LATERALITY   • BREAST BIOPSY Right 2019    [facility]    • BREAST LUMPECTOMY Right 2019   • CHOLECYSTECTOMY      for stones w/ Dr. Barboza @ [facility]   • COLONOSCOPY     • COLONOSCOPY     • ENDOSCOPY      by Dr. Urias   •  ENDOSCOPY N/A 10/20/2017    Procedure: ESOPHAGOGASTRODUODENOSCOPY;  Surgeon: Guille Urias MD;  Location:  [facility];  Service:    • GASTRIC BANDING REMOVAL N/A 12/21/2016    Procedure: GASTRIC BANDING REMOVAL LAPAROSCOPIC;  Surgeon: Guille Urias MD;  Location:  [facility];  Service:    • GASTRIC SLEEVE LAPAROSCOPIC N/A 10/20/2017    Procedure: GASTRIC SLEEVE LAPAROSCOPIC, ;  Surgeon: Guille Urias MD;  Location:  [facility];  Service:    • LAPAROSCOPIC GASTRIC BANDING  2008    s/p LAGB APS w/ HHR 11/2008 by [facility] @ [facility].  full dissection hiatus, single stitch ant repair   • VENOUS ACCESS DEVICE (PORT) INSERTION Left 09/24/2019   • VENOUS ACCESS DEVICE (PORT) INSERTION Left 10/24/2019   • WISDOM TOOTH EXTRACTION           Visit Dx:      ICD-10-CM ICD-9-CM   1. Lymphedema of breast  I89.0 457.1       Lymphedema     Row Name 12/11/20 1315             Subjective Pain    Able to rate subjective pain?  yes  -SG      Pre-Treatment Pain Level  0  -SG      Post-Treatment Pain Level  0  -SG         Subjective Comments    Subjective Comments  Pt reports that she has been doing well, continuing with compression bra, lymph pad, and compression pump.  -SG         Lymphedema Assessment    Lymphedema Classification  RUE:;at risk/stage 0  -SG      Lymphedema Cancer Related Sx  right;lumpectomy;sentinel node biopsy  -SG      Lymph Nodes Removed #  3  -SG      Positive Lymph Nodes #  3  -SG      Chemo Received  yes  -SG      Radiation Therapy Received  yes  -SG      Infections or Cellulitis?  no  -SG         Posture/Observations    Alignment Options  Rounded shoulders  -SG      Rounded Shoulders  Moderate  -SG         General ROM    GENERAL ROM COMMENTS  BUE WFL  -SG         MMT (Manual Muscle Testing)    General MMT Comments  BUE WFL  -SG         Skin Changes/Observations    Location/Assessment  Upper Quadrant  -SG      Upper Quadrant Conditions  right:;intact;clean  -SG      Upper Quadrant Color/Pigment  right:;radiation  fibrosis;other (comment) residual redness from radiation  -SG      Skin Observations Comment  Tight soft tissue restrictions and scar tissue R breast  -SG         Manual Lymphatic Drainage    Astym  --  -SG      Anterior-Lateral Chest  --  -SG      Manual Lymphatic Drainage Comments  MLD to R breast throughout PORi protocol  -SG         Compression/Skin Care    Compression/Skin Care Comments  Ktape to right breast in lymphatic drainage technique  -SG         L-Dex Bioimpedence Screening    L-Dex Measurement Extremity  RUE  -SG      L-Dex Patient Position  Standing  -SG      L-Dex UE Dominate Side  Right  -SG      L-Dex UE At Risk Side  Right  -SG      L-Dex UE Pre Surgical Value  No  -SG        User Key  (r) = Recorded By, (t) = Taken By, (c) = Cosigned By    Initials Name Provider Type    Tiffanie Alexis, OTR/L Occupational Therapist                  OT Assessment/Plan     Row Name 12/11/20 1300          OT Assessment    Functional Limitations  Limitations in functional capacity and performance;Performance in self-care ADL  -SG     Impairments  Edema;Impaired flexibility;Impaired lymphatic circulation;Range of motion;Pain  -SG     Assessment Comments  Pt has made great progress throughout her time in therapy. Edema in breast has reduced, skin tissue is much healthier--more pliable and not as fibrotic. She is indpendent w/ lymphedema management including compression using bra and lymph pad, as well as daily compliance w/ compression pump. Her pain has reduced, she only reports of occassional heaviness at this point. She will benefit from continuing with lymphedema management, and has been instructed to contact me with any questions or concerns in the future.   -SG     OT Rehab Potential  Good  -SG     Patient/caregiver participated in establishment of treatment plan and goals  Yes  -SG     Patient would benefit from skilled therapy intervention  Yes  -SG        OT Plan    Planned CPT's?  OT EVAL MOD COMPLEXITY:  86546;OT THER ACT EA 15 MIN: [id number];OT THER PROC EA 15 MIN: 39923GR;OT SELF CARE/MGMT/TRAIN 15 MIN: 21832;OT MANUAL THERAPY EA 15 MIN: 23838  -     Planned Therapy Interventions (Optional Details)  home exercise program;manual therapy techniques;patient/family education;postural re-education;ROM (Range of Motion);stair training  -     OT Plan Comments  Conitnue with CDT independently after d/c.  -       User Key  (r) = Recorded By, (t) = Taken By, (c) = Cosigned By    Initials Name Provider Type    Tiffanie Alexis, OTR/L Occupational Therapist                     Manual Rx (last 36 hours)      Manual Treatments     Row Name 12/11/20 1300             Total Minutes    89548 - OT Manual Therapy Minutes  45  -         Manual Rx 1    Manual Rx 1 Location  Arm--Orthopedic: Forearm extensors, biceps muscles, lateral intermuscular septum tension line upper arm, pectoralis major  -      Manual Rx 1 Type  Trigger Point Release, Myofascial Bending  -      Manual Rx 1 Duration  Trigger Point Release for count of 3 seconds, repeat each section 5 times; Myofascial stretching w/ tissue/muscle bending 3-5 times  -         Manual Rx 2    Manual Rx 2 Location  Axilla--Orthopedic: Medial Intermuscular Septum tension line upper arm, pectoralis minor, subscapularis, latissimus dorsi, teres major  -      Manual Rx 2 Type  Trigger Point Release; Myofascial bending  -      Manual Rx 2 Duration  Trigger Point Release for count of 3 seconds, repeat each section 5 times; Myofascial stretching w/ tissue/muscle bending 3-5 times  -         Manual Rx 3    Manual Rx 3 Location  Breast: Lymph System: Zone 1--pectoral region, Zone 2--superior breast, Zone 3--inferior breast  -      Manual Rx 3 Type  Fluid movement/ MLD; parasternal LN node pumps  -      Manual Rx 3 Duration  Repeat each Line x1 and corresponding parasternal node pumps x5, repeat each zone x3  -         Manual Rx 4    Manual Rx 4 Location  Lateral  "chest/trunk--Lymph System: zone 1--lateral, zone 2--inferior, zone 3--central, zone 4-superior  -      Manual Rx 4 Type  Intercostal Lymph Node Pumps  -SG      Manual Rx 4 Duration  Repeat all 4 zones x 3  -SG         Manual Rx 5    Manual Rx 5 Location  Upper Arm: Lymph System  -SG      Manual Rx 5 Type  Upper arm fluid clearance of axillary pathway; medial confluence fluid clearance--antecubital fossa; lateral upper arm-cephalic pathway  -SG      Manual Rx 5 Duration  x3 lines of treatment  -         Manual Rx 6    Manual Rx 6 Location  Forearm--Orthopedic: radius and ulna-interosseous membrane; carpals-joint capsules-metacarpals-interosseous membranes; thenar muscles  -SG      Manual Rx 6 Type  Joint mobilization, Trigger Point Release, Drainage of forearm  -SG      Manual Rx 6 Duration  Joint Mob: each section x5 in each position; drainage of forearm: perform x3-5 lines of treatment  -SG         Manual Rx 7    Manual Rx 7 Location  Back--orthopedic: upper trapezius, infraspinatus, teres minor, rhomboids, quadratus lumborum; T12, L1, L2 mobilization  -SG      Manual Rx 7 Type  Trigger Point Release, Joint mobilization  -SG      Manual Rx 7 Duration  Trigger Point Release for count of 3 seconds, repeat each section 5 times; perform 5-10 oscillations of each vertebra successively  -      Additional Treatment?  Yes  -         Manual Rx 8    Manual Rx 8 Location  Back--lymph system  -SG      Manual Rx 8 Type  Fluid movement; paraspinal lymph node pumps  -SG      Manual Rx 8 Duration  Repeat each line x1 and corresponding paraspinal node pumps x5; repeat each zone x3  -SG        User Key  (r) = Recorded By, (t) = Taken By, (c) = Cosigned By    Initials Name Provider Type    Tiffanie Alexis OTR/L Occupational Therapist          OT Goals     Row Name 12/11/20 1300          OT Short Term Goals    STG Date to Achieve  09/19/20  -     STG 1  \"Pt will understand lymphedema precautions to decrease the risk of " "infection and exacerbation of the lymphedema.  -SG     STG 1 Progress  Met  -SG     STG 2  \"Pt will perform HEP w/ minimal assistance to help improve lymphatic flow   -SG     STG 2 Progress  Met  -SG     STG 3  \"Pt will perform a self-MLD protocol with minimal assistance to help reduce swelling and thus improve ROM and mobility.  -SG     STG 3 Progress  Met  -SG        Long Term Goals    LTG 1  \"Pt will obtain appropriate compression and be ind w/ donning/doffing which will enable regular daily garment wear  -SG     LTG 1 Progress  Met  -SG     LTG 2  \"Treatment will achieve maximum edema and/or lymphedema reduction to enable functional improvements and a return to PLOF  -SG     LTG 2 Progress  Met  -SG     LTG 3  \"Pt and/or caregiver will be ind w/ HEP and lymphedema management to help prevent edema relapse and reduce the risk of infection  -SG     LTG 3 Progress  Met  -SG     LTG 4  \"If appropriate, pt will be set up and ind w/ compression pump for long term management of lymphedema  -SG     LTG 4 Progress  Met  -SG        Time Calculation    OT Goal Re-Cert Due Date  02/11/21  -SG       User Key  (r) = Recorded By, (t) = Taken By, (c) = Cosigned By    Initials Name Provider Type    Tiffanie Alexis OTR/L Occupational Therapist          Therapy Education  Education Details: Pt was edu to continue with HEP and OT recommendations for lymphedema management; pt edu to contact me should she need anything in the future  Given: HEP, Symptoms/condition management, Edema management  Program: New, Reinforced, Progressed  How Provided: Written, Demonstration, Verbal  Provided to: Patient, Caregiver  Level of Understanding: Verbalized, Demonstrated, Teach back education performed    Outcome Measure Options: Disabilities of the Arm, Shoulder, and Hand (DASH)  DASH  Open a tight or new jar.: Mild Difficulty  Write: No Difficulty  Turn a key: No Difficulty  Prepare a meal: No Difficulty  Push open a heavy door: No " Difficulty  Place an object on a shelf above your head: No Difficulty  Do heavy household chores (e.g., wash walls, wash floors): Mild Difficulty  Garden or do yard work: Mild Difficulty  Make a bed: Mild Difficulty  Carry a shopping bag or briefcase: No Difficulty  Carry a heavy object (over 10 lbs): No Difficulty  Change a lightbulb overhead: Mild Difficulty  Wash or blow dry your hair: No Difficulty  Wash your back: No Difficulty  Put on a pullover sweater: No Difficulty  Use a knife to cut food: No Difficulty  Recreational activities in which require little effort (e.g., cardplaying, knitting, etc.): No Difficulty  Recreational activities in which you take some force or impact through your arm, should or hand (e.g. golf, hammering, tennis, etc.): Mild Difficulty  Recreational Activities in which you move your arm freely (e.g., frisbee, badminton, etc.): Mild Difficulty  Manage transportation needs (getting from one place to another): No Difficulty  Sexual Activities: No Difficulty  During the past week, to what extent has your arm, shoulder, or hand problem interfered with your normal social activites with family, friends, neighbors or groups?: Slightly  During the past week, were you limited in your work or other regular daily activities as a result of your arm, shoulder or hand problem?: Slightly Limited  Arm, Shoulder, or hand pain: None  Arm, shoulder or hand pain when you performed any specific activity: Mild  Tingling (pins and needles) in your arm, shoulder, or hand: None  Weakness in your arm, shoulder or hand: Mild  Stiffness in your arm, shoulder or hand: None  During the past week, how much difficulty have you had sleeping because of the pain in your arm, shoulder or hand?: No difficulty  I feel less capable, less confident or less useful because of my arm, shoulder or hand problem: Strongly disagree  DASH Sum : 41  Number of Questions Answered: 30  DASH Score: 9.17  Work Module (Optional)  Using your  usual technique for your work?: No Difficulty  Doing your usual work because of arm, shoulder or hand pain?: No Difficulty  Doing your work as well as you would like?: No Difficulty  Spending your usual amount of time doing your work?: No Difficulty  Work Module Score: 0           Time Calculation:   OT Start Time: 1300     Therapy Charges for Today     Code Description Service Date Service Provider Modifiers Qty    [id number] HC OT MANUAL THERAPY EA 15 MIN 12/11/2020 Tiffanie Mitchell OTR/L GO 3                  OP OT Discharge Summary  Date of Discharge: 12/11/20  Reason for Discharge: All goals achieved, Independent  Outcomes Achieved: Able to achieve all goals within established timeline  Discharge Destination: Home with home program      JAGDEEP Benavides  12/11/2020

## 2020-12-30 ENCOUNTER — TELEPHONE (OUTPATIENT)
Dept: ONCOLOGY | Facility: CLINIC | Age: 60
End: 2020-12-30

## 2020-12-30 NOTE — TELEPHONE ENCOUNTER
Returned call to patient informing her that Dr. Rodriguez is wanting all of his patients to get the covid vaccine. Patient stating she understood.

## 2020-12-30 NOTE — TELEPHONE ENCOUNTER
Francoise is wanting to know if she would be a candidate for the COVID vaccine. It may be available to her sometime in March.     Ph# [phone]

## 2021-02-11 ENCOUNTER — HOSPITAL ENCOUNTER (OUTPATIENT)
Dept: MAMMOGRAPHY | Facility: HOSPITAL | Age: 61
Discharge: HOME | End: 2021-02-11
Admitting: SURGERY

## 2021-02-11 DIAGNOSIS — C50.211 MALIGNANT NEOPLASM OF UPPER-INNER QUADRANT OF RIGHT FEMALE BREAST, UNSPECIFIED ESTROGEN RECEPTOR STATUS (HCC): ICD-10-CM

## 2021-02-11 PROCEDURE — 77065 DX MAMMO INCL CAD UNI: CPT

## 2021-02-11 PROCEDURE — 77065 DX MAMMO INCL CAD UNI: CPT | Performed by: RADIOLOGY

## 2021-02-12 ENCOUNTER — TRANSCRIBE ORDERS (OUTPATIENT)
Dept: ADMINISTRATIVE | Facility: HOSPITAL | Age: 61
End: 2021-02-12

## 2021-02-12 DIAGNOSIS — R92.8 ABNORMAL MAMMOGRAM: Primary | ICD-10-CM

## 2021-05-06 ENCOUNTER — OFFICE VISIT (OUTPATIENT)
Dept: ONCOLOGY | Facility: CLINIC | Age: 61
End: 2021-05-06

## 2021-05-06 VITALS
HEIGHT: 63 IN | OXYGEN SATURATION: 99 % | TEMPERATURE: 97.5 F | RESPIRATION RATE: 20 BRPM | BODY MASS INDEX: 39.85 KG/M2 | SYSTOLIC BLOOD PRESSURE: 100 MMHG | WEIGHT: 224.9 LBS | HEART RATE: 88 BPM | DIASTOLIC BLOOD PRESSURE: 62 MMHG

## 2021-05-06 DIAGNOSIS — C50.411 MALIGNANT NEOPLASM OF UPPER-OUTER QUADRANT OF RIGHT BREAST IN FEMALE, ESTROGEN RECEPTOR POSITIVE (HCC): Primary | ICD-10-CM

## 2021-05-06 DIAGNOSIS — Z17.0 MALIGNANT NEOPLASM OF UPPER-OUTER QUADRANT OF RIGHT BREAST IN FEMALE, ESTROGEN RECEPTOR POSITIVE (HCC): Primary | ICD-10-CM

## 2021-05-06 PROCEDURE — 99213 OFFICE O/P EST LOW 20 MIN: CPT | Performed by: INTERNAL MEDICINE

## 2021-05-06 RX ORDER — CETIRIZINE HYDROCHLORIDE 10 MG/1
TABLET ORAL
COMMUNITY

## 2021-05-06 NOTE — PROGRESS NOTES
PROBLEM LIST:  Oncology/Hematology History Overview Note   Lab Results   Component Value Date    FINALDX  08/19/2019     1. RIGHT BREAST LUMPECTOMY WITH NEEDLE LOCALIZATION:   Infiltrating and in-situ intermediate grade lobular carcinoma.  Bloom Denis score 6/9.  Infiltrating tumor size 2.2x1.5x1.0 cm.   Margins of resection negative for tumor with closest infiltrating margin inferior and superior measuring 4 mm each.   No lymphvascular invasion identified.   Presence of previous biopsy site identified.  See Template.  2. SENTINEL LYMPH NODE #1, RIGHT AXILLA, EXCISION:   Lymph node x1; positive for metastatic lobular carcinoma (1/1).  3. SENTINEL LYMPH NODE #2, RIGHT AXILLA, EXCISION:  Lymph node x1; positive for metastatic lobular carcinoma (1/1).  4. NONSENTINEL LYMPH NODE, RIGHT AXILLA, EXCISION:  Lymph node x1; positive for metastatic lobular carcinoma (1/1).     INVASIVE BREAST CANCER STAGING TEMPLATE:  TYPE OF SPECIMEN/PROCEDURE:  Needle localized lumpectomy  SPECIMEN LATERALITY: Right breast  TUMOR SITE: 12 o'clock   TUMOR SIZE: (Size of Largest Invasive Carcinoma): 2.2x1.5x1.0 cm   HISTOLOGIC TYPE OF INVASIVE CARCINOMA:  Lobular   GRADE: Intermediate   TUBULAR FORMATION:  3  MITOTIC ACTIVITY:  1  PLEOMORPHISM:  2  OVERALL SCORE: 6/9   DUCTAL CARCINOMA IN SITU (DCIS) EXTENT: 0  TUMOR EXTENSION (Only if structures present and involved):    SKIN: N/A    NIPPLE: N/A    SKELETAL MUSCLE: N/A   SURGICAL MARGINS (Uninvolved/positive): Uninvolved   INVASIVE CARCINOMA MARGINS (Uninvolved/Positive) Uninvolved   DISTANCE FROM CLOSEST MARGIN: 4 mm   SPECIFIC CLOSEST MARGIN: Inferior and superior   LCIS MARGINS (Uninvolved/Positive/No LCIS): Uninvolved   DISTANCE FROM CLOSEST MARGIN: 1 mm  SPECIFIC CLOSEST MARGIN: Inferior   LYMPH NODES (required only if lymph nodes are present in the specimen):  Present   NUMBER OF LYMPH NODES WITH MACROMETASTASIS: 3  NUMBER OF LYMPH NODES WITH MICROMETASTASIS: 0  NUMBER OF LYMPH  NODES WITH ISOLATED TUMOR CELLS: 0  TOTAL NUMBER OF LYMPH NODES EXAMINED (Including sentinel nodes): 3  NUMBER OF SENTINEL NODES EXAMINED: 2  EXTRANODAL EXTENSION OF TUMOR:  0  VASCULAR/LYMPHATIC INVASION:  Not identified     ESTROGEN RECEPTOR STATUS BY IHC METHOD: Positive   PROGESTERONE RECEPTOR STATUS BY IHC METHOD: Positive    HER-2/ysabel ONCOPROTEIN STATUS BY IHC METHOD:  Negative   HER-2/ysabel ONCOGENE STATUS BY IN SITU HYBRIDIZATION ANALYSIS:  Not performed   TREATMENT EFFECT (BREAST): None  TREATMENT EFFECT (NODES): None   ADDITIONAL PATHOLOGIC FINDINGS:  Previous biopsy site identified   OTHER STUDIES:  None  AJCC PATHOLOGIC STAGE:  (COMPLETED BY PATHOLOGIST, BASED ONLY ON TISSUE FINDINGS, MORE EXTENSIVE DISEASE MAY NOT BE KNOWN TO THE PATHOLOGIST)  pT=  2  pN=  2  pM=  Unknown    DGD/mbc             Malignant neoplasm of upper-outer quadrant of right breast in female, estrogen receptor positive (CMS/HCC)   7/18/2019 Biopsy    Right breast     8/16/2019 Initial Diagnosis    Malignant neoplasm of upper-outer quadrant of right breast in female, estrogen receptor positive (CMS/HCC)     8/19/2019 Surgery    Surgery       Right breast lumpectomy with sentinel node biopsy by Dr. Stephanie Fraire     8/19/2019 Cancer Staged    Cancer Staging  Malignant neoplasm of upper-outer quadrant of right breast in female, estrogen receptor positive (CMS/HCC)  Staging form: Breast, AJCC 8th Edition  - Pathologic stage from 9/12/2019: Stage IB (pT2, pN1a, cM0, G2, ER: Positive, CT: Positive, HER2: Negative) - Signed by Laila Thacker MD on 9/12/2019 9/19/2019 Imaging    Negative work-up for metastatic disease by CAT scan of the chest abdomen pelvis and bone scan     9/20/2019 -  Other Event    ECHO - Normal EF     9/23/2019 Surgery    Surgery       Port Placement     9/25/2019 - 11/22/2019 Chemotherapy    OP BREAST AC DD DOXOrubicin / Cyclophosphamide       12/6/2019 - 2/21/2020 Chemotherapy    OP BREAST PACLitaxel (weekly X  12)       3/9/2020 - 4/20/2020 Radiation    Radiation OncologyTreatment Course:  Francoise Kamara received 6000 cGy in 30 fractions to right breast & s/c via External Beam Radiation - EBRT.         REASON FOR VISIT: Management of my breast cancer    HISTORY OF PRESENT ILLNESS:   61 y.o.  female presents today for management of her breast cancer.  She completed 4 cycles of dose dense Adriamycin and Cytoxan and Weekly Taxol in Feb/2020.  She has completed  adjuvant radiation in April 2020.  She is currently on Arimidex since then.  Clinically tolerating it well.  No major issues since I last saw her.  Mammogram in February was completed.  She is having some near syncopal episodes.    Past Medical History:   Diagnosis Date   • Cardiomegaly     stable on CXR   • Drug therapy    • Dyspnea on exertion    • Elevated LDL cholesterol level    • Fatigue    • GERD (gastroesophageal reflux disease)     on daily Protonix, EGD 10/2016. Sx's resolved since AGBR, even if doesn't take her PPI. serum h. pyl neg.  EGD GDW 10/16 prior to AGBR  36 cm   • History of radiation therapy 04/20/2020    right breast   • Hx of radiation therapy    • Hypertension    • Hypoalbuminemia     admits to chronic undereating   • Low HDL (under 40)    • Malignant neoplasm of upper-outer quadrant of right breast in female, estrogen receptor positive (CMS/HCC) 9/12/2019   • Menopause    • Microcytic red blood cells     H/H 12.6/38.4   • Morbid obesity (CMS/HCC)    • OAB (overactive bladder)    • Peripheral edema    • Rosacea     on Doxycycline   • Stress incontinence    • Vaginal atrophy    • Vitamin D deficiency    • Wears glasses      Social History     Socioeconomic History   • Marital status:      Spouse name: Not on file   • Number of children: Not on file   • Years of education: Not on file   • Highest education level: Not on file   Tobacco Use   • Smoking status: Never Smoker   • Smokeless tobacco: Never Used   Substance and Sexual  Activity   • Alcohol use: No   • Drug use: No   • Sexual activity: Defer     Comment: spouse     Family History   Problem Relation Age of Onset   • Hypertension Mother    • Sleep apnea Mother    • Hypertension Father    • Lung cancer Father    • Hypertension Maternal Grandfather    • Stroke Maternal Grandfather    • Hypertension Paternal Grandmother    • Breast cancer Paternal Aunt 45   • No Known Problems Brother    • BRCA 1/2 Neg Hx    • Ovarian cancer Neg Hx        Review of Systems:    Review of Systems   Constitutional: Positive for fatigue.   HENT:  Negative.    Eyes: Negative.    Respiratory: Negative.    Cardiovascular: Negative.    Gastrointestinal: Negative.    Endocrine: Negative.    Genitourinary: Negative.     Musculoskeletal: Positive for arthralgias.   Skin: Negative.    Neurological: Negative.    Hematological: Negative.    Psychiatric/Behavioral: Negative.       A comprehensive 14 point review of systems was performed and was negative except as mentioned.      Medications:        Current Outpatient Medications:   •  anastrozole (ARIMIDEX) 1 MG tablet, Take 1 mg by mouth Daily., Disp: , Rfl:   •  Biotin 5000 MCG tablet, Take  by mouth., Disp: , Rfl:   •  calcium carbonate, oyster shell, 500 MG tablet tablet, Take 500 mg by mouth 2 (Two) Times a Day., Disp: , Rfl:   •  cetirizine (zyrTEC) 10 MG tablet, Take  by mouth., Disp: , Rfl:   •  esomeprazole (nexIUM) 40 MG capsule, Take 40 mg by mouth Every Morning Before Breakfast., Disp: , Rfl:   •  Ferrous Gluconate (IRON 27 PO), Take  by mouth., Disp: , Rfl:   •  hydrochlorothiazide (MICROZIDE) 12.5 MG capsule, Take 12.5 mg by mouth As Needed., Disp: , Rfl:   •  lisinopril (PRINIVIL,ZESTRIL) 40 MG tablet, Take 1 tablet by mouth Daily., Disp: , Rfl:   •  tolterodine LA (DETROL LA) 4 MG 24 hr capsule, Take 1 capsule by mouth Daily., Disp: , Rfl:   •  vitamin D (ERGOCALCIFEROL) 61265 UNITS capsule capsule, 50,000 Units 1 (One) Time Per Week. sundays, Disp: ,  "Rfl:       ALLERGIES:    Allergies   Allergen Reactions   • Penicillins Hives and Swelling     Invanz given for AGBR surgery         Physical Exam    VITAL SIGNS:  /62   Pulse 88   Temp 97.5 °F (36.4 °C) (Temporal)   Resp 20   Ht 160 cm (63\")   Wt 102 kg (224 lb 14.4 oz)   SpO2 99%   BMI 39.84 kg/m²     Wt Readings from Last 3 Encounters:   05/06/21 102 kg (224 lb 14.4 oz)   11/18/20 98.7 kg (217 lb 8 oz)   11/05/20 100 kg (221 lb)       Body mass index is 39.84 kg/m². Body surface area is 2.03 meters squared.         Performance Status: 0    General: well appearing, in no acute distress  HEENT: sclera anicteric, oropharynx clear, neck is supple  Lymphatics: no cervical, supraclavicular, or axillary adenopathy  Cardiovascular: regular rate and rhythm, no murmurs, rubs or gallops  Lungs: clear to auscultation bilaterally  Abdomen: soft, nontender, nondistended.  No palpable organomegaly  Extremities: no lower extremity edema  Skin: no rashes, lesions, bruising, or petechiae  Msk:  Shows no weakness of the large muscle groups  Psych: Mood is stable  Port in the left chest wall      RECENT LABS:    Lab Results   Component Value Date    HGB 12.6 11/18/2020    HCT 37.4 11/18/2020    MCV 91 11/18/2020     11/18/2020    WBC 7.2 11/18/2020    NEUTROABS 4.8 11/18/2020    LYMPHSABS 1.7 11/18/2020    MONOSABS 0.5 11/18/2020    EOSABS 0.1 11/18/2020    BASOSABS 0.1 11/18/2020       Lab Results   Component Value Date    GLUCOSE 75 02/21/2020    BUN 16 11/18/2020    CREATININE 0.91 11/18/2020     11/18/2020    K 3.9 11/18/2020     (H) 11/18/2020    CO2 24 11/18/2020    CALCIUM 9.3 11/18/2020    PROTEINTOT 6.3 02/21/2020    ALBUMIN 4.0 11/18/2020    BILITOT 0.3 11/18/2020    ALKPHOS 86 11/18/2020    AST 13 11/18/2020    ALT 10 11/18/2020     Mammo Diagnostic Digital Tomosynthesis Bilateral With Cad    Result Date: 8/10/2020  Findings consistent with conservation therapy on the right with increasing " skin thickening since the previous exam, likely related to evolving radiation changes, weight loss, and edema. Clinical correlation is recommended. Unremarkable left mammogram.   BI-RADS CATEGORY:  3, PROBABLY BENIGN   RECOMMENDATION:  6 month right mammographic follow-up  CAD was utilized.  The standard false-negative rate of mammography is between 10% and 25%. Complex patterns or increased breast density will markedly elevate the false-negative rate of mammography.    A letter, in lay terminology, with the results of this exam was given to the patient at the time of the visit.   Physician Order  Diagnostic 6 Month follow up Mammogram with Breast Ultrasound if needed.    Diagnosis: Abnormal Mammogram _ Physician Order  This report was finalized on 8/10/2020 10:51 AM by Dr. Bruna Lundberg MD.              Assessment/Plan    1. Stage IB infiltrating intermediate grade lobular carcinoma of the right breast with node positivity.  She is tolerating her oral hormone blocker.    Plan for 10 years. She is doing well otherwise.  Continue with no changes.  Reviewed her mammograms from February of 2021 and they are clear.      2.  Near syncopal episodes.  Her pressures running fairly low.  She is on 40 mg of a ACE inhibitor and hydrochlorothiazide.  And she is running 100/60.  She will need to adjust her antihypertensive medicines to prevent further near syncopal episodes.    I will check on her and 1 year.  If she has any issues in the interim she can give me a call.  She is scheduled to undergo a mammogram in August.        Laila Thacker MD  [facility] Hematology and Oncology    Return in (Approximately): 1 year    No orders of the defined types were placed in this encounter.      5/6/2021

## 2021-06-02 ENCOUNTER — OFFICE VISIT (OUTPATIENT)
Dept: OBSTETRICS AND GYNECOLOGY | Facility: CLINIC | Age: 61
End: 2021-06-02

## 2021-06-02 VITALS
SYSTOLIC BLOOD PRESSURE: 130 MMHG | HEIGHT: 63 IN | BODY MASS INDEX: 40.04 KG/M2 | WEIGHT: 226 LBS | DIASTOLIC BLOOD PRESSURE: 70 MMHG

## 2021-06-02 DIAGNOSIS — Z78.0 MENOPAUSE: ICD-10-CM

## 2021-06-02 DIAGNOSIS — N95.2 VAGINAL ATROPHY: ICD-10-CM

## 2021-06-02 DIAGNOSIS — Z01.419 ENCOUNTER FOR GYNECOLOGICAL EXAMINATION WITHOUT ABNORMAL FINDING: Primary | ICD-10-CM

## 2021-06-02 PROCEDURE — 99396 PREV VISIT EST AGE 40-64: CPT | Performed by: OBSTETRICS & GYNECOLOGY

## 2021-06-02 RX ORDER — CALCIUM CARBONATE 500(1250)
2 TABLET ORAL DAILY
COMMUNITY
Start: 2021-05-18

## 2021-06-02 NOTE — PROGRESS NOTES
Chief Complaint   Patient presents with   • Annual Exam   • Fatigue     secondary to medication (Arimidex)       Francoise Kamara is a 61 y.o. year old  presenting to be seen for her annual exam. This patient has previously had a LAP-BAND procedure in 2008 with removal in  and a gastric sleeve placed in . She has had a cholecystectomy.  She has no postmenopausal bleeding. She does not use hormonal replacement therapy. She does have a history of vaginal atrophy. She had a screening pelvic ultrasound in  with normal ovaries.  She has a personal history of carcinoma of the right breast with a lumpectomy followed by radiation therapy and now on Arimidex. Her mammogram in February was stable.  She denies bowel or urinary symptoms. She has had Covid-19 immunizations.    SCREENING TESTS    Year 2012   Age                         PAP        Neg.                 HPV high risk                         Mammogram          benign               MIKAYLA score                         Breast MRI                         Lipids                         Vitamin D                         Colonoscopy                         DEXA  Frax (hip/any)                         Ovarian Screen         normal                    She exercises regularly: no.  She wears her seat belt: yes.  She has concerns about domestic violence: no.  She has noticed changes in height: no    GYN screening history:  · Last pap: was done on approximately 2019 and the result was: normal PAP.  · Last mammogram: was done on approximately 2021 and the result was: Birads III (Probably benign)..    No Additional Complaints Reported    The following portions of the patient's history were reviewed and updated as appropriate:vital signs and   She  has a past medical history of Cardiomegaly, Drug therapy, Dyspnea on exertion, Elevated LDL  cholesterol level, Fatigue, GERD (gastroesophageal reflux disease), History of radiation therapy (04/20/2020), radiation therapy, Hypertension, Hypoalbuminemia, Low HDL (under 40), Malignant neoplasm of upper-outer quadrant of right breast in female, estrogen receptor positive (CMS/HCC) (9/12/2019), Menopause, Microcytic red blood cells, Morbid obesity (CMS/HCC), OAB (overactive bladder), Peripheral edema, Rosacea, Stress incontinence, Vaginal atrophy, Vitamin D deficiency, and Wears glasses.  She does not have any pertinent problems on file.  She  has a past surgical history that includes Laparoscopic gastric banding (2008); Cholecystectomy (2015); Gastric Banding Removal (N/A, 12/21/2016); Esophagogastroduodenoscopy (2016); [place] tooth extraction; Gastric Sleeve (N/A, 10/20/2017); Esophagogastroduodenoscopy (N/A, 10/20/2017); Colonoscopy (2014); Colonoscopy (2017); Venous Access Device (Port) (Left, 09/24/2019); Venous Access Device (Port) (Left, 10/24/2019); Breast lumpectomy (Right, 08/19/2019); Breast biopsy; and Breast biopsy (Right, 07/2019).  Her family history includes Breast cancer (age of onset: 45) in her paternal aunt; Hypertension in her father, maternal grandfather, mother, and paternal grandmother; Lung cancer in her father; No Known Problems in her brother; Sleep apnea in her mother; Stroke in her maternal grandfather.  She  reports that she has never smoked. She has never used smokeless tobacco. She reports that she does not drink alcohol and does not use drugs.  Current Outpatient Medications   Medication Sig Dispense Refill   • anastrozole (ARIMIDEX) 1 MG tablet Take 1 mg by mouth Daily.     • Biotin 5000 MCG tablet Take  by mouth.     • calcium carbonate, oyster shell, 500 MG tablet tablet Take 2 tablets by mouth Daily.     • cetirizine (zyrTEC) 10 MG tablet Take  by mouth.     • esomeprazole (nexIUM) 40 MG capsule Take 40 mg by mouth Every Morning Before Breakfast.     • Ferrous Gluconate  "(IRON 27 PO) Take  by mouth.     • hydrochlorothiazide (MICROZIDE) 12.5 MG capsule Take 12.5 mg by mouth As Needed.     • lisinopril (PRINIVIL,ZESTRIL) 40 MG tablet Take 1 tablet by mouth Daily.     • tolterodine LA (DETROL LA) 4 MG 24 hr capsule Take 1 capsule by mouth Daily.     • vitamin D (ERGOCALCIFEROL) 93646 UNITS capsule capsule 50,000 Units 1 (One) Time Per Week. sundays       No current facility-administered medications for this visit.     She is allergic to penicillins..    Review of Systems  A review of systems was taken. She denies cough, fever, shortness of breath, and loss of her sense of taste or smell  Constitutional: negative for fever, chills, activity change, appetite change, and unexpected weight change. She complains of fatigue and lethargy.  Respiratory: negative  Cardiovascular: negative  Gastrointestinal: negative  Genitourinary:negative  Musculoskeletal:negative  Behavioral/Psych: negative     Counseling/Anticipatory Guidance Discussed: nutrition, physical activity, healthy weight, injury prevention, immunizations, screenings and self-breast exam      /70   Ht 160 cm (63\")   Wt 103 kg (226 lb)   Breastfeeding No   BMI 40.03 kg/m²     BMI reviewed     MEDICALLY INDICATED   Physical Exam    Neuro: alert and oriented to person, place and time   General:  alert; cooperative; well developed; well nourished  obese - Body mass index is 40.03 kg/m².   Skin:  No suspicious lesions seen   Thyroid: normal to inspection and palpation   Lungs:  breathing is unlabored  clear to auscultation bilaterally   Heart:  regular rate and rhythm, S1, S2 normal, no murmur, click, rub or gallop  normal apical impulse   Breasts:  Examined in supine position  Nipples normal without inversion, lesions or discharge  There are no palpable axillary nodes  Scar UOQ right breast with lymphedema   Abdomen: soft, non-tender; no masses  no umbilical or inguinal hernias are present  no hepato-splenomegaly   Pelvis: " Clinical staff was present for exam  External genitalia:  normal appearance of the external genitalia including Bartholin's and Hutchins's glands.  Vaginal:  atrophic mucosal changes are present;  Cervix:  normal appearance.  Uterus:  normal size, shape and consistency. anteverted;  Adnexa:  non palpable bilaterally.  Rectal:  anus visually normal appearing. recto-vaginal exam unremarkable and confirms findings;     Lab Review   CBC results and CMP results    Imaging  Mammogram results              ASSESSMENT  Problems Addressed this Visit        Genitourinary and Reproductive     Vaginal atrophy    Menopause      Other Visit Diagnoses     Encounter for gynecological examination without abnormal finding    -  Primary      Diagnoses       Codes Comments    Encounter for gynecological examination without abnormal finding    -  Primary ICD-10-CM: Z01.419  ICD-9-CM: V72.31     Menopause     ICD-10-CM: Z78.0  ICD-9-CM: 627.2     Vaginal atrophy     ICD-10-CM: N95.2  ICD-9-CM: 627.3               Substance History:   reports that she has never smoked. She has never used smokeless tobacco.   reports no history of alcohol use.   reports no history of drug use.    Substance use counseling is not indicated based on patient history.      PLAN    · Medications prescribed this encounter:  No orders of the defined types were placed in this encounter.  · Monthly self breast assessment and follow-up breast imaging in August 2021  · Calcium, 600 mg/ Vit. D, 400 IU daily; regular weight-bearing exercise  · Follow up: 12 month(s)  *Please note that portions of this documentation may have been completed with a voice recognition program.  Efforts were made to edit this dictation, but occasional words may have been mistranscribed.       This note was electronically signed.    POLLO Oseguera MD  June 2, 2021  14:22 EDT

## 2021-08-16 ENCOUNTER — HOSPITAL ENCOUNTER (OUTPATIENT)
Dept: MAMMOGRAPHY | Facility: HOSPITAL | Age: 61
Discharge: HOME | End: 2021-08-16
Admitting: SURGERY

## 2021-08-16 DIAGNOSIS — R92.8 ABNORMAL MAMMOGRAM: ICD-10-CM

## 2021-08-16 PROCEDURE — G0279 TOMOSYNTHESIS, MAMMO: HCPCS

## 2021-08-16 PROCEDURE — 77062 BREAST TOMOSYNTHESIS BI: CPT | Performed by: RADIOLOGY

## 2021-08-16 PROCEDURE — 77066 DX MAMMO INCL CAD BI: CPT | Performed by: RADIOLOGY

## 2021-08-16 PROCEDURE — 77066 DX MAMMO INCL CAD BI: CPT

## 2021-09-16 NOTE — PROGRESS NOTES
0 PROBLEM LIST:  Oncology/Hematology History    Lab Results   Component Value Date    FINALDX  08/19/2019     1. RIGHT BREAST LUMPECTOMY WITH NEEDLE LOCALIZATION:   Infiltrating and in-situ intermediate grade lobular carcinoma.  Bloom Denis score 6/9.  Infiltrating tumor size 2.2x1.5x1.0 cm.   Margins of resection negative for tumor with closest infiltrating margin inferior and superior measuring 4 mm each.   No lymphvascular invasion identified.   Presence of previous biopsy site identified.  See Template.  2. SENTINEL LYMPH NODE #1, RIGHT AXILLA, EXCISION:   Lymph node x1; positive for metastatic lobular carcinoma (1/1).  3. SENTINEL LYMPH NODE #2, RIGHT AXILLA, EXCISION:  Lymph node x1; positive for metastatic lobular carcinoma (1/1).  4. NONSENTINEL LYMPH NODE, RIGHT AXILLA, EXCISION:  Lymph node x1; positive for metastatic lobular carcinoma (1/1).     INVASIVE BREAST CANCER STAGING TEMPLATE:  TYPE OF SPECIMEN/PROCEDURE:  Needle localized lumpectomy  SPECIMEN LATERALITY: Right breast  TUMOR SITE: 12 o'clock   TUMOR SIZE: (Size of Largest Invasive Carcinoma): 2.2x1.5x1.0 cm   HISTOLOGIC TYPE OF INVASIVE CARCINOMA:  Lobular   GRADE: Intermediate   TUBULAR FORMATION:  3  MITOTIC ACTIVITY:  1  PLEOMORPHISM:  2  OVERALL SCORE: 6/9   DUCTAL CARCINOMA IN SITU (DCIS) EXTENT: 0  TUMOR EXTENSION (Only if structures present and involved):    SKIN: N/A    NIPPLE: N/A    SKELETAL MUSCLE: N/A   SURGICAL MARGINS (Uninvolved/positive): Uninvolved   INVASIVE CARCINOMA MARGINS (Uninvolved/Positive) Uninvolved   DISTANCE FROM CLOSEST MARGIN: 4 mm   SPECIFIC CLOSEST MARGIN: Inferior and superior   LCIS MARGINS (Uninvolved/Positive/No LCIS): Uninvolved   DISTANCE FROM CLOSEST MARGIN: 1 mm  SPECIFIC CLOSEST MARGIN: Inferior   LYMPH NODES (required only if lymph nodes are present in the specimen):  Present   NUMBER OF LYMPH NODES WITH MACROMETASTASIS: 3  NUMBER OF LYMPH NODES WITH MICROMETASTASIS: 0  NUMBER OF LYMPH NODES WITH  ISOLATED TUMOR CELLS: 0  TOTAL NUMBER OF LYMPH NODES EXAMINED (Including sentinel nodes): 3  NUMBER OF SENTINEL NODES EXAMINED: 2  EXTRANODAL EXTENSION OF TUMOR:  0  VASCULAR/LYMPHATIC INVASION:  Not identified     ESTROGEN RECEPTOR STATUS BY IHC METHOD: Positive   PROGESTERONE RECEPTOR STATUS BY IHC METHOD: Positive    HER-2/ysabel ONCOPROTEIN STATUS BY IHC METHOD:  Negative   HER-2/ysabel ONCOGENE STATUS BY IN SITU HYBRIDIZATION ANALYSIS:  Not performed   TREATMENT EFFECT (BREAST): None  TREATMENT EFFECT (NODES): None   ADDITIONAL PATHOLOGIC FINDINGS:  Previous biopsy site identified   OTHER STUDIES:  None  AJCC PATHOLOGIC STAGE:  (COMPLETED BY PATHOLOGIST, BASED ONLY ON TISSUE FINDINGS, MORE EXTENSIVE DISEASE MAY NOT BE KNOWN TO THE PATHOLOGIST)  pT=  2  pN=  2  pM=  Unknown    DGD/mbc                Malignant neoplasm of upper-outer quadrant of right breast in female, estrogen receptor positive (CMS/HCC)    8/16/2019 Initial Diagnosis     Malignant neoplasm of upper-outer quadrant of right breast in female, estrogen receptor positive (CMS/HCC)         8/19/2019 Surgery     Surgery       Right breast lumpectomy with sentinel node biopsy by Dr. Stephanie Fraire         8/19/2019 Cancer Staged     Cancer Staging  Malignant neoplasm of upper-outer quadrant of right breast in female, estrogen receptor positive (CMS/HCC)  Staging form: Breast, AJCC 8th Edition  - Pathologic stage from 9/12/2019: Stage IB (pT2, pN1a, cM0, G2, ER: Positive, WV: Positive, HER2: Negative) - Signed by Laila Thacker MD on 9/12/2019 9/19/2019 Imaging     Negative work-up for metastatic disease by CAT scan of the chest abdomen pelvis and bone scan         9/20/2019 -  Other Event     ECHO - Normal EF         9/23/2019 Surgery     Surgery       Port Placement         9/25/2019 -  Chemotherapy     OP BREAST AC DD DOXOrubicin / Cyclophosphamide              REASON FOR VISIT: Management of my breast cancer    HISTORY OF PRESENT ILLNESS:   59  y.o.  female presents today for management of her breast cancer.  She completed 2 cycles of dose dense Adriamycin and Cytoxan.  She is here for cycle #3.  Her first cycle was complicated by alopecia.  Her nausea was fairly controlled.  She did have some fatigue.  No infections.  She does have mouth sores that she has.      Past Medical History:   Diagnosis Date   • Cardiomegaly     stable on CXR   • Dyspnea on exertion    • Elevated LDL cholesterol level    • Fatigue    • GERD (gastroesophageal reflux disease)     on daily Protonix, EGD 10/2016. Sx's resolved since AGBR, even if doesn't take her PPI. serum h. pyl neg.  EGD GDW 10/16 prior to AGBR  36 cm   • Hypertension    • Hypoalbuminemia     admits to chronic undereating   • Low HDL (under 40)    • Malignant neoplasm of upper-outer quadrant of right breast in female, estrogen receptor positive (CMS/HCC) 9/12/2019   • Microcytic red blood cells     H/H 12.6/38.4   • Morbid obesity (CMS/HCC)    • OAB (overactive bladder)    • Peripheral edema    • Post-menopause on HRT (hormone replacement therapy)    • Rosacea     on Doxycycline   • Stress incontinence    • Vaginal atrophy    • Vitamin D deficiency    • Wears glasses      Social History     Socioeconomic History   • Marital status:      Spouse name: Not on file   • Number of children: Not on file   • Years of education: Not on file   • Highest education level: Not on file   Tobacco Use   • Smoking status: Never Smoker   • Smokeless tobacco: Never Used   Substance and Sexual Activity   • Alcohol use: No   • Drug use: No   • Sexual activity: Defer     Comment: spouse   Social History Narrative     w/2 children.  Lives in [place].  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      Family History   Problem Relation Age of Onset   • Hypertension Mother    • Sleep apnea Mother    • Hypertension Father    • Lung cancer Father    • Hypertension Maternal Grandfather    • Stroke Maternal  Grandfather    • Hypertension Paternal Grandmother    • Breast cancer Paternal Aunt 45   • No Known Problems Brother    • BRCA 1/2 Neg Hx    • Ovarian cancer Neg Hx        Review of Systems:    Review of Systems   Constitutional: Positive for fatigue.   HENT:  Negative.         Mouth Sores   Eyes: Negative.    Respiratory: Negative.    Cardiovascular: Negative.    Gastrointestinal: Negative.    Endocrine: Negative.    Genitourinary: Negative.     Musculoskeletal: Negative.    Skin: Negative.    Neurological: Negative.    Hematological: Negative.    Psychiatric/Behavioral: Negative.       A comprehensive 14 point review of systems was performed and was negative except as mentioned.      Medications:        Current Outpatient Medications:   •  Biotin 5000 MCG tablet, Take  by mouth., Disp: , Rfl:   •  dexamethasone (DECADRON) 4 MG tablet, Take 2 tablets in the morning daily on Days 2, 3 & 4.  Take with food., Disp: 6 tablet, Rfl: 3  •  Ferrous Gluconate (IRON 27 PO), Take  by mouth., Disp: , Rfl:   •  hydrochlorothiazide (MICROZIDE) 12.5 MG capsule, Take 12.5 mg by mouth Daily., Disp: , Rfl:   •  lidocaine-prilocaine (EMLA) 2.5-2.5 % cream, Apply  topically to the appropriate area as directed As Needed (45-60 minutes prior to port access.  Cover with saran/plastic wrap)., Disp: 30 g, Rfl: 2  •  LISINOPRIL PO, Take 40 mg by mouth Daily., Disp: , Rfl:   •  ondansetron (ZOFRAN) 8 MG tablet, Take 1 tablet by mouth 3 (Three) Times a Day As Needed for Nausea or Vomiting., Disp: 30 tablet, Rfl: 5  •  pantoprazole (PROTONIX) 40 MG EC tablet, Take 40 mg by mouth Daily., Disp: , Rfl:   •  SOOLANTRA 1 % cream, Apply 1 application topically Daily. Apply to face, Disp: , Rfl:   •  tolterodine LA (DETROL LA) 4 MG 24 hr capsule, Take 1 capsule by mouth Daily., Disp: 30 capsule, Rfl: 11  •  valACYclovir (VALTREX) 1000 MG tablet, Take 1 tablet by mouth 2 (Two) Times a Day for 3 days., Disp: 6 tablet, Rfl: 4  •  vitamin D  "(ERGOCALCIFEROL) 73753 UNITS capsule capsule, 50,000 Units 1 (One) Time Per Week. sundays, Disp: , Rfl:   No current facility-administered medications for this visit.     Facility-Administered Medications Ordered in Other Visits:   •  heparin flush (porcine) 100 UNIT/ML injection 500 Units, 500 Units, Intravenous, PRN, Laila Thacker MD      ALLERGIES:    Allergies   Allergen Reactions   • Penicillins Hives and Swelling     Invanz given for AGBR surgery         Physical Exam    VITAL SIGNS:  /67   Pulse 100   Temp 97.4 °F (36.3 °C) (Temporal)   Resp 18   Ht 160 cm (63\")   Wt 100 kg (221 lb)   BMI 39.15 kg/m²     Wt Readings from Last 3 Encounters:   10/25/19 100 kg (221 lb)   10/25/19 100 kg (221 lb)   10/11/19 100 kg (221 lb)       Body mass index is 39.15 kg/m². Body surface area is 2.02 meters squared.         Performance Status: 0    General: well appearing, in no acute distress  HEENT: sclera anicteric, oropharynx clear, neck is supple  Lymphatics: no cervical, supraclavicular, or axillary adenopathy  Cardiovascular: regular rate and rhythm, no murmurs, rubs or gallops  Lungs: clear to auscultation bilaterally  Abdomen: soft, nontender, nondistended.  No palpable organomegaly  Extremities: no lower extremity edema  Skin: no rashes, lesions, bruising, or petechiae  Msk:  Shows no weakness of the large muscle groups  Psych: Mood is stable  Port in the left chest wall      RECENT LABS:    Lab Results   Component Value Date    HGB 11.5 (L) 10/25/2019    HCT 34.4 10/25/2019    MCV 93.8 10/25/2019     10/25/2019    WBC 9.30 10/25/2019    NEUTROABS 7.30 (H) 10/25/2019    LYMPHSABS 1.40 10/25/2019    MONOSABS 0.60 10/25/2019    EOSABS 0.1 05/08/2019    BASOSABS 0.0 05/08/2019       Lab Results   Component Value Date    GLUCOSE 74 10/11/2019    BUN 11 10/11/2019    CREATININE 0.65 10/11/2019     10/11/2019    K 4.9 10/19/2019     10/11/2019    CO2 28.0 10/11/2019    CALCIUM 9.0 " 10/11/2019    PROTEINTOT 6.6 10/11/2019    ALBUMIN 3.60 10/11/2019    BILITOT 0.3 10/11/2019    ALKPHOS 76 10/11/2019    AST 12 10/11/2019    ALT 10 10/11/2019       Ct Chest With Contrast    Result Date: 9/22/2019  No CT evidence of metastatic disease. No evidence of acute intrathoracic, intra-abdominal or pelvic abnormality. Postsurgical changes identified within the right axillary region. No definite remaining lymph nodes identified.  DICTATED:   09/20/2019 EDITED/ls :   09/21/2019  This report was finalized on 9/22/2019 9:00 AM by Dr. Paz Hubbard MD.      Nm Bone Scan Whole Body    Result Date: 9/13/2019  Normal examination.  D:  09/13/2019 E:  09/13/2019     This report was finalized on 9/13/2019 4:23 PM by Dr. César Paiz MD.      Ct Abdomen Pelvis With Contrast    Result Date: 9/22/2019  No CT evidence of metastatic disease. No evidence of acute intrathoracic, intra-abdominal or pelvic abnormality. Postsurgical changes identified within the right axillary region. No definite remaining lymph nodes identified.  DICTATED:   09/20/2019 EDITED/ls :   09/21/2019  This report was finalized on 9/22/2019 9:00 AM by Dr. Paz Hubbard MD.              Assessment/Plan    1. Stage IB infiltrating intermediate grade lobular carcinoma of the right breast with node positivity.  Continue adjuvant chemotherapy with dose dense Adriamycin and Cytoxan.  I plan to treat her with 12 cycles of weekly Taxol after that.  She is tolerating it well.  No changes for now.    2.  Oral mucositis.  On valtrex as needed    3.  Chemotherapy-induced nausea.  She is well controlled on the current regiment of Zofran.          I spent a total of 25 minutes in direct patient care, greater than 20 minutes (greater than 50%) were spent in coordination of care, and counseling the patient regarding breast cancer. Answered any questions patient had with medication and plan.      Laila Thacker MD  [facility] Hematology and  Oncology    Return on: 11/08/19  Return in (Approximately): 2 weeks, Schedule with next infusion    No orders of the defined types were placed in this encounter.      10/25/2019         Please note that portions of this note may have been completed with a voice recognition program. Efforts were made to edit the dictations, but occasionally words are mistranscribed.

## 2021-11-11 RX ORDER — ANASTROZOLE 1 MG/1
1 TABLET ORAL DAILY
Qty: 30 TABLET | Refills: 11 | Status: SHIPPED | OUTPATIENT
Start: 2021-11-11 | End: 2022-11-21 | Stop reason: SDUPTHER

## 2021-11-11 NOTE — TELEPHONE ENCOUNTER
Pt is calling in about a Refill on Anastrozole 1 mg. To be filled at [facility] in [place]. Advised pt, Ismael would follow up on this.

## 2021-12-02 ENCOUNTER — OFFICE VISIT (OUTPATIENT)
Dept: BARIATRICS/WEIGHT MGMT | Facility: CLINIC | Age: 61
End: 2021-12-02

## 2021-12-02 VITALS
TEMPERATURE: 97.1 F | HEART RATE: 75 BPM | SYSTOLIC BLOOD PRESSURE: 132 MMHG | WEIGHT: 221 LBS | RESPIRATION RATE: 18 BRPM | DIASTOLIC BLOOD PRESSURE: 78 MMHG | BODY MASS INDEX: 39.16 KG/M2 | OXYGEN SATURATION: 97 % | HEIGHT: 63 IN

## 2021-12-02 DIAGNOSIS — E66.9 OBESITY, CLASS II, BMI 35-39.9: Primary | ICD-10-CM

## 2021-12-02 DIAGNOSIS — Z98.84 STATUS POST BARIATRIC SURGERY: ICD-10-CM

## 2021-12-02 PROCEDURE — 99214 OFFICE O/P EST MOD 30 MIN: CPT | Performed by: PHYSICIAN ASSISTANT

## 2021-12-02 RX ORDER — CYANOCOBALAMIN 1000 UG/ML
INJECTION, SOLUTION INTRAMUSCULAR; SUBCUTANEOUS
COMMUNITY
Start: 2021-10-12

## 2021-12-02 NOTE — PROGRESS NOTES
"[facility]   [address]  [address]  [facility] [id number] [phone]        Patient Name:  Francoise Kamara.  :  1960        Reason for Visit:  4+ years postop      HPI: Francoise Kamara is a 61 y.o. female s/p LSG by GDW on 10/20/17    LOV 2020- weight 217lb. Goal kyle 1200/day and 100g protein / day. Increase exercise.     Doing well.  Overall doing well and \"can't complain\". Doesn't have much of an appetite or desire to eat post chemo.  Would like to lose more weight but overall placed.  Working to get back her stamina, is fatigued walking short distances.  Reflux is well controlled on Nexium daily.  Denies other issues/concerns. Denies dysphagia, reflux, nausea, vomiting and abdominal pain.  No she is not getting enough protein or overall food intake or fluids.  Getting 45-50g prot/day. Does make sure to get a protein shake in daily. Eating 1-2 meals, occ snacks. Eating a lot of meat. Avoids salads.   Finds herself eating a small candy bar and drinking daily Mountain Dew.  Drinking <64 fluid oz/day.  Last labs revealed bariatric levels wnl .  Taking B12, Calcium, Vit D, iron and Biotin, monthly B12, weekly D.  On Nexium.  Exercising limited, walking some.     Presurgery weight: 262 pounds.  Today's weight is 100 kg (221 lb) pounds, today's  Body mass index is 39.15 kg/m²., and@ weight loss since surgery is 41 pounds.      Past Medical History:   Diagnosis Date   • Cardiomegaly     stable on CXR   • Drug therapy    • Dyspnea on exertion    • Elevated LDL cholesterol level    • Fatigue    • GERD (gastroesophageal reflux disease)     on daily Protonix, EGD 10/2016. Sx's resolved since AGBR, even if doesn't take her PPI. serum h. pyl neg.  EGD GDW 10/16 prior to AGBR  36 cm   • History of radiation therapy 2020    right breast   • Hx of radiation therapy    • Hypertension    • Hypoalbuminemia     admits to chronic undereating   • Low HDL " (under 40)    • Malignant neoplasm of upper-outer quadrant of right breast in female, estrogen receptor positive (CMS/HCC) 9/12/2019   • Menopause    • Microcytic red blood cells     H/H 12.6/38.4   • Morbid obesity (CMS/HCC)    • OAB (overactive bladder)    • Peripheral edema    • Rosacea     on Doxycycline   • Stress incontinence    • Vaginal atrophy    • Vitamin D deficiency    • Wears glasses      Past Surgical History:   Procedure Laterality Date   • BREAST BIOPSY      ? LATERALITY   • BREAST BIOPSY Right 07/2019    [facility]    • BREAST LUMPECTOMY Right 08/19/2019   • CHOLECYSTECTOMY  2015    for stones w/ Dr. Barboza @ [facility]   • COLONOSCOPY  2014   • COLONOSCOPY  2017   • ENDOSCOPY  2016    by Dr. Urias   • ENDOSCOPY N/A 10/20/2017    Procedure: ESOPHAGOGASTRODUODENOSCOPY;  Surgeon: Guille Urias MD;  Location:  [facility];  Service:    • GASTRIC BANDING REMOVAL N/A 12/21/2016    Procedure: GASTRIC BANDING REMOVAL LAPAROSCOPIC;  Surgeon: Guille Urias MD;  Location:  [facility];  Service:    • GASTRIC SLEEVE LAPAROSCOPIC N/A 10/20/2017    Procedure: GASTRIC SLEEVE LAPAROSCOPIC, ;  Surgeon: Guille Urias MD;  Location:  [facility];  Service:    • LAPAROSCOPIC GASTRIC BANDING  2008    s/p LAGB APS w/ HHR 11/2008 by GDW @ [facility].  full dissection hiatus, single stitch ant repair   • VENOUS ACCESS DEVICE (PORT) INSERTION Left 09/24/2019   • VENOUS ACCESS DEVICE (PORT) INSERTION Left 10/24/2019   • WISDOM TOOTH EXTRACTION       Outpatient Medications Marked as Taking for the 12/2/21 encounter (Office Visit) with Celine Jacobsen PA-C   Medication Sig Dispense Refill   • anastrozole (ARIMIDEX) 1 MG tablet Take 1 tablet by mouth Daily. 30 tablet 11   • Biotin 5000 MCG tablet Take  by mouth.     • calcium carbonate, oyster shell, 500 MG tablet tablet Take 2 tablets by mouth Daily.     • cetirizine (zyrTEC) 10 MG tablet Take  by mouth.     • cyanocobalamin 1000 MCG/ML injection      • esomeprazole (nexIUM) 40 MG  "capsule Take 40 mg by mouth Every Morning Before Breakfast.     • Ferrous Gluconate (IRON 27 PO) Take  by mouth.     • hydrochlorothiazide (MICROZIDE) 12.5 MG capsule Take 12.5 mg by mouth As Needed.     • lisinopril (PRINIVIL,ZESTRIL) 40 MG tablet Take 1 tablet by mouth Daily.     • tolterodine LA (DETROL LA) 4 MG 24 hr capsule Take 1 capsule by mouth Daily.     • vitamin D (ERGOCALCIFEROL) 97207 UNITS capsule capsule 50,000 Units 1 (One) Time Per Week. sundays         Allergies   Allergen Reactions   • Penicillins Hives and Swelling     Invanz given for AGBR surgery       Social History     Socioeconomic History   • Marital status:    Tobacco Use   • Smoking status: Never Smoker   • Smokeless tobacco: Never Used   Substance and Sexual Activity   • Alcohol use: No   • Drug use: No   • Sexual activity: Defer     Comment: spouse       /78 (BP Location: Left arm, Patient Position: Sitting, Cuff Size: Large Adult)   Pulse 75   Temp 97.1 °F (36.2 °C) (Temporal)   Resp 18   Ht 160 cm (63\")   Wt 100 kg (221 lb)   SpO2 97%   BMI 39.15 kg/m²     Physical Exam  Constitutional:       Appearance: She is well-developed. She is obese.   HENT:      Head: Normocephalic and atraumatic.   Cardiovascular:      Rate and Rhythm: Normal rate and regular rhythm.   Pulmonary:      Effort: Pulmonary effort is normal.      Breath sounds: Normal breath sounds.   Abdominal:      General: Bowel sounds are normal.      Palpations: Abdomen is soft.      Comments: Incisions healing well   Skin:     General: Skin is warm and dry.   Neurological:      Mental Status: She is alert.   Psychiatric:         Mood and Affect: Mood normal.         Behavior: Behavior normal.         Thought Content: Thought content normal.         Judgment: Judgment normal.           Assessment:  4 + years s/p LSG by JULY on 10/20/17    ICD-10-CM ICD-9-CM   1. Obesity, Class II, BMI 35-39.9  E66.9 278.00   2. Fatigue, unspecified type  R53.83 780.79 "         Plan: Discussed dietary goals and eating regular healthy high-protein meals.  Increasing protein 200 g a day.  Offered consult with dietitian.  Continue PPI.  Continue w/ good food choices and healthy habits.  Continue to focus on high protein, low carb.  Keep tracking intake.  Encouraged routine exercise as able.  Routine bariatric labs deferred due to patient's request, is followed by PCP.  Continue vitamins.  Call w/ problems/concerns.     Patient's Body mass index is 39.15 kg/m². indicating that she is morbidly obese (BMI > 40 or > 35 with obesity - related health condition). Obesity-related health conditions include the following: As above. Obesity is improving with treatment. BMI is is above average; BMI management plan is completed. We discussed low calorie, low carb based diet program, portion control and increasing exercise..        The patient was instructed to follow up in 1 year, sooner if needed.

## 2022-05-05 ENCOUNTER — OFFICE VISIT (OUTPATIENT)
Dept: ONCOLOGY | Facility: CLINIC | Age: 62
End: 2022-05-05

## 2022-05-05 VITALS
WEIGHT: 222.9 LBS | SYSTOLIC BLOOD PRESSURE: 90 MMHG | HEIGHT: 63 IN | OXYGEN SATURATION: 98 % | TEMPERATURE: 97.3 F | BODY MASS INDEX: 39.5 KG/M2 | HEART RATE: 81 BPM | DIASTOLIC BLOOD PRESSURE: 62 MMHG | RESPIRATION RATE: 16 BRPM

## 2022-05-05 DIAGNOSIS — Z17.0 MALIGNANT NEOPLASM OF UPPER-OUTER QUADRANT OF RIGHT BREAST IN FEMALE, ESTROGEN RECEPTOR POSITIVE: Primary | ICD-10-CM

## 2022-05-05 DIAGNOSIS — C50.411 MALIGNANT NEOPLASM OF UPPER-OUTER QUADRANT OF RIGHT BREAST IN FEMALE, ESTROGEN RECEPTOR POSITIVE: Primary | ICD-10-CM

## 2022-05-05 PROCEDURE — 99213 OFFICE O/P EST LOW 20 MIN: CPT | Performed by: INTERNAL MEDICINE

## 2022-05-05 NOTE — PROGRESS NOTES
PROBLEM LIST:  Oncology/Hematology History Overview Note   Lab Results   Component Value Date    FINALDX  08/19/2019     1. RIGHT BREAST LUMPECTOMY WITH NEEDLE LOCALIZATION:   Infiltrating and in-situ intermediate grade lobular carcinoma.  Bloom Denis score 6/9.  Infiltrating tumor size 2.2x1.5x1.0 cm.   Margins of resection negative for tumor with closest infiltrating margin inferior and superior measuring 4 mm each.   No lymphvascular invasion identified.   Presence of previous biopsy site identified.  See Template.  2. SENTINEL LYMPH NODE #1, RIGHT AXILLA, EXCISION:   Lymph node x1; positive for metastatic lobular carcinoma (1/1).  3. SENTINEL LYMPH NODE #2, RIGHT AXILLA, EXCISION:  Lymph node x1; positive for metastatic lobular carcinoma (1/1).  4. NONSENTINEL LYMPH NODE, RIGHT AXILLA, EXCISION:  Lymph node x1; positive for metastatic lobular carcinoma (1/1).     INVASIVE BREAST CANCER STAGING TEMPLATE:  TYPE OF SPECIMEN/PROCEDURE:  Needle localized lumpectomy  SPECIMEN LATERALITY: Right breast  TUMOR SITE: 12 o'clock   TUMOR SIZE: (Size of Largest Invasive Carcinoma): 2.2x1.5x1.0 cm   HISTOLOGIC TYPE OF INVASIVE CARCINOMA:  Lobular   GRADE: Intermediate   TUBULAR FORMATION:  3  MITOTIC ACTIVITY:  1  PLEOMORPHISM:  2  OVERALL SCORE: 6/9   DUCTAL CARCINOMA IN SITU (DCIS) EXTENT: 0  TUMOR EXTENSION (Only if structures present and involved):    SKIN: N/A    NIPPLE: N/A    SKELETAL MUSCLE: N/A   SURGICAL MARGINS (Uninvolved/positive): Uninvolved   INVASIVE CARCINOMA MARGINS (Uninvolved/Positive) Uninvolved   DISTANCE FROM CLOSEST MARGIN: 4 mm   SPECIFIC CLOSEST MARGIN: Inferior and superior   LCIS MARGINS (Uninvolved/Positive/No LCIS): Uninvolved   DISTANCE FROM CLOSEST MARGIN: 1 mm  SPECIFIC CLOSEST MARGIN: Inferior   LYMPH NODES (required only if lymph nodes are present in the specimen):  Present   NUMBER OF LYMPH NODES WITH MACROMETASTASIS: 3  NUMBER OF LYMPH NODES WITH MICROMETASTASIS: 0  NUMBER OF LYMPH  NODES WITH ISOLATED TUMOR CELLS: 0  TOTAL NUMBER OF LYMPH NODES EXAMINED (Including sentinel nodes): 3  NUMBER OF SENTINEL NODES EXAMINED: 2  EXTRANODAL EXTENSION OF TUMOR:  0  VASCULAR/LYMPHATIC INVASION:  Not identified     ESTROGEN RECEPTOR STATUS BY IHC METHOD: Positive   PROGESTERONE RECEPTOR STATUS BY IHC METHOD: Positive    HER-2/ysabel ONCOPROTEIN STATUS BY IHC METHOD:  Negative   HER-2/ysabel ONCOGENE STATUS BY IN SITU HYBRIDIZATION ANALYSIS:  Not performed   TREATMENT EFFECT (BREAST): None  TREATMENT EFFECT (NODES): None   ADDITIONAL PATHOLOGIC FINDINGS:  Previous biopsy site identified   OTHER STUDIES:  None  AJCC PATHOLOGIC STAGE:  (COMPLETED BY PATHOLOGIST, BASED ONLY ON TISSUE FINDINGS, MORE EXTENSIVE DISEASE MAY NOT BE KNOWN TO THE PATHOLOGIST)  pT=  2  pN=  2  pM=  Unknown    DGD/mbc             Malignant neoplasm of upper-outer quadrant of right breast in female, estrogen receptor positive (HCC)   7/18/2019 Biopsy    Right breast     8/16/2019 Initial Diagnosis    Malignant neoplasm of upper-outer quadrant of right breast in female, estrogen receptor positive (CMS/HCC)     8/19/2019 Surgery    Surgery       Right breast lumpectomy with sentinel node biopsy by Dr. Stephanie Fraire     8/19/2019 Cancer Staged    Cancer Staging  Malignant neoplasm of upper-outer quadrant of right breast in female, estrogen receptor positive (CMS/HCC)  Staging form: Breast, AJCC 8th Edition  - Pathologic stage from 9/12/2019: Stage IB (pT2, pN1a, cM0, G2, ER: Positive, NJ: Positive, HER2: Negative) - Signed by Laila Thacker MD on 9/12/2019 9/19/2019 Imaging    Negative work-up for metastatic disease by CAT scan of the chest abdomen pelvis and bone scan     9/20/2019 -  Other Event    ECHO - Normal EF     9/23/2019 Surgery    Surgery       Port Placement     9/25/2019 - 11/22/2019 Chemotherapy    OP BREAST AC DD DOXOrubicin / Cyclophosphamide       12/6/2019 - 2/21/2020 Chemotherapy    OP BREAST PACLitaxel (weekly X  12)       3/9/2020 - 4/20/2020 Radiation    Radiation OncologyTreatment Course:  Francoise Kamara received 6000 cGy in 30 fractions to right breast & s/c via External Beam Radiation - EBRT.      Hormonal Therapy    Arimidex for at least 10 years         REASON FOR VISIT: Management of my breast cancer    HISTORY OF PRESENT ILLNESS:   62 y.o.  female presents today for management of her breast cancer.  She completed 4 cycles of dose dense Adriamycin and Cytoxan and Weekly Taxol in Feb/2020.  She has completed  adjuvant radiation in April 2020.  She is currently on Arimidex since then.  Clinically tolerating it well.  No major issues since I last saw her.  Mammogram in February was completed.  Still a lot of orthostatic symptoms.  Her pressures are fairly low here.    Past Medical History:   Diagnosis Date   • Cardiomegaly     stable on CXR   • Drug therapy    • Dyspnea on exertion    • Elevated LDL cholesterol level    • Fatigue    • GERD (gastroesophageal reflux disease)     on daily Protonix, EGD 10/2016. Sx's resolved since AGBR, even if doesn't take her PPI. serum h. pyl neg.  EGD [facility] 10/16 prior to AGBR  36 cm   • History of radiation therapy 04/20/2020    right breast   • Hx of radiation therapy    • Hypertension    • Hypoalbuminemia     admits to chronic undereating   • Low HDL (under 40)    • Malignant neoplasm of upper-outer quadrant of right breast in female, estrogen receptor positive (HCC) 9/12/2019   • Menopause    • Microcytic red blood cells     H/H 12.6/38.4   • Morbid obesity (HCC)    • OAB (overactive bladder)    • Peripheral edema    • Rosacea     on Doxycycline   • Stress incontinence    • Vaginal atrophy    • Vitamin D deficiency    • Wears glasses      Social History     Socioeconomic History   • Marital status:    Tobacco Use   • Smoking status: Never Smoker   • Smokeless tobacco: Never Used   Substance and Sexual Activity   • Alcohol use: No   • Drug use: No   • Sexual activity:  Defer     Comment: spouse     Family History   Problem Relation Age of Onset   • Hypertension Mother    • Sleep apnea Mother    • Hypertension Father    • Lung cancer Father    • Hypertension Maternal Grandfather    • Stroke Maternal Grandfather    • Hypertension Paternal Grandmother    • Breast cancer Paternal Aunt 45   • No Known Problems Brother    • BRCA 1/2 Neg Hx    • Ovarian cancer Neg Hx        Review of Systems:    Review of Systems   Constitutional: Positive for fatigue.   HENT:  Negative.    Eyes: Negative.    Respiratory: Negative.    Cardiovascular: Negative.    Gastrointestinal: Negative.    Endocrine: Negative.    Genitourinary: Negative.     Musculoskeletal: Positive for arthralgias.   Skin: Negative.    Neurological: Negative.    Hematological: Negative.    Psychiatric/Behavioral: Negative.       A comprehensive 14 point review of systems was performed and was negative except as mentioned.      Medications:        Current Outpatient Medications:   •  anastrozole (ARIMIDEX) 1 MG tablet, Take 1 tablet by mouth Daily., Disp: 30 tablet, Rfl: 11  •  Biotin 5000 MCG tablet, Take  by mouth., Disp: , Rfl:   •  calcium carbonate, oyster shell, 500 MG tablet tablet, Take 2 tablets by mouth Daily., Disp: , Rfl:   •  cetirizine (zyrTEC) 10 MG tablet, Take  by mouth., Disp: , Rfl:   •  cyanocobalamin 1000 MCG/ML injection, , Disp: , Rfl:   •  esomeprazole (nexIUM) 40 MG capsule, Take 40 mg by mouth Every Morning Before Breakfast., Disp: , Rfl:   •  Ferrous Gluconate (IRON 27 PO), Take  by mouth., Disp: , Rfl:   •  hydrochlorothiazide (MICROZIDE) 12.5 MG capsule, Take 12.5 mg by mouth As Needed., Disp: , Rfl:   •  lisinopril (PRINIVIL,ZESTRIL) 40 MG tablet, Take 1 tablet by mouth Daily., Disp: , Rfl:   •  tolterodine LA (DETROL LA) 4 MG 24 hr capsule, Take 1 capsule by mouth Daily., Disp: , Rfl:   •  vitamin D (ERGOCALCIFEROL) 04928 UNITS capsule capsule, 50,000 Units 1 (One) Time Per Week. sundays, Disp: , Rfl:  "      ALLERGIES:    Allergies   Allergen Reactions   • Penicillins Hives and Swelling     Invanz given for AGBR surgery         Physical Exam    VITAL SIGNS:  BP 90/62   Pulse 81   Temp 97.3 °F (36.3 °C) (Temporal)   Resp 16   Ht 160 cm (63\")   Wt 101 kg (222 lb 14.4 oz)   SpO2 98%   BMI 39.48 kg/m²     Wt Readings from Last 3 Encounters:   05/05/22 101 kg (222 lb 14.4 oz)   12/02/21 100 kg (221 lb)   06/02/21 103 kg (226 lb)       Body mass index is 39.48 kg/m². Body surface area is 2.02 meters squared.         Performance Status: 0    General: well appearing, in no acute distress  HEENT: sclera anicteric, oropharynx clear, neck is supple  Lymphatics: no cervical, supraclavicular, or axillary adenopathy  Cardiovascular: regular rate and rhythm, no murmurs, rubs or gallops  Lungs: clear to auscultation bilaterally  Abdomen: soft, nontender, nondistended.  No palpable organomegaly  Extremities: no lower extremity edema  Skin: no rashes, lesions, bruising, or petechiae  Msk:  Shows no weakness of the large muscle groups  Psych: Mood is stable  Port in the left chest wall      RECENT LABS:    Lab Results   Component Value Date    HGB 12.6 11/18/2020    HCT 37.4 11/18/2020    MCV 91 11/18/2020     11/18/2020    WBC 7.2 11/18/2020    NEUTROABS 4.8 11/18/2020    LYMPHSABS 1.7 11/18/2020    MONOSABS 0.5 11/18/2020    EOSABS 0.1 11/18/2020    BASOSABS 0.1 11/18/2020       Lab Results   Component Value Date    GLUCOSE 93 11/18/2020    BUN 16 11/18/2020    CREATININE 0.91 11/18/2020     11/18/2020    K 3.9 11/18/2020     (H) 11/18/2020    CO2 24 11/18/2020    CALCIUM 9.3 11/18/2020    PROTEINTOT 6.3 02/21/2020    ALBUMIN 4.0 11/18/2020    BILITOT 0.3 11/18/2020    ALKPHOS 86 11/18/2020    AST 13 11/18/2020    ALT 10 11/18/2020           Assessment/Plan    1. Stage IB infiltrating intermediate grade lobular carcinoma of the right breast with node positivity.  She is tolerating her oral hormone " blocker.    Plan for 10 years. She is doing well otherwise.  Continue with no changes.  Reviewed her mammograms from February of 2022 and they are clear.      2.  Near syncopal episodes.  Her pressures running fairly low.  She is on 40 mg of a ACE inhibitor and hydrochlorothiazide.  And she is running 90s/60.  She will need to adjust her antihypertensive medicines to prevent further near syncopal episodes.  I think it is reasonable to cut her ACE inhibitor in half.  Her pressures are way too low for her to feel well.  It is causing a fair bit of fatigue issues and she is falling asleep very easily.    I will check on her and 1 year.  If she has any issues in the interim she can give me a call.  She is scheduled to undergo a mammogram in August.        Laila Thacker MD  [facility]    Return in (Approximately): 1 year    No orders of the defined types were placed in this encounter.      5/5/2022

## 2022-06-07 ENCOUNTER — OFFICE VISIT (OUTPATIENT)
Dept: OBSTETRICS AND GYNECOLOGY | Facility: CLINIC | Age: 62
End: 2022-06-07

## 2022-06-07 ENCOUNTER — TRANSCRIBE ORDERS (OUTPATIENT)
Dept: ADMINISTRATIVE | Facility: HOSPITAL | Age: 62
End: 2022-06-07

## 2022-06-07 VITALS
WEIGHT: 225.6 LBS | HEIGHT: 63 IN | BODY MASS INDEX: 39.97 KG/M2 | SYSTOLIC BLOOD PRESSURE: 100 MMHG | DIASTOLIC BLOOD PRESSURE: 64 MMHG

## 2022-06-07 DIAGNOSIS — Z12.31 VISIT FOR SCREENING MAMMOGRAM: Primary | ICD-10-CM

## 2022-06-07 DIAGNOSIS — I89.0 LYMPHEDEMA: ICD-10-CM

## 2022-06-07 DIAGNOSIS — Z85.3 PERSONAL HISTORY OF BREAST CANCER: ICD-10-CM

## 2022-06-07 DIAGNOSIS — Z01.411 ENCOUNTER FOR GYNECOLOGICAL EXAMINATION WITH ABNORMAL FINDING: Primary | ICD-10-CM

## 2022-06-07 PROCEDURE — 99396 PREV VISIT EST AGE 40-64: CPT | Performed by: NURSE PRACTITIONER

## 2022-06-07 NOTE — PROGRESS NOTES
Chief Complaint  Francoise Kamara is a 62 y.o.  female presenting for Annual Exam    History of Present Illness  Francoise is a very pleasant 63yo woman, , formerly a pt of Dr. Santos, here today for annual gyn exam.  She has a personal history of Right Breast Cancer (Summer 2019) with surgery and radiation tx.  She was using a device to help prevent the lymphedema in the right breast, but hasn't been using daily, as faithfully as she once did.  Feels some lymphedema in the R LOQ.  Otherwise, preventive health procedures are up to date.  No PMB at all.  And ROS essentially negative.  (She has retired from [employer] & now working PT with [employer].  She loves the work.    The following portions of the patient's history were reviewed and updated as appropriate: allergies, current medications, past family history, past medical history, past social history, past surgical history and problem list.    Allergies   Allergen Reactions   • Penicillins Hives and Swelling     Invanz given for AGBR surgery         Current Outpatient Medications:   •  anastrozole (ARIMIDEX) 1 MG tablet, Take 1 tablet by mouth Daily., Disp: 30 tablet, Rfl: 11  •  Biotin 5000 MCG tablet, Take  by mouth., Disp: , Rfl:   •  calcium carbonate, oyster shell, 500 MG tablet tablet, Take 2 tablets by mouth Daily., Disp: , Rfl:   •  cetirizine (zyrTEC) 10 MG tablet, Take  by mouth., Disp: , Rfl:   •  cyanocobalamin 1000 MCG/ML injection, , Disp: , Rfl:   •  esomeprazole (nexIUM) 40 MG capsule, Take 40 mg by mouth Every Morning Before Breakfast., Disp: , Rfl:   •  Ferrous Gluconate (IRON 27 PO), Take  by mouth., Disp: , Rfl:   •  hydrochlorothiazide (MICROZIDE) 12.5 MG capsule, Take 12.5 mg by mouth As Needed., Disp: , Rfl:   •  lisinopril (PRINIVIL,ZESTRIL) 40 MG tablet, Take 20 mg by mouth Daily., Disp: , Rfl:   •  tolterodine LA (DETROL LA) 4 MG 24 hr capsule, Take 1 capsule by mouth Daily., Disp: , Rfl:   •  vitamin D  (ERGOCALCIFEROL) 69437 UNITS capsule capsule, 50,000 Units 1 (One) Time Per Week. sundays, Disp: , Rfl:     Past Medical History:   Diagnosis Date   • Cardiomegaly     stable on CXR   • Drug therapy    • Dyspnea on exertion    • Elevated LDL cholesterol level    • Fatigue    • GERD (gastroesophageal reflux disease)     on daily Protonix, EGD 10/2016. Sx's resolved since AGBR, even if doesn't take her PPI. serum h. pyl neg.  EGD [facility] 10/16 prior to AGBR  36 cm   • History of radiation therapy 04/20/2020    right breast   • Hx of radiation therapy    • Hypertension    • Hypoalbuminemia     admits to chronic undereating   • Low HDL (under 40)    • Malignant neoplasm of upper-outer quadrant of right breast in female, estrogen receptor positive (HCC) 9/12/2019   • Menopause    • Microcytic red blood cells     H/H 12.6/38.4   • Morbid obesity (HCC)    • OAB (overactive bladder)    • Peripheral edema    • Rosacea     on Doxycycline   • Stress incontinence    • Vaginal atrophy    • Vitamin D deficiency    • Wears glasses         Past Surgical History:   Procedure Laterality Date   • BREAST BIOPSY      ? LATERALITY   • BREAST BIOPSY Right 07/2019    usg    • BREAST LUMPECTOMY Right 08/19/2019   • CHOLECYSTECTOMY  2015    for stones w/ Dr. Barboza @ [facility]   • COLONOSCOPY  2014   • COLONOSCOPY  2017   • ENDOSCOPY  2016    by Dr. Urias   • ENDOSCOPY N/A 10/20/2017    Procedure: ESOPHAGOGASTRODUODENOSCOPY;  Surgeon: Guille Urias MD;  Location:  [facility];  Service:    • GASTRIC BANDING REMOVAL N/A 12/21/2016    Procedure: GASTRIC BANDING REMOVAL LAPAROSCOPIC;  Surgeon: Guille Urias MD;  Location:  [facility];  Service:    • GASTRIC SLEEVE LAPAROSCOPIC N/A 10/20/2017    Procedure: GASTRIC SLEEVE LAPAROSCOPIC, ;  Surgeon: Guille Urias MD;  Location:  [facility];  Service:    • LAPAROSCOPIC GASTRIC BANDING  2008    s/p LAGB APS w/ HHR 11/2008 by [facility] @ [facility].  full dissection hiatus, single stitch ant repair   • VENOUS  "ACCESS DEVICE (PORT) INSERTION Left 09/24/2019   • VENOUS ACCESS DEVICE (PORT) INSERTION Left 10/24/2019   • WISDOM TOOTH EXTRACTION         Objective  /64   Ht 160 cm (63\")   Wt 102 kg (225 lb 9.6 oz)   Breastfeeding No   BMI 39.96 kg/m²     Physical Exam  Exam conducted with a chaperone present.   Constitutional:       Appearance: Normal appearance.   HENT:      Head: Normocephalic.   Neck:      Thyroid: No thyroid mass or thyromegaly.   Cardiovascular:      Rate and Rhythm: Normal rate and regular rhythm.      Heart sounds: Normal heart sounds.   Pulmonary:      Effort: Pulmonary effort is normal.      Breath sounds: Normal breath sounds.   Chest:   Breasts:      Right: Swelling present. No inverted nipple, mass, nipple discharge, axillary adenopathy or supraclavicular adenopathy.      Left: No swelling, inverted nipple, mass, nipple discharge, axillary adenopathy or supraclavicular adenopathy.        Comments: Mild lymphedema in the R LOQ.  No masses.  Abdominal:      Palpations: Abdomen is soft. There is no mass.      Tenderness: There is no abdominal tenderness.   Genitourinary:     General: Normal vulva.      Labia:         Right: No lesion.         Left: No lesion.       Vagina: Normal. No erythema.      Cervix: No discharge, lesion or erythema.      Uterus: Not enlarged and not tender.       Adnexa:         Right: No mass or tenderness.          Left: No mass or tenderness.        Comments: Anus appears wnl.  No rectal exam performed.  Lymphadenopathy:      Upper Body:      Right upper body: No supraclavicular or axillary adenopathy.      Left upper body: No supraclavicular or axillary adenopathy.   Neurological:      Mental Status: She is alert.         Assessment/Plan   Diagnoses and all orders for this visit:    1. Encounter for gynecological examination with abnormal finding (Primary)    2. Personal history of breast cancer    3. Lymphedema    Encouraged to use the device as instructed to help " with R breast lymphedema.  Couns re: SBE.  She had appt with Dr. Rodriguez in May; has appt with Dr. LANDA in Sept.      Procedures    40 to 64: Counseling/Anticipatory Guidance Discussed: physical activity, screenings and self-breast exam    Return in about 1 year (around 6/7/2023) for Annual physical.    Yulisa Cummins, APRN  06/07/2022

## 2022-08-19 ENCOUNTER — HOSPITAL ENCOUNTER (OUTPATIENT)
Dept: MAMMOGRAPHY | Facility: HOSPITAL | Age: 62
Discharge: HOME | End: 2022-08-19
Admitting: SURGERY

## 2022-08-19 DIAGNOSIS — Z12.31 VISIT FOR SCREENING MAMMOGRAM: ICD-10-CM

## 2022-08-19 PROCEDURE — 77067 SCR MAMMO BI INCL CAD: CPT

## 2022-08-19 PROCEDURE — 77067 SCR MAMMO BI INCL CAD: CPT | Performed by: RADIOLOGY

## 2022-08-19 PROCEDURE — 77063 BREAST TOMOSYNTHESIS BI: CPT

## 2022-08-19 PROCEDURE — 77063 BREAST TOMOSYNTHESIS BI: CPT | Performed by: RADIOLOGY

## 2022-11-21 RX ORDER — ANASTROZOLE 1 MG/1
1 TABLET ORAL DAILY
Qty: 30 TABLET | Refills: 11 | Status: SHIPPED | OUTPATIENT
Start: 2022-11-21

## 2022-12-28 ENCOUNTER — OFFICE VISIT (OUTPATIENT)
Dept: BARIATRICS/WEIGHT MGMT | Facility: CLINIC | Age: 62
End: 2022-12-28

## 2022-12-28 VITALS
SYSTOLIC BLOOD PRESSURE: 90 MMHG | HEART RATE: 84 BPM | OXYGEN SATURATION: 99 % | DIASTOLIC BLOOD PRESSURE: 68 MMHG | BODY MASS INDEX: 40.13 KG/M2 | WEIGHT: 226.5 LBS | RESPIRATION RATE: 18 BRPM | TEMPERATURE: 96.9 F | HEIGHT: 63 IN

## 2022-12-28 DIAGNOSIS — K91.2 POSTGASTRECTOMY MALABSORPTION: ICD-10-CM

## 2022-12-28 DIAGNOSIS — Z13.21 MALNUTRITION SCREEN: ICD-10-CM

## 2022-12-28 DIAGNOSIS — E55.9 HYPOVITAMINOSIS D: ICD-10-CM

## 2022-12-28 DIAGNOSIS — Z98.84 STATUS POST BARIATRIC SURGERY: ICD-10-CM

## 2022-12-28 DIAGNOSIS — E66.01 OBESITY, CLASS III, BMI 40-49.9 (MORBID OBESITY): Primary | ICD-10-CM

## 2022-12-28 DIAGNOSIS — Z13.0 SCREENING, IRON DEFICIENCY ANEMIA: ICD-10-CM

## 2022-12-28 DIAGNOSIS — R53.83 FATIGUE, UNSPECIFIED TYPE: ICD-10-CM

## 2022-12-28 DIAGNOSIS — Z90.3 POSTGASTRECTOMY MALABSORPTION: ICD-10-CM

## 2022-12-28 PROCEDURE — 99214 OFFICE O/P EST MOD 30 MIN: CPT | Performed by: PHYSICIAN ASSISTANT

## 2022-12-28 NOTE — PROGRESS NOTES
[facility]ariat[facility]  [address]  [address]  [facility] [address] [phone]        Patient Name:  Francoise Kamara.  :  1960        Reason for Visit:   5 years postop      HPI: Francoise Kamara is a 62 y.o. female s/p LSG by GDW on 10/20/17    Doing well.  Continues to feel fatigued. BP medication was reduced, has not been monitoring BP at home. Also c/o chest pressue after eating at times with heartburn, noted on a more regular basis and not able to pinpoint triggers, may occur with any type of food. Takes nexium 40mg daily + pepcid prn once or twice a week.    Denies dysphagia, nausea, vomiting, abdominal pain, pulmonary issues and fevers.   Getting around 40g prot/day.  Eating once or twice a day. Doesn't have much of an appetite. Drinking <64 fluid oz/day.  Last labs revealed bariatric levels wnl .  Taking B12, Vit D and iron, B12 in monthly injection.  On Nexium.  Was doing PT for knee.     Presurgery weight: 262 pounds.  Today's weight is 103 kg (226 lb 8 oz) pounds, today's  Body mass index is 40.12 kg/m²., and@ weight loss since surgery is 36 pounds.      Past Medical History:   Diagnosis Date   • Cardiomegaly     stable on CXR   • Drug therapy    • Dyspnea on exertion    • Elevated LDL cholesterol level    • Fatigue    • GERD (gastroesophageal reflux disease)     on daily Protonix, EGD 10/2016. Sx's resolved since AGBR, even if doesn't take her PPI. serum h. pyl neg.  EGD [facility] 10/16 prior to AGBR  36 cm   • History of radiation therapy 2020    right breast   • Hx of radiation therapy    • Hypertension    • Hypoalbuminemia     admits to chronic undereating   • Low HDL (under 40)    • Malignant neoplasm of upper-outer quadrant of right breast in female, estrogen receptor positive (HCC) 2019   • Menopause    • Microcytic red blood cells     H/H 12.6/38.4   • Morbid obesity (HCC)    • OAB (overactive bladder)    • Peripheral edema    •  Rosacea     on Doxycycline   • Stress incontinence    • Vaginal atrophy    • Vitamin D deficiency    • Wears glasses      Past Surgical History:   Procedure Laterality Date   • BREAST BIOPSY      ? LATERALITY   • BREAST BIOPSY Right 07/2019    usg    • BREAST LUMPECTOMY Right 08/19/2019   • CHOLECYSTECTOMY  2015    for stones w/ Dr. Barboza @ [facility]   • COLONOSCOPY  2014   • COLONOSCOPY  2017   • ENDOSCOPY  2016    by Dr. Urias   • ENDOSCOPY N/A 10/20/2017    Procedure: ESOPHAGOGASTRODUODENOSCOPY;  Surgeon: Guille Urias MD;  Location:  [facility];  Service:    • GASTRIC BANDING REMOVAL N/A 12/21/2016    Procedure: GASTRIC BANDING REMOVAL LAPAROSCOPIC;  Surgeon: Guille Urias MD;  Location:  [facility];  Service:    • GASTRIC SLEEVE LAPAROSCOPIC N/A 10/20/2017    Procedure: GASTRIC SLEEVE LAPAROSCOPIC, ;  Surgeon: Guille Urias MD;  Location:  [facility];  Service:    • LAPAROSCOPIC GASTRIC BANDING  2008    s/p LAGB APS w/ HHR 11/2008 by [facility] @ [facility].  full dissection hiatus, single stitch ant repair   • VENOUS ACCESS DEVICE (PORT) INSERTION Left 09/24/2019   • VENOUS ACCESS DEVICE (PORT) INSERTION Left 10/24/2019   • WISDOM TOOTH EXTRACTION       Outpatient Medications Marked as Taking for the 12/28/22 encounter (Office Visit) with Celine Jacobsen PA-C   Medication Sig Dispense Refill   • anastrozole (ARIMIDEX) 1 MG tablet Take 1 tablet by mouth Daily. 30 tablet 11   • Biotin 5000 MCG tablet Take  by mouth.     • calcium carbonate, oyster shell, 500 MG tablet tablet Take 2 tablets by mouth Daily.     • cetirizine (zyrTEC) 10 MG tablet Take  by mouth.     • cyanocobalamin 1000 MCG/ML injection      • esomeprazole (nexIUM) 40 MG capsule Take 40 mg by mouth Every Morning Before Breakfast.     • Ferrous Gluconate (IRON 27 PO) Take  by mouth.     • hydrochlorothiazide (MICROZIDE) 12.5 MG capsule Take 12.5 mg by mouth As Needed.     • lisinopril (PRINIVIL,ZESTRIL) 40 MG tablet Take 20 mg by mouth Daily.     •  "tolterodine LA (DETROL LA) 4 MG 24 hr capsule Take 1 capsule by mouth Daily.     • vitamin D (ERGOCALCIFEROL) 88026 UNITS capsule capsule 50,000 Units 1 (One) Time Per Week. sundays         Allergies   Allergen Reactions   • Penicillins Hives and Swelling     Invanz given for AGBR surgery       Social History     Socioeconomic History   • Marital status:    Tobacco Use   • Smoking status: Never   • Smokeless tobacco: Never   Vaping Use   • Vaping Use: Never used   Substance and Sexual Activity   • Alcohol use: No   • Drug use: Never   • Sexual activity: Not Currently     Partners: Male     Birth control/protection: Post-menopausal, None     Comment: spouse       BP 90/68 (BP Location: Left arm, Patient Position: Sitting)   Pulse 84   Temp 96.9 °F (36.1 °C)   Resp 18   Ht 160 cm (63\")   Wt 103 kg (226 lb 8 oz)   SpO2 99%   BMI 40.12 kg/m²     Physical Exam  Constitutional:       Appearance: She is well-developed. She is obese.   HENT:      Head: Normocephalic and atraumatic.   Cardiovascular:      Rate and Rhythm: Normal rate and regular rhythm.   Pulmonary:      Effort: Pulmonary effort is normal.      Breath sounds: Normal breath sounds.   Abdominal:      General: Bowel sounds are normal.      Palpations: Abdomen is soft.   Skin:     General: Skin is warm and dry.   Neurological:      Mental Status: She is alert.   Psychiatric:         Mood and Affect: Mood normal.         Behavior: Behavior normal.         Thought Content: Thought content normal.         Judgment: Judgment normal.           Assessment:  s/p LSG by [facility] on 10/20/17    ICD-10-CM ICD-9-CM   1. Obesity, Class III, BMI 40-49.9 (morbid obesity) ([facility])  E66.01 278.01   2. Fatigue, unspecified type  R53.83 780.79   3. Status post bariatric surgery  Z98.84 V45.86   4. Hypovitaminosis D  E55.9 268.9   5. Screening, iron deficiency anemia  Z13.0 V78.0   6. Malnutrition screen  Z13.21 V77.2   7. Postgastrectomy malabsorption  K91.2 579.3    Z90.3  "         Plan:  Will increase Nexium to  BID for heartburn, offered imaging evaluation, will consider if symptoms do not improve on increased medication management. Can also use pepcid prn. Continue w/ good food choices and healthy habits. Will have dietician contact to optimize diet plan.  Continue to focus on high protein, low carb.  Keep tracking intake.  Continue routine exercise.  Routine bariatric labs ordered.  Continue vitamins w/ adjustments pending lab results.  Call w/ problems/concerns.     Discuss low BP with PCP, may need further medication titration    Class 3 Severe Obesity (BMI >=40). Obesity-related health conditions include the following: as above. Obesity is improving with treatment. BMI is is above average; BMI management plan is completed. We discussed low calorie, low carb based diet program, portion control and increasing exercise.        The patient was instructed to follow up in 1 year, sooner if needed.

## 2022-12-30 ENCOUNTER — TELEPHONE (OUTPATIENT)
Dept: BARIATRICS/WEIGHT MGMT | Facility: CLINIC | Age: 62
End: 2022-12-30

## 2022-12-30 NOTE — TELEPHONE ENCOUNTER
5 years post op sleeve    No weight loss X 1 year   Post breast ca-no appetite, no energy, never got strength back  Breakfast: unable to eat due to s/e meds that cause loose bm    12/1230   3-4/7  :crackers/tuna/snickers snack size/[employer]: hamburger  Coke--12oz drinks on it all day    Dinner: 5-630p:  5oz grilled steak/chicken/pork chops salad mash potatoes 1-2 spoons sides at one time  Eats half and then other half 7/730p  Snacks: snack size candy bar/pb crackers    Drinks:  1-2 coke a day,  12 oz  lemonade, 1 shahnaz sun: 97g sugar/day     Plan:  Omit sugary drinks  Increase protein for lunch and snacks  Reduce candy bars  Reduce carbs and increase protein  Variety  100g protein a day

## 2023-01-03 LAB
25(OH)D3+25(OH)D2 SERPL-MCNC: 59 NG/ML (ref 30–100)
ALBUMIN SERPL-MCNC: 4.3 G/DL (ref 3.8–4.9)
ALBUMIN/GLOB SERPL: 1.7 {RATIO} (ref 1.2–2.2)
ALP SERPL-CCNC: 93 IU/L (ref 44–121)
ALT SERPL-CCNC: 7 IU/L (ref 0–32)
AST SERPL-CCNC: 9 IU/L (ref 0–40)
BASOPHILS # BLD AUTO: 0.1 X10E3/UL (ref 0–0.2)
BASOPHILS NFR BLD AUTO: 1 %
BILIRUB SERPL-MCNC: 0.3 MG/DL (ref 0–1.2)
BUN SERPL-MCNC: 15 MG/DL (ref 8–27)
BUN/CREAT SERPL: 13 (ref 12–28)
CALCIUM SERPL-MCNC: 9.6 MG/DL (ref 8.7–10.3)
CHLORIDE SERPL-SCNC: 102 MMOL/L (ref 96–106)
CO2 SERPL-SCNC: 25 MMOL/L (ref 20–29)
CREAT SERPL-MCNC: 1.14 MG/DL (ref 0.57–1)
EGFRCR SERPLBLD CKD-EPI 2021: 55 ML/MIN/1.73
EOSINOPHIL # BLD AUTO: 0.2 X10E3/UL (ref 0–0.4)
EOSINOPHIL NFR BLD AUTO: 2 %
ERYTHROCYTE [DISTWIDTH] IN BLOOD BY AUTOMATED COUNT: 12.1 % (ref 11.7–15.4)
FERRITIN SERPL-MCNC: 186 NG/ML (ref 15–150)
FOLATE SERPL-MCNC: 3.5 NG/ML
GLOBULIN SER CALC-MCNC: 2.5 G/DL (ref 1.5–4.5)
GLUCOSE SERPL-MCNC: 83 MG/DL (ref 70–99)
HCT VFR BLD AUTO: 39.2 % (ref 34–46.6)
HGB BLD-MCNC: 12.4 G/DL (ref 11.1–15.9)
IMM GRANULOCYTES # BLD AUTO: 0.1 X10E3/UL (ref 0–0.1)
IMM GRANULOCYTES NFR BLD AUTO: 1 %
IRON SERPL-MCNC: 95 UG/DL (ref 27–159)
LYMPHOCYTES # BLD AUTO: 2.6 X10E3/UL (ref 0.7–3.1)
LYMPHOCYTES NFR BLD AUTO: 33 %
MCH RBC QN AUTO: 29.1 PG (ref 26.6–33)
MCHC RBC AUTO-ENTMCNC: 31.6 G/DL (ref 31.5–35.7)
MCV RBC AUTO: 92 FL (ref 79–97)
METHYLMALONATE SERPL-SCNC: 151 NMOL/L (ref 0–378)
MONOCYTES # BLD AUTO: 0.5 X10E3/UL (ref 0.1–0.9)
MONOCYTES NFR BLD AUTO: 6 %
NEUTROPHILS # BLD AUTO: 4.7 X10E3/UL (ref 1.4–7)
NEUTROPHILS NFR BLD AUTO: 57 %
PLATELET # BLD AUTO: 272 X10E3/UL (ref 150–450)
POTASSIUM SERPL-SCNC: 4.1 MMOL/L (ref 3.5–5.2)
PREALB SERPL-MCNC: 21 MG/DL (ref 10–36)
PROT SERPL-MCNC: 6.8 G/DL (ref 6–8.5)
RBC # BLD AUTO: 4.26 X10E6/UL (ref 3.77–5.28)
SODIUM SERPL-SCNC: 142 MMOL/L (ref 134–144)
VIT B1 BLD-SCNC: 99 NMOL/L (ref 66.5–200)
WBC # BLD AUTO: 8.1 X10E3/UL (ref 3.4–10.8)

## 2023-05-11 ENCOUNTER — OFFICE VISIT (OUTPATIENT)
Dept: ONCOLOGY | Facility: CLINIC | Age: 63
End: 2023-05-11
Payer: COMMERCIAL

## 2023-05-11 VITALS
BODY MASS INDEX: 41.29 KG/M2 | HEIGHT: 63 IN | SYSTOLIC BLOOD PRESSURE: 110 MMHG | TEMPERATURE: 96.8 F | HEART RATE: 66 BPM | RESPIRATION RATE: 20 BRPM | OXYGEN SATURATION: 98 % | DIASTOLIC BLOOD PRESSURE: 80 MMHG | WEIGHT: 233 LBS

## 2023-05-11 DIAGNOSIS — Z17.0 MALIGNANT NEOPLASM OF UPPER-OUTER QUADRANT OF RIGHT BREAST IN FEMALE, ESTROGEN RECEPTOR POSITIVE: Primary | ICD-10-CM

## 2023-05-11 DIAGNOSIS — C50.411 MALIGNANT NEOPLASM OF UPPER-OUTER QUADRANT OF RIGHT BREAST IN FEMALE, ESTROGEN RECEPTOR POSITIVE: Primary | ICD-10-CM

## 2023-05-11 PROCEDURE — 99213 OFFICE O/P EST LOW 20 MIN: CPT | Performed by: INTERNAL MEDICINE

## 2023-05-11 NOTE — PROGRESS NOTES
PROBLEM LIST:  Oncology/Hematology History Overview Note   Lab Results   Component Value Date    FINALDX  08/19/2019     1. RIGHT BREAST LUMPECTOMY WITH NEEDLE LOCALIZATION:   Infiltrating and in-situ intermediate grade lobular carcinoma.  Bloom Denis score 6/9.  Infiltrating tumor size 2.2x1.5x1.0 cm.   Margins of resection negative for tumor with closest infiltrating margin inferior and superior measuring 4 mm each.   No lymphvascular invasion identified.   Presence of previous biopsy site identified.  See Template.  2. SENTINEL LYMPH NODE #1, RIGHT AXILLA, EXCISION:   Lymph node x1; positive for metastatic lobular carcinoma (1/1).  3. SENTINEL LYMPH NODE #2, RIGHT AXILLA, EXCISION:  Lymph node x1; positive for metastatic lobular carcinoma (1/1).  4. NONSENTINEL LYMPH NODE, RIGHT AXILLA, EXCISION:  Lymph node x1; positive for metastatic lobular carcinoma (1/1).     INVASIVE BREAST CANCER STAGING TEMPLATE:  TYPE OF SPECIMEN/PROCEDURE:  Needle localized lumpectomy  SPECIMEN LATERALITY: Right breast  TUMOR SITE: 12 o'clock   TUMOR SIZE: (Size of Largest Invasive Carcinoma): 2.2x1.5x1.0 cm   HISTOLOGIC TYPE OF INVASIVE CARCINOMA:  Lobular   GRADE: Intermediate   TUBULAR FORMATION:  3  MITOTIC ACTIVITY:  1  PLEOMORPHISM:  2  OVERALL SCORE: 6/9   DUCTAL CARCINOMA IN SITU (DCIS) EXTENT: 0  TUMOR EXTENSION (Only if structures present and involved):    SKIN: N/A    NIPPLE: N/A    SKELETAL MUSCLE: N/A   SURGICAL MARGINS (Uninvolved/positive): Uninvolved   INVASIVE CARCINOMA MARGINS (Uninvolved/Positive) Uninvolved   DISTANCE FROM CLOSEST MARGIN: 4 mm   SPECIFIC CLOSEST MARGIN: Inferior and superior   LCIS MARGINS (Uninvolved/Positive/No LCIS): Uninvolved   DISTANCE FROM CLOSEST MARGIN: 1 mm  SPECIFIC CLOSEST MARGIN: Inferior   LYMPH NODES (required only if lymph nodes are present in the specimen):  Present   NUMBER OF LYMPH NODES WITH MACROMETASTASIS: 3  NUMBER OF LYMPH NODES WITH MICROMETASTASIS: 0  NUMBER OF LYMPH  NODES WITH ISOLATED TUMOR CELLS: 0  TOTAL NUMBER OF LYMPH NODES EXAMINED (Including sentinel nodes): 3  NUMBER OF SENTINEL NODES EXAMINED: 2  EXTRANODAL EXTENSION OF TUMOR:  0  VASCULAR/LYMPHATIC INVASION:  Not identified     ESTROGEN RECEPTOR STATUS BY IHC METHOD: Positive   PROGESTERONE RECEPTOR STATUS BY IHC METHOD: Positive    HER-2/ysabel ONCOPROTEIN STATUS BY IHC METHOD:  Negative   HER-2/ysabel ONCOGENE STATUS BY IN SITU HYBRIDIZATION ANALYSIS:  Not performed   TREATMENT EFFECT (BREAST): None  TREATMENT EFFECT (NODES): None   ADDITIONAL PATHOLOGIC FINDINGS:  Previous biopsy site identified   OTHER STUDIES:  None  AJCC PATHOLOGIC STAGE:  (COMPLETED BY PATHOLOGIST, BASED ONLY ON TISSUE FINDINGS, MORE EXTENSIVE DISEASE MAY NOT BE KNOWN TO THE PATHOLOGIST)  pT=  2  pN=  2  pM=  Unknown    DGD/mbc             Malignant neoplasm of upper-outer quadrant of right breast in female, estrogen receptor positive   7/18/2019 Biopsy    Right breast     8/16/2019 Initial Diagnosis    Malignant neoplasm of upper-outer quadrant of right breast in female, estrogen receptor positive (CMS/HCC)     8/19/2019 Surgery    Surgery       Right breast lumpectomy with sentinel node biopsy by Dr. Stephanie Fraire     8/19/2019 Cancer Staged    Cancer Staging  Malignant neoplasm of upper-outer quadrant of right breast in female, estrogen receptor positive (CMS/HCC)  Staging form: Breast, AJCC 8th Edition  - Pathologic stage from 9/12/2019: Stage IB (pT2, pN1a, cM0, G2, ER: Positive, AK: Positive, HER2: Negative) - Signed by Laila Thacker MD on 9/12/2019 9/19/2019 Imaging    Negative work-up for metastatic disease by CAT scan of the chest abdomen pelvis and bone scan     9/20/2019 -  Other Event    ECHO - Normal EF     9/23/2019 Surgery    Surgery       Port Placement     9/25/2019 - 11/22/2019 Chemotherapy    OP BREAST AC DD DOXOrubicin / Cyclophosphamide       12/6/2019 - 2/21/2020 Chemotherapy    OP BREAST PACLitaxel (weekly X 12)        3/9/2020 - 4/20/2020 Radiation    Radiation OncologyTreatment Course:  Francoise Kamara received 6000 cGy in 30 fractions to right breast & s/c via External Beam Radiation - EBRT.      Hormonal Therapy    Arimidex for at least 10 years         REASON FOR VISIT: Management of my breast cancer    HISTORY OF PRESENT ILLNESS:   63 y.o.  female presents today for management of her breast cancer.  She completed 4 cycles of dose dense Adriamycin and Cytoxan and Weekly Taxol in Feb/2020.  She has completed  adjuvant radiation in April 2020.  She is currently on Arimidex since then.  Clinically tolerating it well.  No major issues since I last saw her.  Mammogram in February was completed.  She does have some arthralgias especially of the knees.  Otherwise doing well.    Past Medical History:   Diagnosis Date   • Cardiomegaly     stable on CXR   • Drug therapy    • Dyspnea on exertion    • Elevated LDL cholesterol level    • Fatigue    • GERD (gastroesophageal reflux disease)     on daily Protonix, EGD 10/2016. Sx's resolved since AGBR, even if doesn't take her PPI. serum h. pyl neg.  EGD [facility] 10/16 prior to AGBR  36 cm   • History of radiation therapy 04/20/2020    right breast   • Hx of radiation therapy    • Hypertension    • Hypoalbuminemia     admits to chronic undereating   • Low HDL (under 40)    • Malignant neoplasm of upper-outer quadrant of right breast in female, estrogen receptor positive 09/12/2019   • Menopause    • Microcytic red blood cells     H/H 12.6/38.4   • Morbid obesity    • OAB (overactive bladder)    • Peripheral edema    • Rosacea     on Doxycycline   • Stress incontinence    • Vaginal atrophy    • Vitamin D deficiency    • Wears glasses      Social History     Socioeconomic History   • Marital status:    Tobacco Use   • Smoking status: Never   • Smokeless tobacco: Never   Vaping Use   • Vaping Use: Never used   Substance and Sexual Activity   • Alcohol use: No   • Drug use: Never   •  Sexual activity: Not Currently     Partners: Male     Birth control/protection: Post-menopausal, None     Comment: spouse     Family History   Problem Relation Age of Onset   • Hypertension Mother    • Sleep apnea Mother    • Hypertension Father    • Lung cancer Father    • Cancer Father         Lung   • Hypertension Maternal Grandfather    • Stroke Maternal Grandfather    • Hypertension Paternal Grandmother    • Breast cancer Paternal Aunt 45   • No Known Problems Brother    • BRCA 1/2 Neg Hx    • Ovarian cancer Neg Hx        Review of Systems:    Review of Systems   Constitutional: Positive for fatigue.   HENT:  Negative.    Eyes: Negative.    Respiratory: Negative.    Cardiovascular: Negative.    Gastrointestinal: Negative.    Endocrine: Negative.    Genitourinary: Negative.     Musculoskeletal: Positive for arthralgias.   Skin: Negative.    Neurological: Negative.    Hematological: Negative.    Psychiatric/Behavioral: Negative.       A comprehensive 14 point review of systems was performed and was negative except as mentioned.      Medications:        Current Outpatient Medications:   •  anastrozole (ARIMIDEX) 1 MG tablet, Take 1 tablet by mouth Daily., Disp: 30 tablet, Rfl: 11  •  Biotin 5000 MCG tablet, Take  by mouth., Disp: , Rfl:   •  calcium carbonate, oyster shell, 500 MG tablet tablet, Take 2 tablets by mouth Daily., Disp: , Rfl:   •  cetirizine (zyrTEC) 10 MG tablet, Take  by mouth., Disp: , Rfl:   •  cyanocobalamin 1000 MCG/ML injection, , Disp: , Rfl:   •  esomeprazole (nexIUM) 40 MG capsule, Take 1 capsule by mouth Every Morning Before Breakfast., Disp: , Rfl:   •  Ferrous Gluconate (IRON 27 PO), Take  by mouth., Disp: , Rfl:   •  hydrochlorothiazide (MICROZIDE) 12.5 MG capsule, Take 1 capsule by mouth As Needed., Disp: , Rfl:   •  lisinopril (PRINIVIL,ZESTRIL) 40 MG tablet, Take 20 mg by mouth Daily., Disp: , Rfl:   •  tolterodine LA (DETROL LA) 4 MG 24 hr capsule, Take 1 capsule by mouth Daily.,  "Disp: , Rfl:   •  vitamin D (ERGOCALCIFEROL) 26211 UNITS capsule capsule, 1 capsule 1 (One) Time Per Week. sundays, Disp: , Rfl:       ALLERGIES:    Allergies   Allergen Reactions   • Penicillins Hives and Swelling     Invanz given for AGBR surgery         Physical Exam    VITAL SIGNS:  /80 Comment: Manual  Pulse 66   Temp 96.8 °F (36 °C) (Infrared)   Resp 20   Ht 160 cm (63\")   Wt 106 kg (233 lb)   SpO2 98% Comment: RA  BMI 41.27 kg/m²     Wt Readings from Last 3 Encounters:   05/11/23 106 kg (233 lb)   12/28/22 103 kg (226 lb 8 oz)   06/07/22 102 kg (225 lb 9.6 oz)       Body mass index is 41.27 kg/m². Body surface area is 2.07 meters squared.         Performance Status: 0    General: well appearing, in no acute distress  HEENT: sclera anicteric, oropharynx clear, neck is supple  Lymphatics: no cervical, supraclavicular, or axillary adenopathy  Cardiovascular: regular rate and rhythm, no murmurs, rubs or gallops  Lungs: clear to auscultation bilaterally  Abdomen: soft, nontender, nondistended.  No palpable organomegaly  Extremities: no lower extremity edema  Skin: no rashes, lesions, bruising, or petechiae  Msk:  Shows no weakness of the large muscle groups  Psych: Mood is stable  Port in the left chest wall      RECENT LABS:    Lab Results   Component Value Date    HGB 12.4 12/28/2022    HCT 39.2 12/28/2022    MCV 92 12/28/2022     12/28/2022    WBC 8.1 12/28/2022    NEUTROABS 4.7 12/28/2022    LYMPHSABS 2.6 12/28/2022    MONOSABS 0.5 12/28/2022    EOSABS 0.2 12/28/2022    BASOSABS 0.1 12/28/2022       Lab Results   Component Value Date    GLUCOSE 83 12/28/2022    BUN 15 12/28/2022    CREATININE 1.14 (H) 12/28/2022     12/28/2022    K 4.1 12/28/2022     12/28/2022    CO2 25 12/28/2022    CALCIUM 9.6 12/28/2022    PROTEINTOT 6.3 02/21/2020    ALBUMIN 4.3 12/28/2022    BILITOT 0.3 12/28/2022    ALKPHOS 93 12/28/2022    AST 9 12/28/2022    ALT 7 12/28/2022 "           Assessment/Plan    1. Stage IB infiltrating intermediate grade lobular carcinoma of the right breast with node positivity.  She had mammograms done in August which looked clear.  She is tolerating her oral hormone blocker.    Plan for 10 years.  She has completed 4 years so far.  Reviewed her mammograms from Aug of 2022 and they are clear.  Plan to repeat it in September of this year.              Laila Thacker MD  [facility] Hematology and On[facility]         No orders of the defined types were placed in this encounter.      5/11/2023

## 2023-06-13 ENCOUNTER — OFFICE VISIT (OUTPATIENT)
Dept: OBSTETRICS AND GYNECOLOGY | Facility: CLINIC | Age: 63
End: 2023-06-13
Payer: COMMERCIAL

## 2023-06-13 VITALS
DIASTOLIC BLOOD PRESSURE: 80 MMHG | HEIGHT: 63 IN | BODY MASS INDEX: 41.11 KG/M2 | SYSTOLIC BLOOD PRESSURE: 118 MMHG | WEIGHT: 232 LBS

## 2023-06-13 DIAGNOSIS — Z85.3 HISTORY OF CANCER OF RIGHT BREAST: ICD-10-CM

## 2023-06-13 DIAGNOSIS — Z01.419 ENCOUNTER FOR GYNECOLOGICAL EXAMINATION WITHOUT ABNORMAL FINDING: Primary | ICD-10-CM

## 2023-06-13 RX ORDER — FOLIC ACID 1 MG/1
1 TABLET ORAL DAILY
COMMUNITY

## 2023-06-13 NOTE — PROGRESS NOTES
"Chief Complaint  Francoise Kamara is a 63 y.o.  female presenting for Annual Exam    History of Present Illness  Francoise is a very pleasant 64yo woman, , here for annual gyn exam.    She is s/p surgery & radiation for RIGHT breast cancer in the Summer .  She is tolerating Anastrozole well.  Denies any vaginal bldg or bothersome side effects.  She has mild lymphedema in the right breast (admits she doesn't wear the device to prevent this).   She will try to get back to faithfully using it before seeing Dr. Rodriguez.  (her words, \"Sometimes he growls at me . . . \")  Her PCP will order the colonoscopy.  She has appt for mammogram in late Aug 2023.    Otherwise, ROS negative.    The following portions of the patient's history were reviewed and updated as appropriate: allergies, current medications, past family history, past medical history, past social history, past surgical history and problem list.    Allergies   Allergen Reactions   • Penicillins Hives and Swelling     Invanz given for AGBR surgery         Current Outpatient Medications:   •  anastrozole (ARIMIDEX) 1 MG tablet, Take 1 tablet by mouth Daily., Disp: 30 tablet, Rfl: 11  •  Biotin 5000 MCG tablet, Take  by mouth., Disp: , Rfl:   •  calcium carbonate, oyster shell, 500 MG tablet tablet, Take 2 tablets by mouth Daily., Disp: , Rfl:   •  cetirizine (zyrTEC) 10 MG tablet, Take  by mouth., Disp: , Rfl:   •  cyanocobalamin 1000 MCG/ML injection, , Disp: , Rfl:   •  esomeprazole (nexIUM) 40 MG capsule, Take 1 capsule by mouth Every Morning Before Breakfast., Disp: , Rfl:   •  Ferrous Gluconate (IRON 27 PO), Take  by mouth., Disp: , Rfl:   •  folic acid (FOLVITE) 1 MG tablet, Take 1 tablet by mouth Daily., Disp: , Rfl:   •  hydrochlorothiazide (MICROZIDE) 12.5 MG capsule, Take 1 capsule by mouth As Needed., Disp: , Rfl:   •  lisinopril (PRINIVIL,ZESTRIL) 40 MG tablet, Take 20 mg by mouth Daily., Disp: , Rfl:   •  tolterodine LA (DETROL LA) 4 " MG 24 hr capsule, Take 1 capsule by mouth Daily., Disp: , Rfl:   •  vitamin D (ERGOCALCIFEROL) 72940 UNITS capsule capsule, 1 capsule 1 (One) Time Per Week. sundays, Disp: , Rfl:     Past Medical History:   Diagnosis Date   • Cardiomegaly     stable on CXR   • Drug therapy    • Dyspnea on exertion    • Elevated LDL cholesterol level    • Fatigue    • GERD (gastroesophageal reflux disease)     on daily Protonix, EGD 10/2016. Sx's resolved since AGBR, even if doesn't take her PPI. serum h. pyl neg.  EGD [facility] 10/16 prior to AGBR  36 cm   • History of radiation therapy 04/20/2020    right breast   • Hx of radiation therapy    • Hypertension    • Hypoalbuminemia     admits to chronic undereating   • Low HDL (under 40)    • Malignant neoplasm of upper-outer quadrant of right breast in female, estrogen receptor positive 09/12/2019   • Menopause    • Microcytic red blood cells     H/H 12.6/38.4   • Morbid obesity    • OAB (overactive bladder)    • Peripheral edema    • Rosacea     on Doxycycline   • Stress incontinence    • Vaginal atrophy    • Vitamin D deficiency    • Wears glasses         Past Surgical History:   Procedure Laterality Date   • BREAST BIOPSY      ? LATERALITY   • BREAST BIOPSY Right 07/2019    [facility]    • BREAST LUMPECTOMY Right 08/19/2019   • CHOLECYSTECTOMY  2015    for stones w/ Dr. Barboza @ [facility]   • COLONOSCOPY  2014   • COLONOSCOPY  2017   • ENDOSCOPY  2016    by Dr. Urias   • ENDOSCOPY N/A 10/20/2017    Procedure: ESOPHAGOGASTRODUODENOSCOPY;  Surgeon: Guille Urias MD;  Location:  [facility];  Service:    • GASTRIC BANDING REMOVAL N/A 12/21/2016    Procedure: GASTRIC BANDING REMOVAL LAPAROSCOPIC;  Surgeon: Guille Urias MD;  Location:  [facility];  Service:    • GASTRIC SLEEVE LAPAROSCOPIC N/A 10/20/2017    Procedure: GASTRIC SLEEVE LAPAROSCOPIC, ;  Surgeon: Guille Urias MD;  Location:  [facility];  Service:    • LAPAROSCOPIC GASTRIC BANDING  2008    s/p LAGB APS w/ HHR 11/2008 by GDW @  "[facility].  full dissection hiatus, single stitch ant repair   • VENOUS ACCESS DEVICE (PORT) INSERTION Left 09/24/2019   • VENOUS ACCESS DEVICE (PORT) INSERTION Left 10/24/2019   • WISDOM TOOTH EXTRACTION         Objective  /80   Ht 160 cm (63\")   Wt 105 kg (232 lb)   Breastfeeding No   BMI 41.10 kg/m²     Physical Exam  Vitals and nursing note reviewed. Exam conducted with a chaperone present.   Constitutional:       General: She is not in acute distress.     Appearance: Normal appearance. She is not ill-appearing.   HENT:      Head: Normocephalic.   Neck:      Thyroid: No thyroid mass or thyromegaly.   Cardiovascular:      Rate and Rhythm: Normal rate and regular rhythm.      Heart sounds: Normal heart sounds. No murmur heard.  Pulmonary:      Effort: Pulmonary effort is normal. No respiratory distress.      Breath sounds: Normal breath sounds.   Chest:   Breasts:     Right: No inverted nipple, mass or nipple discharge.      Left: No inverted nipple, mass or nipple discharge.      Comments: Mild lymphedema in the lower quads of right breast.  No masses.  No nipple discharge.  No node enlargement.    Abdominal:      Palpations: Abdomen is soft. There is no mass.      Tenderness: There is no abdominal tenderness.   Genitourinary:     General: Normal vulva.      Labia:         Right: No rash, tenderness or lesion.         Left: No rash, tenderness or lesion.       Vagina: Normal. No vaginal discharge or erythema.      Cervix: No discharge, lesion or erythema.      Uterus: Not enlarged and not tender.       Adnexa:         Right: No mass or tenderness.          Left: No mass or tenderness.        Comments: Anus appears wnl.  No rectal exam performed.  Lymphadenopathy:      Upper Body:      Right upper body: No supraclavicular or axillary adenopathy.      Left upper body: No supraclavicular or axillary adenopathy.   Skin:     General: Skin is warm and dry.   Neurological:      Mental Status: She is alert and " oriented to person, place, and time.   Psychiatric:         Mood and Affect: Mood normal.         Behavior: Behavior normal.         Assessment/Plan   Diagnoses and all orders for this visit:    1. Encounter for gynecological examination without abnormal finding (Primary)    2. History of cancer of right breast    Couns re: SBE, mammograms, to notify us promptly prn any vag bldg.    Procedures    40 to 64: Counseling/Anticipatory Guidance Discussed: screenings and self-breast exam    Return in about 1 year (around 6/13/2024) for Annual physical.    Yulisa Cummins, APRN  06/13/2023

## 2023-06-19 LAB — REF LAB TEST METHOD: NORMAL

## 2023-08-23 ENCOUNTER — HOSPITAL ENCOUNTER (OUTPATIENT)
Dept: MAMMOGRAPHY | Facility: HOSPITAL | Age: 63
Discharge: HOME | End: 2023-08-23
Admitting: INTERNAL MEDICINE
Payer: COMMERCIAL

## 2023-08-23 DIAGNOSIS — C50.411 MALIGNANT NEOPLASM OF UPPER-OUTER QUADRANT OF RIGHT BREAST IN FEMALE, ESTROGEN RECEPTOR POSITIVE: ICD-10-CM

## 2023-08-23 DIAGNOSIS — Z17.0 MALIGNANT NEOPLASM OF UPPER-OUTER QUADRANT OF RIGHT BREAST IN FEMALE, ESTROGEN RECEPTOR POSITIVE: ICD-10-CM

## 2023-08-23 PROCEDURE — 77067 SCR MAMMO BI INCL CAD: CPT

## 2023-08-23 PROCEDURE — 77063 BREAST TOMOSYNTHESIS BI: CPT

## 2023-10-05 ENCOUNTER — HOSPITAL ENCOUNTER (OUTPATIENT)
Dept: MRI IMAGING | Facility: HOSPITAL | Age: 63
Discharge: HOME | End: 2023-10-05
Admitting: SURGERY
Payer: COMMERCIAL

## 2023-10-05 DIAGNOSIS — C50.211 MALIGNANT NEOPLASM OF UPPER-INNER QUADRANT OF RIGHT FEMALE BREAST, UNSPECIFIED ESTROGEN RECEPTOR STATUS: ICD-10-CM

## 2023-10-05 LAB — CREAT BLDA-MCNC: 1.4 MG/DL (ref 0.6–1.3)

## 2023-10-05 PROCEDURE — 77049 MRI BREAST C-+ W/CAD BI: CPT

## 2023-10-05 PROCEDURE — 0 GADOBENATE DIMEGLUMINE 529 MG/ML SOLUTION: Performed by: SURGERY

## 2023-10-05 PROCEDURE — A9577 INJ MULTIHANCE: HCPCS | Performed by: SURGERY

## 2023-10-05 PROCEDURE — 82565 ASSAY OF CREATININE: CPT

## 2023-10-05 RX ADMIN — GADOBENATE DIMEGLUMINE 10 ML: 529 INJECTION, SOLUTION INTRAVENOUS at 10:18

## 2023-11-10 RX ORDER — ANASTROZOLE 1 MG/1
1 TABLET ORAL DAILY
Qty: 90 TABLET | Refills: 3 | Status: SHIPPED | OUTPATIENT
Start: 2023-11-10

## 2023-12-28 ENCOUNTER — OFFICE VISIT (OUTPATIENT)
Dept: BARIATRICS/WEIGHT MGMT | Facility: CLINIC | Age: 63
End: 2023-12-28
Payer: COMMERCIAL

## 2023-12-28 VITALS
TEMPERATURE: 97.7 F | WEIGHT: 224 LBS | RESPIRATION RATE: 18 BRPM | DIASTOLIC BLOOD PRESSURE: 82 MMHG | HEIGHT: 63 IN | HEART RATE: 80 BPM | BODY MASS INDEX: 39.69 KG/M2 | OXYGEN SATURATION: 98 % | SYSTOLIC BLOOD PRESSURE: 134 MMHG

## 2023-12-28 DIAGNOSIS — R53.83 FATIGUE, UNSPECIFIED TYPE: Primary | ICD-10-CM

## 2023-12-28 DIAGNOSIS — K91.2 POSTGASTRECTOMY MALABSORPTION: ICD-10-CM

## 2023-12-28 DIAGNOSIS — Z13.21 MALNUTRITION SCREEN: ICD-10-CM

## 2023-12-28 DIAGNOSIS — R10.13 EPIGASTRIC PAIN: ICD-10-CM

## 2023-12-28 DIAGNOSIS — E66.9 OBESITY, CLASS II, BMI 35-39.9: ICD-10-CM

## 2023-12-28 DIAGNOSIS — Z13.0 SCREENING, IRON DEFICIENCY ANEMIA: ICD-10-CM

## 2023-12-28 DIAGNOSIS — Z98.84 STATUS POST BARIATRIC SURGERY: ICD-10-CM

## 2023-12-28 DIAGNOSIS — Z90.3 POSTGASTRECTOMY MALABSORPTION: ICD-10-CM

## 2023-12-28 PROCEDURE — 99214 OFFICE O/P EST MOD 30 MIN: CPT | Performed by: PHYSICIAN ASSISTANT

## 2023-12-28 NOTE — PROGRESS NOTES
"[facility]  [address]  [address]  [facility] [id number] [phone]        Patient Name:  Francoise Kamara.  :  1960        Reason for Visit:   6 years postop      HPI: Francoise Kamara is a 63 y.o. female  s/p LSG by GDW on 10/20/17       LOV nexium increased to BID.  Dietician contacted with recs below.  Treated for breast Ca.     Plan:  Omit sugary drinks  Increase protein for lunch and snacks  Reduce candy bars  Reduce carbs and increase protein  Variety  100g protein a day    Doing pretty well.  Good report form oncologist, is on arimidex. She has occ fecal ugency/ incontinence at times which is felt to be related to s/e of arimidex. Seeing PCP next week to discuss colonoscopy that she thinks is due.    Has some postprandial cramping of epigastrium that improves with stretching or \"walking it off\", no dysphagia, now occurring daily, can be with a variety of foods.  Reflux controlled on nexium QD.  Denies dysphagia, nausea, vomiting, pulmonary issues, and fevers. Would like to continue to lose weight.  Says she doesn't eat enough, will \"junk eat\" on candy, etc. But overall small portions. Getting \"little to none\" g prot/day.  Drinking 64? fluid oz/day.   \"Can live on 1 soft drink a day\". Taking B12, Calcium, Vit D, and iron, weekly D Rx, EOW B12 injections.   On Nexium.  Activity Improving but \"not great\".     Presurgery weight: 262 pounds.  Today's weight is 102 kg (224 lb) pounds, today's  Body mass index is 39.68 kg/m²., andHIS@ weight loss since surgery is 38 pounds.      Past Medical History:   Diagnosis Date    Cardiomegaly     stable on CXR    Drug therapy     Dyspnea on exertion     Elevated LDL cholesterol level     Fatigue     GERD (gastroesophageal reflux disease)     on daily Protonix, EGD 10/2016. Sx's resolved since AGBR, even if doesn't take her PPI. serum h. pyl neg.  EGD GDW 10/16 prior to AGBR  36 cm    History of radiation " therapy 04/20/2020    right breast    Hx of radiation therapy     Hypertension     Hypoalbuminemia     admits to chronic undereating    Low HDL (under 40)     Malignant neoplasm of upper-outer quadrant of right breast in female, estrogen receptor positive 09/12/2019    Menopause     Microcytic red blood cells     H/H 12.6/38.4    Morbid obesity     OAB (overactive bladder)     Peripheral edema     Rosacea     on Doxycycline    Stress incontinence     Vaginal atrophy     Vitamin D deficiency     Wears glasses      Past Surgical History:   Procedure Laterality Date    BREAST BIOPSY      ? LATERALITY    BREAST BIOPSY Right 07/2019    usg     BREAST LUMPECTOMY Right 08/19/2019    CHOLECYSTECTOMY  2015    for stones w/ Dr. aBrboza @ [facility]    COLONOSCOPY  2014    COLONOSCOPY  2017    ENDOSCOPY  2016    by Dr. Urias    ENDOSCOPY N/A 10/20/2017    Procedure: ESOPHAGOGASTRODUODENOSCOPY;  Surgeon: Guille Urias MD;  Location:  [facility];  Service:     GASTRIC BANDING REMOVAL N/A 12/21/2016    Procedure: GASTRIC BANDING REMOVAL LAPAROSCOPIC;  Surgeon: Guille Urias MD;  Location:  [facility];  Service:     GASTRIC SLEEVE LAPAROSCOPIC N/A 10/20/2017    Procedure: GASTRIC SLEEVE LAPAROSCOPIC, ;  Surgeon: Guille Urias MD;  Location:  [facility];  Service:     LAPAROSCOPIC GASTRIC BANDING  2008    s/p LAGB APS w/ HHR 11/2008 by GDW @ [facility].  full dissection hiatus, single stitch ant repair    VENOUS ACCESS DEVICE (PORT) INSERTION Left 09/24/2019    VENOUS ACCESS DEVICE (PORT) INSERTION Left 10/24/2019    WISDOM TOOTH EXTRACTION       Outpatient Medications Marked as Taking for the 12/28/23 encounter (Office Visit) with Celine Jacobsen PA-C   Medication Sig Dispense Refill    anastrozole (ARIMIDEX) 1 MG tablet TAKE 1 TABLET BY MOUTH ONCE DAILY 90 tablet 3    Biotin 5000 MCG tablet Take  by mouth.      calcium carbonate, oyster shell, 500 MG tablet tablet Take 2 tablets by mouth Daily.      cetirizine (zyrTEC) 10 MG  "tablet Take  by mouth.      cyanocobalamin 1000 MCG/ML injection Inject 1 mL into the appropriate muscle as directed by prescriber Every 28 (Twenty-Eight) Days.      esomeprazole (nexIUM) 40 MG capsule Take 1 capsule by mouth Every Morning Before Breakfast.      Ferrous Gluconate (IRON 27 PO) Take  by mouth.      folic acid (FOLVITE) 1 MG tablet Take 1 tablet by mouth Daily.      hydrochlorothiazide (MICROZIDE) 12.5 MG capsule Take 1 capsule by mouth As Needed.      lisinopril (PRINIVIL,ZESTRIL) 40 MG tablet Take 0.5 tablets by mouth Daily. Takes 1/2 tab QD at this time per provider instruction      tolterodine LA (DETROL LA) 4 MG 24 hr capsule Take 1 capsule by mouth Daily.      vitamin D (ERGOCALCIFEROL) 93539 UNITS capsule capsule 1 capsule 1 (One) Time Per Week. sundays         Allergies   Allergen Reactions    Penicillins Hives and Swelling     Invanz given for AGBR surgery       Social History     Socioeconomic History    Marital status:    Tobacco Use    Smoking status: Never    Smokeless tobacco: Never   Vaping Use    Vaping Use: Never used   Substance and Sexual Activity    Alcohol use: No    Drug use: Never    Sexual activity: Not Currently     Partners: Male     Birth control/protection: Post-menopausal, None     Comment: spouse       /82 (BP Location: Left arm, Patient Position: Sitting)   Pulse 80   Temp 97.7 °F (36.5 °C)   Resp 18   Ht 160 cm (63\")   Wt 102 kg (224 lb)   SpO2 98%   BMI 39.68 kg/m²     Physical Exam  Constitutional:       Appearance: She is well-developed.   HENT:      Head: Normocephalic and atraumatic.   Cardiovascular:      Rate and Rhythm: Normal rate and regular rhythm.   Pulmonary:      Effort: Pulmonary effort is normal.      Breath sounds: Normal breath sounds.   Abdominal:      General: Bowel sounds are normal.      Palpations: Abdomen is soft.   Skin:     General: Skin is warm and dry.   Neurological:      Mental Status: She is alert.   Psychiatric:         " Mood and Affect: Mood normal.         Behavior: Behavior normal.         Thought Content: Thought content normal.         Judgment: Judgment normal.           Assessment:  6 years s/p LSG by LETICIAW on 10/20/17     ICD-10-CM ICD-9-CM   1. Fatigue, unspecified type  R53.83 780.79   2. Screening, iron deficiency anemia  Z13.0 V78.0   3. Malnutrition screen  Z13.21 V77.2   4. Postgastrectomy malabsorption  K91.2 579.3    Z90.3    5. Obesity, Class II, BMI 35-39.9  E66.9 278.00   6. Status post bariatric surgery  Z98.84 V45.86   7. Epigastric pain  R10.13 789.06         Plan:  will order UGI for eval and discuss with Dr. Urias, likely followed by EGD.cont daily PPI. F/u with PCP/ GI for diarrhea/ urgency eval.   Pt requests referral to [facility].  Encouraged dietician eval  w/ good food choices and healthy habits.  Continue to focus on high protein, low carb.  start tracking intake- increase protein.  encouraged routine exercise.  Routine bariatric labs ordered.  Continue vitamins w/ adjustments pending lab results.  Call w/ problems/concerns.     Class 2 Severe Obesity (BMI >=35 and <=39.9). Obesity-related health conditions include the following:  as above . Obesity is improving with treatment. BMI is is above average; BMI management plan is completed. We discussed portion control and increasing exercise.        The patient was instructed to follow up in 1 year, sooner pending imaging, sooner if needed.

## 2024-01-01 LAB
25(OH)D3+25(OH)D2 SERPL-MCNC: 56.6 NG/ML (ref 30–100)
ALBUMIN SERPL-MCNC: 4.2 G/DL (ref 3.9–4.9)
ALBUMIN/GLOB SERPL: 1.6 {RATIO} (ref 1.2–2.2)
ALP SERPL-CCNC: 96 IU/L (ref 44–121)
ALT SERPL-CCNC: 11 IU/L (ref 0–32)
AST SERPL-CCNC: 13 IU/L (ref 0–40)
BASOPHILS # BLD AUTO: 0.1 X10E3/UL (ref 0–0.2)
BASOPHILS NFR BLD AUTO: 1 %
BILIRUB SERPL-MCNC: 0.3 MG/DL (ref 0–1.2)
BUN SERPL-MCNC: 14 MG/DL (ref 8–27)
BUN/CREAT SERPL: 14 (ref 12–28)
CALCIUM SERPL-MCNC: 9.2 MG/DL (ref 8.7–10.3)
CHLORIDE SERPL-SCNC: 103 MMOL/L (ref 96–106)
CO2 SERPL-SCNC: 24 MMOL/L (ref 20–29)
CREAT SERPL-MCNC: 1.03 MG/DL (ref 0.57–1)
EGFRCR SERPLBLD CKD-EPI 2021: 62 ML/MIN/1.73
EOSINOPHIL # BLD AUTO: 0.2 X10E3/UL (ref 0–0.4)
EOSINOPHIL NFR BLD AUTO: 2 %
ERYTHROCYTE [DISTWIDTH] IN BLOOD BY AUTOMATED COUNT: 11.9 % (ref 11.7–15.4)
FERRITIN SERPL-MCNC: 207 NG/ML (ref 15–150)
FOLATE SERPL-MCNC: >20 NG/ML
GLOBULIN SER CALC-MCNC: 2.7 G/DL (ref 1.5–4.5)
GLUCOSE SERPL-MCNC: 85 MG/DL (ref 70–99)
HCT VFR BLD AUTO: 36.7 % (ref 34–46.6)
HGB BLD-MCNC: 12.2 G/DL (ref 11.1–15.9)
IMM GRANULOCYTES # BLD AUTO: 0 X10E3/UL (ref 0–0.1)
IMM GRANULOCYTES NFR BLD AUTO: 0 %
IRON SERPL-MCNC: 84 UG/DL (ref 27–139)
LYMPHOCYTES # BLD AUTO: 2.3 X10E3/UL (ref 0.7–3.1)
LYMPHOCYTES NFR BLD AUTO: 29 %
MCH RBC QN AUTO: 30.2 PG (ref 26.6–33)
MCHC RBC AUTO-ENTMCNC: 33.2 G/DL (ref 31.5–35.7)
MCV RBC AUTO: 91 FL (ref 79–97)
METHYLMALONATE SERPL-SCNC: 154 NMOL/L (ref 0–378)
MONOCYTES # BLD AUTO: 0.5 X10E3/UL (ref 0.1–0.9)
MONOCYTES NFR BLD AUTO: 6 %
NEUTROPHILS # BLD AUTO: 4.9 X10E3/UL (ref 1.4–7)
NEUTROPHILS NFR BLD AUTO: 62 %
PLATELET # BLD AUTO: 257 X10E3/UL (ref 150–450)
POTASSIUM SERPL-SCNC: 3.9 MMOL/L (ref 3.5–5.2)
PREALB SERPL-MCNC: 21 MG/DL (ref 10–36)
PROT SERPL-MCNC: 6.9 G/DL (ref 6–8.5)
RBC # BLD AUTO: 4.04 X10E6/UL (ref 3.77–5.28)
SODIUM SERPL-SCNC: 144 MMOL/L (ref 134–144)
VIT B1 BLD-SCNC: 80.1 NMOL/L (ref 66.5–200)
WBC # BLD AUTO: 8 X10E3/UL (ref 3.4–10.8)

## 2024-01-11 ENCOUNTER — HOSPITAL ENCOUNTER (OUTPATIENT)
Dept: GENERAL RADIOLOGY | Facility: HOSPITAL | Age: 64
Discharge: HOME | End: 2024-01-11
Admitting: PHYSICIAN ASSISTANT
Payer: COMMERCIAL

## 2024-01-11 DIAGNOSIS — R10.13 EPIGASTRIC PAIN: ICD-10-CM

## 2024-01-11 PROCEDURE — 74240 X-RAY XM UPR GI TRC 1CNTRST: CPT

## 2024-01-11 RX ADMIN — BARIUM SULFATE 183 ML: 960 POWDER, FOR SUSPENSION ORAL at 13:31

## 2024-02-14 ENCOUNTER — OFFICE VISIT (OUTPATIENT)
Dept: BARIATRICS/WEIGHT MGMT | Facility: CLINIC | Age: 64
End: 2024-02-14
Payer: COMMERCIAL

## 2024-02-14 VITALS
DIASTOLIC BLOOD PRESSURE: 62 MMHG | HEART RATE: 72 BPM | SYSTOLIC BLOOD PRESSURE: 108 MMHG | TEMPERATURE: 97.3 F | OXYGEN SATURATION: 98 % | HEIGHT: 63 IN | WEIGHT: 224 LBS | BODY MASS INDEX: 39.69 KG/M2

## 2024-02-14 DIAGNOSIS — E66.9 OBESITY, CLASS II, BMI 35-39.9: ICD-10-CM

## 2024-02-14 DIAGNOSIS — R10.13 EPIGASTRIC PAIN: Primary | ICD-10-CM

## 2024-02-14 PROCEDURE — 99213 OFFICE O/P EST LOW 20 MIN: CPT | Performed by: PHYSICIAN ASSISTANT

## 2024-02-14 RX ORDER — ATENOLOL 25 MG/1
25 TABLET ORAL DAILY
COMMUNITY
Start: 2024-02-02

## 2024-02-14 RX ORDER — ASPIRIN 81 MG/1
81 TABLET ORAL DAILY
COMMUNITY

## 2024-02-26 NOTE — PROGRESS NOTES
[facility]  [address]  [place] [address]   Office Note      Date: 2024  Patient Name: Francoise Kamara  MRN: [id number]  : 1960  Subjective  Subjective     Chief Complaint  Obesity Management consult, nutrition counseling          Francoise Kamara presents to [facility] for obesity management. She was referred by her bariatric surgery team for medical weight management. s/p LSG by JULY on 10/20/17. Presurgery weight: 262 pounds. Angelo: 201 (10/2018). Co-existing new onset a-fib, hypertension, cardiomegaly, breast cancer in remission, dyslipidemias. She desires to lose weight to improve mobility and feel better. Her weight loss goal is 20-50lb.     Weight history:  Highest lifetime weight: 270 pounds. Today's weight is 102 kg (224 lb 12.8 oz) pounds.   Weight 5 years ago: 224lb  Angelo 201lb. 2018 at which time she was diagnosed with breast cancer and started to regain with the lifestyle change, stress, fatigue, and weight positive chemo treatments.     Current lifestyle:   The following seem to sabotage weight loss efforts:skipping meals, eating too fast, too little protein, and poor sleep  She lives with her  and her youngest daughter who is 21 and is in college and is home on the weekends.   She is a retired teacher and now works as a . She is at work from 8-2 then sometimes visits homes in the late afternoon or evening. Goes home from 12-2 during the day.   B: skips, not hungry in the morning  L: 11-12: packing a ham sandwich with mancia and 1-2 slices of white bread and may have a handful of BBQ chips  D : Her  cooks in the home. Eats a lot of red meat, shake and bake, grilled pork chop, chicken.   Tries not to eat after 7pm.   One 16.9 oz soda daily.   Dining out usually gets steak, doesn't eat the whole meal.   Beverages: 4oz simply fresh lemonade in water, soda. Knows she is not  "eating enough or drinking water.  No intentional exercise.   Her  is also starting with medical weight management today, also had gastric sleeve surgery.    Pertinent medical history:  GERD: EGD is being rescheduled due to recent syncopal episode. Hasn't had any reflux episodes which she describes as \"needing to lay down and stretch out so food will move down\". Symptoms usually resolve within 30 minutes to an hour. Not related to portion size or food content (spicy foods, etc). On nexium, has taken for several years. No PRN meds.    Bowel urgency at times.  Has been on anastrozole for 4 years, will likely continue until 2027.  Hypertension: normal for her his 110-120/70-80. Infrequent hypotension.  Fatigue started with cancer treatments.   Insomnia: Has never been evaluated for sleep apnea, +snoring. She sleeps 5-6 hours a night.   Wake time 5:00-5:15am  Not sleepy until 1am.   If she goes to sleep earlier she usually wakes up at 1am wide awake.   Denies history of chest pain or blood sugar problems.   Pertinent family history:  Mother has afib, sleep apnea, hypertension  Father: hypertension, lung cancer  Son: ADHD, diabetes    Review of Systems   Constitutional:  Positive for fatigue and unexpected weight gain.        Positive for weight gain   HENT:  Negative for trouble swallowing.         Negative for throat swelling   Respiratory:  Negative for shortness of breath and wheezing.         Negative for snoring   Cardiovascular:  Negative for chest pain, palpitations and leg swelling.   Gastrointestinal:  Negative for abdominal pain, constipation, diarrhea, GERD and indigestion.   Endocrine: Positive for cold intolerance. Negative for heat intolerance, polydipsia, polyphagia and polyuria.        Negative for loss of hair  Negative for hirsutism     Genitourinary:         Denies menstrual irregularities   Musculoskeletal:  Negative for arthralgias.        Denies exercise limitations  Denies chronic pain " "  Skin:  Negative for dry skin.        Negative for acne   Neurological:  Negative for headache and memory problem.        Negative for paresthesias   Psychiatric/Behavioral:  Negative for self-injury, sleep disturbance, suicidal ideas and depressed mood. The patient is not nervous/anxious.    All other systems reviewed and are negative.    PHQ-9 Total Score: 7       Objective     Body mass index is 39.82 kg/m².   Body composition analysis completed and showed:   Body Fat %: 48.4     Measurements (in inches)  Measurements (in inches) Waist Circumference: 48   Neck: 15  Chest: 48  Hips: 52.5  Thighs: 40    Vital Signs:   /82 (BP Location: Right arm, Patient Position: Sitting)   Pulse 65   Resp 16   Ht 160 cm (63\")   Wt 102 kg (224 lb 12.8 oz)   SpO2 98%   BMI 39.82 kg/m²     Physical Exam   General appears stated age and normal appearance   HEENT PERRLA, EOM intact, conjunctivae normal, and without thyromegaly or thyroid nodules   Chest/lungs Normal rate, Regular rhythm, Breathing is unlabored, and Clear to auscultation bilaterally   Abdomen Hypopigmented linear striae, Soft, normal bowel sounds, without mass or tenderness, and Central adiposity   Extremities nonpitting edema   Neuro Good historian and No focal deficit   Skin Warm, dry, intact   Psych normal behavior, normal thought content, and normal concentration     Result Review :   The following data was reviewed by: SOLITARIO Rausch on 02/27/2024:  Office Visit on 12/28/2023   Component Date Value Ref Range Status    WBC 12/28/2023 8.0  3.4 - 10.8 x10E3/uL Final    RBC 12/28/2023 4.04  3.77 - 5.28 x10E6/uL Final    Hemoglobin 12/28/2023 12.2  11.1 - 15.9 g/dL Final    Hematocrit 12/28/2023 36.7  34.0 - 46.6 % Final    MCV 12/28/2023 91  79 - 97 fL Final    MCH 12/28/2023 30.2  26.6 - 33.0 pg Final    MCHC 12/28/2023 33.2  31.5 - 35.7 g/dL Final    RDW 12/28/2023 11.9  11.7 - 15.4 % Final    Platelets 12/28/2023 257  150 - 450 x10E3/uL Final    " Neutrophil Rel % 12/28/2023 62  Not Estab. % Final    Lymphocyte Rel % 12/28/2023 29  Not Estab. % Final    Monocyte Rel % 12/28/2023 6  Not Estab. % Final    Eosinophil Rel % 12/28/2023 2  Not Estab. % Final    Basophil Rel % 12/28/2023 1  Not Estab. % Final    Neutrophils Absolute 12/28/2023 4.9  1.4 - 7.0 x10E3/uL Final    Lymphocytes Absolute 12/28/2023 2.3  0.7 - 3.1 x10E3/uL Final    Monocytes Absolute 12/28/2023 0.5  0.1 - 0.9 x10E3/uL Final    Eosinophils Absolute 12/28/2023 0.2  0.0 - 0.4 x10E3/uL Final    Basophils Absolute 12/28/2023 0.1  0.0 - 0.2 x10E3/uL Final    Immature Granulocyte Rel % 12/28/2023 0  Not Estab. % Final    Immature Grans Absolute 12/28/2023 0.0  0.0 - 0.1 x10E3/uL Final    Glucose 12/28/2023 85  70 - 99 mg/dL Final    BUN 12/28/2023 14  8 - 27 mg/dL Final    Creatinine 12/28/2023 1.03 (H)  0.57 - 1.00 mg/dL Final    EGFR Result 12/28/2023 62  >59 mL/min/1.73 Final    BUN/Creatinine Ratio 12/28/2023 14  12 - 28 Final    Sodium 12/28/2023 144  134 - 144 mmol/L Final    Potassium 12/28/2023 3.9  3.5 - 5.2 mmol/L Final    Chloride 12/28/2023 103  96 - 106 mmol/L Final    Total CO2 12/28/2023 24  20 - 29 mmol/L Final    Calcium 12/28/2023 9.2  8.7 - 10.3 mg/dL Final    Total Protein 12/28/2023 6.9  6.0 - 8.5 g/dL Final    Albumin 12/28/2023 4.2  3.9 - 4.9 g/dL Final    Globulin 12/28/2023 2.7  1.5 - 4.5 g/dL Final    A/G Ratio 12/28/2023 1.6  1.2 - 2.2 Final    Total Bilirubin 12/28/2023 0.3  0.0 - 1.2 mg/dL Final    Alkaline Phosphatase 12/28/2023 96  44 - 121 IU/L Final    AST (SGOT) 12/28/2023 13  0 - 40 IU/L Final    ALT (SGPT) 12/28/2023 11  0 - 32 IU/L Final    Ferritin 12/28/2023 207 (H)  15 - 150 ng/mL Final    Folate 12/28/2023 >20.0  >3.0 ng/mL Final    Comment: A serum folate concentration of less than 3.1 ng/mL is  considered to represent clinical deficiency.      Iron 12/28/2023 84  27 - 139 ug/dL Final    Methylmalonic Acid 12/28/2023 154  0 - 378 nmol/L Final    Prealbumin  12/28/2023 21  10 - 36 mg/dL Final    25 Hydroxy, Vitamin D 12/28/2023 56.6  30.0 - 100.0 ng/mL Final    Comment: Vitamin D deficiency has been defined by the [place] of  Medicine and an Endocrine Society practice guideline as a  level of serum 25-OH vitamin D less than 20 ng/mL (1,2).  The Endocrine Society went on to further define vitamin D  insufficiency as a level between 21 and 29 ng/mL (2).  1. IOM ([place] of Medicine). 2010. Dietary reference     intakes for calcium and D. [place]: The     National [employer].  2. Estuardo MF, Dimitry LAWRENCE, Tex GALINDO, et al.     Evaluation, treatment, and prevention of vitamin D     deficiency: an Endocrine Society clinical practice     guideline. JCEM. 2011 Jul; 96(7):1911-30.      Vitamin B1, Whole Blood 12/28/2023 80.1  66.5 - 200.0 nmol/L Final   Hospital Outpatient Visit on 10/05/2023   Component Date Value Ref Range Status    Creatinine 10/05/2023 1.40 (H)  0.60 - 1.30 mg/dL Final    Serial Number: [id number]:  [id number]         Data reviewed : recent CT head IMPRESSION: No acute intracranial mass, mass-effect or hemorrhage identified     Recent            Assessment / Plan       Diagnoses and all orders for this visit:    1. Class 2 severe obesity with serious comorbidity and body mass index (BMI) of 39.0 to 39.9 in adult, unspecified obesity type (Primary)  Assessment & Plan:  Patient's (Body mass index is 39.82 kg/m².) indicates that they are morbidly/severely obese (BMI > 40 or > 35 with obesity - related health condition) with health conditions that include hypertension, GERD, and a-fib, renal insufficiency  . Weight is worsening. BMI  is above average; BMI management plan is completed. We discussed low calorie, low carb based diet program, portion control, increasing exercise, joining a fitness center or start home based exercise program, and an leigha-based approach such as [employer] Pal or Lose It.    Topics of discussion included obesity as  a disease, nutritional education on food groups, exercise, and medications. Patient was instructed in adequate protein, controlled carb and controlled fat intake.   Patient received instructions on using the medicines as a tool in controlling their weight with nutritional and behavioral changes. Risks and benefits were discussed. I believe the potential benefits of medication helping to decrease weight outweighs the risks. Patient is to try nutritonal/behavioral changes only first.   Patient received our clinic education booklet.   Our patient consent form was reviewed including potential risks of weight loss. We also reviewed our confidentiality and HIPPA statements. Patients current FITT score was reviewed along with current capability for exercise tolerance and a patient will work towards a FITT score of:     Frequency   Intensity Time Strength Training   []   0 None  []   0 None  []   0 None  []   0 None    []   1 (1-2x/week) []   1 (light) []   1 (<10 min) []   1 (1x/week)   [x]   2 (3-5x/week) [x]   2 (moderate) []   2 (10-20 min) [x]   2 (2x/week)   []   3 (daily)   []   3 (moderately hard)  []   4 (very hard) []   3 (20-30 min)  [x]   4 (>30 min) []   3 (3-4x/week)       Patient's past medical history was reviewed in detail and barriers to weight loss were identified and discussed. Past efforts at weight reduction on their own as well as under physician supervision were documented and discussed.  I advised patient to continue routine care with their Primary Care Provider.     Nutritional recommendations and goals were reviewed including Calories:0242-1179 daily adjusted for exercise calories burnt, Protein: g daily, Net carbs (total carb - fiber) of 50-75g per day.  Start to keep a food journal and bring into next visit in 2 weeks for review. Practice the behavioral modification technique of mindful eating. Take one MVI daily and 2000mg fish oil daily. Take other medications and supplements as  directed.  - Complete labs today.  -  Eliminate soda and increase water intake.  - Has exercise equipment at home (bike, fancy treadmill). Discussed darebee resistance training exercises.   - AOM options: topamax (insomnia, soda), metformin to counteract weight positive medication, naltrexone for carb cravings. Caution wellbutrin due to recent syncope, has follow up with cardiology due to atrial fibrillation. Caution GLP-1 due to esophageal dysmotility and significant JAMISON at this time. Also wegovy and saxenda are unavailable to start anyhow. No history of AOMs.   - Consider eval for sleep apnea.   - Treatment goal 180lb      Orders:  -     Cancel: Basic Metabolic Panel  -     Cancel: Insulin, Total  -     Cancel: Lipid Panel  -     Cancel: TSH  -     Cancel: T4, Free  -     Cancel: T3, Free  -     multivitamin with minerals (Multivitamin Adults) tablet tablet; Take 1 tablet by mouth Daily.  -     Omega-3 Fatty Acids (fish oil) 1000 MG capsule capsule; Take 1 capsule by mouth 2 (Two) Times a Day With Meals.  -     Basic Metabolic Panel  -     Insulin, Total  -     Lipid Panel  -     T3, Free  -     T4, Free  -     TSH    2. Paroxysmal atrial fibrillation  Assessment & Plan:  New diagnosis, spontaneous conversion. Was started on atenolol and aspirin. Has follow up with cardiology in April. Activated a-fib alert on her watch during visit today. Not a candidate for sympathomimetic at this time.      Orders:  -     Cancel: TSH  -     Cancel: T4, Free  -     Cancel: T3, Free  -     T3, Free  -     T4, Free  -     TSH    3. Renal insufficiency  Assessment & Plan:  Increase hydration. Repeat lab today. Weight reduction should help. BP well controlled.     Orders:  -     Cancel: Basic Metabolic Panel  -     Basic Metabolic Panel    4. Fatigue, unspecified type  -     Cancel: TSH  -     Cancel: T4, Free  -     Cancel: T3, Free  -     T3, Free  -     T4, Free  -     TSH    5. Hypertension, unspecified type  Assessment &  Plan:  Hypertension is stable and controlled  Continue current treatment regimen.  Weight loss.  Regular aerobic exercise.  Blood pressure will be reassessed  at next follow up .         I spent 75 minutes caring for Francoise on this date of service. This time includes time spent by me in the following activities:preparing for the visit, reviewing tests, performing a medically appropriate examination and/or evaluation , counseling and educating the patient/family/caregiver, ordering medications, tests, or procedures, documenting information in the medical record, and independently interpreting results and communicating that information with the patient/family/caregiver  Follow Up   Return in about 2 weeks (around 3/12/2024) for Next scheduled follow up.  Patient was given instructions and counseling regarding her condition or for health maintenance advice. Please see specific information pulled into the AVS if appropriate.     Erica Ghosh, APRN  02/27/2024

## 2024-02-27 ENCOUNTER — OFFICE VISIT (OUTPATIENT)
Dept: BARIATRICS/WEIGHT MGMT | Facility: CLINIC | Age: 64
End: 2024-02-27
Payer: COMMERCIAL

## 2024-02-27 VITALS
DIASTOLIC BLOOD PRESSURE: 82 MMHG | HEIGHT: 63 IN | BODY MASS INDEX: 39.83 KG/M2 | SYSTOLIC BLOOD PRESSURE: 126 MMHG | HEART RATE: 65 BPM | WEIGHT: 224.8 LBS | OXYGEN SATURATION: 98 % | RESPIRATION RATE: 16 BRPM

## 2024-02-27 DIAGNOSIS — E66.01 CLASS 2 SEVERE OBESITY WITH SERIOUS COMORBIDITY AND BODY MASS INDEX (BMI) OF 39.0 TO 39.9 IN ADULT, UNSPECIFIED OBESITY TYPE: Primary | ICD-10-CM

## 2024-02-27 DIAGNOSIS — I48.0 PAROXYSMAL ATRIAL FIBRILLATION: ICD-10-CM

## 2024-02-27 DIAGNOSIS — I10 HYPERTENSION, UNSPECIFIED TYPE: ICD-10-CM

## 2024-02-27 DIAGNOSIS — R53.83 FATIGUE, UNSPECIFIED TYPE: ICD-10-CM

## 2024-02-27 DIAGNOSIS — N28.9 RENAL INSUFFICIENCY: ICD-10-CM

## 2024-02-27 PROBLEM — E66.812 CLASS 2 SEVERE OBESITY WITH SERIOUS COMORBIDITY AND BODY MASS INDEX (BMI) OF 39.0 TO 39.9 IN ADULT: Status: ACTIVE | Noted: 2017-10-04

## 2024-02-27 PROCEDURE — 99215 OFFICE O/P EST HI 40 MIN: CPT | Performed by: NURSE PRACTITIONER

## 2024-02-27 PROCEDURE — 99417 PROLNG OP E/M EACH 15 MIN: CPT | Performed by: NURSE PRACTITIONER

## 2024-02-27 RX ORDER — CHLORAL HYDRATE 500 MG
1000 CAPSULE ORAL 2 TIMES DAILY WITH MEALS
Start: 2024-02-27

## 2024-02-27 NOTE — ASSESSMENT & PLAN NOTE
Patient's (Body mass index is 39.82 kg/m².) indicates that they are morbidly/severely obese (BMI > 40 or > 35 with obesity - related health condition) with health conditions that include hypertension, GERD, and a-fib, renal insufficiency  . Weight is worsening. BMI  is above average; BMI management plan is completed. We discussed low calorie, low carb based diet program, portion control, increasing exercise, joining a fitness center or start home based exercise program, and an leigha-based approach such as [employer] Pal or Lose It.    Topics of discussion included obesity as a disease, nutritional education on food groups, exercise, and medications. Patient was instructed in adequate protein, controlled carb and controlled fat intake.   Patient received instructions on using the medicines as a tool in controlling their weight with nutritional and behavioral changes. Risks and benefits were discussed. I believe the potential benefits of medication helping to decrease weight outweighs the risks. Patient is to try nutritonal/behavioral changes only first.   Patient received our clinic education booklet.   Our patient consent form was reviewed including potential risks of weight loss. We also reviewed our confidentiality and HIPPA statements. Patients current FITT score was reviewed along with current capability for exercise tolerance and a patient will work towards a FITT score of:     Frequency   Intensity Time Strength Training   []   0 None  []   0 None  []   0 None  []   0 None    []   1 (1-2x/week) []   1 (light) []   1 (<10 min) []   1 (1x/week)   [x]   2 (3-5x/week) [x]   2 (moderate) []   2 (10-20 min) [x]   2 (2x/week)   []   3 (daily)   []   3 (moderately hard)  []   4 (very hard) []   3 (20-30 min)  [x]   4 (>30 min) []   3 (3-4x/week)       Patient's past medical history was reviewed in detail and barriers to weight loss were identified and discussed. Past efforts at weight reduction on their own as well as  under physician supervision were documented and discussed.  I advised patient to continue routine care with their Primary Care Provider.     Nutritional recommendations and goals were reviewed including Calories:0617-3445 daily adjusted for exercise calories burnt, Protein: g daily, Net carbs (total carb - fiber) of 50-75g per day.  Start to keep a food journal and bring into next visit in 2 weeks for review. Practice the behavioral modification technique of mindful eating. Take one MVI daily and 2000mg fish oil daily. Take other medications and supplements as directed.  - Complete labs today.  -  Eliminate soda and increase water intake.  - Has exercise equipment at home (bike, fancy treadmill). Discussed darebee resistance training exercises.   - AOM options: topamax (insomnia, soda), metformin to counteract weight positive medication, naltrexone for carb cravings. Caution wellbutrin due to recent syncope, has follow up with cardiology due to atrial fibrillation. Caution GLP-1 due to esophageal dysmotility and significant JAMISON at this time. Also wegovy and saxenda are unavailable to start anyhow. No history of AOMs.   - Consider eval for sleep apnea.   - Treatment goal 180lb

## 2024-02-27 NOTE — ASSESSMENT & PLAN NOTE
New diagnosis, spontaneous conversion. Was started on atenolol and aspirin. Has follow up with cardiology in April. Activated a-fib alert on her watch during visit today. Not a candidate for sympathomimetic at this time.

## 2024-02-27 NOTE — ASSESSMENT & PLAN NOTE
Hypertension is stable and controlled  Continue current treatment regimen.  Weight loss.  Regular aerobic exercise.  Blood pressure will be reassessed  at next follow up .

## 2024-02-28 LAB
BUN SERPL-MCNC: 19 MG/DL (ref 8–23)
BUN/CREAT SERPL: 17 (ref 7–25)
CALCIUM SERPL-MCNC: 9.1 MG/DL (ref 8.6–10.5)
CHLORIDE SERPL-SCNC: 100 MMOL/L (ref 98–107)
CHOLEST SERPL-MCNC: 192 MG/DL (ref 0–200)
CO2 SERPL-SCNC: 25.9 MMOL/L (ref 22–29)
CREAT SERPL-MCNC: 1.12 MG/DL (ref 0.57–1)
EGFRCR SERPLBLD CKD-EPI 2021: 56.1 ML/MIN/1.73
GLUCOSE SERPL-MCNC: 84 MG/DL (ref 65–99)
HDLC SERPL-MCNC: 73 MG/DL (ref 40–60)
INSULIN SERPL-ACNC: 7.4 UIU/ML (ref 2.6–24.9)
LDLC SERPL CALC-MCNC: 103 MG/DL (ref 0–100)
POTASSIUM SERPL-SCNC: 3.7 MMOL/L (ref 3.5–5.2)
SODIUM SERPL-SCNC: 139 MMOL/L (ref 136–145)
T3FREE SERPL-MCNC: 2.7 PG/ML (ref 2–4.4)
T4 FREE SERPL-MCNC: 1.44 NG/DL (ref 0.93–1.7)
TRIGL SERPL-MCNC: 89 MG/DL (ref 0–150)
TSH SERPL DL<=0.005 MIU/L-ACNC: 1.42 UIU/ML (ref 0.27–4.2)
VLDLC SERPL CALC-MCNC: 16 MG/DL (ref 5–40)

## 2024-03-14 ENCOUNTER — OFFICE VISIT (OUTPATIENT)
Dept: BARIATRICS/WEIGHT MGMT | Facility: CLINIC | Age: 64
End: 2024-03-14
Payer: COMMERCIAL

## 2024-03-14 VITALS
HEIGHT: 63 IN | DIASTOLIC BLOOD PRESSURE: 62 MMHG | HEART RATE: 62 BPM | BODY MASS INDEX: 38.55 KG/M2 | SYSTOLIC BLOOD PRESSURE: 116 MMHG | WEIGHT: 217.6 LBS

## 2024-03-14 DIAGNOSIS — I10 HYPERTENSION, UNSPECIFIED TYPE: ICD-10-CM

## 2024-03-14 DIAGNOSIS — E66.01 CLASS 2 SEVERE OBESITY WITH SERIOUS COMORBIDITY AND BODY MASS INDEX (BMI) OF 38.0 TO 38.9 IN ADULT, UNSPECIFIED OBESITY TYPE: Primary | ICD-10-CM

## 2024-03-14 DIAGNOSIS — N28.9 RENAL INSUFFICIENCY: ICD-10-CM

## 2024-03-14 PROCEDURE — 99214 OFFICE O/P EST MOD 30 MIN: CPT | Performed by: NURSE PRACTITIONER

## 2024-03-14 RX ORDER — BUPROPION HYDROCHLORIDE 150 MG/1
150 TABLET ORAL DAILY
Qty: 7 TABLET | Refills: 0 | Status: SHIPPED | OUTPATIENT
Start: 2024-03-14

## 2024-03-14 RX ORDER — BUPROPION HYDROCHLORIDE 300 MG/1
300 TABLET ORAL DAILY
Qty: 30 TABLET | Refills: 1 | Status: SHIPPED | OUTPATIENT
Start: 2024-03-14

## 2024-03-14 NOTE — PROGRESS NOTES
[facility]  [address]  [place] [address]     Office Note      Date: 2024  Patient Name: Francoise Kamara  MRN: [id number]  : 1960  Subjective  Subjective     Chief Complaint  Obesity Management follow-up          Francoise Kamara presents to [facility] for obesity management. This is her initial 2 week follow up. She is working on the following lifestyle changes: daily food journal, daily protein shake, low carb bread, no fried foods, no mt dew! She is feeling better already with 7.2lb off, is able to get up off the floor easier and she is sleeping better. Also noticed the spots on her legs have gone away, thinks they may have been related to the mt. Dew.  Patient is satisfied with weight loss progress. She is surprised that she lost so much, it is not typical for her to lose weight quickly. Appetite is well controlled, getting full fast. Reports no side effects of prescribed medications today. The patient is not taking multivitamin and is not taking fish oil.  The patient is using a food journal.   24 hour recall:  B: protein shake  L: PB & celery  D: salad with steak tips    B: protein drink, 3 strawberries  L: 4 grilled chicken nuggets  D: salad   Can't eat after 6:30pm or she gets reflux.   The patient is not exercising but plans to start walking with her .    Review of Systems   Constitutional:  Negative for appetite change and fatigue.   Eyes:  Negative for visual disturbance.   Cardiovascular:  Negative for chest pain and palpitations.   Gastrointestinal:  Negative for constipation and indigestion.   Neurological:  Negative for light-headedness.   All other systems reviewed and are negative.        Current Outpatient Medications:     anastrozole (ARIMIDEX) 1 MG tablet, TAKE 1 TABLET BY MOUTH ONCE DAILY, Disp: 90 tablet, Rfl: 3    aspirin 81 MG EC tablet, Take 1 tablet by mouth Daily., Disp: , Rfl:      ATENOLOL PO, Take 12.5 mg by mouth Daily., Disp: , Rfl:     Biotin 5000 MCG tablet, Take  by mouth., Disp: , Rfl:     calcium carbonate, oyster shell, 500 MG tablet tablet, Take 2 tablets by mouth Daily., Disp: , Rfl:     cetirizine (zyrTEC) 10 MG tablet, Take  by mouth., Disp: , Rfl:     cyanocobalamin 1000 MCG/ML injection, Inject 1 mL into the appropriate muscle as directed by prescriber Every 28 (Twenty-Eight) Days., Disp: , Rfl:     esomeprazole (nexIUM) 40 MG capsule, Take 1 capsule by mouth Every Morning Before Breakfast., Disp: , Rfl:     Ferrous Gluconate (IRON 27 PO), Take  by mouth., Disp: , Rfl:     folic acid (FOLVITE) 1 MG tablet, Take 1 tablet by mouth Daily., Disp: , Rfl:     hydrochlorothiazide (MICROZIDE) 12.5 MG capsule, Take 1 capsule by mouth As Needed., Disp: , Rfl:     lisinopril (PRINIVIL,ZESTRIL) 40 MG tablet, Take 0.5 tablets by mouth Daily. Takes 1/2 tab QD at this time per provider instruction, Disp: , Rfl:     multivitamin with minerals (Multivitamin Adults) tablet tablet, Take 1 tablet by mouth Daily., Disp: , Rfl:     Omega-3 Fatty Acids (fish oil) 1000 MG capsule capsule, Take 1 capsule by mouth 2 (Two) Times a Day With Meals., Disp: , Rfl:     tolterodine LA (DETROL LA) 4 MG 24 hr capsule, Take 1 capsule by mouth Daily., Disp: , Rfl:     vitamin D (ERGOCALCIFEROL) 39685 UNITS capsule capsule, 1 capsule 1 (One) Time Per Week. sundays, Disp: , Rfl:     buPROPion XL (WELLBUTRIN XL) 150 MG 24 hr tablet, Take 1 tablet by mouth Daily., Disp: 7 tablet, Rfl: 0    buPROPion XL (WELLBUTRIN XL) 300 MG 24 hr tablet, Take 1 tablet by mouth Daily., Disp: 30 tablet, Rfl: 1    Objective   Start weight: 224.8 pounds.    Total Loss lb/%Loss of beginning body weight (BBW): -7.2 lb/-3.20 %  Change in weight since last visit: -7.2lb    Body mass index is 38.55 kg/m².   Body composition analysis completed and showed:   Body Fat %: 47.2    Measurements (in inches)  Waist Circumference: 44.5      Vital  "Signs:   /62 (BP Location: Left arm, Patient Position: Sitting)   Pulse 62   Ht 160 cm (63\")   Wt 98.7 kg (217 lb 9.6 oz)   BMI 38.55 kg/m²     Physical Exam   General appears stated age and normal appearance   HEENT PERRLA, EOM intact, and conjunctivae normal   Chest/lungs Normal rate, Regular rhythm, and Breathing is unlabored   Extremities without edema   Neuro Good historian and No focal deficit   Skin Warm, dry, intact   Psych normal behavior, normal thought content, and normal concentration     Result Review :                Assessment / Plan        Diagnoses and all orders for this visit:    1. Class 2 severe obesity with serious comorbidity and body mass index (BMI) of 38.0 to 38.9 in adult, unspecified obesity type (Primary)  Overview:  s/p LSG by JULY on 10/20/17. Presurgery weight: 262 pounds. Angelo: 201 (10/2018)  Goal:180lb  No 37- recent syncope and a-fib (2/2024)  Caution GLP-1 due to esophageal dysmotility and significant JAMISON   Consider: topamax, metformin, wellbutrin/naltrexone   SOKE      Assessment & Plan:  Patient's (Body mass index is 38.55 kg/m².) indicates that they are morbidly/severely obese (BMI > 40 or > 35 with obesity - related health condition) with health conditions that include hypertension, GERD, and a-fib, renal insufficiency  . Weight is improving with lifestyle modifications. BMI  is above average; BMI management plan is completed. We discussed low calorie, low carb based diet program, portion control, increasing exercise, joining a fitness center or start home based exercise program, pharmacologic options including wellbutrin, and an leigha-based approach such as [employer] Pal or Lose It.     I have instructed the patient to continue with pursuit of medical weight loss as a part of this program. Patient does meet criteria for use of anorectics at this time as BMI >30  and is not at treatment goal.     The current plan for this month includes:   - Continue to work on " lifestyle behavioral changes  - Continue to prioritize protein, fiber, and hydration.   - Continue daily food journal, ok to increase complex carbs. Discussed VLCD guide.  - Start wellbutrin.   - Treatment goal 180lb.       Orders:  -     buPROPion XL (WELLBUTRIN XL) 300 MG 24 hr tablet; Take 1 tablet by mouth Daily.  Dispense: 30 tablet; Refill: 1  -     buPROPion XL (WELLBUTRIN XL) 150 MG 24 hr tablet; Take 1 tablet by mouth Daily.  Dispense: 7 tablet; Refill: 0    2. Hypertension, unspecified type  Assessment & Plan:  Hypertension is stable and controlled  Continue current treatment regimen.  Weight loss.  Regular aerobic exercise.  Ambulatory blood pressure monitoring.  Blood pressure will be reassessed  in 2 months .      3. Renal insufficiency  Assessment & Plan:  Renal condition is stable.  Continue current treatment regimen.  Weight loss.  Regular aerobic exercise.  Renal condition will be reassessed in 3 months.            Follow Up   Return in about 2 months (around 5/14/2024) for Next scheduled follow up.  Patient was given instructions and counseling regarding her condition or for health maintenance advice. Please see specific information pulled into the AVS if appropriate.     Erica Ghosh, APRN  03/14/2024

## 2024-03-14 NOTE — ASSESSMENT & PLAN NOTE
Hypertension is stable and controlled  Continue current treatment regimen.  Weight loss.  Regular aerobic exercise.  Ambulatory blood pressure monitoring.  Blood pressure will be reassessed  in 2 months .

## 2024-03-14 NOTE — ASSESSMENT & PLAN NOTE
Patient's (Body mass index is 38.55 kg/m².) indicates that they are morbidly/severely obese (BMI > 40 or > 35 with obesity - related health condition) with health conditions that include hypertension, GERD, and a-fib, renal insufficiency  . Weight is improving with lifestyle modifications. BMI  is above average; BMI management plan is completed. We discussed low calorie, low carb based diet program, portion control, increasing exercise, joining a fitness center or start home based exercise program, pharmacologic options including wellbutrin, and an leigha-based approach such as [employer] Pal or Lose It.     I have instructed the patient to continue with pursuit of medical weight loss as a part of this program. Patient does meet criteria for use of anorectics at this time as BMI >30  and is not at treatment goal.     The current plan for this month includes:   - Continue to work on lifestyle behavioral changes  - Continue to prioritize protein, fiber, and hydration.   - Continue daily food journal, ok to increase complex carbs. Discussed VLCD guide.  - Start wellbutrin.   - Treatment goal 180lb.

## 2024-03-14 NOTE — ASSESSMENT & PLAN NOTE
Renal condition is stable.  Continue current treatment regimen.  Weight loss.  Regular aerobic exercise.  Renal condition will be reassessed in 3 months.

## 2024-04-30 ENCOUNTER — OFFICE VISIT (OUTPATIENT)
Dept: BARIATRICS/WEIGHT MGMT | Facility: CLINIC | Age: 64
End: 2024-04-30
Payer: COMMERCIAL

## 2024-04-30 VITALS
DIASTOLIC BLOOD PRESSURE: 78 MMHG | HEIGHT: 63 IN | WEIGHT: 209.8 LBS | SYSTOLIC BLOOD PRESSURE: 128 MMHG | HEART RATE: 89 BPM | OXYGEN SATURATION: 94 % | BODY MASS INDEX: 37.17 KG/M2

## 2024-04-30 DIAGNOSIS — E66.01 CLASS 2 SEVERE OBESITY WITH SERIOUS COMORBIDITY AND BODY MASS INDEX (BMI) OF 37.0 TO 37.9 IN ADULT, UNSPECIFIED OBESITY TYPE: Primary | ICD-10-CM

## 2024-04-30 DIAGNOSIS — I10 HYPERTENSION, UNSPECIFIED TYPE: ICD-10-CM

## 2024-04-30 DIAGNOSIS — K21.9 GASTROESOPHAGEAL REFLUX DISEASE, UNSPECIFIED WHETHER ESOPHAGITIS PRESENT: ICD-10-CM

## 2024-04-30 PROCEDURE — 99214 OFFICE O/P EST MOD 30 MIN: CPT | Performed by: NURSE PRACTITIONER

## 2024-04-30 RX ORDER — PREDNISONE 10 MG/1
TABLET ORAL
COMMUNITY
Start: 2024-04-29

## 2024-04-30 RX ORDER — BUPROPION HYDROCHLORIDE 300 MG/1
300 TABLET ORAL DAILY
Qty: 30 TABLET | Refills: 2 | Status: SHIPPED | OUTPATIENT
Start: 2024-04-30

## 2024-04-30 NOTE — ASSESSMENT & PLAN NOTE
Hypertension is stable and controlled, no change with wellbutrin.   Continue current treatment regimen.  Weight loss.  Regular aerobic exercise.  Ambulatory blood pressure monitoring.  Blood pressure will be reassessed in 1 month.

## 2024-04-30 NOTE — PROGRESS NOTES
[facility]  [address]  [place] [address]     Office Note      Date: 2024  Patient Name: Francoise Kamara  MRN: [id number]  : 1960  Subjective  Subjective     Chief Complaint  Obesity Management follow-up          Francoise Kamara presents to [facility] for obesity management.   Patient is satisfied with weight loss progress. Encouraged that she is able to cross her legs and bend over and tie her shoes. Appetite is well controlled, states she is not hungry, has to force herself to eat a lot, forgets to eat. Reports that carbonated sodas taste nasty. She got a significant sunburn on her face and neck while staying with her mother in the hospital and sitting by the window, wonders if  the wellbutrin made her more susceptible to sunburn since that has never happened to her before. The patient is not taking multivitamin and is not taking fish oil.  The patient is using a food journal.    24 hour recall:   B; atkins bar or shake  L: skipping  D: steak, 1/2 sweet potato  S: berries  Eating keto bread, uses low sugar dressings and sauces  The patient is exercising with a FITT score of:    Frequency Intensity Time Strength Training   [x]   0, none [x]   0 [x]   0 [x]   0   []   1 (1-2x/week) []   1 (light) []   1 (<10 min) []   1 (1x/week)   []   2 (3-5x/week) []   2 (moderate) []   2 (10-20 min) []   2 (2x/week)   []   3 (daily) []   3 (moderately hard)  []   4 (very hard) []   3 (20-30 min)  []   4 (>30 min) []   3 (3-4x/week)       Review of Systems   Constitutional:  Negative for appetite change and fatigue.   Eyes:  Negative for visual disturbance.   Cardiovascular:  Negative for chest pain and palpitations.   Gastrointestinal:  Negative for constipation and indigestion.   Neurological:  Negative for light-headedness.         Current Outpatient Medications:     anastrozole (ARIMIDEX) 1 MG tablet, TAKE 1 TABLET BY  MOUTH ONCE DAILY, Disp: 90 tablet, Rfl: 3    aspirin 81 MG EC tablet, Take 1 tablet by mouth Daily., Disp: , Rfl:     Biotin 5000 MCG tablet, Take  by mouth., Disp: , Rfl:     buPROPion XL (WELLBUTRIN XL) 300 MG 24 hr tablet, Take 1 tablet by mouth Daily., Disp: 30 tablet, Rfl: 2    calcium carbonate, oyster shell, 500 MG tablet tablet, Take 2 tablets by mouth Daily., Disp: , Rfl:     cetirizine (zyrTEC) 10 MG tablet, Take  by mouth., Disp: , Rfl:     cyanocobalamin 1000 MCG/ML injection, Inject 1 mL into the appropriate muscle as directed by prescriber Every 28 (Twenty-Eight) Days., Disp: , Rfl:     esomeprazole (nexIUM) 40 MG capsule, Take 1 capsule by mouth Every Morning Before Breakfast., Disp: , Rfl:     Ferrous Gluconate (IRON 27 PO), Take  by mouth., Disp: , Rfl:     folic acid (FOLVITE) 1 MG tablet, Take 1 tablet by mouth Daily., Disp: , Rfl:     hydrochlorothiazide (MICROZIDE) 12.5 MG capsule, Take 1 capsule by mouth As Needed., Disp: , Rfl:     lisinopril (PRINIVIL,ZESTRIL) 40 MG tablet, Take 0.5 tablets by mouth Daily. Takes 1/2 tab QD at this time per provider instruction, Disp: , Rfl:     predniSONE (DELTASONE) 10 MG (21) dose pack, , Disp: , Rfl:     tolterodine LA (DETROL LA) 4 MG 24 hr capsule, Take 1 capsule by mouth Daily., Disp: , Rfl:     vitamin D (ERGOCALCIFEROL) 50025 UNITS capsule capsule, 1 capsule 1 (One) Time Per Week. sundays, Disp: , Rfl:     ATENOLOL PO, Take 12.5 mg by mouth Daily. (Patient not taking: Reported on 4/30/2024), Disp: , Rfl:     multivitamin with minerals (Multivitamin Adults) tablet tablet, Take 1 tablet by mouth Daily. (Patient not taking: Reported on 4/30/2024), Disp: , Rfl:     Omega-3 Fatty Acids (fish oil) 1000 MG capsule capsule, Take 1 capsule by mouth 2 (Two) Times a Day With Meals. (Patient not taking: Reported on 4/30/2024), Disp: , Rfl:     Objective   Start weight: 224.8 pounds.    Total Loss lb/%Loss of beginning body weight (BBW): -15lb/-6.67%  Change in  "weight since last visit: -8.1    Body mass index is 37.16 kg/m².   Body composition analysis completed and showed:   Body Fat %: 45.7    Measurements (in inches)  Waist Circumference: 44.5      Vital Signs:   /78 (BP Location: Left arm, Patient Position: Sitting)   Pulse 89   Ht 160 cm (63\")   Wt 95.2 kg (209 lb 12.8 oz)   SpO2 94%   BMI 37.16 kg/m²     Physical Exam   General appears stated age and normal appearance   HEENT PERRLA, EOM intact, and conjunctivae normal   Chest/lungs Normal rate, Regular rhythm, and Breathing is unlabored   Extremities without edema   Neuro Good historian and No focal deficit   Skin Warm, dry, intact   Psych normal behavior, normal thought content, and normal concentration     Result Review :                Assessment / Plan        Diagnoses and all orders for this visit:    1. Class 2 severe obesity with serious comorbidity and body mass index (BMI) of 37.0 to 37.9 in adult, unspecified obesity type (Primary)  Overview:  s/p LSG by JULY on 10/20/17. Presurgery weight: 262 pounds. Angelo: 201 (10/2018)  Goal:180lb  No 37- recent syncope and a-fib (2/2024)  Caution GLP-1 due to esophageal dysmotility and significant JAMISON   Consider: topamax, metformin, wellbutrin/naltrexone   SOKE      Assessment & Plan:  Patient's (Body mass index is 37.16 kg/m².) indicates that they are morbidly/severely obese (BMI > 40 or > 35 with obesity - related health condition) with health conditions that include hypertension and GERD . Weight is improving with treatment. BMI  is above average; BMI management plan is completed. We discussed low calorie, low carb based diet program, portion control, increasing exercise, management of depression/anxiety/stress to control compensatory eating, pharmacologic options including wellbutrin, and an leigha-based approach such as [employer] Pal or Lose It.     I have instructed the patient to continue with pursuit of medical weight loss as a part of this program. " Patient does meet criteria for use of anorectics at this time as BMI >30  and is not at treatment goal.     The current plan for this month includes:   - Continue to work on lifestyle behavioral changes  - Continue to prioritize protein, fiber, and hydration.   - Continue wellbutrin, tolerating well.  - Treatment goal 180lb.       Orders:  -     buPROPion XL (WELLBUTRIN XL) 300 MG 24 hr tablet; Take 1 tablet by mouth Daily.  Dispense: 30 tablet; Refill: 2    2. Hypertension, unspecified type  Assessment & Plan:  Hypertension is stable and controlled, no change with wellbutrin.   Continue current treatment regimen.  Weight loss.  Regular aerobic exercise.  Ambulatory blood pressure monitoring.  Blood pressure will be reassessed in 1 month.      3. Gastroesophageal reflux disease, unspecified whether esophagitis present  Assessment & Plan:  Imrpoving with lifestyle change and weight loss. No meds at this time.             Follow Up   Return in about 1 month (around 5/30/2024) for Next scheduled follow up.  Patient was given instructions and counseling regarding her condition or for health maintenance advice. Please see specific information pulled into the AVS if appropriate.     Erica Ghosh, APRN  04/30/2024

## 2024-04-30 NOTE — ASSESSMENT & PLAN NOTE
Patient's (Body mass index is 37.16 kg/m².) indicates that they are morbidly/severely obese (BMI > 40 or > 35 with obesity - related health condition) with health conditions that include hypertension and GERD . Weight is improving with treatment. BMI  is above average; BMI management plan is completed. We discussed low calorie, low carb based diet program, portion control, increasing exercise, management of depression/anxiety/stress to control compensatory eating, pharmacologic options including wellbutrin, and an leigha-based approach such as [employer] Pal or Lose It.     I have instructed the patient to continue with pursuit of medical weight loss as a part of this program. Patient does meet criteria for use of anorectics at this time as BMI >30  and is not at treatment goal.     The current plan for this month includes:   - Continue to work on lifestyle behavioral changes  - Continue to prioritize protein, fiber, and hydration.   - Continue wellbutrin, tolerating well.  - Treatment goal 180lb.

## 2024-05-09 ENCOUNTER — OFFICE VISIT (OUTPATIENT)
Dept: ONCOLOGY | Facility: CLINIC | Age: 64
End: 2024-05-09
Payer: COMMERCIAL

## 2024-05-09 VITALS
HEIGHT: 63 IN | DIASTOLIC BLOOD PRESSURE: 69 MMHG | HEART RATE: 86 BPM | RESPIRATION RATE: 16 BRPM | BODY MASS INDEX: 37.39 KG/M2 | TEMPERATURE: 97.7 F | SYSTOLIC BLOOD PRESSURE: 111 MMHG | OXYGEN SATURATION: 99 % | WEIGHT: 211 LBS

## 2024-05-09 DIAGNOSIS — Z79.811 LONG TERM CURRENT USE OF AROMATASE INHIBITOR: ICD-10-CM

## 2024-05-09 DIAGNOSIS — C50.411 MALIGNANT NEOPLASM OF UPPER-OUTER QUADRANT OF RIGHT BREAST IN FEMALE, ESTROGEN RECEPTOR POSITIVE: Primary | ICD-10-CM

## 2024-05-09 DIAGNOSIS — Z17.0 MALIGNANT NEOPLASM OF UPPER-OUTER QUADRANT OF RIGHT BREAST IN FEMALE, ESTROGEN RECEPTOR POSITIVE: Primary | ICD-10-CM

## 2024-05-09 PROCEDURE — 99213 OFFICE O/P EST LOW 20 MIN: CPT | Performed by: INTERNAL MEDICINE

## 2024-05-13 RX ORDER — CALCIUM CARBONATE 500(1250)
1000 TABLET ORAL DAILY
Qty: 180 TABLET | Refills: 3 | Status: SHIPPED | OUTPATIENT
Start: 2024-05-13

## 2024-05-13 RX ORDER — ANASTROZOLE 1 MG/1
1 TABLET ORAL DAILY
Qty: 90 TABLET | Refills: 3 | OUTPATIENT
Start: 2024-05-13

## 2024-05-13 NOTE — TELEPHONE ENCOUNTER
Caller: Anh Francoise Lilia    Relationship: Self    Best call back number: [phone]  Requested Prescriptions:   Requested Prescriptions     Pending Prescriptions Disp Refills    anastrozole (ARIMIDEX) 1 MG tablet 90 tablet 3     Sig: Take 1 tablet by mouth Daily.    calcium carbonate, oyster shell, 500 MG tablet tablet       Sig: Take 2 tablets by mouth Daily.        Pharmacy where request should be sent: [address] [phone] [facility] [phone]      Last office visit with prescribing clinician: 5/9/2024   Last telemedicine visit with prescribing clinician: Visit date not found   Next office visit with prescribing clinician: 5/5/2025     Additional details provided by patient: PLEASE ADD REFILLS THAT WILL LAST THROUGH 5/2025    Does the patient have less than a 3 day supply:  [x] Yes  [] No    Would you like a call back once the refill request has been completed: [] Yes [x] No    If the office needs to give you a call back, can they leave a voicemail: [x] Yes [] No    Dewayne Zhou Rep   05/13/24 12:16 EDT

## 2024-05-30 ENCOUNTER — OFFICE VISIT (OUTPATIENT)
Dept: BARIATRICS/WEIGHT MGMT | Facility: CLINIC | Age: 64
End: 2024-05-30
Payer: COMMERCIAL

## 2024-05-30 VITALS
WEIGHT: 203 LBS | SYSTOLIC BLOOD PRESSURE: 112 MMHG | BODY MASS INDEX: 35.97 KG/M2 | DIASTOLIC BLOOD PRESSURE: 68 MMHG | HEIGHT: 63 IN | HEART RATE: 89 BPM

## 2024-05-30 DIAGNOSIS — E66.01 CLASS 2 SEVERE OBESITY WITH SERIOUS COMORBIDITY AND BODY MASS INDEX (BMI) OF 35.0 TO 35.9 IN ADULT, UNSPECIFIED OBESITY TYPE: Primary | ICD-10-CM

## 2024-05-30 DIAGNOSIS — I10 HYPERTENSION, UNSPECIFIED TYPE: ICD-10-CM

## 2024-05-30 PROCEDURE — 99214 OFFICE O/P EST MOD 30 MIN: CPT | Performed by: NURSE PRACTITIONER

## 2024-05-30 NOTE — ASSESSMENT & PLAN NOTE
Patient's (Body mass index is 35.96 kg/m².) indicates that they are morbidly/severely obese (BMI > 40 or > 35 with obesity - related health condition) with health conditions that include hypertension and GERD . Weight is improving with treatment. BMI  is above average; BMI management plan is completed. We discussed low calorie, low carb based diet program, portion control, increasing exercise, pharmacologic options including wellbutrin, and an leigha-based approach such as [employer] Pal or Lose It.     I have instructed the patient to continue with pursuit of medical weight loss as a part of this program. Patient does meet criteria for use of anorectics at this time as BMI >30  and is not at treatment goal.     The current plan for this month includes:   - Continue to work on lifestyle behavioral changes  - Continue to prioritize protein, fiber, and hydration.   - Continue wellbutrin.   - Treatment goal 180lb.

## 2024-05-30 NOTE — PROGRESS NOTES
[facility]  [address]  [place] [address]     Office Note      Date: 2024  Patient Name: Francoise Kamara  MRN: [id number]  : 1960  Subjective  Subjective     Chief Complaint  Obesity Management follow-up          Francoise Kamara presents to [facility] for obesity management. s/p LSG by GDW on 10/20/17. Presurgery weight: 262 pounds. Angelo: 201 (10/2018)  Patient is satisfied with weight loss progress. Appetite is moderately controlled. Reports no side effects of prescribed medications today. The patient is not taking multivitamin and is not taking fish oil.  The patient is using a food journal.  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  24 hour recall:  8 glasses of water  B: atkins bar  L: topping off 2 slices of pepperoni pizza  D: salad   The patient is not exercising.    Review of Systems   Constitutional:  Negative for appetite change and fatigue.   Eyes:  Negative for visual disturbance.   Cardiovascular:  Negative for chest pain and palpitations.   Gastrointestinal:  Negative for constipation and indigestion.   Neurological:  Negative for light-headedness.   All other systems reviewed and are negative.        Current Outpatient Medications:     anastrozole (ARIMIDEX) 1 MG tablet, TAKE 1 TABLET BY MOUTH ONCE DAILY, Disp: 90 tablet, Rfl: 3    aspirin 81 MG EC tablet, Take 1 tablet by mouth Daily., Disp: , Rfl:     Biotin 5000 MCG tablet, Take  by mouth., Disp: , Rfl:     buPROPion XL (WELLBUTRIN XL) 300 MG 24 hr tablet, Take 1 tablet by mouth Daily., Disp: 30 tablet, Rfl: 2    calcium carbonate, oyster shell, 500 MG tablet tablet, Take 2 tablets by mouth Daily., Disp: 180 tablet, Rfl: 3    cetirizine (zyrTEC) 10 MG tablet, Take  by mouth., Disp: , Rfl:     cyanocobalamin 1000 MCG/ML injection, Inject 1 mL into the appropriate muscle as directed by prescriber Every 28  "(Twenty-Eight) Days., Disp: , Rfl:     esomeprazole (nexIUM) 40 MG capsule, Take 1 capsule by mouth Every Morning Before Breakfast., Disp: , Rfl:     Ferrous Gluconate (IRON 27 PO), Take  by mouth., Disp: , Rfl:     folic acid (FOLVITE) 1 MG tablet, Take 1 tablet by mouth Daily., Disp: , Rfl:     hydrochlorothiazide (MICROZIDE) 12.5 MG capsule, Take 1 capsule by mouth As Needed., Disp: , Rfl:     lisinopril (PRINIVIL,ZESTRIL) 40 MG tablet, Take 0.5 tablets by mouth Daily. Takes 1/2 tab QD at this time per provider instruction, Disp: , Rfl:     multivitamin with minerals (Multivitamin Adults) tablet tablet, Take 1 tablet by mouth Daily., Disp: , Rfl:     Omega-3 Fatty Acids (fish oil) 1000 MG capsule capsule, Take 1 capsule by mouth 2 (Two) Times a Day With Meals., Disp: , Rfl:     tolterodine LA (DETROL LA) 4 MG 24 hr capsule, Take 1 capsule by mouth Daily., Disp: , Rfl:     vitamin D (ERGOCALCIFEROL) 49684 UNITS capsule capsule, 1 capsule 1 (One) Time Per Week. sundays, Disp: , Rfl:     Objective   Start weight: 224.8 pounds.    Total Loss lb/%Loss of beginning body weight (BBW): -21.8lb/-9.70%  Change in weight since last visit: -6.8    Body mass index is 35.96 kg/m².   Body composition analysis completed and showed:   Body Fat %: 45.5    Measurements (in inches)  Waist Circumference: 44      Vital Signs:   /68 (BP Location: Left arm, Patient Position: Sitting)   Pulse 89   Ht 160 cm (63\")   Wt 92.1 kg (203 lb)   BMI 35.96 kg/m²     Physical Exam   General appears stated age and normal appearance   HEENT PERRLA, EOM intact, and conjunctivae normal   Chest/lungs Normal rate, Regular rhythm, and Breathing is unlabored   Extremities without edema   Neuro Good historian and No focal deficit   Skin Warm, dry, intact   Psych normal behavior, normal thought content, and normal concentration     Result Review :                Assessment / Plan        Diagnoses and all orders for this visit:    1. Class 2 severe " obesity with serious comorbidity and body mass index (BMI) of 35.0 to 35.9 in adult, unspecified obesity type (Primary)  Overview:  s/p LSG by LETICIAW on 10/20/17. Presurgery weight: 262 pounds. Angelo: 201 (10/2018)  Goal:180lb  No 37- recent syncope and a-fib (2/2024)  Caution GLP-1 due to esophageal dysmotility and significant JAMISON   Consider: topamax, metformin, wellbutrin/naltrexone   SOKE      Assessment & Plan:  Patient's (Body mass index is 35.96 kg/m².) indicates that they are morbidly/severely obese (BMI > 40 or > 35 with obesity - related health condition) with health conditions that include hypertension and GERD . Weight is improving with treatment. BMI  is above average; BMI management plan is completed. We discussed low calorie, low carb based diet program, portion control, increasing exercise, pharmacologic options including wellbutrin, and an leigha-based approach such as [employer] Pal or Lose It.     I have instructed the patient to continue with pursuit of medical weight loss as a part of this program. Patient does meet criteria for use of anorectics at this time as BMI >30  and is not at treatment goal.     The current plan for this month includes:   - Continue to work on lifestyle behavioral changes  - Continue to prioritize protein, fiber, and hydration.   - Continue wellbutrin.   - Treatment goal 180lb.         2. Hypertension, unspecified type  Assessment & Plan:  Hypertension is stable and controlled  Continue current treatment regimen.  Weight loss.  Regular aerobic exercise.  Ambulatory blood pressure monitoring.  Blood pressure will be reassessed in 1 month.            Follow Up   Return in about 1 month (around 6/30/2024) for Next scheduled follow up.  Patient was given instructions and counseling regarding her condition or for health maintenance advice. Please see specific information pulled into the AVS if appropriate.     Erica Ghosh, SOLITARIO  05/30/2024    No

## 2024-05-30 NOTE — ASSESSMENT & PLAN NOTE
Hypertension is stable and controlled  Continue current treatment regimen.  Weight loss.  Regular aerobic exercise.  Ambulatory blood pressure monitoring.  Blood pressure will be reassessed in 1 month.

## 2024-06-18 ENCOUNTER — OFFICE VISIT (OUTPATIENT)
Dept: OBSTETRICS AND GYNECOLOGY | Facility: CLINIC | Age: 64
End: 2024-06-18
Payer: COMMERCIAL

## 2024-06-18 VITALS
DIASTOLIC BLOOD PRESSURE: 72 MMHG | SYSTOLIC BLOOD PRESSURE: 128 MMHG | WEIGHT: 205.6 LBS | HEIGHT: 63 IN | BODY MASS INDEX: 36.43 KG/M2

## 2024-06-18 DIAGNOSIS — Z01.411 ENCOUNTER FOR GYNECOLOGICAL EXAMINATION WITH ABNORMAL FINDING: Primary | ICD-10-CM

## 2024-06-18 DIAGNOSIS — Z85.3 PERSONAL HISTORY OF BREAST CANCER: ICD-10-CM

## 2024-06-18 DIAGNOSIS — N95.2 ATROPHY OF VAGINA: ICD-10-CM

## 2024-06-18 PROCEDURE — 99396 PREV VISIT EST AGE 40-64: CPT | Performed by: NURSE PRACTITIONER

## 2024-06-18 PROCEDURE — 99459 PELVIC EXAMINATION: CPT | Performed by: NURSE PRACTITIONER

## 2024-06-18 NOTE — PROGRESS NOTES
Chief Complaint  Francoise Kamara is a 64 y.o.  female presenting for Annual Exam    History of Present Illness  Francoise is a 65yo woman, , here for annual gyn exam.  She has no past history of any gynecologic surgeries.  She has a past medical history of right breast cancer in 2019.  She had a lumpectomy and radiation therapy.  She now takes anastrozole.  Hypertension is well-controlled.  She has had no surgeries in the past year.  She was hospitalized in January with a flu and atrial fibrillation, which was converted with medication.  She took oral antiarrhythmia medicine for about 1 month.  Now takes a low-dose aspirin daily  Otherwise ROS negative.    Last pap 2023 wnl, neg HPV  Last mammogram 2023, Bi-Rads 2, benign  Last breast MRI 10/2023, also BiRads 2, benign  States PCP will order colon screening.    The following portions of the patient's history were reviewed and updated as appropriate: allergies, current medications, past family history, past medical history, past social history, past surgical history, and problem list.    Allergies   Allergen Reactions    Penicillins Hives and Swelling     Invanz given for AGBR surgery         Current Outpatient Medications:     anastrozole (ARIMIDEX) 1 MG tablet, TAKE 1 TABLET BY MOUTH ONCE DAILY, Disp: 90 tablet, Rfl: 3    aspirin 81 MG EC tablet, Take 1 tablet by mouth Daily., Disp: , Rfl:     Biotin 5000 MCG tablet, Take  by mouth., Disp: , Rfl:     buPROPion XL (WELLBUTRIN XL) 300 MG 24 hr tablet, Take 1 tablet by mouth Daily., Disp: 30 tablet, Rfl: 2    calcium carbonate, oyster shell, 500 MG tablet tablet, Take 2 tablets by mouth Daily., Disp: 180 tablet, Rfl: 3    cetirizine (zyrTEC) 10 MG tablet, Take  by mouth., Disp: , Rfl:     cyanocobalamin 1000 MCG/ML injection, Inject 1 mL into the appropriate muscle as directed by prescriber Every 28 (Twenty-Eight) Days., Disp: , Rfl:     esomeprazole (nexIUM) 40 MG capsule, Take 1 capsule  by mouth Every Morning Before Breakfast., Disp: , Rfl:     Ferrous Gluconate (IRON 27 PO), Take  by mouth., Disp: , Rfl:     folic acid (FOLVITE) 1 MG tablet, Take 1 tablet by mouth Daily., Disp: , Rfl:     hydrochlorothiazide (MICROZIDE) 12.5 MG capsule, Take 1 capsule by mouth As Needed., Disp: , Rfl:     lisinopril (PRINIVIL,ZESTRIL) 40 MG tablet, Take 0.5 tablets by mouth Daily. Takes 1/2 tab QD at this time per provider instruction, Disp: , Rfl:     multivitamin with minerals (Multivitamin Adults) tablet tablet, Take 1 tablet by mouth Daily., Disp: , Rfl:     Omega-3 Fatty Acids (fish oil) 1000 MG capsule capsule, Take 1 capsule by mouth 2 (Two) Times a Day With Meals., Disp: , Rfl:     tolterodine LA (DETROL LA) 4 MG 24 hr capsule, Take 1 capsule by mouth Daily., Disp: , Rfl:     vitamin D (ERGOCALCIFEROL) 99852 UNITS capsule capsule, 1 capsule 1 (One) Time Per Week. sundays, Disp: , Rfl:     Past Medical History:   Diagnosis Date    Atrial fibrillation 02/2024    syncopal episode, spontaneous cardioversion    Cardiomegaly     stable on CXR    Drug therapy     Dyspnea on exertion     Elevated LDL cholesterol level     Fatigue     Gall stones     GERD (gastroesophageal reflux disease)     on daily Protonix, EGD 10/2016. Sx's resolved since AGBR, even if doesn't take her PPI. serum h. pyl neg.  EGD GDW 10/16 prior to AGBR  36 cm    History of radiation therapy 04/20/2020    right breast    Hx of radiation therapy     Hypertension     Hypoalbuminemia     admits to chronic undereating    Low HDL (under 40)     Malignant neoplasm of upper-outer quadrant of right breast in female, estrogen receptor positive 09/12/2019    Menopause     Microcytic red blood cells     H/H 12.6/38.4    Morbid obesity     OAB (overactive bladder)     Peripheral edema     Renal insufficiency 2/27/2024    Rosacea     on Doxycycline    Stress incontinence     Syncope 02/2024    CT head no actue hemorrhage    Vaginal atrophy     Vitamin D  "deficiency     Wears glasses         Past Surgical History:   Procedure Laterality Date    BREAST BIOPSY      ? LATERALITY    BREAST BIOPSY Right 07/2019    usg     BREAST LUMPECTOMY Right 08/19/2019    CHOLECYSTECTOMY  2015    for stones w/ Dr. Barboza @ [facility]    COLONOSCOPY  2014    COLONOSCOPY  2017    ENDOSCOPY  2016    by Dr. Urias    ENDOSCOPY N/A 10/20/2017    Procedure: ESOPHAGOGASTRODUODENOSCOPY;  Surgeon: Guille Urias MD;  Location:  [facility];  Service:     GASTRIC BANDING REMOVAL N/A 12/21/2016    Procedure: GASTRIC BANDING REMOVAL LAPAROSCOPIC;  Surgeon: Guille Urias MD;  Location:  [facility];  Service:     GASTRIC SLEEVE LAPAROSCOPIC N/A 10/20/2017    Procedure: GASTRIC SLEEVE LAPAROSCOPIC, ;  Surgeon: Guille Urias MD;  Location:  [facility];  Service:     LAPAROSCOPIC GASTRIC BANDING  2008    s/p LAGB APS w/ HHR 11/2008 by G[facility] @ [facility].  full dissection hiatus, single stitch ant repair    VENOUS ACCESS DEVICE (PORT) INSERTION Left 09/24/2019    VENOUS ACCESS DEVICE (PORT) INSERTION Left 10/24/2019    WISDOM TOOTH EXTRACTION         Objective  /72   Ht 160 cm (63\")   Wt 93.3 kg (205 lb 9.6 oz)   Breastfeeding No   BMI 36.42 kg/m²     Physical Exam  Vitals and nursing note reviewed. Exam conducted with a chaperone present.   Constitutional:       General: She is not in acute distress.     Appearance: Normal appearance. She is not ill-appearing.   HENT:      Head: Normocephalic.   Neck:      Thyroid: No thyroid mass or thyromegaly.   Cardiovascular:      Rate and Rhythm: Normal rate and regular rhythm.      Heart sounds: Normal heart sounds. No murmur heard.  Pulmonary:      Effort: Pulmonary effort is normal. No respiratory distress.      Breath sounds: Normal breath sounds.   Chest:   Breasts:     Right: No inverted nipple, mass or nipple discharge.      Left: No inverted nipple, mass or nipple discharge.      Comments: Right breast tissue with more density (post-radiation " changes);  no masses.  Abdominal:      Palpations: Abdomen is soft. There is no mass.      Tenderness: There is no abdominal tenderness.   Genitourinary:     General: Normal vulva.      Labia:         Right: No rash, tenderness or lesion.         Left: No rash, tenderness or lesion.       Vagina: Normal. No vaginal discharge or erythema.      Cervix: No discharge, lesion or erythema.      Uterus: Not enlarged and not tender.       Adnexa:         Right: No mass or tenderness.          Left: No mass or tenderness.        Comments: Marked pallor of the vaginal mucosa, but no abnormal discharge.  Anus appears wnl.  No rectal exam performed.  Lymphadenopathy:      Upper Body:      Right upper body: No supraclavicular or axillary adenopathy.      Left upper body: No supraclavicular or axillary adenopathy.   Skin:     General: Skin is warm and dry.   Neurological:      Mental Status: She is alert and oriented to person, place, and time.   Psychiatric:         Mood and Affect: Mood normal.         Behavior: Behavior normal.         Assessment/Plan   Diagnoses and all orders for this visit:    1. Encounter for gynecological examination with abnormal finding (Primary)    2. Atrophy of vagina    3. Personal history of breast cancer    Discussed OTC meds/ moisturizers for vaginal mucosa (such as Replens / RepHresh).    Procedures    40 to 64: Counseling/Anticipatory Guidance Discussed: nutrition, physical activity, screenings, and self-breast exam    Return in about 1 year (around 6/18/2025) for Annual physical.    Yulisa Cummins, SOLITARIO  06/18/2024

## 2024-07-02 ENCOUNTER — OFFICE VISIT (OUTPATIENT)
Dept: BARIATRICS/WEIGHT MGMT | Facility: CLINIC | Age: 64
End: 2024-07-02
Payer: COMMERCIAL

## 2024-07-02 VITALS
RESPIRATION RATE: 16 BRPM | BODY MASS INDEX: 34.98 KG/M2 | WEIGHT: 197.4 LBS | SYSTOLIC BLOOD PRESSURE: 116 MMHG | HEIGHT: 63 IN | HEART RATE: 91 BPM | DIASTOLIC BLOOD PRESSURE: 70 MMHG | OXYGEN SATURATION: 99 %

## 2024-07-02 DIAGNOSIS — I10 HYPERTENSION, UNSPECIFIED TYPE: ICD-10-CM

## 2024-07-02 DIAGNOSIS — E66.9 CLASS 1 OBESITY WITH SERIOUS COMORBIDITY AND BODY MASS INDEX (BMI) OF 34.0 TO 34.9 IN ADULT, UNSPECIFIED OBESITY TYPE: Primary | ICD-10-CM

## 2024-07-02 PROBLEM — E66.811 CLASS 1 OBESITY WITH SERIOUS COMORBIDITY AND BODY MASS INDEX (BMI) OF 34.0 TO 34.9 IN ADULT: Status: ACTIVE | Noted: 2017-10-04

## 2024-07-02 PROCEDURE — 99214 OFFICE O/P EST MOD 30 MIN: CPT | Performed by: NURSE PRACTITIONER

## 2024-07-02 RX ORDER — BUPROPION HYDROCHLORIDE 300 MG/1
300 TABLET ORAL DAILY
Qty: 30 TABLET | Refills: 2 | Status: SHIPPED | OUTPATIENT
Start: 2024-07-02

## 2024-07-02 NOTE — PROGRESS NOTES
[facility]  [address]  [place] [address]     Office Note      Date: 2024  Patient Name: Francoise Kamara  MRN: [id number]  : 1960  Subjective  Subjective     Chief Complaint  Obesity Management follow-up          Francoise Kamara presents to [facility] for obesity management.   Patient is satisfied with weight loss progress. Appetite is moderately controlled. States she is not hungry, doesn't really desire to eat, nothing sounds good or tastes good. Reports dry mouth with wellbutrin but otherwise no side effects. The patient is not taking multivitamin and is not taking fish oil.  The patient is using a food journal, keeping a pad in her bag to write things down. Just writing down foods and if it has a label she writes down the amount of protein. Avoiding anything with >20 carbs. Not drinking as much water the last 2 weeks. The patient is not exercising, still tires easily.    Review of Systems   Constitutional:  Negative for appetite change and fatigue.   Eyes:  Negative for visual disturbance.   Cardiovascular:  Negative for chest pain and palpitations.   Gastrointestinal:  Negative for constipation and indigestion.   Neurological:  Negative for light-headedness.   All other systems reviewed and are negative.        Current Outpatient Medications:     anastrozole (ARIMIDEX) 1 MG tablet, TAKE 1 TABLET BY MOUTH ONCE DAILY, Disp: 90 tablet, Rfl: 3    aspirin 81 MG EC tablet, Take 1 tablet by mouth Daily., Disp: , Rfl:     Biotin 5000 MCG tablet, Take  by mouth., Disp: , Rfl:     buPROPion XL (WELLBUTRIN XL) 300 MG 24 hr tablet, Take 1 tablet by mouth Daily., Disp: 30 tablet, Rfl: 2    calcium carbonate, oyster shell, 500 MG tablet tablet, Take 2 tablets by mouth Daily., Disp: 180 tablet, Rfl: 3    cetirizine (zyrTEC) 10 MG tablet, Take  by mouth., Disp: , Rfl:     cyanocobalamin 1000 MCG/ML injection, Inject 1 mL  "into the appropriate muscle as directed by prescriber Every 28 (Twenty-Eight) Days., Disp: , Rfl:     esomeprazole (nexIUM) 40 MG capsule, Take 1 capsule by mouth Every Morning Before Breakfast., Disp: , Rfl:     Ferrous Gluconate (IRON 27 PO), Take  by mouth., Disp: , Rfl:     folic acid (FOLVITE) 1 MG tablet, Take 1 tablet by mouth Daily., Disp: , Rfl:     hydrochlorothiazide (MICROZIDE) 12.5 MG capsule, Take 1 capsule by mouth As Needed., Disp: , Rfl:     lisinopril (PRINIVIL,ZESTRIL) 40 MG tablet, Take 0.5 tablets by mouth Daily. Takes 1/2 tab QD at this time per provider instruction, Disp: , Rfl:     multivitamin with minerals (Multivitamin Adults) tablet tablet, Take 1 tablet by mouth Daily., Disp: , Rfl:     Omega-3 Fatty Acids (fish oil) 1000 MG capsule capsule, Take 1 capsule by mouth 2 (Two) Times a Day With Meals., Disp: , Rfl:     tolterodine LA (DETROL LA) 4 MG 24 hr capsule, Take 1 capsule by mouth Daily., Disp: , Rfl:     vitamin D (ERGOCALCIFEROL) 76730 UNITS capsule capsule, 1 capsule 1 (One) Time Per Week. sundays, Disp: , Rfl:     Objective   Start weight: 224.8 pounds.    Total Loss lb/%Loss of beginning body weight (BBW): -27.4 lb/-12.20%  Change in weight since last visit: -5.6    Body mass index is 34.97 kg/m².   Body composition analysis completed and showed:   Body Fat %: 43.9    Measurements (in inches)  Waist Circumference: 42.5      Vital Signs:   /70 (BP Location: Left arm, Patient Position: Sitting)   Pulse 91   Resp 16   Ht 160 cm (63\")   Wt 89.5 kg (197 lb 6.4 oz)   SpO2 99%   BMI 34.97 kg/m²     Physical Exam   General appears stated age and normal appearance   HEENT PERRLA, EOM intact, and conjunctivae normal   Chest/lungs Normal rate, Regular rhythm, and Breathing is unlabored   Extremities without edema   Neuro Good historian and No focal deficit   Skin Warm, dry, intact   Psych normal behavior, normal thought content, and normal concentration     Result Review : "                Assessment / Plan        Diagnoses and all orders for this visit:    1. Class 1 obesity with serious comorbidity and body mass index (BMI) of 34.0 to 34.9 in adult, unspecified obesity type (Primary)  Overview:  s/p LSG by JULY on 10/20/17. Presurgery weight: 262 pounds. Angelo: 201 (10/2018)  Goal:180lb  No 37- recent syncope and a-fib (2/2024)  Caution GLP-1 due to esophageal dysmotility and significant JAMISON   Consider: topamax, metformin, wellbutrin/naltrexone   SOKE      Assessment & Plan:  Patient's (Body mass index is 34.97 kg/m².) indicates that they are obese (BMI >30) with health conditions that include hypertension, GERD, and a-fib  . Weight is improving with treatment. BMI  is above average; BMI management plan is completed. We discussed low calorie, low carb based diet program, portion control, increasing exercise, management of depression/anxiety/stress to control compensatory eating, pharmacologic options including wellbutrin, and an leigha-based approach such as [employer] or Lose It.     I have instructed the patient to continue with pursuit of medical weight loss as a part of this program. Patient does meet criteria for use of anorectics at this time as BMI >30  and is not at treatment goal.     The current plan for this month includes:   - Continue to work on lifestyle behavioral changes  - Increase water intake, goal of 100oz/day  - Continue to strive for at least 60g of protein daily.   - Continue wellbutrin.   - Treatment goal 180lb      Orders:  -     buPROPion XL (WELLBUTRIN XL) 300 MG 24 hr tablet; Take 1 tablet by mouth Daily.  Dispense: 30 tablet; Refill: 2    2. Hypertension, unspecified type  Assessment & Plan:  Hypertension is stable and controlled  Continue current treatment regimen.  Weight loss.  Regular aerobic exercise.  Ambulatory blood pressure monitoring.  Blood pressure will be reassessed in 1 month.            Follow Up   Return in about 6 weeks (around 8/13/2024)  for Next scheduled follow up.  Patient was given instructions and counseling regarding her condition or for health maintenance advice. Please see specific information pulled into the AVS if appropriate.     SOLITARIO Chin  07/02/2024

## 2024-07-02 NOTE — ASSESSMENT & PLAN NOTE
Patient's (Body mass index is 34.97 kg/m².) indicates that they are obese (BMI >30) with health conditions that include hypertension, GERD, and a-fib  . Weight is improving with treatment. BMI  is above average; BMI management plan is completed. We discussed low calorie, low carb based diet program, portion control, increasing exercise, management of depression/anxiety/stress to control compensatory eating, pharmacologic options including wellbutrin, and an leigha-based approach such as [employer] Pal or Lose It.     I have instructed the patient to continue with pursuit of medical weight loss as a part of this program. Patient does meet criteria for use of anorectics at this time as BMI >30  and is not at treatment goal.     The current plan for this month includes:   - Continue to work on lifestyle behavioral changes  - Increase water intake, goal of 100oz/day  - Continue to strive for at least 60g of protein daily.   - Continue wellbutrin.   - Treatment goal 180lb

## 2024-07-31 ENCOUNTER — HOSPITAL ENCOUNTER (OUTPATIENT)
Dept: BONE DENSITY | Facility: HOSPITAL | Age: 64
Discharge: HOME | End: 2024-07-31
Admitting: INTERNAL MEDICINE
Payer: COMMERCIAL

## 2024-07-31 DIAGNOSIS — Z79.811 LONG TERM CURRENT USE OF AROMATASE INHIBITOR: ICD-10-CM

## 2024-07-31 PROCEDURE — 77080 DXA BONE DENSITY AXIAL: CPT

## 2024-08-11 LAB — NCCN CRITERIA FLAG: NORMAL

## 2024-08-12 ENCOUNTER — OFFICE VISIT (OUTPATIENT)
Dept: BARIATRICS/WEIGHT MGMT | Facility: CLINIC | Age: 64
End: 2024-08-12
Payer: COMMERCIAL

## 2024-08-12 VITALS
HEART RATE: 76 BPM | HEIGHT: 63 IN | WEIGHT: 195 LBS | BODY MASS INDEX: 34.55 KG/M2 | DIASTOLIC BLOOD PRESSURE: 80 MMHG | SYSTOLIC BLOOD PRESSURE: 118 MMHG

## 2024-08-12 DIAGNOSIS — I10 HYPERTENSION, UNSPECIFIED TYPE: ICD-10-CM

## 2024-08-12 DIAGNOSIS — E66.9 CLASS 1 OBESITY WITH SERIOUS COMORBIDITY AND BODY MASS INDEX (BMI) OF 34.0 TO 34.9 IN ADULT, UNSPECIFIED OBESITY TYPE: Primary | ICD-10-CM

## 2024-08-12 PROCEDURE — 99214 OFFICE O/P EST MOD 30 MIN: CPT | Performed by: NURSE PRACTITIONER

## 2024-08-12 NOTE — PROGRESS NOTES
"  [facility]  [address]  [place] [address]     Office Note      Date: 2024  Patient Name: Francoise Kamara  MRN: [id number]  : 1960  Subjective  Subjective     Chief Complaint  Obesity Management follow-up          Francoise Kamara presents to [facility] for obesity management. s/p LSG by GDW on 10/20/17. Presurgery weight: 262 pounds. Angelo: 201 (10/2018)  Patient is satisfied with weight loss progress. Appetite is moderately controlled. Reports no side effects of prescribed medications today. The patient is not taking multivitamin and is not taking fish oil. The patient is not using a food journal, \"it fell apart this month\". Still eating at home most of the time.   The patient is not exercising- will be going up 26 stairs once a week for a client visit. Her first time was last week, had to stop every 4-5 steps.     Breakfast: cheerios with organic milk  Snack: 1/2 beef and cheddar  Lunch: 3oz pot roast and 2 new potatoes  Dinner:  slice pepperoni toppings, 2 8oz campo    Review of Systems   Constitutional:  Negative for appetite change and fatigue.   Eyes:  Negative for visual disturbance.   Cardiovascular:  Negative for chest pain and palpitations.   Gastrointestinal:  Negative for constipation and indigestion.   Neurological:  Negative for light-headedness.         Current Outpatient Medications:     anastrozole (ARIMIDEX) 1 MG tablet, TAKE 1 TABLET BY MOUTH ONCE DAILY, Disp: 90 tablet, Rfl: 3    aspirin 81 MG EC tablet, Take 1 tablet by mouth Daily., Disp: , Rfl:     Biotin 5000 MCG tablet, Take  by mouth., Disp: , Rfl:     buPROPion XL (WELLBUTRIN XL) 300 MG 24 hr tablet, Take 1 tablet by mouth Daily., Disp: 30 tablet, Rfl: 2    calcium carbonate, oyster shell, 500 MG tablet tablet, Take 2 tablets by mouth Daily., Disp: 180 tablet, Rfl: 3    cetirizine (zyrTEC) 10 MG tablet, Take  by mouth., Disp: , Rfl:     " "cyanocobalamin 1000 MCG/ML injection, Inject 1 mL into the appropriate muscle as directed by prescriber Every 28 (Twenty-Eight) Days., Disp: , Rfl:     esomeprazole (nexIUM) 40 MG capsule, Take 1 capsule by mouth Every Morning Before Breakfast., Disp: , Rfl:     Ferrous Gluconate (IRON 27 PO), Take  by mouth., Disp: , Rfl:     folic acid (FOLVITE) 1 MG tablet, Take 1 tablet by mouth Daily., Disp: , Rfl:     hydrochlorothiazide (MICROZIDE) 12.5 MG capsule, Take 1 capsule by mouth As Needed., Disp: , Rfl:     lisinopril (PRINIVIL,ZESTRIL) 40 MG tablet, Take 0.5 tablets by mouth Daily. Takes 1/2 tab QD at this time per provider instruction, Disp: , Rfl:     tolterodine LA (DETROL LA) 4 MG 24 hr capsule, Take 1 capsule by mouth Daily., Disp: , Rfl:     vitamin D (ERGOCALCIFEROL) 84934 UNITS capsule capsule, 1 capsule 1 (One) Time Per Week. sundays, Disp: , Rfl:     multivitamin with minerals (Multivitamin Adults) tablet tablet, Take 1 tablet by mouth Daily. (Patient not taking: Reported on 8/12/2024), Disp: , Rfl:     Omega-3 Fatty Acids (fish oil) 1000 MG capsule capsule, Take 1 capsule by mouth 2 (Two) Times a Day With Meals. (Patient not taking: Reported on 8/12/2024), Disp: , Rfl:     Objective   Start weight: 224.8 pounds.    Total Loss lb/%Loss of beginning body weight (BBW): -29.8lb/-13.25%  Change in weight since last visit: -2.6    Body mass index is 34.54 kg/m².   Body composition analysis completed and showed:   Body Fat %: 44.6    Measurements (in inches)  Waist Circumference: 43      Vital Signs:   /80 (BP Location: Left arm, Patient Position: Sitting)   Pulse 76   Ht 160 cm (63\")   Wt 88.5 kg (195 lb)   BMI 34.54 kg/m²     No LMP recorded. Patient is postmenopausal.    Physical Exam   General appears stated age and normal appearance   HEENT PERRLA, EOM intact, and conjunctivae normal   Chest/lungs Normal rate, Regular rhythm, and Breathing is unlabored   Extremities without edema   Neuro Good " historian and No focal deficit   Skin Warm, dry, intact   Psych normal behavior, normal thought content, and normal concentration     Result Review :                Assessment / Plan        Diagnoses and all orders for this visit:    1. Class 1 obesity with serious comorbidity and body mass index (BMI) of 34.0 to 34.9 in adult, unspecified obesity type (Primary)  Overview:  s/p LSG by JULY on 10/20/17. Presurgery weight: 262 pounds. Angelo: 201 (10/2018)  Goal:180lb  No 37- recent syncope and a-fib (2/2024)  Caution GLP-1 due to esophageal dysmotility and significant JAMISON   Consider: topamax, metformin, wellbutrin/naltrexone   SOKE      Assessment & Plan:  Patient's (Body mass index is 34.54 kg/m².) indicates that they are obese (BMI >30) with health conditions that include hypertension and GERD . Weight is improving with treatment. BMI  is above average; BMI management plan is completed. We discussed low calorie, low carb based diet program, portion control, increasing exercise, and pharmacologic options including wellbutrin .     I have instructed the patient to continue with pursuit of medical weight loss as a part of this program. Patient does meet criteria for use of anorectics at this time as BMI >30  and is not at treatment goal.     The current plan for this month includes:   - Continue to work on lifestyle behavioral changes  - Prioritize food journal, water, protein, continue to limit sweets. Continue to fast after 6pm.   - Encouraged to increase awareness of current fitness by watching average daily steps. We will be discussing exercise more in the coming months.   - Continue wellbutrin.  - Treatment goal  180lb        2. Hypertension, unspecified type  Assessment & Plan:  Hypertension is stable and controlled  Continue current treatment regimen.  Weight loss.  Regular aerobic exercise.  Ambulatory blood pressure monitoring.  Blood pressure will be reassessed in 1 month.            Follow Up   Return in  about 1 month (around 9/12/2024) for Next scheduled follow up.  Patient was given instructions and counseling regarding her condition or for health maintenance advice. Please see specific information pulled into the AVS if appropriate.     Erica Ghosh, SOLITARIO  08/12/2024

## 2024-08-12 NOTE — ASSESSMENT & PLAN NOTE
Patient's (Body mass index is 34.54 kg/m².) indicates that they are obese (BMI >30) with health conditions that include hypertension and GERD . Weight is improving with treatment. BMI  is above average; BMI management plan is completed. We discussed low calorie, low carb based diet program, portion control, increasing exercise, and pharmacologic options including wellbutrin .     I have instructed the patient to continue with pursuit of medical weight loss as a part of this program. Patient does meet criteria for use of anorectics at this time as BMI >30  and is not at treatment goal.     The current plan for this month includes:   - Continue to work on lifestyle behavioral changes  - Prioritize food journal, water, protein, continue to limit sweets. Continue to fast after 6pm.   - Encouraged to increase awareness of current fitness by watching average daily steps. We will be discussing exercise more in the coming months.   - Continue wellbutrin.  - Treatment goal  180lb

## 2024-08-26 ENCOUNTER — HOSPITAL ENCOUNTER (OUTPATIENT)
Dept: MAMMOGRAPHY | Facility: HOSPITAL | Age: 64
Discharge: HOME | End: 2024-08-26
Admitting: INTERNAL MEDICINE
Payer: COMMERCIAL

## 2024-08-26 DIAGNOSIS — C50.411 MALIGNANT NEOPLASM OF UPPER-OUTER QUADRANT OF RIGHT BREAST IN FEMALE, ESTROGEN RECEPTOR POSITIVE: ICD-10-CM

## 2024-08-26 DIAGNOSIS — Z17.0 MALIGNANT NEOPLASM OF UPPER-OUTER QUADRANT OF RIGHT BREAST IN FEMALE, ESTROGEN RECEPTOR POSITIVE: ICD-10-CM

## 2024-08-26 PROCEDURE — 77067 SCR MAMMO BI INCL CAD: CPT

## 2024-08-26 PROCEDURE — 77063 BREAST TOMOSYNTHESIS BI: CPT

## 2024-09-12 ENCOUNTER — HOSPITAL ENCOUNTER (OUTPATIENT)
Dept: MAMMOGRAPHY | Facility: HOSPITAL | Age: 64
Discharge: HOME | End: 2024-09-12
Admitting: RADIOLOGY
Payer: COMMERCIAL

## 2024-09-12 ENCOUNTER — OFFICE VISIT (OUTPATIENT)
Dept: BARIATRICS/WEIGHT MGMT | Facility: CLINIC | Age: 64
End: 2024-09-12
Payer: COMMERCIAL

## 2024-09-12 VITALS
HEART RATE: 71 BPM | BODY MASS INDEX: 34.09 KG/M2 | HEIGHT: 63 IN | RESPIRATION RATE: 18 BRPM | DIASTOLIC BLOOD PRESSURE: 64 MMHG | SYSTOLIC BLOOD PRESSURE: 118 MMHG | OXYGEN SATURATION: 98 % | WEIGHT: 192.4 LBS

## 2024-09-12 DIAGNOSIS — R92.8 ABNORMAL MAMMOGRAM: ICD-10-CM

## 2024-09-12 DIAGNOSIS — E66.9 CLASS 1 OBESITY WITH SERIOUS COMORBIDITY AND BODY MASS INDEX (BMI) OF 34.0 TO 34.9 IN ADULT, UNSPECIFIED OBESITY TYPE: Primary | ICD-10-CM

## 2024-09-12 DIAGNOSIS — I10 HYPERTENSION, UNSPECIFIED TYPE: ICD-10-CM

## 2024-09-12 PROCEDURE — 99214 OFFICE O/P EST MOD 30 MIN: CPT | Performed by: NURSE PRACTITIONER

## 2024-09-12 PROCEDURE — G0279 TOMOSYNTHESIS, MAMMO: HCPCS

## 2024-09-12 PROCEDURE — 77065 DX MAMMO INCL CAD UNI: CPT

## 2024-09-12 RX ORDER — BUPROPION HYDROCHLORIDE 300 MG/1
300 TABLET ORAL DAILY
Qty: 30 TABLET | Refills: 2 | Status: SHIPPED | OUTPATIENT
Start: 2024-09-12

## 2024-09-12 NOTE — PROGRESS NOTES
[facility]  [address]  [place] [address]     Office Note      Date: 2024  Patient Name: Francoise Kamara  MRN: [id number]  : 1960  Subjective  Subjective     Chief Complaint  Obesity Management follow-up          Francoise Kamara presents to [facility] for obesity management.   Patient is satisfied with weight loss progress. Appetite is well controlled. Reports no side effects of prescribed medications today. The patient is taking multivitamin and is not taking fish oil.  The patient is not using a food journal. 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 No longer having to stop to catch her breath as often with walking. Wants to increase intensity.  Sleep 4-5 hours a night, plans to start a supplement that she heard about from her mom called Relaxium (valerest, ashwaganda, magnesium, l-ttryptophan, melatonin, chamomile extract, passionflower extract, WILMAN).   She is under a lot of stress with her family and work and also has to have further workup for an abnormal mammogram which she is anxious about. Denies stress eating but knows she's not as mindful and intentional with her diet choices.   24 hour recall:  B: pop tart  L: bologna sandwich  D: slice of pizza  The patient is exercising with a FITT score of:    Frequency Intensity Time Strength Training   []   0, none []   0 []   0 [x]   0   [x]   1 (1-2x/week) [x]   1 (light) [x]   1 (<10 min) []   1 (1x/week)   []   2 (3-5x/week) []   2 (moderate) []   2 (10-20 min) []   2 (2x/week)   []   3 (daily) []   3 (moderately hard)  []   4 (very hard) []   3 (20-30 min)  []   4 (>30 min) []   3 (3-4x/week)       Review of Systems   Constitutional:  Negative for appetite change and fatigue.   Eyes:  Negative for visual disturbance.   Cardiovascular:  Negative for chest pain  and palpitations.   Gastrointestinal:  Negative for constipation and indigestion.   Neurological:  Negative for light-headedness.       Current Outpatient Medications:     anastrozole (ARIMIDEX) 1 MG tablet, TAKE 1 TABLET BY MOUTH ONCE DAILY, Disp: 90 tablet, Rfl: 3    aspirin 81 MG EC tablet, Take 1 tablet by mouth Daily., Disp: , Rfl:     Biotin 5000 MCG tablet, Take  by mouth., Disp: , Rfl:     buPROPion XL (WELLBUTRIN XL) 300 MG 24 hr tablet, Take 1 tablet by mouth Daily., Disp: 30 tablet, Rfl: 2    calcium carbonate, oyster shell, 500 MG tablet tablet, Take 2 tablets by mouth Daily., Disp: 180 tablet, Rfl: 3    cetirizine (zyrTEC) 10 MG tablet, Take  by mouth., Disp: , Rfl:     cyanocobalamin 1000 MCG/ML injection, Inject 1 mL into the appropriate muscle as directed by prescriber Every 28 (Twenty-Eight) Days., Disp: , Rfl:     esomeprazole (nexIUM) 40 MG capsule, Take 1 capsule by mouth Every Morning Before Breakfast., Disp: , Rfl:     Ferrous Gluconate (IRON 27 PO), Take  by mouth., Disp: , Rfl:     folic acid (FOLVITE) 1 MG tablet, Take 1 tablet by mouth Daily., Disp: , Rfl:     hydrochlorothiazide (MICROZIDE) 12.5 MG capsule, Take 1 capsule by mouth As Needed., Disp: , Rfl:     lisinopril (PRINIVIL,ZESTRIL) 40 MG tablet, Take 0.5 tablets by mouth Daily. Takes 1/2 tab QD at this time per provider instruction, Disp: , Rfl:     multivitamin with minerals (Multivitamin Adults) tablet tablet, Take 1 tablet by mouth Daily., Disp: , Rfl:     tolterodine LA (DETROL LA) 4 MG 24 hr capsule, Take 1 capsule by mouth Daily., Disp: , Rfl:     vitamin D (ERGOCALCIFEROL) 09308 UNITS capsule capsule, 1 capsule 1 (One) Time Per Week. sundays, Disp: , Rfl:     Omega-3 Fatty Acids (fish oil) 1000 MG capsule capsule, Take 1 capsule by mouth 2 (Two) Times a Day With Meals. (Patient not taking: Reported on 8/12/2024), Disp: , Rfl:     Objective   Start weight: 224.8 pounds.    Total Loss lb/%Loss of beginning body weight (BBW):  "-32.4lb/-14.41%  Change in weight since last visit: -2.6    Body mass index is 34.08 kg/m².   Body composition analysis completed and showed:   Body Fat %: 45.7%    Measurements (in inches)  Waist Circumference: 39      Vital Signs:   /64   Pulse 71   Resp 18   Ht 160 cm (63\")   Wt 87.3 kg (192 lb 6.4 oz)   SpO2 98%   BMI 34.08 kg/m²     No LMP recorded. Patient is postmenopausal.    Physical Exam   General appears stated age and normal appearance   HEENT PERRLA, EOM intact, and conjunctivae normal   Chest/lungs Normal rate, Regular rhythm, and Breathing is unlabored   Extremities without edema   Neuro Good historian and No focal deficit   Skin Warm, dry, intact   Psych normal behavior, normal thought content, and normal concentration     Result Review :                Assessment / Plan        Diagnoses and all orders for this visit:    1. Class 1 obesity with serious comorbidity and body mass index (BMI) of 34.0 to 34.9 in adult, unspecified obesity type (Primary)  Overview:  s/p LSG by JULY on 10/20/17. Presurgery weight: 262 pounds. Angelo: 201 (10/2018)  Goal:180lb  No 37- recent syncope and a-fib (2/2024)  Caution GLP-1 due to esophageal dysmotility and significant JAMISON   Consider: topamax, metformin, wellbutrin/naltrexone   SOKE      Assessment & Plan:  Patient's (Body mass index is 34.08 kg/m².) indicates that they are obese (BMI >30) with health conditions that include hypertension and GERD . Weight is improving with treatment. BMI  is above average; BMI management plan is completed. We discussed low calorie, low carb based diet program, portion control, increasing exercise, management of depression/anxiety/stress to control compensatory eating, pharmacologic options including wellbutrin, and an leigha-based approach such as [employer] Pal or Lose It.     I have instructed the patient to continue with pursuit of medical weight loss as a part of this program. Patient does meet criteria for use of " anorectics at this time as BMI >30  and is not at treatment goal.     The current plan for this month includes:   - Continue to work on lifestyle behavioral changes  - Increase effort/intensity with walking  - bring back focus and planning  - increase water, protein  - Continue current medications  - Treatment goal 180lb      Orders:  -     buPROPion XL (WELLBUTRIN XL) 300 MG 24 hr tablet; Take 1 tablet by mouth Daily.  Dispense: 30 tablet; Refill: 2    2. Hypertension, unspecified type  Assessment & Plan:  Hypertension is stable and controlled  Continue current treatment regimen.  Weight loss.  Regular aerobic exercise.  Ambulatory blood pressure monitoring.  Blood pressure will be reassessed in 3 months.            Follow Up   Return in about 5 weeks (around 10/17/2024) for Next scheduled follow up.  Patient was given instructions and counseling regarding her condition or for health maintenance advice. Please see specific information pulled into the AVS if appropriate.     Erica Ghosh, SOLITARIO  09/12/2024

## 2024-09-12 NOTE — ASSESSMENT & PLAN NOTE
Patient's (Body mass index is 34.08 kg/m².) indicates that they are obese (BMI >30) with health conditions that include hypertension and GERD . Weight is improving with treatment. BMI  is above average; BMI management plan is completed. We discussed low calorie, low carb based diet program, portion control, increasing exercise, management of depression/anxiety/stress to control compensatory eating, pharmacologic options including wellbutrin, and an leigha-based approach such as [employer] Pal or Lose It.     I have instructed the patient to continue with pursuit of medical weight loss as a part of this program. Patient does meet criteria for use of anorectics at this time as BMI >30  and is not at treatment goal.     The current plan for this month includes:   - Continue to work on lifestyle behavioral changes  - Increase effort/intensity with walking  - bring back focus and planning  - increase water, protein  - Continue current medications  - Treatment goal 180lb

## 2024-10-03 ENCOUNTER — OUTSIDE FACILITY SERVICE (OUTPATIENT)
Dept: GASTROENTEROLOGY | Facility: CLINIC | Age: 64
End: 2024-10-03
Payer: COMMERCIAL

## 2024-10-03 PROCEDURE — 43239 EGD BIOPSY SINGLE/MULTIPLE: CPT | Performed by: INTERNAL MEDICINE

## 2024-10-03 PROCEDURE — 88305 TISSUE EXAM BY PATHOLOGIST: CPT | Performed by: INTERNAL MEDICINE

## 2024-10-03 PROCEDURE — 45378 DIAGNOSTIC COLONOSCOPY: CPT | Performed by: INTERNAL MEDICINE

## 2024-10-04 ENCOUNTER — LAB REQUISITION (OUTPATIENT)
Dept: LAB | Facility: HOSPITAL | Age: 64
End: 2024-10-04
Payer: COMMERCIAL

## 2024-10-04 DIAGNOSIS — Z12.11 ENCOUNTER FOR SCREENING FOR MALIGNANT NEOPLASM OF COLON: ICD-10-CM

## 2024-10-07 LAB
CYTO UR: NORMAL
LAB AP CASE REPORT: NORMAL
LAB AP CLINICAL INFORMATION: NORMAL
PATH REPORT.FINAL DX SPEC: NORMAL
PATH REPORT.GROSS SPEC: NORMAL

## 2024-10-08 ENCOUNTER — TRANSCRIBE ORDERS (OUTPATIENT)
Dept: ADMINISTRATIVE | Facility: HOSPITAL | Age: 64
End: 2024-10-08
Payer: COMMERCIAL

## 2024-10-08 DIAGNOSIS — Z12.31 VISIT FOR SCREENING MAMMOGRAM: Primary | ICD-10-CM

## 2024-10-17 ENCOUNTER — OFFICE VISIT (OUTPATIENT)
Dept: BARIATRICS/WEIGHT MGMT | Facility: CLINIC | Age: 64
End: 2024-10-17
Payer: COMMERCIAL

## 2024-10-17 VITALS
DIASTOLIC BLOOD PRESSURE: 80 MMHG | HEART RATE: 82 BPM | WEIGHT: 192 LBS | HEIGHT: 63 IN | SYSTOLIC BLOOD PRESSURE: 124 MMHG | BODY MASS INDEX: 34.02 KG/M2

## 2024-10-17 DIAGNOSIS — E66.811 CLASS 1 OBESITY WITH SERIOUS COMORBIDITY AND BODY MASS INDEX (BMI) OF 34.0 TO 34.9 IN ADULT, UNSPECIFIED OBESITY TYPE: Primary | ICD-10-CM

## 2024-10-17 DIAGNOSIS — I10 HYPERTENSION, UNSPECIFIED TYPE: ICD-10-CM

## 2024-10-17 PROCEDURE — 99212 OFFICE O/P EST SF 10 MIN: CPT | Performed by: NURSE PRACTITIONER

## 2024-10-17 NOTE — ASSESSMENT & PLAN NOTE
Patient's (Body mass index is 34.01 kg/m².) indicates that they are obese (BMI >30) with health conditions that include hypertension, GERD, and a-fib  . Weight is improving with treatment. BMI  is above average; BMI management plan is completed. We discussed low calorie, low carb based diet program, portion control, increasing exercise, pharmacologic options including wellbutrin, and an leigha-based approach such as [employer] Pal or Lose It.     I have instructed the patient to continue with pursuit of medical weight loss as a part of this program. Patient does meet criteria for use of anorectics at this time as BMI >30  and is not at treatment goal.     The current plan for this month includes:   - No skipping meals- pack protein bar, nuts, protein shakes, etc as plan B.   - Continue to increase physical activity  - Continue to work on lifestyle behavioral changes  - Continue current medications  - Treatment goal 180lb

## 2024-10-17 NOTE — PROGRESS NOTES
"  [facility]  [address]  [place] [address]     Office Note      Date: 10/17/2024  Patient Name: Francoise Kamara  MRN: [id number]  : 1960  Subjective  Subjective     Chief Complaint  Obesity Management follow-up          Francoise Kamara presents to [facility] for obesity management.   Patient is satisfied with weight loss progress. Appetite is moderately controlled. It has been a busy and stressful month. Admits she is frequently skipping meals. Has had a few episodes of feeling \"swimmy\" and remembers that she hasn't eaten for the day. Reports no side effects of prescribed medications today. The patient is not taking multivitamin and is not taking fish oil.  The patient is using a food journal.    The patient is exercising light walking, stairs with a FITT score of:    Frequency Intensity Time Strength Training   []   0, none []   0 []   0 [x]   0   [x]   1 (1-2x/week) [x]   1 (light) [x]   1 (<10 min) []   1 (1x/week)   []   2 (3-5x/week) []   2 (moderate) []   2 (10-20 min) []   2 (2x/week)   []   3 (daily) []   3 (moderately hard)  []   4 (very hard) []   3 (20-30 min)  []   4 (>30 min) []   3 (3-4x/week)         Breakfast: bowl of corn pops, milk organic skim milk  Snack: 2 snack size 3 musketeers  Lunch: none  Dinner: hamburger steak w/fries  Water Intake: 24 oz    Review of Systems   Constitutional:  Negative for appetite change and fatigue.   Eyes:  Negative for visual disturbance.   Cardiovascular:  Negative for chest pain and palpitations.   Gastrointestinal:  Negative for constipation and indigestion.   Neurological:  Negative for light-headedness.   Psychiatric/Behavioral:  Positive for stress.          Current Outpatient Medications:     anastrozole (ARIMIDEX) 1 MG tablet, TAKE 1 TABLET BY MOUTH ONCE DAILY, Disp: 90 tablet, Rfl: 3    aspirin 81 MG EC tablet, Take 1 tablet by mouth Daily., Disp: , Rfl:     " "Biotin 5000 MCG tablet, Take  by mouth., Disp: , Rfl:     buPROPion XL (WELLBUTRIN XL) 300 MG 24 hr tablet, Take 1 tablet by mouth Daily., Disp: 30 tablet, Rfl: 2    calcium carbonate, oyster shell, 500 MG tablet tablet, Take 2 tablets by mouth Daily., Disp: 180 tablet, Rfl: 3    cetirizine (zyrTEC) 10 MG tablet, Take  by mouth., Disp: , Rfl:     cyanocobalamin 1000 MCG/ML injection, Inject 1 mL into the appropriate muscle as directed by prescriber Every 28 (Twenty-Eight) Days., Disp: , Rfl:     esomeprazole (nexIUM) 40 MG capsule, Take 1 capsule by mouth Every Morning Before Breakfast., Disp: , Rfl:     Ferrous Gluconate (IRON 27 PO), Take  by mouth., Disp: , Rfl:     folic acid (FOLVITE) 1 MG tablet, Take 1 tablet by mouth Daily., Disp: , Rfl:     hydrochlorothiazide (MICROZIDE) 12.5 MG capsule, Take 1 capsule by mouth As Needed., Disp: , Rfl:     lisinopril (PRINIVIL,ZESTRIL) 40 MG tablet, Take 0.5 tablets by mouth Daily. Takes 1/2 tab QD at this time per provider instruction, Disp: , Rfl:     multivitamin with minerals (Multivitamin Adults) tablet tablet, Take 1 tablet by mouth Daily., Disp: , Rfl:     tolterodine LA (DETROL LA) 4 MG 24 hr capsule, Take 1 capsule by mouth Daily., Disp: , Rfl:     vitamin D (ERGOCALCIFEROL) 10007 UNITS capsule capsule, 1 capsule 1 (One) Time Per Week. sundays, Disp: , Rfl:     Objective   Start weight: 224.8 pounds.    Total Loss lb/%Loss of beginning body weight (BBW): -32.8lb/-14.59%  Change in weight since last visit: -0.6    Body mass index is 34.01 kg/m².   Body composition analysis completed and showed:   Body Fat %: 44.4    Measurements (in inches)  Waist Circumference: 43      Vital Signs:   /80 (BP Location: Left arm, Patient Position: Sitting)   Pulse 82   Ht 160 cm (63\")   Wt 87.1 kg (192 lb)   BMI 34.01 kg/m²     No LMP recorded. Patient is postmenopausal.    Physical Exam   General appears stated age and normal appearance   HEENT PERRLA, EOM intact, and " conjunctivae normal   Chest/lungs Normal rate, Regular rhythm, and Breathing is unlabored   Extremities without edema   Neuro Good historian and No focal deficit   Skin Warm, dry, intact   Psych normal behavior, normal thought content, and normal concentration     Result Review :                Assessment / Plan        Diagnoses and all orders for this visit:    1. Class 1 obesity with serious comorbidity and body mass index (BMI) of 34.0 to 34.9 in adult, unspecified obesity type (Primary)  Overview:  s/p LSG by JULY on 10/20/17. Presurgery weight: 262 pounds. Angelo: 201 (10/2018)  Goal:180lb  No 37- recent syncope and a-fib (2/2024)  Caution GLP-1 due to esophageal dysmotility and significant JAMISON   Consider: topamax, metformin, wellbutrin/naltrexone   SOKE      Assessment & Plan:  Patient's (Body mass index is 34.01 kg/m².) indicates that they are obese (BMI >30) with health conditions that include hypertension, GERD, and a-fib  . Weight is improving with treatment. BMI  is above average; BMI management plan is completed. We discussed low calorie, low carb based diet program, portion control, increasing exercise, pharmacologic options including wellbutrin, and an leigha-based approach such as [employer] Pal or Lose It.     I have instructed the patient to continue with pursuit of medical weight loss as a part of this program. Patient does meet criteria for use of anorectics at this time as BMI >30  and is not at treatment goal.     The current plan for this month includes:   - No skipping meals- pack protein bar, nuts, protein shakes, etc as plan B.   - Continue to increase physical activity  - Continue to work on lifestyle behavioral changes  - Continue current medications  - Treatment goal 180lb      2. Hypertension, unspecified type  Assessment & Plan:  Hypertension is stable and controlled  Continue current treatment regimen.  Weight loss.  Regular aerobic exercise.  Ambulatory blood pressure monitoring.  Blood  pressure will be reassessed in 1 month.            Follow Up   Return in about 1 month (around 11/17/2024).  Patient was given instructions and counseling regarding her condition or for health maintenance advice. Please see specific information pulled into the AVS if appropriate.     Erica Ghosh, APRN  10/17/2024

## 2024-11-04 DIAGNOSIS — C50.411 MALIGNANT NEOPLASM OF UPPER-OUTER QUADRANT OF RIGHT BREAST IN FEMALE, ESTROGEN RECEPTOR POSITIVE: Primary | ICD-10-CM

## 2024-11-04 DIAGNOSIS — Z17.0 MALIGNANT NEOPLASM OF UPPER-OUTER QUADRANT OF RIGHT BREAST IN FEMALE, ESTROGEN RECEPTOR POSITIVE: Primary | ICD-10-CM

## 2024-11-04 RX ORDER — ANASTROZOLE 1 MG/1
1 TABLET ORAL DAILY
Qty: 30 TABLET | Refills: 5 | Status: SHIPPED | OUTPATIENT
Start: 2024-11-04

## 2024-11-14 ENCOUNTER — OFFICE VISIT (OUTPATIENT)
Dept: BARIATRICS/WEIGHT MGMT | Facility: CLINIC | Age: 64
End: 2024-11-14
Payer: COMMERCIAL

## 2024-11-14 VITALS
BODY MASS INDEX: 34.11 KG/M2 | HEIGHT: 63 IN | HEART RATE: 82 BPM | SYSTOLIC BLOOD PRESSURE: 108 MMHG | WEIGHT: 192.5 LBS | DIASTOLIC BLOOD PRESSURE: 70 MMHG

## 2024-11-14 DIAGNOSIS — E66.811 CLASS 1 OBESITY WITH SERIOUS COMORBIDITY AND BODY MASS INDEX (BMI) OF 34.0 TO 34.9 IN ADULT, UNSPECIFIED OBESITY TYPE: Primary | ICD-10-CM

## 2024-11-14 DIAGNOSIS — I10 HYPERTENSION, UNSPECIFIED TYPE: ICD-10-CM

## 2024-11-14 PROCEDURE — 99214 OFFICE O/P EST MOD 30 MIN: CPT | Performed by: NURSE PRACTITIONER

## 2024-11-14 NOTE — ASSESSMENT & PLAN NOTE
Hypertension is stable and controlled, improving with treatment.   Continue current treatment regimen.  Weight loss.  Regular aerobic exercise.  Ambulatory blood pressure monitoring.  Blood pressure will be reassessed in 1 month.

## 2024-11-14 NOTE — ASSESSMENT & PLAN NOTE
Patient's (Body mass index is 34.1 kg/m².) indicates that they are obese (BMI >30) with health conditions that include hypertension . Weight is unchanged. BMI  is above average; BMI management plan is completed. We discussed low calorie, low carb based diet program, portion control, increasing exercise, management of depression/anxiety/stress to control compensatory eating, pharmacologic options including wellbutrin, and an leigha-based approach such as [employer] Pal or Lose It.     I have instructed the patient to continue with pursuit of medical weight loss as a part of this program. Patient does meet criteria for use of anorectics at this time as BMI >30  and is not at treatment goal.     The current plan for this month includes:   - Continue to work on lifestyle behavioral changes  - Pack protein snacks in car, increase mindfulness of protein intake, continue to log in planner  - Treatment goal 180lb

## 2024-11-14 NOTE — PROGRESS NOTES
[facility]  [address]  [place] [address]     Office Note      Date: 2024  Patient Name: Francoise Kamara  MRN: [id number]  : 1960  Subjective  Subjective     Chief Complaint  Obesity Management follow-up          Francoise Kamara presents to [facility] for obesity management. s/p LSG by GDW on 10/20/17. Presurgery weight: 262 pounds. Angelo: 201 (10/2018). Goal:180lb.  Patient is satisfied with weight loss progress. Appetite is moderately controlled, doesn't feel hungry, forgets to eat. Has made some progress with packing protein snacks while she is away from home but knows that she has to be intentional or she forgets. Reports no side effects of prescribed medications today. The patient is not taking multivitamin and is not taking fish oil. The patient is not using a food journal.      24 hour recall:  Breakfast: small bowl of cereal (sugar pops- keeps them in her closet for her kids and when she saw them she treated herself)  Lunch: none  Dinner: hamburger rose on  bun, onion, mustard, handful of fries  Snack: Protein bar (20g)  Other beverages: lemonade  Alcohol: none    The patient is exercising walking 2 times weekly for 15 minutes. Twice weekly she has to walk up 42 stairs or up a large hill to get to a client and she is proud that she can do that. FITT score of:    Frequency Intensity Time Strength Training   []   0, none []   0 []   0 [x]   0   [x]   1 (1-2x/week) [x]   1 (light) []   1 (<10 min) []   1 (1x/week)   []   2 (3-5x/week) []   2 (moderate) [x]   2 (10-20 min) []   2 (2x/week)   []   3 (daily) []   3 (moderately hard)  []   4 (very hard) []   3 (20-30 min)  []   4 (>30 min) []   3 (3-4x/week)         Review of Systems   Constitutional:  Positive for fatigue. Negative for appetite change.   Eyes:  Negative for visual disturbance.   Cardiovascular:  Negative for chest pain and palpitations.    Gastrointestinal:  Positive for indigestion. Negative for constipation.   Neurological:  Negative for light-headedness.         Current Outpatient Medications:     anastrozole (ARIMIDEX) 1 MG tablet, TAKE 1 TABLET BY MOUTH ONCE DAILY, Disp: 30 tablet, Rfl: 5    aspirin 81 MG EC tablet, Take 1 tablet by mouth Daily., Disp: , Rfl:     Biotin 5000 MCG tablet, Take  by mouth., Disp: , Rfl:     buPROPion XL (WELLBUTRIN XL) 300 MG 24 hr tablet, Take 1 tablet by mouth Daily., Disp: 30 tablet, Rfl: 2    calcium carbonate, oyster shell, 500 MG tablet tablet, Take 2 tablets by mouth Daily., Disp: 180 tablet, Rfl: 3    cetirizine (zyrTEC) 10 MG tablet, Take  by mouth., Disp: , Rfl:     cyanocobalamin 1000 MCG/ML injection, Inject 1 mL into the appropriate muscle as directed by prescriber Every 28 (Twenty-Eight) Days., Disp: , Rfl:     esomeprazole (nexIUM) 40 MG capsule, Take 1 capsule by mouth Every Morning Before Breakfast., Disp: , Rfl:     Ferrous Gluconate (IRON 27 PO), Take  by mouth., Disp: , Rfl:     folic acid (FOLVITE) 1 MG tablet, Take 1 tablet by mouth Daily., Disp: , Rfl:     hydrochlorothiazide (MICROZIDE) 12.5 MG capsule, Take 1 capsule by mouth As Needed., Disp: , Rfl:     lisinopril (PRINIVIL,ZESTRIL) 40 MG tablet, Take 0.5 tablets by mouth Daily. Takes 1/2 tab QD at this time per provider instruction, Disp: , Rfl:     tolterodine LA (DETROL LA) 4 MG 24 hr capsule, Take 1 capsule by mouth Daily., Disp: , Rfl:     vitamin D (ERGOCALCIFEROL) 44111 UNITS capsule capsule, 1 capsule 1 (One) Time Per Week. sundays, Disp: , Rfl:     multivitamin with minerals (Multivitamin Adults) tablet tablet, Take 1 tablet by mouth Daily. (Patient not taking: Reported on 11/14/2024), Disp: , Rfl:     Objective   Start weight: 224.8 pounds.    Total Loss lb/%Loss of beginning body weight (BBW): -32.3lb/-14.36%  Change in weight since last visit: +0.5    Body mass index is 34.1 kg/m².   Body composition analysis completed and  "showed:   Body Fat %: 45.0    Measurements (in inches)  Waist Circumference: 43      Vital Signs:   /70 (BP Location: Left arm, Patient Position: Sitting)   Pulse 82   Ht 160 cm (63\")   Wt 87.3 kg (192 lb 8 oz)   BMI 34.10 kg/m²     No LMP recorded. Patient is postmenopausal.    Physical Exam   General appears stated age and normal appearance   HEENT PERRLA, EOM intact, and conjunctivae normal   Chest/lungs Normal rate, Regular rhythm, and Breathing is unlabored   Extremities without edema   Neuro Good historian and No focal deficit   Skin Warm, dry, intact   Psych normal behavior, normal thought content, and normal concentration     Result Review :                Assessment / Plan        Diagnoses and all orders for this visit:    1. Class 1 obesity with serious comorbidity and body mass index (BMI) of 34.0 to 34.9 in adult, unspecified obesity type (Primary)  Overview:  s/p LSG by JULY on 10/20/17. Presurgery weight: 262 pounds. Angelo: 201 (10/2018)  Goal:180lb  No 37- recent syncope and a-fib (2/2024)  Caution GLP-1 due to esophageal dysmotility and significant JAMISON   Consider: topamax, metformin, wellbutrin/naltrexone   SOKE      Assessment & Plan:  Patient's (Body mass index is 34.1 kg/m².) indicates that they are obese (BMI >30) with health conditions that include hypertension . Weight is unchanged. BMI  is above average; BMI management plan is completed. We discussed low calorie, low carb based diet program, portion control, increasing exercise, management of depression/anxiety/stress to control compensatory eating, pharmacologic options including wellbutrin, and an leigha-based approach such as [employer] Pal or Lose It.     I have instructed the patient to continue with pursuit of medical weight loss as a part of this program. Patient does meet criteria for use of anorectics at this time as BMI >30  and is not at treatment goal.     The current plan for this month includes:   - Continue to work on " lifestyle behavioral changes  - Pack protein snacks in car, increase mindfulness of protein intake, continue to log in planner  - Treatment goal 180lb        2. Hypertension, unspecified type  Assessment & Plan:  Hypertension is stable and controlled, improving with treatment.   Continue current treatment regimen.  Weight loss.  Regular aerobic exercise.  Ambulatory blood pressure monitoring.  Blood pressure will be reassessed in 1 month.            Follow Up   Return in about 2 months (around 1/14/2025) for Next scheduled follow up.  Patient was given instructions and counseling regarding her condition or for health maintenance advice. Please see specific information pulled into the AVS if appropriate.     Erica Ghosh, APRN  11/14/2024

## 2024-12-19 ENCOUNTER — OFFICE VISIT (OUTPATIENT)
Dept: BARIATRICS/WEIGHT MGMT | Facility: CLINIC | Age: 64
End: 2024-12-19
Payer: COMMERCIAL

## 2024-12-19 VITALS
DIASTOLIC BLOOD PRESSURE: 80 MMHG | WEIGHT: 191 LBS | HEART RATE: 68 BPM | TEMPERATURE: 97.8 F | BODY MASS INDEX: 33.84 KG/M2 | RESPIRATION RATE: 16 BRPM | HEIGHT: 63 IN | OXYGEN SATURATION: 98 % | SYSTOLIC BLOOD PRESSURE: 110 MMHG

## 2024-12-19 VITALS
DIASTOLIC BLOOD PRESSURE: 74 MMHG | OXYGEN SATURATION: 98 % | RESPIRATION RATE: 18 BRPM | HEIGHT: 63 IN | WEIGHT: 192.2 LBS | SYSTOLIC BLOOD PRESSURE: 124 MMHG | HEART RATE: 76 BPM | BODY MASS INDEX: 34.05 KG/M2

## 2024-12-19 DIAGNOSIS — Z13.0 SCREENING, IRON DEFICIENCY ANEMIA: ICD-10-CM

## 2024-12-19 DIAGNOSIS — E66.811 OBESITY, CLASS I, BMI 30-34.9: Primary | ICD-10-CM

## 2024-12-19 DIAGNOSIS — R53.83 FATIGUE, UNSPECIFIED TYPE: ICD-10-CM

## 2024-12-19 DIAGNOSIS — Z13.21 MALNUTRITION SCREEN: ICD-10-CM

## 2024-12-19 DIAGNOSIS — E66.811 CLASS 1 OBESITY WITH SERIOUS COMORBIDITY AND BODY MASS INDEX (BMI) OF 34.0 TO 34.9 IN ADULT, UNSPECIFIED OBESITY TYPE: Primary | ICD-10-CM

## 2024-12-19 DIAGNOSIS — I48.0 PAROXYSMAL ATRIAL FIBRILLATION: ICD-10-CM

## 2024-12-19 DIAGNOSIS — I10 HYPERTENSION, UNSPECIFIED TYPE: ICD-10-CM

## 2024-12-19 DIAGNOSIS — Z78.0 MENOPAUSE: ICD-10-CM

## 2024-12-19 DIAGNOSIS — I51.7 CARDIOMEGALY: ICD-10-CM

## 2024-12-19 DIAGNOSIS — Z51.81 THERAPEUTIC DRUG MONITORING: ICD-10-CM

## 2024-12-19 DIAGNOSIS — N28.9 RENAL INSUFFICIENCY: ICD-10-CM

## 2024-12-19 DIAGNOSIS — R60.0 PERIPHERAL EDEMA: ICD-10-CM

## 2024-12-19 DIAGNOSIS — E55.9 HYPOVITAMINOSIS D: ICD-10-CM

## 2024-12-19 DIAGNOSIS — Z90.3 POSTGASTRECTOMY MALABSORPTION: ICD-10-CM

## 2024-12-19 DIAGNOSIS — K91.2 POSTGASTRECTOMY MALABSORPTION: ICD-10-CM

## 2024-12-19 DIAGNOSIS — K21.9 GASTROESOPHAGEAL REFLUX DISEASE, UNSPECIFIED WHETHER ESOPHAGITIS PRESENT: ICD-10-CM

## 2024-12-19 PROCEDURE — 99214 OFFICE O/P EST MOD 30 MIN: CPT | Performed by: NURSE PRACTITIONER

## 2024-12-19 RX ORDER — BUPROPION HYDROCHLORIDE 300 MG/1
300 TABLET ORAL DAILY
Qty: 30 TABLET | Refills: 2 | Status: SHIPPED | OUTPATIENT
Start: 2024-12-19

## 2024-12-19 NOTE — PROGRESS NOTES
[facility]  [address]  [place] [address]     Office Note      Date: 2024  Patient Name: Francoise Kamara  MRN: [id number]  : 1960  Subjective  Subjective     Chief Complaint  Obesity Management follow-up          Francoise Kamara presents to [facility] for obesity management. s/p LSG by GDW on 10/20/17. Presurgery weight: 262 pounds. Angelo: 192lb (24)  Patient is satisfied with weight loss progress. Appetite is moderately controlled. Reports no side effects of prescribed medications today. The patient is not taking multivitamin and is not taking fish oil. The patient is not using a food journal.      She is struggling to get in her protein and water.  Discussed ways to increase both, setting alarm reminders to drink and eat regularly.  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    24 hour recall:  Breakfast: 1 egg, spoonful of hash browns  Lunch: rice crispy treat  Dinner: chik annette a fried chicken sandwich w/ pickles and 1/2 bun  Snack: none    Water Intake: 0-16 oz  Other beverages: cranberry juice, lemonade  Alcohol: none    The patient is exercising by walking 2-3 times per week with a FITT score of:    Frequency Intensity Time Strength Training   []   0, none []   0 []   0 [x]   0   []   1 (1-2x/week) [x]   1 (light) []   1 (<10 min) []   1 (1x/week)   [x]   2 (3-5x/week) []   2 (moderate) [x]   2 (10-20 min) []   2 (2x/week)   []   3 (daily) []   3 (moderately hard)  []   4 (very hard) []   3 (20-30 min)  []   4 (>30 min) []   3 (3-4x/week)         Review of Systems   Constitutional:  Negative for appetite change and fatigue.   Eyes:  Negative for visual disturbance.   Cardiovascular:  Negative for chest pain and palpitations.   Gastrointestinal:  Negative for constipation and indigestion.   Neurological:  Negative for light-headedness.       Current Outpatient  Medications:     anastrozole (ARIMIDEX) 1 MG tablet, TAKE 1 TABLET BY MOUTH ONCE DAILY, Disp: 30 tablet, Rfl: 5    aspirin 81 MG EC tablet, Take 1 tablet by mouth Daily., Disp: , Rfl:     Biotin 5000 MCG tablet, Take  by mouth., Disp: , Rfl:     buPROPion XL (WELLBUTRIN XL) 300 MG 24 hr tablet, Take 1 tablet by mouth Daily., Disp: 30 tablet, Rfl: 2    calcium carbonate, oyster shell, 500 MG tablet tablet, Take 2 tablets by mouth Daily., Disp: 180 tablet, Rfl: 3    cetirizine (zyrTEC) 10 MG tablet, Take  by mouth., Disp: , Rfl:     cyanocobalamin 1000 MCG/ML injection, Inject 1 mL into the appropriate muscle as directed by prescriber Every 28 (Twenty-Eight) Days., Disp: , Rfl:     esomeprazole (nexIUM) 40 MG capsule, Take 1 capsule by mouth Every Morning Before Breakfast., Disp: , Rfl:     Ferrous Gluconate (IRON 27 PO), Take  by mouth., Disp: , Rfl:     folic acid (FOLVITE) 1 MG tablet, Take 1 tablet by mouth Daily., Disp: , Rfl:     hydrochlorothiazide (MICROZIDE) 12.5 MG capsule, Take 1 capsule by mouth As Needed., Disp: , Rfl:     lisinopril (PRINIVIL,ZESTRIL) 40 MG tablet, Take 0.5 tablets by mouth Daily. Takes 1/2 tab QD at this time per provider instruction, Disp: , Rfl:     tolterodine LA (DETROL LA) 4 MG 24 hr capsule, Take 1 capsule by mouth Daily., Disp: , Rfl:     vitamin D (ERGOCALCIFEROL) 25949 UNITS capsule capsule, 1 capsule 1 (One) Time Per Week. sundays, Disp: , Rfl:     multivitamin with minerals (Multivitamin Adults) tablet tablet, Take 1 tablet by mouth Daily. (Patient not taking: Reported on 12/19/2024), Disp: , Rfl:     Objective   Start weight: 224.8 pounds.    Total Loss lb/%Loss of beginning body weight (BBW): -32.6lb/-14.50%  Change in weight since last visit: -0.4    Body mass index is 34.05 kg/m².   Body composition analysis completed and showed:   Body Fat %: 44.6%    Measurements (in inches)  Waist Circumference: 41.5      Vital Signs:   /74   Pulse 76   Resp 18   Ht 160 cm  "(63\")   Wt 87.2 kg (192 lb 3.2 oz)   SpO2 98%   BMI 34.05 kg/m²     No LMP recorded. Patient is postmenopausal.    Physical Exam   General appears stated age and normal appearance   HEENT PERRLA, EOM intact, and conjunctivae normal   Chest/lungs Normal rate, Regular rhythm, and Breathing is unlabored   Extremities without edema   Neuro Good historian and No focal deficit   Skin Warm, dry, intact   Psych normal behavior, normal thought content, and normal concentration     Result Review :                Assessment / Plan        Diagnoses and all orders for this visit:    1. Class 1 obesity with serious comorbidity and body mass index (BMI) of 34.0 to 34.9 in adult, unspecified obesity type (Primary)  Overview:  s/p LSG by JULY on 10/20/17. Presurgery weight: 262 pounds. Angelo: 201 (10/2018)  Goal:180lb  No 37- recent syncope and a-fib (2/2024)  Caution GLP-1 due to esophageal dysmotility and significant JAMISON   Consider: topamax, metformin, wellbutrin/naltrexone   SOKE      Assessment & Plan:  Patient's (Body mass index is 34.05 kg/m².) indicates that they are obese (BMI >30) with health conditions that include hypertension and GERD . Weight is improving with treatment. BMI  is above average; BMI management plan is completed. We discussed low calorie, low carb based diet program, portion control, increasing exercise, management of depression/anxiety/stress to control compensatory eating, pharmacologic options including wellbutrin, and an leigha-based approach such as [employer] Pal or Lose It.     I have instructed the patient to continue with pursuit of medical weight loss as a part of this program. Patient does meet criteria for use of anorectics at this time as BMI >30  and is not at treatment goal.     The current plan for this month includes:   - Continue to work on lifestyle behavioral changes  - Increase protein, be more intentional  - Continue current medications  - Treatment goal 180lb      Orders:  -     " buPROPion XL (WELLBUTRIN XL) 300 MG 24 hr tablet; Take 1 tablet by mouth Daily.  Dispense: 30 tablet; Refill: 2    2. Hypertension, unspecified type  Assessment & Plan:  Hypertension is stable and controlled  Continue current treatment regimen.  Weight loss.  Regular aerobic exercise.  Blood pressure will be reassessed in 1 month.            Follow Up   Return in about 1 month (around 1/19/2025) for Next scheduled follow up.  Patient was given instructions and counseling regarding her condition or for health maintenance advice. Please see specific information pulled into the AVS if appropriate.     Erica Ghosh, APRN  12/19/2024

## 2024-12-19 NOTE — ASSESSMENT & PLAN NOTE
Hypertension is stable and controlled  Continue current treatment regimen.  Weight loss.  Regular aerobic exercise.  Blood pressure will be reassessed in 1 month.

## 2024-12-19 NOTE — ASSESSMENT & PLAN NOTE
Patient's (Body mass index is 34.05 kg/m².) indicates that they are obese (BMI >30) with health conditions that include hypertension and GERD . Weight is improving with treatment. BMI  is above average; BMI management plan is completed. We discussed low calorie, low carb based diet program, portion control, increasing exercise, management of depression/anxiety/stress to control compensatory eating, pharmacologic options including wellbutrin, and an leigha-based approach such as [employer] Pal or Lose It.     I have instructed the patient to continue with pursuit of medical weight loss as a part of this program. Patient does meet criteria for use of anorectics at this time as BMI >30  and is not at treatment goal.     The current plan for this month includes:   - Continue to work on lifestyle behavioral changes  - Increase protein, be more intentional  - Continue current medications  - Treatment goal 180lb

## 2024-12-19 NOTE — PROGRESS NOTES
[facility]  [address]  [address]  [facility] [id number] [phone]        Patient Name:  Francoise Kamara.  :  1960      Date of Visit: 2024      Reason for Visit:   7 years postop      HPI: Francoise Kamara is a 64 y.o. female s/p LSG by GDW on 10/20/17     On wellbutrin from [facility] next door. Admittedly not getting enough protein.  C/o fatigue.  Last year had epigastric pain.  Planning EGD at that time, but cancelled for new onset a fib.  10/3/24 Dr. Zuniga EGD: +HH.  Advised to take PPI daily.  Has no reflux now.    Doing well.  No issues/concerns. Denies dysphagia, reflux, nausea, vomiting, and abdominal pain.  Getting 30-50g prot/day.  Drinking <64 fluid oz/day. Last labs revealed low B1. Taking  B12 shot monthly, folic acid .  On Nexium.  Exercise: walking, very active at work with first steps.     Presurgery weight: 262 pounds.  Today's weight is 86.6 kg (191 lb) pounds, today's  Body mass index is 33.83 kg/m²., and weight loss since surgery is 71 pounds.      Past Medical History:   Diagnosis Date    Atrial fibrillation 2024    syncopal episode, spontaneous cardioversion    Cardiomegaly     stable on CXR    Drug therapy     Dyspnea on exertion     Elevated LDL cholesterol level     Fatigue     Gall stones     GERD (gastroesophageal reflux disease)     on daily Protonix, EGD 10/2016. Sx's resolved since AGBR, even if doesn't take her PPI. serum h. pyl neg.  EGD GDW 10/16 prior to AGBR  36 cm    History of radiation therapy 2020    right breast    Hx of radiation therapy     Hypertension     Hypoalbuminemia     admits to chronic undereating    Low HDL (under 40)     Malignant neoplasm of upper-outer quadrant of right breast in female, estrogen receptor positive 2019    Menopause     Microcytic red blood cells     H/H 12.6/38.4    Morbid obesity     OAB (overactive bladder)     Peripheral edema     Renal insufficiency 2024     Rosacea     on Doxycycline    Stress incontinence     Syncope 02/2024    CT head no actue hemorrhage    Vaginal atrophy     Vitamin D deficiency     Wears glasses      Past Surgical History:   Procedure Laterality Date    BREAST BIOPSY      ? LATERALITY    BREAST BIOPSY Right 07/2019    usg     BREAST LUMPECTOMY Right 08/19/2019    CHOLECYSTECTOMY  2015    for stones w/ Dr. Barboza @ [facility]    COLONOSCOPY  2014    COLONOSCOPY  2017    ENDOSCOPY  2016    by Dr. Urias    ENDOSCOPY N/A 10/20/2017    Procedure: ESOPHAGOGASTRODUODENOSCOPY;  Surgeon: Guille Urias MD;  Location:  [facility];  Service:     GASTRIC BANDING REMOVAL N/A 12/21/2016    Procedure: GASTRIC BANDING REMOVAL LAPAROSCOPIC;  Surgeon: Guille Urias MD;  Location:  [facility];  Service:     GASTRIC SLEEVE LAPAROSCOPIC N/A 10/20/2017    Procedure: GASTRIC SLEEVE LAPAROSCOPIC, ;  Surgeon: Guille Urias MD;  Location:  [facility];  Service:     LAPAROSCOPIC GASTRIC BANDING  2008    s/p LAGB APS w/ HHR 11/2008 by GDW @ [facility].  full dissection hiatus, single stitch ant repair    MOUTH SURGERY      VENOUS ACCESS DEVICE (PORT) INSERTION Left 09/24/2019    VENOUS ACCESS DEVICE (PORT) INSERTION Left 10/24/2019    WISDOM TOOTH EXTRACTION       Outpatient Medications Marked as Taking for the 12/19/24 encounter (Office Visit) with Angelica Wiley MD   Medication Sig Dispense Refill    anastrozole (ARIMIDEX) 1 MG tablet TAKE 1 TABLET BY MOUTH ONCE DAILY 30 tablet 5    aspirin 81 MG EC tablet Take 1 tablet by mouth Daily.      Biotin 5000 MCG tablet Take  by mouth.      buPROPion XL (WELLBUTRIN XL) 300 MG 24 hr tablet Take 1 tablet by mouth Daily. 30 tablet 2    calcium carbonate, oyster shell, 500 MG tablet tablet Take 2 tablets by mouth Daily. 180 tablet 3    cetirizine (zyrTEC) 10 MG tablet Take  by mouth.      cyanocobalamin 1000 MCG/ML injection Inject 1 mL into the appropriate muscle as directed by prescriber Every 28 (Twenty-Eight) Days.       "esomeprazole (nexIUM) 40 MG capsule Take 1 capsule by mouth Every Morning Before Breakfast.      Ferrous Gluconate (IRON 27 PO) Take  by mouth.      folic acid (FOLVITE) 1 MG tablet Take 1 tablet by mouth Daily.      hydrochlorothiazide (MICROZIDE) 12.5 MG capsule Take 1 capsule by mouth As Needed.      lisinopril (PRINIVIL,ZESTRIL) 40 MG tablet Take 0.5 tablets by mouth 2 (Two) Times a Day. Takes 1/2 tab QD at this time per provider instruction      multivitamin with minerals (Multivitamin Adults) tablet tablet Take 1 tablet by mouth Daily.      tolterodine LA (DETROL LA) 4 MG 24 hr capsule Take 1 capsule by mouth Daily.      [DISCONTINUED] vitamin D (ERGOCALCIFEROL) 20468 UNITS capsule capsule 1 capsule Every 30 (Thirty) Days. sundays         Allergies   Allergen Reactions    Penicillins Hives and Swelling     Invanz given for AGBR surgery       Social History     Socioeconomic History    Marital status:    Tobacco Use    Smoking status: Never     Passive exposure: Never    Smokeless tobacco: Never   Vaping Use    Vaping status: Never Used   Substance and Sexual Activity    Alcohol use: No    Drug use: Never    Sexual activity: Not Currently     Partners: Male     Birth control/protection: Post-menopausal, None     Comment: spouse       /80 (BP Location: Left arm, Patient Position: Sitting)   Pulse 68   Temp 97.8 °F (36.6 °C)   Resp 16   Ht 160 cm (63\")   Wt 86.6 kg (191 lb)   SpO2 98%   BMI 33.83 kg/m²     Physical Exam  Constitutional:       General: She is not in acute distress.     Appearance: She is well-developed. She is not diaphoretic.   HENT:      Head: Normocephalic and atraumatic.      Mouth/Throat:      Pharynx: No oropharyngeal exudate.   Eyes:      Conjunctiva/sclera: Conjunctivae normal.      Pupils: Pupils are equal, round, and reactive to light.   Pulmonary:      Effort: Pulmonary effort is normal. No respiratory distress.   Abdominal:      General: There is no distension.      " Palpations: Abdomen is soft.   Skin:     General: Skin is warm and dry.      Coloration: Skin is not pale.   Neurological:      Mental Status: She is alert and oriented to person, place, and time.      Cranial Nerves: No cranial nerve deficit.   Psychiatric:         Behavior: Behavior normal.         Thought Content: Thought content normal.           Assessment:  7 years s/p LSG by JULY on 10/20/17     ICD-10-CM ICD-9-CM   1. Obesity, Class I, BMI 30-34.9  E66.811 278.00   2. Fatigue, unspecified type  R53.83 780.79   3. Postgastrectomy malabsorption  K91.2 579.3    Z90.3    4. Malnutrition screen  Z13.21 V77.2   5. Therapeutic drug monitoring  Z51.81 V58.83   6. Screening, iron deficiency anemia  Z13.0 V78.0   7. Hypovitaminosis D  E55.9 268.9   8. Paroxysmal atrial fibrillation  I48.0 427.31   9. Renal insufficiency  N28.9 593.9   10. Menopause  Z78.0 627.2   11. Hypertension, unspecified type  I10 401.9   12. Peripheral edema  R60.0 782.3   13. Gastroesophageal reflux disease, unspecified whether esophagitis present  K21.9 530.81   14. Cardiomegaly  I51.7 429.3              Plan:  Doing well. Continue w/ good food choices and healthy habits.  Continue protein >70g/day.  Continue routine exercise.  Routine bariatric labs ordered.  Continue vitamins w/ adjustments pending lab results.  Call w/ problems/concerns.     Improve protein.    The patient was instructed to follow up in 6 months, sooner if needed.    I spent 30 minutes caring for Francoise on this date of service. This time includes time spent by me in the following activities: preparing for the visit, reviewing tests, performing a medically appropriate examination and/or evaluation, counseling and educating the patient/family/caregiver, referring and communicating with other health care professionals, documenting information in the medical record, independently interpreting results and communicating that information with the patient/family/caregiver, care  coordination, ordering medications, ordering test(s), ordering procedure(s), obtaining a separately obtained history, and reviewing a separately obtained history      Angelica Wiley MD

## 2024-12-28 LAB
25(OH)D3+25(OH)D2 SERPL-MCNC: 59.5 NG/ML (ref 30–100)
ALBUMIN SERPL-MCNC: 4.2 G/DL (ref 3.9–4.9)
ALP SERPL-CCNC: 105 IU/L (ref 44–121)
ALT SERPL-CCNC: 8 IU/L (ref 0–32)
AST SERPL-CCNC: 9 IU/L (ref 0–40)
BASOPHILS # BLD AUTO: 0.1 X10E3/UL (ref 0–0.2)
BASOPHILS NFR BLD AUTO: 1 %
BILIRUB SERPL-MCNC: 0.5 MG/DL (ref 0–1.2)
BUN SERPL-MCNC: 23 MG/DL (ref 8–27)
BUN/CREAT SERPL: 17 (ref 12–28)
CALCIUM SERPL-MCNC: 9.9 MG/DL (ref 8.7–10.3)
CHLORIDE SERPL-SCNC: 107 MMOL/L (ref 96–106)
CO2 SERPL-SCNC: 23 MMOL/L (ref 20–29)
CREAT SERPL-MCNC: 1.38 MG/DL (ref 0.57–1)
EGFRCR SERPLBLD CKD-EPI 2021: 43 ML/MIN/1.73
EOSINOPHIL # BLD AUTO: 0.2 X10E3/UL (ref 0–0.4)
EOSINOPHIL NFR BLD AUTO: 2 %
ERYTHROCYTE [DISTWIDTH] IN BLOOD BY AUTOMATED COUNT: 12 % (ref 11.7–15.4)
FERRITIN SERPL-MCNC: 298 NG/ML (ref 15–150)
FOLATE SERPL-MCNC: >20 NG/ML
GLOBULIN SER CALC-MCNC: 2.5 G/DL (ref 1.5–4.5)
GLUCOSE SERPL-MCNC: 78 MG/DL (ref 70–99)
HCT VFR BLD AUTO: 36.8 % (ref 34–46.6)
HGB BLD-MCNC: 12 G/DL (ref 11.1–15.9)
IMM GRANULOCYTES # BLD AUTO: 0 X10E3/UL (ref 0–0.1)
IMM GRANULOCYTES NFR BLD AUTO: 1 %
IRON SERPL-MCNC: 83 UG/DL (ref 27–139)
LYMPHOCYTES # BLD AUTO: 2 X10E3/UL (ref 0.7–3.1)
LYMPHOCYTES NFR BLD AUTO: 25 %
MCH RBC QN AUTO: 30.5 PG (ref 26.6–33)
MCHC RBC AUTO-ENTMCNC: 32.6 G/DL (ref 31.5–35.7)
MCV RBC AUTO: 93 FL (ref 79–97)
METHYLMALONATE SERPL-SCNC: 175 NMOL/L (ref 0–378)
MONOCYTES # BLD AUTO: 0.5 X10E3/UL (ref 0.1–0.9)
MONOCYTES NFR BLD AUTO: 6 %
NEUTROPHILS # BLD AUTO: 5.3 X10E3/UL (ref 1.4–7)
NEUTROPHILS NFR BLD AUTO: 65 %
PLATELET # BLD AUTO: 232 X10E3/UL (ref 150–450)
POTASSIUM SERPL-SCNC: 4.3 MMOL/L (ref 3.5–5.2)
PREALB SERPL-MCNC: 21 MG/DL (ref 10–36)
PROT SERPL-MCNC: 6.7 G/DL (ref 6–8.5)
RBC # BLD AUTO: 3.94 X10E6/UL (ref 3.77–5.28)
SODIUM SERPL-SCNC: 145 MMOL/L (ref 134–144)
VIT B1 BLD-SCNC: 119.4 NMOL/L (ref 66.5–200)
WBC # BLD AUTO: 8 X10E3/UL (ref 3.4–10.8)

## 2025-01-21 ENCOUNTER — OFFICE VISIT (OUTPATIENT)
Dept: BARIATRICS/WEIGHT MGMT | Facility: CLINIC | Age: 65
End: 2025-01-21
Payer: COMMERCIAL

## 2025-01-21 VITALS
WEIGHT: 187.4 LBS | SYSTOLIC BLOOD PRESSURE: 112 MMHG | OXYGEN SATURATION: 98 % | HEIGHT: 63 IN | RESPIRATION RATE: 18 BRPM | HEART RATE: 70 BPM | BODY MASS INDEX: 33.2 KG/M2 | DIASTOLIC BLOOD PRESSURE: 72 MMHG

## 2025-01-21 DIAGNOSIS — R41.89 BRAIN FOG: ICD-10-CM

## 2025-01-21 DIAGNOSIS — E66.811 CLASS 1 OBESITY WITH SERIOUS COMORBIDITY AND BODY MASS INDEX (BMI) OF 33.0 TO 33.9 IN ADULT, UNSPECIFIED OBESITY TYPE: Primary | ICD-10-CM

## 2025-01-21 PROCEDURE — 99214 OFFICE O/P EST MOD 30 MIN: CPT | Performed by: NURSE PRACTITIONER

## 2025-01-21 RX ORDER — ERGOCALCIFEROL 1.25 MG/1
50000 CAPSULE, LIQUID FILLED ORAL
COMMUNITY
Start: 2025-01-02

## 2025-01-21 RX ORDER — METRONIDAZOLE 7.5 MG/G
GEL TOPICAL 2 TIMES DAILY
COMMUNITY
Start: 2025-01-06

## 2025-01-21 NOTE — ASSESSMENT & PLAN NOTE
Patient's (Body mass index is 33.2 kg/m².) indicates that they are obese (BMI >30) with health conditions that include hypertension and GERD . Weight is improving with treatment. BMI  is above average; BMI management plan is completed. We discussed low calorie, low carb based diet program, portion control, increasing exercise, management of depression/anxiety/stress to control compensatory eating, pharmacologic options including wellbutrin, and an leigha-based approach such as [place] Pal or Lose It.     I have instructed the patient to continue with pursuit of medical weight loss as a part of this program. Patient does meet criteria for use of anorectics at this time as BMI >30  and is not at treatment goal.     The current plan for this month includes:   - Continue to work on lifestyle behavioral changes.  - Prioritize protein, fiber, and hydration. Pack snacks to take to the hospital.  - Continue current medications.   - Treatment goal 180lb.

## 2025-01-21 NOTE — ASSESSMENT & PLAN NOTE
Admits to difficulty with short term memory if she has not eaten. Hx of b-12 deficiency, gets monthly injections. Encouraged to be more intentional with meals and increase protein and hydration. Reassess next visit.

## 2025-01-21 NOTE — PROGRESS NOTES
[facility]  [address]  [place] [address]     Office Note      Date: 2025  Patient Name: Francoise Kamara  MRN: [id number]  : 1960  Subjective  Subjective     Chief Complaint  Obesity Management follow-up          Francoise Kamara presents to [facility] for obesity management.   Patient is satisfied with weight loss progress. Appetite is moderately controlled. Reports no side effects of prescribed medications today. The patient is not taking multivitamin and is not taking fish oil. The patient is not using a food journal.      She has not been doing as well as she would like, her son had to have colon surgery (3 weeks ago) and she has been staying with him in the hospital 2-3 nights a week as he does not want to be left alone, hopefully he will get to go home by the end of the week.  She has not exercised and has just been eating what is available but trying to be mindful of what she eats and drinks.  She is aware that she is not hitting her protein goals and that she needs to work on that.  She has not been feeling hungry and believes that is one of the issues.  We did discuss some options on protein snack ideas for while she is at the hospital, as well as ways to increase her protein at home.    24 hour recall:  Breakfast: none  Lunch: grilled cheese and fries  Dinner: 1 slice of pepperoni pizza  Snack: none  Water Intake: 20 oz  Other beverages: 12 oz coke  Alcohol: none    The patient is exercising by walking with a FITT score of:    Frequency Intensity Time Strength Training   []   0, none []   0 []   0 [x]   0   [x]   1 (1-2x/week) []   1 (light) []   1 (<10 min) []   1 (1x/week)   []   2 (3-5x/week) [x]   2 (moderate) [x]   2 (10-20 min) []   2 (2x/week)   []   3 (daily) []   3 (moderately hard)  []   4 (very hard) []   3 (20-30 min)  []   4 (>30 min) []   3 (3-4x/week)             Review of Systems    Constitutional:  Positive for fatigue. Negative for appetite change.   Eyes:  Negative for visual disturbance.   Cardiovascular:  Negative for chest pain and palpitations.   Gastrointestinal:  Negative for constipation and indigestion.   Neurological:  Negative for light-headedness.        +brain fog   Psychiatric/Behavioral:  Positive for stress.    All other systems reviewed and are negative.        Current Outpatient Medications:     anastrozole (ARIMIDEX) 1 MG tablet, TAKE 1 TABLET BY MOUTH ONCE DAILY, Disp: 30 tablet, Rfl: 5    aspirin 81 MG EC tablet, Take 1 tablet by mouth Daily., Disp: , Rfl:     Biotin 5000 MCG tablet, Take  by mouth., Disp: , Rfl:     buPROPion XL (WELLBUTRIN XL) 300 MG 24 hr tablet, Take 1 tablet by mouth Daily., Disp: 30 tablet, Rfl: 2    calcium carbonate, oyster shell, 500 MG tablet tablet, Take 2 tablets by mouth Daily., Disp: 180 tablet, Rfl: 3    cetirizine (zyrTEC) 10 MG tablet, Take  by mouth., Disp: , Rfl:     cyanocobalamin 1000 MCG/ML injection, Inject 1 mL into the appropriate muscle as directed by prescriber Every 28 (Twenty-Eight) Days., Disp: , Rfl:     esomeprazole (nexIUM) 40 MG capsule, Take 1 capsule by mouth Every Morning Before Breakfast., Disp: , Rfl:     Ferrous Gluconate (IRON 27 PO), Take  by mouth., Disp: , Rfl:     folic acid (FOLVITE) 1 MG tablet, Take 1 tablet by mouth Daily., Disp: , Rfl:     hydrochlorothiazide (MICROZIDE) 12.5 MG capsule, Take 1 capsule by mouth As Needed., Disp: , Rfl:     lisinopril (PRINIVIL,ZESTRIL) 40 MG tablet, Take 0.5 tablets by mouth 2 (Two) Times a Day. Takes 1/2 tab QD at this time per provider instruction, Disp: , Rfl:     metroNIDAZOLE (METROGEL) 0.75 % gel, Apply  topically to the appropriate area as directed 2 (Two) Times a Day., Disp: , Rfl:     multivitamin with minerals (Multivitamin Adults) tablet tablet, Take 1 tablet by mouth Daily., Disp: , Rfl:     tolterodine LA (DETROL LA) 4 MG 24 hr capsule, Take 1 capsule by  "mouth Daily., Disp: , Rfl:     vitamin D (ERGOCALCIFEROL) 1.25 MG (70381 UT) capsule capsule, Take 1 capsule by mouth Every 7 (Seven) Days., Disp: , Rfl:     Objective   Start weight: 224.8  Total weight loss/%: -37.4 lb / -16.6 %  Change since last visit: -4.9 lb    Body mass index is 33.2 kg/m².   Body composition analysis completed and showed:   Body Fat %: 44.0    Measurements (in inches)  Waist Circumference: 42      Vital Signs:   /72 (BP Location: Left arm, Patient Position: Sitting, Cuff Size: Adult)   Pulse 70   Resp 18   Ht 160 cm (63\")   Wt 85 kg (187 lb 6.4 oz)   SpO2 98%   BMI 33.20 kg/m²     No LMP recorded. Patient is postmenopausal.    Physical Exam   General appears stated age and normal appearance   HEENT PERRLA, EOM intact, and conjunctivae normal   Chest/lungs Normal rate, Regular rhythm, and Breathing is unlabored   Extremities without edema   Neuro Good historian and No focal deficit   Skin Warm, dry, intact   Psych normal behavior, normal thought content, and normal concentration     Result Review :                Assessment / Plan        Diagnoses and all orders for this visit:    1. Class 1 obesity with serious comorbidity and body mass index (BMI) of 33.0 to 33.9 in adult, unspecified obesity type (Primary)  Overview:  s/p LSG by JULY on 10/20/17. Presurgery weight: 262 pounds. Angelo: 201 (10/2018)  Goal:180lb  No 37- recent syncope and a-fib (2/2024)  Caution GLP-1 due to esophageal dysmotility and significant JAMISON   Consider: topamax, metformin, wellbutrin/naltrexone   SOKE      Assessment & Plan:  Patient's (Body mass index is 33.2 kg/m².) indicates that they are obese (BMI >30) with health conditions that include hypertension and GERD . Weight is improving with treatment. BMI  is above average; BMI management plan is completed. We discussed low calorie, low carb based diet program, portion control, increasing exercise, management of depression/anxiety/stress to control " compensatory eating, pharmacologic options including wellbutrin, and an leigha-based approach such as [employer] Pal or Lose It.     I have instructed the patient to continue with pursuit of medical weight loss as a part of this program. Patient does meet criteria for use of anorectics at this time as BMI >30  and is not at treatment goal.     The current plan for this month includes:   - Continue to work on lifestyle behavioral changes.  - Prioritize protein, fiber, and hydration. Pack snacks to take to the hospital.  - Continue current medications.   - Treatment goal 180lb.         2. Brain fog  Assessment & Plan:  Admits to difficulty with short term memory if she has not eaten. Hx of b-12 deficiency, gets monthly injections. Encouraged to be more intentional with meals and increase protein and hydration. Reassess next visit.             Follow Up   Return in about 1 month (around 2/21/2025) for Next scheduled follow up.  Patient was given instructions and counseling regarding her condition or for health maintenance advice. Please see specific information pulled into the AVS if appropriate.     Erica Ghosh, APRN  01/21/2025

## 2025-02-27 ENCOUNTER — OFFICE VISIT (OUTPATIENT)
Dept: BARIATRICS/WEIGHT MGMT | Facility: CLINIC | Age: 65
End: 2025-02-27
Payer: COMMERCIAL

## 2025-02-27 VITALS
OXYGEN SATURATION: 98 % | WEIGHT: 187.4 LBS | BODY MASS INDEX: 33.2 KG/M2 | DIASTOLIC BLOOD PRESSURE: 64 MMHG | SYSTOLIC BLOOD PRESSURE: 116 MMHG | RESPIRATION RATE: 18 BRPM | HEIGHT: 63 IN | HEART RATE: 68 BPM

## 2025-02-27 DIAGNOSIS — I10 HYPERTENSION, UNSPECIFIED TYPE: ICD-10-CM

## 2025-02-27 DIAGNOSIS — E66.811 CLASS 1 OBESITY WITH SERIOUS COMORBIDITY AND BODY MASS INDEX (BMI) OF 33.0 TO 33.9 IN ADULT, UNSPECIFIED OBESITY TYPE: Primary | ICD-10-CM

## 2025-02-27 PROCEDURE — 99214 OFFICE O/P EST MOD 30 MIN: CPT | Performed by: NURSE PRACTITIONER

## 2025-02-27 RX ORDER — BUPROPION HYDROCHLORIDE 300 MG/1
300 TABLET ORAL DAILY
Qty: 30 TABLET | Refills: 0 | Status: SHIPPED | OUTPATIENT
Start: 2025-02-27

## 2025-02-27 NOTE — PROGRESS NOTES
[facility]  [address]  [place] [address]     Office Note      Date: 2025  Patient Name: Francoise Kamara  MRN: [id number]  : 1960  Subjective  Subjective     Chief Complaint  Obesity Management follow-up          Francoise Kamara presents to [facility] for obesity management.   Patient is satisfied with weight loss progress. Appetite is moderately controlled. Reports no side effects of prescribed medications today. The patient is not taking multivitamin and is not taking fish oil.  The patient is not using a food journal.     She has been working on increasing her protein intake, making sure to have protein snacks available when out running around. She is stopping when she fills full instead of continuing to eat. She is struggling with hydration- she does not like plain water, she is not a fan of the flavored water mixes.  She will continue to work on increasing her protein and being mindful of her food choices.     24 hour recall:  Breakfast: Protein shake  Lunch: 6 chicken nuggets  Dinner: 8-10 bite size pieces steak  Snack: protein snack bar (20g)  Other beverages: Simply Lemonade, shahnaz suns  Alcohol: none    The patient is exercising by walking more using stairs and hills with a FITT score of:    Frequency Intensity Time Strength Training   []   0, none []   0 []   0 [x]   0   [x]   1 (1-2x/week) [x]   1 (light) []   1 (<10 min) []   1 (1x/week)   []   2 (3-5x/week) []   2 (moderate) [x]   2 (10-20 min) []   2 (2x/week)   []   3 (daily) []   3 (moderately hard)  []   4 (very hard) []   3 (20-30 min)  []   4 (>30 min) []   3 (3-4x/week)           Review of Systems   Constitutional:  Negative for appetite change and fatigue.   Eyes:  Negative for visual disturbance.   Cardiovascular:  Negative for chest pain and palpitations.   Gastrointestinal:  Negative for constipation and indigestion.   Neurological:   Negative for light-headedness.         Current Outpatient Medications:     anastrozole (ARIMIDEX) 1 MG tablet, TAKE 1 TABLET BY MOUTH ONCE DAILY, Disp: 30 tablet, Rfl: 5    aspirin 81 MG EC tablet, Take 1 tablet by mouth Daily., Disp: , Rfl:     Biotin 5000 MCG tablet, Take  by mouth., Disp: , Rfl:     buPROPion XL (WELLBUTRIN XL) 300 MG 24 hr tablet, Take 1 tablet by mouth Daily., Disp: 30 tablet, Rfl: 0    calcium carbonate, oyster shell, 500 MG tablet tablet, Take 2 tablets by mouth Daily., Disp: 180 tablet, Rfl: 3    cetirizine (zyrTEC) 10 MG tablet, Take  by mouth., Disp: , Rfl:     cyanocobalamin 1000 MCG/ML injection, Inject 1 mL into the appropriate muscle as directed by prescriber Every 28 (Twenty-Eight) Days., Disp: , Rfl:     esomeprazole (nexIUM) 40 MG capsule, Take 1 capsule by mouth Every Morning Before Breakfast., Disp: , Rfl:     Ferrous Gluconate (IRON 27 PO), Take  by mouth., Disp: , Rfl:     folic acid (FOLVITE) 1 MG tablet, Take 1 tablet by mouth Daily., Disp: , Rfl:     hydrochlorothiazide (MICROZIDE) 12.5 MG capsule, Take 1 capsule by mouth As Needed., Disp: , Rfl:     lisinopril (PRINIVIL,ZESTRIL) 40 MG tablet, Take 0.5 tablets by mouth 2 (Two) Times a Day. Takes 1/2 tab QD at this time per provider instruction, Disp: , Rfl:     metroNIDAZOLE (METROGEL) 0.75 % gel, Apply  topically to the appropriate area as directed 2 (Two) Times a Day., Disp: , Rfl:     multivitamin with minerals (Multivitamin Adults) tablet tablet, Take 1 tablet by mouth Daily., Disp: , Rfl:     tolterodine LA (DETROL LA) 4 MG 24 hr capsule, Take 1 capsule by mouth Daily., Disp: , Rfl:     vitamin D (ERGOCALCIFEROL) 1.25 MG (03469 UT) capsule capsule, Take 1 capsule by mouth Every 7 (Seven) Days., Disp: , Rfl:     Objective   Start weight: 224.8 pounds.    Total weight loss: -37.4 pounds/-16.64%  Change in weight since last visit: -0.2    Body mass index is 33.2 kg/m².   Body composition analysis completed and showed:  "  Body Fat %: 44.3%    Measurements (in inches)  Waist Circumference: 40      Vital Signs:   /64   Pulse 68   Resp 18   Ht 160 cm (63\")   Wt 85 kg (187 lb 6.4 oz)   SpO2 98%   BMI 33.20 kg/m²     No LMP recorded. Patient is postmenopausal.    Physical Exam   General appears stated age and normal appearance   HEENT PERRLA, EOM intact, and conjunctivae normal   Chest/lungs Normal rate, Regular rhythm, and Breathing is unlabored   Extremities without edema   Neuro Good historian and No focal deficit   Skin Warm, dry, intact   Psych normal behavior, normal thought content, and normal concentration     Result Review :                Assessment / Plan        Diagnoses and all orders for this visit:    1. Class 1 obesity with serious comorbidity and body mass index (BMI) of 33.0 to 33.9 in adult, unspecified obesity type (Primary)  Overview:  s/p LSG by JULY on 10/20/17. Presurgery weight: 262 pounds. Angelo: 201 (10/2018)  Goal:180lb  No 37- recent syncope and a-fib (2/2024)  Caution GLP-1 due to esophageal dysmotility and significant JAMISON   Consider: topamax, metformin, wellbutrin/naltrexone   SOKE      Assessment & Plan:  Patient's (Body mass index is 33.2 kg/m².) indicates that they are obese (BMI >30) with health conditions that include hypertension and GERD . Weight is improving with treatment. BMI  is above average; BMI management plan is completed. We discussed low calorie, low carb based diet program, portion control, increasing exercise, management of depression/anxiety/stress to control compensatory eating, pharmacologic options including wellbutrin, and an leigha-based approach such as [employer] Pal or Lose It.     I have instructed the patient to continue with pursuit of medical weight loss as a part of this program. Patient does meet criteria for use of anorectics at this time as BMI >30  and is not at treatment goal.     The current plan for this month includes:   - Continue to work on lifestyle " behavioral changes  - Continue to be intentional with protein, getting at least 60g/day.  - Work on  hydration.   - Treatment goal 180lb.      Orders:  -     buPROPion XL (WELLBUTRIN XL) 300 MG 24 hr tablet; Take 1 tablet by mouth Daily.  Dispense: 30 tablet; Refill: 0    2. Hypertension, unspecified type  Assessment & Plan:  Hypertension is stable and controlled  Continue current treatment regimen.  Weight loss.  Regular aerobic exercise.  Ambulatory blood pressure monitoring.  Blood pressure will be reassessed in 1 month.            Follow Up   Return in about 1 month (around 3/27/2025) for Next scheduled follow up.  Patient was given instructions and counseling regarding her condition or for health maintenance advice. Please see specific information pulled into the AVS if appropriate.     Erica Ghosh, APRN  02/27/2025

## 2025-02-27 NOTE — ASSESSMENT & PLAN NOTE
Patient's (Body mass index is 33.2 kg/m².) indicates that they are obese (BMI >30) with health conditions that include hypertension and GERD . Weight is improving with treatment. BMI  is above average; BMI management plan is completed. We discussed low calorie, low carb based diet program, portion control, increasing exercise, management of depression/anxiety/stress to control compensatory eating, pharmacologic options including wellbutrin, and an leigha-based approach such as [employer] Pal or Lose It.     I have instructed the patient to continue with pursuit of medical weight loss as a part of this program. Patient does meet criteria for use of anorectics at this time as BMI >30  and is not at treatment goal.     The current plan for this month includes:   - Continue to work on lifestyle behavioral changes  - Continue to be intentional with protein, getting at least 60g/day.  - Work on  hydration.   - Treatment goal 180lb.

## 2025-02-28 ENCOUNTER — HOSPITAL ENCOUNTER (OUTPATIENT)
Facility: HOSPITAL | Age: 65
Discharge: HOME | End: 2025-02-28
Admitting: INTERNAL MEDICINE
Payer: COMMERCIAL

## 2025-02-28 DIAGNOSIS — C50.411 MALIGNANT NEOPLASM OF UPPER-OUTER QUADRANT OF RIGHT BREAST IN FEMALE, ESTROGEN RECEPTOR POSITIVE: ICD-10-CM

## 2025-02-28 DIAGNOSIS — Z17.0 MALIGNANT NEOPLASM OF UPPER-OUTER QUADRANT OF RIGHT BREAST IN FEMALE, ESTROGEN RECEPTOR POSITIVE: ICD-10-CM

## 2025-02-28 PROCEDURE — A9577 INJ MULTIHANCE: HCPCS | Performed by: INTERNAL MEDICINE

## 2025-02-28 PROCEDURE — C8908 MRI W/O FOL W/CONT, BREAST,: HCPCS

## 2025-02-28 PROCEDURE — 25510000002 GADOBENATE DIMEGLUMINE 529 MG/ML SOLUTION: Performed by: INTERNAL MEDICINE

## 2025-02-28 PROCEDURE — C8937 CAD BREAST MRI: HCPCS

## 2025-02-28 RX ADMIN — GADOBENATE DIMEGLUMINE 17 ML: 529 INJECTION, SOLUTION INTRAVENOUS at 10:11

## 2025-03-27 ENCOUNTER — OFFICE VISIT (OUTPATIENT)
Dept: BARIATRICS/WEIGHT MGMT | Facility: CLINIC | Age: 65
End: 2025-03-27
Payer: COMMERCIAL

## 2025-03-27 VITALS
HEIGHT: 63 IN | WEIGHT: 187 LBS | DIASTOLIC BLOOD PRESSURE: 76 MMHG | HEART RATE: 70 BPM | BODY MASS INDEX: 33.13 KG/M2 | SYSTOLIC BLOOD PRESSURE: 112 MMHG

## 2025-03-27 DIAGNOSIS — Z71.3 NUTRITIONAL COUNSELING: ICD-10-CM

## 2025-03-27 DIAGNOSIS — E66.811 CLASS 1 OBESITY WITH SERIOUS COMORBIDITY AND BODY MASS INDEX (BMI) OF 33.0 TO 33.9 IN ADULT, UNSPECIFIED OBESITY TYPE: Primary | ICD-10-CM

## 2025-03-27 PROCEDURE — 99214 OFFICE O/P EST MOD 30 MIN: CPT | Performed by: NURSE PRACTITIONER

## 2025-03-27 RX ORDER — BUPROPION HYDROCHLORIDE 300 MG/1
300 TABLET ORAL DAILY
Qty: 30 TABLET | Refills: 2 | Status: SHIPPED | OUTPATIENT
Start: 2025-03-27

## 2025-03-27 NOTE — ASSESSMENT & PLAN NOTE
Patient's (Body mass index is 33.13 kg/m².) indicates that they are obese (BMI >30) with health conditions that include hypertension . Weight is improving with treatment. BMI  is above average; BMI management plan is completed. We discussed low calorie, low carb based diet program, portion control, increasing exercise, management of depression/anxiety/stress to control compensatory eating, pharmacologic options including wellbutrin, and an leigha-based approach such as [employer] Pal or Lose It.    I have instructed the patient to continue with pursuit of medical weight loss as a part of this program. Patient does meet criteria for use of anorectics at this time as BMI >30  and is not at treatment goal.     The current plan for this month includes:   - Continue to work on lifestyle behavioral changes  - Increase protein- set alarms  - Continue to increase physical activity  - Con't current medications, tolerating without side effects. Seems to have plateaued, maintaining 16.8% loss. Treatment goal is 180lb.         VTAMA Counseling: I discussed with the patient that VTAMA is not for use in the eyes, mouth or mouth. They should call the office if they develop any signs of allergic reactions to VTAMA. The patient verbalized understanding of the proper use and possible adverse effects of VTAMA.  All of the patient's questions and concerns were addressed.

## 2025-03-27 NOTE — PROGRESS NOTES
[facility]  [address]  [place] [address]     Office Note      Date: 2025  Patient Name: Francoise Kamara  MRN: [id number]  : 1960  Subjective  Subjective     Chief Complaint  Obesity Management follow-up          Francoise Kamara presents to [facility] for obesity management.   Patient is satisfied with weight loss progress. Appetite is moderately controlled. Reports no side effects of prescribed medications today. The patient is taking multivitamin and is not taking fish oil.  The patient is not using a food journal.      Still running around a lot with her grandsons health issues and her mother.  She is struggling still with her protein intake, she gets busy with work and forgets to eat or will not make time to eat. She is starting to carry protein snacks with her but forgets to eat them sometimes.  Discussed setting alarms.    24 hour recall:  Breakfast: none  Lunch: 6 chick annette a nuggets  Dinner: 2 squares pizza cheese, pepperoni  Snack: none  Water Intake: 16 oz  Other beverages: soda  Alcohol: none    The patient is exercising walking doing with a FITT score of:    Frequency Intensity Time Strength Training   []   0, none []   0 []   0 [x]   0   [x]   1 (1-2x/week) [x]   1 (light) []   1 (<10 min) []   1 (1x/week)   []   2 (3-5x/week) []   2 (moderate) [x]   2 (10-20 min) []   2 (2x/week)   []   3 (daily) []   3 (moderately hard)  []   4 (very hard) []   3 (20-30 min)  []   4 (>30 min) []   3 (3-4x/week)               Review of Systems   Constitutional:  Negative for appetite change and fatigue.   Eyes:  Negative for visual disturbance.   Cardiovascular:  Negative for chest pain and palpitations.   Gastrointestinal:  Negative for constipation and indigestion.   Neurological:  Negative for light-headedness.   All other systems reviewed and are negative.        Current Outpatient Medications:     anastrozole  (ARIMIDEX) 1 MG tablet, TAKE 1 TABLET BY MOUTH ONCE DAILY, Disp: 30 tablet, Rfl: 5    aspirin 81 MG EC tablet, Take 1 tablet by mouth Daily., Disp: , Rfl:     Biotin 5000 MCG tablet, Take  by mouth., Disp: , Rfl:     buPROPion XL (WELLBUTRIN XL) 300 MG 24 hr tablet, Take 1 tablet by mouth Daily., Disp: 30 tablet, Rfl: 2    calcium carbonate, oyster shell, 500 MG tablet tablet, Take 2 tablets by mouth Daily., Disp: 180 tablet, Rfl: 3    cetirizine (zyrTEC) 10 MG tablet, Take  by mouth., Disp: , Rfl:     cyanocobalamin 1000 MCG/ML injection, Inject 1 mL into the appropriate muscle as directed by prescriber Every 28 (Twenty-Eight) Days., Disp: , Rfl:     esomeprazole (nexIUM) 40 MG capsule, Take 1 capsule by mouth Every Morning Before Breakfast., Disp: , Rfl:     Ferrous Gluconate (IRON 27 PO), Take  by mouth., Disp: , Rfl:     folic acid (FOLVITE) 1 MG tablet, Take 1 tablet by mouth Daily., Disp: , Rfl:     hydrochlorothiazide (MICROZIDE) 12.5 MG capsule, Take 1 capsule by mouth As Needed., Disp: , Rfl:     lisinopril (PRINIVIL,ZESTRIL) 40 MG tablet, Take 0.5 tablets by mouth 2 (Two) Times a Day. Takes 1/2 tab QD at this time per provider instruction, Disp: , Rfl:     metroNIDAZOLE (METROGEL) 0.75 % gel, Apply  topically to the appropriate area as directed 2 (Two) Times a Day., Disp: , Rfl:     multivitamin with minerals (Multivitamin Adults) tablet tablet, Take 1 tablet by mouth Daily., Disp: , Rfl:     tolterodine LA (DETROL LA) 4 MG 24 hr capsule, Take 1 capsule by mouth Daily., Disp: , Rfl:     vitamin D (ERGOCALCIFEROL) 1.25 MG (46504 UT) capsule capsule, Take 1 capsule by mouth Every 7 (Seven) Days., Disp: , Rfl:     Objective   Start weight: 224.8 pounds.    Total weight loss: -37.8 pounds/-16.8%  Change in weight since last visit: -0.4    Body mass index is 33.13 kg/m².   Body composition analysis completed and showed:   Body Fat %: 43.7    Measurements (in inches)  Waist Circumference: 40      Vital Signs:  "  /76 (BP Location: Left arm, Patient Position: Sitting)   Pulse 70   Ht 160 cm (63\")   Wt 84.8 kg (187 lb)   BMI 33.13 kg/m²     No LMP recorded. Patient is postmenopausal.    Physical Exam   General appears stated age and normal appearance   HEENT PERRLA, EOM intact, and conjunctivae normal   Chest/lungs Normal rate, Regular rhythm, and Breathing is unlabored   Extremities without edema   Neuro Good historian and No focal deficit   Skin Warm, dry, intact   Psych normal behavior, normal thought content, and normal concentration     Result Review :                Assessment / Plan        Diagnoses and all orders for this visit:    1. Class 1 obesity with serious comorbidity and body mass index (BMI) of 33.0 to 33.9 in adult, unspecified obesity type (Primary)  Overview:  s/p LSG by JULY on 10/20/17. Presurgery weight: 262 pounds. Angelo: 201 (10/2018)  Goal:180lb  No 37- recent syncope and a-fib (2/2024)  Caution GLP-1 due to esophageal dysmotility and significant JAMISON   Consider: topamax, metformin, wellbutrin/naltrexone   SOKE      Assessment & Plan:  Patient's (Body mass index is 33.13 kg/m².) indicates that they are obese (BMI >30) with health conditions that include hypertension . Weight is improving with treatment. BMI  is above average; BMI management plan is completed. We discussed low calorie, low carb based diet program, portion control, increasing exercise, management of depression/anxiety/stress to control compensatory eating, pharmacologic options including wellbutrin, and an leigha-based approach such as [employer] Pal or Lose It.    I have instructed the patient to continue with pursuit of medical weight loss as a part of this program. Patient does meet criteria for use of anorectics at this time as BMI >30  and is not at treatment goal.     The current plan for this month includes:   - Continue to work on lifestyle behavioral changes  - Increase protein- set alarms  - Continue to increase physical " activity  - Con't current medications, tolerating without side effects. Seems to have plateaued, maintaining 16.8% loss. Treatment goal is 180lb.          Orders:  -     buPROPion XL (WELLBUTRIN XL) 300 MG 24 hr tablet; Take 1 tablet by mouth Daily.  Dispense: 30 tablet; Refill: 2    2. Nutritional counseling  Assessment & Plan:  Macro goals: <1000 calories  Protein min 53g/day, goal >65g/day  Net Carbs <125g/day    Continue to work on protein, no skipped meals, keep protein snacks on hand. Continue to work on hydration with goal of at least 50oz/day.            Follow Up   Return in about 6 weeks (around 5/8/2025) for Next scheduled follow up.  Patient was given instructions and counseling regarding her condition or for health maintenance advice. Please see specific information pulled into the AVS if appropriate.     Erica Ghosh, APRN  03/27/2025

## 2025-03-27 NOTE — ASSESSMENT & PLAN NOTE
Macro goals: <1000 calories  Protein min 53g/day, goal >65g/day  Net Carbs <125g/day    Continue to work on protein, no skipped meals, keep protein snacks on hand. Continue to work on hydration with goal of at least 50oz/day.

## 2025-05-05 ENCOUNTER — OFFICE VISIT (OUTPATIENT)
Age: 65
End: 2025-05-05
Payer: COMMERCIAL

## 2025-05-05 VITALS
DIASTOLIC BLOOD PRESSURE: 79 MMHG | WEIGHT: 190 LBS | SYSTOLIC BLOOD PRESSURE: 118 MMHG | HEART RATE: 76 BPM | TEMPERATURE: 96.5 F | OXYGEN SATURATION: 100 % | HEIGHT: 63 IN | BODY MASS INDEX: 33.66 KG/M2 | RESPIRATION RATE: 16 BRPM

## 2025-05-05 DIAGNOSIS — Z17.0 MALIGNANT NEOPLASM OF UPPER-OUTER QUADRANT OF RIGHT BREAST IN FEMALE, ESTROGEN RECEPTOR POSITIVE: Primary | ICD-10-CM

## 2025-05-05 DIAGNOSIS — C50.411 MALIGNANT NEOPLASM OF UPPER-OUTER QUADRANT OF RIGHT BREAST IN FEMALE, ESTROGEN RECEPTOR POSITIVE: Primary | ICD-10-CM

## 2025-05-05 DIAGNOSIS — M85.852 OSTEOPENIA OF BOTH HIPS: ICD-10-CM

## 2025-05-05 DIAGNOSIS — Z79.811 LONG TERM CURRENT USE OF AROMATASE INHIBITOR: ICD-10-CM

## 2025-05-05 DIAGNOSIS — M85.851 OSTEOPENIA OF BOTH HIPS: ICD-10-CM

## 2025-05-05 PROCEDURE — 99214 OFFICE O/P EST MOD 30 MIN: CPT | Performed by: INTERNAL MEDICINE

## 2025-05-05 RX ORDER — ALENDRONATE SODIUM 5 MG
5 TABLET ORAL WEEKLY
Qty: 12 TABLET | Refills: 3 | Status: SHIPPED | OUTPATIENT
Start: 2025-05-05

## 2025-05-05 NOTE — PROGRESS NOTES
PROBLEM LIST:  Oncology/Hematology History Overview Note   Lab Results   Component Value Date    FINALDX  08/19/2019     1. RIGHT BREAST LUMPECTOMY WITH NEEDLE LOCALIZATION:   Infiltrating and in-situ intermediate grade lobular carcinoma.  Bloom Denis score 6/9.  Infiltrating tumor size 2.2x1.5x1.0 cm.   Margins of resection negative for tumor with closest infiltrating margin inferior and superior measuring 4 mm each.   No lymphvascular invasion identified.   Presence of previous biopsy site identified.  See Template.  2. SENTINEL LYMPH NODE #1, RIGHT AXILLA, EXCISION:   Lymph node x1; positive for metastatic lobular carcinoma (1/1).  3. SENTINEL LYMPH NODE #2, RIGHT AXILLA, EXCISION:  Lymph node x1; positive for metastatic lobular carcinoma (1/1).  4. NONSENTINEL LYMPH NODE, RIGHT AXILLA, EXCISION:  Lymph node x1; positive for metastatic lobular carcinoma (1/1).     INVASIVE BREAST CANCER STAGING TEMPLATE:  TYPE OF SPECIMEN/PROCEDURE:  Needle localized lumpectomy  SPECIMEN LATERALITY: Right breast  TUMOR SITE: 12 o'clock   TUMOR SIZE: (Size of Largest Invasive Carcinoma): 2.2x1.5x1.0 cm   HISTOLOGIC TYPE OF INVASIVE CARCINOMA:  Lobular   GRADE: Intermediate   TUBULAR FORMATION:  3  MITOTIC ACTIVITY:  1  PLEOMORPHISM:  2  OVERALL SCORE: 6/9   DUCTAL CARCINOMA IN SITU (DCIS) EXTENT: 0  TUMOR EXTENSION (Only if structures present and involved):    SKIN: N/A    NIPPLE: N/A    SKELETAL MUSCLE: N/A   SURGICAL MARGINS (Uninvolved/positive): Uninvolved   INVASIVE CARCINOMA MARGINS (Uninvolved/Positive) Uninvolved   DISTANCE FROM CLOSEST MARGIN: 4 mm   SPECIFIC CLOSEST MARGIN: Inferior and superior   LCIS MARGINS (Uninvolved/Positive/No LCIS): Uninvolved   DISTANCE FROM CLOSEST MARGIN: 1 mm  SPECIFIC CLOSEST MARGIN: Inferior   LYMPH NODES (required only if lymph nodes are present in the specimen):  Present   NUMBER OF LYMPH NODES WITH MACROMETASTASIS: 3  NUMBER OF LYMPH NODES WITH MICROMETASTASIS: 0  NUMBER OF LYMPH  NODES WITH ISOLATED TUMOR CELLS: 0  TOTAL NUMBER OF LYMPH NODES EXAMINED (Including sentinel nodes): 3  NUMBER OF SENTINEL NODES EXAMINED: 2  EXTRANODAL EXTENSION OF TUMOR:  0  VASCULAR/LYMPHATIC INVASION:  Not identified     ESTROGEN RECEPTOR STATUS BY IHC METHOD: Positive   PROGESTERONE RECEPTOR STATUS BY IHC METHOD: Positive    HER-2/ysabel ONCOPROTEIN STATUS BY IHC METHOD:  Negative   HER-2/ysabel ONCOGENE STATUS BY IN SITU HYBRIDIZATION ANALYSIS:  Not performed   TREATMENT EFFECT (BREAST): None  TREATMENT EFFECT (NODES): None   ADDITIONAL PATHOLOGIC FINDINGS:  Previous biopsy site identified   OTHER STUDIES:  None  AJCC PATHOLOGIC STAGE:  (COMPLETED BY PATHOLOGIST, BASED ONLY ON TISSUE FINDINGS, MORE EXTENSIVE DISEASE MAY NOT BE KNOWN TO THE PATHOLOGIST)  pT=  2  pN=  2  pM=  Unknown    DGD/mbc             Malignant neoplasm of upper-outer quadrant of right breast in female, estrogen receptor positive   7/18/2019 Biopsy    Right breast     8/16/2019 Initial Diagnosis    Malignant neoplasm of upper-outer quadrant of right breast in female, estrogen receptor positive (CMS/HCC)     8/19/2019 Surgery    Surgery       Right breast lumpectomy with sentinel node biopsy by Dr. Stephanie Fraire     8/19/2019 Cancer Staged    Cancer Staging  Malignant neoplasm of upper-outer quadrant of right breast in female, estrogen receptor positive (CMS/HCC)  Staging form: Breast, AJCC 8th Edition  - Pathologic stage from 9/12/2019: Stage IB (pT2, pN1a, cM0, G2, ER: Positive, [place]: Positive, HER2: Negative) - Signed by Laila Thacker MD on 9/12/2019 9/19/2019 Imaging    Negative work-up for metastatic disease by CAT scan of the chest abdomen pelvis and bone scan     9/20/2019 -  Other Event    ECHO - Normal EF     9/23/2019 Surgery    Surgery       Port Placement     9/25/2019 - 11/22/2019 Chemotherapy    OP BREAST AC DD DOXOrubicin / Cyclophosphamide       12/6/2019 - 2/21/2020 Chemotherapy    OP BREAST PACLitaxel (weekly X 12)        3/9/2020 - 4/20/2020 Radiation    Radiation OncologyTreatment Course:  Francoise Kamara received 6000 cGy in 30 fractions to right breast & s/c via External Beam Radiation - EBRT.      Hormonal Therapy    Arimidex for at least 10 years         REASON FOR VISIT: Management of my breast cancer    HISTORY OF PRESENT ILLNESS:   65 y.o.  female presents today for management of her breast cancer.  She completed 4 cycles of dose dense Adriamycin and Cytoxan and Weekly Taxol in Feb/2020.  She has completed  adjuvant radiation in April 2020.  She is currently on Arimidex since then.    She has completed 6 years of adjuvant Arimidex so far.  The plan was for 10 years.  Patient is tolerating it without any significant issues.  DEXA shows osteopenia        Past Medical History:   Diagnosis Date    Atrial fibrillation 02/2024    syncopal episode, spontaneous cardioversion    Cardiomegaly     stable on CXR    Drug therapy     Dyspnea on exertion     Elevated LDL cholesterol level     Fatigue     Gall stones     GERD (gastroesophageal reflux disease)     on daily Protonix, EGD 10/2016. Sx's resolved since AGBR, even if doesn't take her PPI. serum h. pyl neg.  EGD [facility] 10/16 prior to AGBR  36 cm    History of radiation therapy 04/20/2020    right breast    Hx of radiation therapy     Hypertension     Hypoalbuminemia     admits to chronic undereating    Low HDL (under 40)     Malignant neoplasm of upper-outer quadrant of right breast in female, estrogen receptor positive 09/12/2019    Menopause     Microcytic red blood cells     H/H 12.6/38.4    Morbid obesity     OAB (overactive bladder)     Peripheral edema     Renal insufficiency 2/27/2024    Rosacea     on Doxycycline    Stress incontinence     Syncope 02/2024    CT head no actue hemorrhage    Vaginal atrophy     Vitamin D deficiency     Wears glasses      Social History     Socioeconomic History    Marital status:    Tobacco Use    Smoking status: Never      Passive exposure: Never    Smokeless tobacco: Never   Vaping Use    Vaping status: Never Used   Substance and Sexual Activity    Alcohol use: No    Drug use: Never    Sexual activity: Not Currently     Partners: Male     Birth control/protection: Post-menopausal, None     Comment: spouse     Family History   Problem Relation Age of Onset    Hypertension Mother     Sleep apnea Mother     Atrial fibrillation Mother     Sleep disorder Mother     Hypertension Father     Lung cancer Father     Cancer Father         Lung    No Known Problems Brother     Diabetes Son     ADD / ADHD Son     Cancer Maternal Uncle     Cancer Paternal Aunt     Breast cancer Paternal Aunt 45    Hypertension Paternal Uncle     Heart disease Paternal Uncle     Hypertension Maternal Grandfather     Stroke Maternal Grandfather     Heart disease Paternal Grandmother     Hypertension Paternal Grandmother     BRCA 1/2 Neg Hx     Ovarian cancer Neg Hx        Review of Systems:    Review of Systems   Constitutional:  Positive for fatigue.   HENT:  Negative.     Eyes: Negative.    Respiratory: Negative.     Cardiovascular: Negative.    Gastrointestinal: Negative.    Endocrine: Negative.    Genitourinary: Negative.     Musculoskeletal:  Positive for arthralgias.   Skin: Negative.    Neurological: Negative.    Hematological: Negative.    Psychiatric/Behavioral: Negative.        A comprehensive 14 point review of systems was performed and was negative except as mentioned.      Medications:        Current Outpatient Medications:     anastrozole (ARIMIDEX) 1 MG tablet, TAKE 1 TABLET BY MOUTH ONCE DAILY, Disp: 30 tablet, Rfl: 5    aspirin 81 MG EC tablet, Take 1 tablet by mouth Daily., Disp: , Rfl:     Biotin 5000 MCG tablet, Take  by mouth., Disp: , Rfl:     buPROPion XL (WELLBUTRIN XL) 300 MG 24 hr tablet, Take 1 tablet by mouth Daily., Disp: 30 tablet, Rfl: 2    calcium carbonate, oyster shell, 500 MG tablet tablet, Take 2 tablets by mouth Daily., Disp: 180  "tablet, Rfl: 3    cetirizine (zyrTEC) 10 MG tablet, Take  by mouth., Disp: , Rfl:     cyanocobalamin 1000 MCG/ML injection, Inject 1 mL into the appropriate muscle as directed by prescriber Every 28 (Twenty-Eight) Days., Disp: , Rfl:     esomeprazole (nexIUM) 40 MG capsule, Take 1 capsule by mouth Every Morning Before Breakfast., Disp: , Rfl:     Ferrous Gluconate (IRON 27 PO), Take  by mouth., Disp: , Rfl:     folic acid (FOLVITE) 1 MG tablet, Take 1 tablet by mouth Daily., Disp: , Rfl:     hydrochlorothiazide (MICROZIDE) 12.5 MG capsule, Take 1 capsule by mouth As Needed., Disp: , Rfl:     lisinopril (PRINIVIL,ZESTRIL) 40 MG tablet, Take 0.5 tablets by mouth 2 (Two) Times a Day. Takes 1/2 tab QD at this time per provider instruction, Disp: , Rfl:     metroNIDAZOLE (METROGEL) 0.75 % gel, Apply  topically to the appropriate area as directed 2 (Two) Times a Day., Disp: , Rfl:     multivitamin with minerals (Multivitamin Adults) tablet tablet, Take 1 tablet by mouth Daily., Disp: , Rfl:     tolterodine LA (DETROL LA) 4 MG 24 hr capsule, Take 1 capsule by mouth Daily., Disp: , Rfl:     vitamin D (ERGOCALCIFEROL) 1.25 MG (38527 UT) capsule capsule, Take 1 capsule by mouth Every 7 (Seven) Days., Disp: , Rfl:     alendronate (FOSAMAX) 5 MG tablet, Take 1 tablet by mouth 1 (One) Time Per Week., Disp: 12 tablet, Rfl: 3      ALLERGIES:    Allergies   Allergen Reactions    Penicillins Hives and Swelling     Invanz given for AGBR surgery         Physical Exam    VITAL SIGNS:  /79 Comment: LUE  Pulse 76   Temp 96.5 °F (35.8 °C) (Temporal)   Resp 16   Ht 160 cm (63\")   Wt 86.2 kg (190 lb)   SpO2 100% Comment: RA  BMI 33.66 kg/m²     Wt Readings from Last 3 Encounters:   05/05/25 86.2 kg (190 lb)   03/27/25 84.8 kg (187 lb)   02/27/25 85 kg (187 lb 6.4 oz)       Body mass index is 33.66 kg/m². Body surface area is 1.89 meters squared.         Performance Status: 0    General: well appearing, in no acute " distress  HEENT: sclera anicteric, oropharynx clear, neck is supple  Lymphatics: no cervical, supraclavicular, or axillary adenopathy  Cardiovascular: regular rate and rhythm, no murmurs, rubs or gallops  Lungs: clear to auscultation bilaterally  Abdomen: soft, nontender, nondistended.  No palpable organomegaly  Extremities: no lower extremity edema  Skin: no rashes, lesions, bruising, or petechiae  Msk:  Shows no weakness of the large muscle groups  Psych: Mood is stable  Port in the left chest wall      RECENT LABS:    Lab Results   Component Value Date    HGB 12.0 12/19/2024    HCT 36.8 12/19/2024    MCV 93 12/19/2024     12/19/2024    WBC 8.0 12/19/2024    NEUTROABS 5.3 12/19/2024    LYMPHSABS 2.0 12/19/2024    MONOSABS 0.5 12/19/2024    EOSABS 0.2 12/19/2024    BASOSABS 0.1 12/19/2024       Lab Results   Component Value Date    GLUCOSE 78 12/19/2024    BUN 23 12/19/2024    CREATININE 1.38 (H) 12/19/2024     (H) 12/19/2024    K 4.3 12/19/2024     (H) 12/19/2024    CO2 23 12/19/2024    CALCIUM 9.9 12/19/2024    PROTEINTOT 6.7 12/19/2024    ALBUMIN 4.2 12/19/2024    BILITOT 0.5 12/19/2024    ALKPHOS 105 12/19/2024    AST 9 12/19/2024    ALT 8 12/19/2024           Assessment/Plan    1. Stage IB infiltrating intermediate grade lobular carcinoma of the right breast with node positivity.  Continue Arimidex for 10 years.  Continue yearly mammogram.    2.  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  Will start weekly Fosamax.          Laila Thacker MD  [facility] Hematology and Oncology    Return in (Approximately): 1 year    No orders of the defined types were placed in this encounter.      5/5/2025

## 2025-05-05 NOTE — LETTER
May 5, 2025     Ezekiel Ba MD  [address]  [place] [address]    Patient: Francoise Kamara   YOB: 1960   Date of Visit: 5/5/2025     Dear Ezekiel Ba MD:       Thank you for referring Francoise Kamara to me for evaluation. Below are the relevant portions of my assessment and plan of care.    If you have questions, please do not hesitate to call me. I look forward to following Francoise along with you.         Sincerely,        Laila Thacker MD        CC: No Recipients    Laila Thacker MD  05/05/25 [id number]  Sign when Signing Visit  PROBLEM LIST:  Oncology/Hematology History Overview Note   Lab Results   Component Value Date    FINALDX  08/19/2019     1. RIGHT BREAST LUMPECTOMY WITH NEEDLE LOCALIZATION:   Infiltrating and in-situ intermediate grade lobular carcinoma.  Bloom Denis score 6/9.  Infiltrating tumor size 2.2x1.5x1.0 cm.   Margins of resection negative for tumor with closest infiltrating margin inferior and superior measuring 4 mm each.   No lymphvascular invasion identified.   Presence of previous biopsy site identified.  See Template.  2. SENTINEL LYMPH NODE #1, RIGHT AXILLA, EXCISION:   Lymph node x1; positive for metastatic lobular carcinoma (1/1).  3. SENTINEL LYMPH NODE #2, RIGHT AXILLA, EXCISION:  Lymph node x1; positive for metastatic lobular carcinoma (1/1).  4. NONSENTINEL LYMPH NODE, RIGHT AXILLA, EXCISION:  Lymph node x1; positive for metastatic lobular carcinoma (1/1).     INVASIVE BREAST CANCER STAGING TEMPLATE:  TYPE OF SPECIMEN/PROCEDURE:  Needle localized lumpectomy  SPECIMEN LATERALITY: Right breast  TUMOR SITE: 12 o'clock   TUMOR SIZE: (Size of Largest Invasive Carcinoma): 2.2x1.5x1.0 cm   HISTOLOGIC TYPE OF INVASIVE CARCINOMA:  Lobular   GRADE: Intermediate   TUBULAR FORMATION:  3  MITOTIC ACTIVITY:  1  PLEOMORPHISM:  2  OVERALL SCORE: 6/9   DUCTAL CARCINOMA IN SITU (DCIS) EXTENT: 0  TUMOR EXTENSION (Only if structures present and involved):    SKIN:  N/A    NIPPLE: N/A    SKELETAL MUSCLE: N/A   SURGICAL MARGINS (Uninvolved/positive): Uninvolved   INVASIVE CARCINOMA MARGINS (Uninvolved/Positive) Uninvolved   DISTANCE FROM CLOSEST MARGIN: 4 mm   SPECIFIC CLOSEST MARGIN: Inferior and superior   LCIS MARGINS (Uninvolved/Positive/No LCIS): Uninvolved   DISTANCE FROM CLOSEST MARGIN: 1 mm  SPECIFIC CLOSEST MARGIN: Inferior   LYMPH NODES (required only if lymph nodes are present in the specimen):  Present   NUMBER OF LYMPH NODES WITH MACROMETASTASIS: 3  NUMBER OF LYMPH NODES WITH MICROMETASTASIS: 0  NUMBER OF LYMPH NODES WITH ISOLATED TUMOR CELLS: 0  TOTAL NUMBER OF LYMPH NODES EXAMINED (Including sentinel nodes): 3  NUMBER OF SENTINEL NODES EXAMINED: 2  EXTRANODAL EXTENSION OF TUMOR:  0  VASCULAR/LYMPHATIC INVASION:  Not identified     ESTROGEN RECEPTOR STATUS BY IHC METHOD: Positive   PROGESTERONE RECEPTOR STATUS BY IHC METHOD: Positive    HER-2/ysabel ONCOPROTEIN STATUS BY IHC METHOD:  Negative   HER-2/ysabel ONCOGENE STATUS BY IN SITU HYBRIDIZATION ANALYSIS:  Not performed   TREATMENT EFFECT (BREAST): None  TREATMENT EFFECT (NODES): None   ADDITIONAL PATHOLOGIC FINDINGS:  Previous biopsy site identified   OTHER STUDIES:  None  AJCC PATHOLOGIC STAGE:  (COMPLETED BY PATHOLOGIST, BASED ONLY ON TISSUE FINDINGS, MORE EXTENSIVE DISEASE MAY NOT BE KNOWN TO THE PATHOLOGIST)  pT=  2  pN=  2  pM=  Unknown    DGD/mbc             Malignant neoplasm of upper-outer quadrant of right breast in female, estrogen receptor positive   7/18/2019 Biopsy    Right breast     8/16/2019 Initial Diagnosis    Malignant neoplasm of upper-outer quadrant of right breast in female, estrogen receptor positive (CMS/HCC)     8/19/2019 Surgery    Surgery       Right breast lumpectomy with sentinel node biopsy by Dr. Stephanie Fraire     8/19/2019 Cancer Staged    Cancer Staging  Malignant neoplasm of upper-outer quadrant of right breast in female, estrogen receptor positive (CMS/HCC)  Staging form: Breast, AJCC  8th Edition  - Pathologic stage from 9/12/2019: Stage IB (pT2, pN1a, cM0, G2, ER: Positive, [place]: Positive, HER2: Negative) - Signed by Laila Thacker MD on 9/12/2019 9/19/2019 Imaging    Negative work-up for metastatic disease by CAT scan of the chest abdomen pelvis and bone scan     9/20/2019 -  Other Event    ECHO - Normal EF     9/23/2019 Surgery    Surgery       Port Placement     9/25/2019 - 11/22/2019 Chemotherapy    OP BREAST AC DD DOXOrubicin / Cyclophosphamide       12/6/2019 - 2/21/2020 Chemotherapy    OP BREAST PACLitaxel (weekly X 12)       3/9/2020 - 4/20/2020 Radiation    Radiation OncologyTreatment Course:  Francoise Kamara received 6000 cGy in 30 fractions to right breast & s/c via External Beam Radiation - EBRT.      Hormonal Therapy    Arimidex for at least 10 years         REASON FOR VISIT: Management of my breast cancer    HISTORY OF PRESENT ILLNESS:   65 y.o.  female presents today for management of her breast cancer.  She completed 4 cycles of dose dense Adriamycin and Cytoxan and Weekly Taxol in Feb/2020.  She has completed  adjuvant radiation in April 2020.  She is currently on Arimidex since then.    She has completed 6 years of adjuvant Arimidex so far.  The plan was for 10 years.  Patient is tolerating it without any significant issues.  DEXA shows osteopenia        Past Medical History:   Diagnosis Date   • Atrial fibrillation 02/2024    syncopal episode, spontaneous cardioversion   • Cardiomegaly     stable on CXR   • Drug therapy    • Dyspnea on exertion    • Elevated LDL cholesterol level    • Fatigue    • Gall stones    • GERD (gastroesophageal reflux disease)     on daily Protonix, EGD 10/2016. Sx's resolved since AGBR, even if doesn't take her PPI. serum h. pyl neg.  EGD [facility] 10/16 prior to AGBR  36 cm   • History of radiation therapy 04/20/2020    right breast   • Hx of radiation therapy    • Hypertension    • Hypoalbuminemia     admits to chronic undereating   •  Low HDL (under 40)    • Malignant neoplasm of upper-outer quadrant of right breast in female, estrogen receptor positive 09/12/2019   • Menopause    • Microcytic red blood cells     H/H 12.6/38.4   • Morbid obesity    • OAB (overactive bladder)    • Peripheral edema    • Renal insufficiency 2/27/2024   • Rosacea     on Doxycycline   • Stress incontinence    • Syncope 02/2024    CT head no actue hemorrhage   • Vaginal atrophy    • Vitamin D deficiency    • Wears glasses      Social History     Socioeconomic History   • Marital status:    Tobacco Use   • Smoking status: Never     Passive exposure: Never   • Smokeless tobacco: Never   Vaping Use   • Vaping status: Never Used   Substance and Sexual Activity   • Alcohol use: No   • Drug use: Never   • Sexual activity: Not Currently     Partners: Male     Birth control/protection: Post-menopausal, None     Comment: spouse     Family History   Problem Relation Age of Onset   • Hypertension Mother    • Sleep apnea Mother    • Atrial fibrillation Mother    • Sleep disorder Mother    • Hypertension Father    • Lung cancer Father    • Cancer Father         Lung   • No Known Problems Brother    • Diabetes Son    • ADD / ADHD Son    • Cancer Maternal Uncle    • Cancer Paternal Aunt    • Breast cancer Paternal Aunt 45   • Hypertension Paternal Uncle    • Heart disease Paternal Uncle    • Hypertension Maternal Grandfather    • Stroke Maternal Grandfather    • Heart disease Paternal Grandmother    • Hypertension Paternal Grandmother    • BRCA 1/2 Neg Hx    • Ovarian cancer Neg Hx        Review of Systems:    Review of Systems   Constitutional:  Positive for fatigue.   HENT:  Negative.     Eyes: Negative.    Respiratory: Negative.     Cardiovascular: Negative.    Gastrointestinal: Negative.    Endocrine: Negative.    Genitourinary: Negative.     Musculoskeletal:  Positive for arthralgias.   Skin: Negative.    Neurological: Negative.    Hematological: Negative.     Psychiatric/Behavioral: Negative.        A comprehensive 14 point review of systems was performed and was negative except as mentioned.      Medications:        Current Outpatient Medications:   •  anastrozole (ARIMIDEX) 1 MG tablet, TAKE 1 TABLET BY MOUTH ONCE DAILY, Disp: 30 tablet, Rfl: 5  •  aspirin 81 MG EC tablet, Take 1 tablet by mouth Daily., Disp: , Rfl:   •  Biotin 5000 MCG tablet, Take  by mouth., Disp: , Rfl:   •  buPROPion XL (WELLBUTRIN XL) 300 MG 24 hr tablet, Take 1 tablet by mouth Daily., Disp: 30 tablet, Rfl: 2  •  calcium carbonate, oyster shell, 500 MG tablet tablet, Take 2 tablets by mouth Daily., Disp: 180 tablet, Rfl: 3  •  cetirizine (zyrTEC) 10 MG tablet, Take  by mouth., Disp: , Rfl:   •  cyanocobalamin 1000 MCG/ML injection, Inject 1 mL into the appropriate muscle as directed by prescriber Every 28 (Twenty-Eight) Days., Disp: , Rfl:   •  esomeprazole (nexIUM) 40 MG capsule, Take 1 capsule by mouth Every Morning Before Breakfast., Disp: , Rfl:   •  Ferrous Gluconate (IRON 27 PO), Take  by mouth., Disp: , Rfl:   •  folic acid (FOLVITE) 1 MG tablet, Take 1 tablet by mouth Daily., Disp: , Rfl:   •  hydrochlorothiazide (MICROZIDE) 12.5 MG capsule, Take 1 capsule by mouth As Needed., Disp: , Rfl:   •  lisinopril (PRINIVIL,ZESTRIL) 40 MG tablet, Take 0.5 tablets by mouth 2 (Two) Times a Day. Takes 1/2 tab QD at this time per provider instruction, Disp: , Rfl:   •  metroNIDAZOLE (METROGEL) 0.75 % gel, Apply  topically to the appropriate area as directed 2 (Two) Times a Day., Disp: , Rfl:   •  multivitamin with minerals (Multivitamin Adults) tablet tablet, Take 1 tablet by mouth Daily., Disp: , Rfl:   •  tolterodine LA (DETROL LA) 4 MG 24 hr capsule, Take 1 capsule by mouth Daily., Disp: , Rfl:   •  vitamin D (ERGOCALCIFEROL) 1.25 MG (27402 UT) capsule capsule, Take 1 capsule by mouth Every 7 (Seven) Days., Disp: , Rfl:   •  alendronate (FOSAMAX) 5 MG tablet, Take 1 tablet by mouth 1 (One) Time  "Per Week., Disp: 12 tablet, Rfl: 3      ALLERGIES:    Allergies   Allergen Reactions   • Penicillins Hives and Swelling     Invanz given for AGBR surgery         Physical Exam    VITAL SIGNS:  /79 Comment: LUE  Pulse 76   Temp 96.5 °F (35.8 °C) (Temporal)   Resp 16   Ht 160 cm (63\")   Wt 86.2 kg (190 lb)   SpO2 100% Comment: RA  BMI 33.66 kg/m²     Wt Readings from Last 3 Encounters:   05/05/25 86.2 kg (190 lb)   03/27/25 84.8 kg (187 lb)   02/27/25 85 kg (187 lb 6.4 oz)       Body mass index is 33.66 kg/m². Body surface area is 1.89 meters squared.         Performance Status: 0    General: well appearing, in no acute distress  HEENT: sclera anicteric, oropharynx clear, neck is supple  Lymphatics: no cervical, supraclavicular, or axillary adenopathy  Cardiovascular: regular rate and rhythm, no murmurs, rubs or gallops  Lungs: clear to auscultation bilaterally  Abdomen: soft, nontender, nondistended.  No palpable organomegaly  Extremities: no lower extremity edema  Skin: no rashes, lesions, bruising, or petechiae  Msk:  Shows no weakness of the large muscle groups  Psych: Mood is stable  Port in the left chest wall      RECENT LABS:    Lab Results   Component Value Date    HGB 12.0 12/19/2024    HCT 36.8 12/19/2024    MCV 93 12/19/2024     12/19/2024    WBC 8.0 12/19/2024    NEUTROABS 5.3 12/19/2024    LYMPHSABS 2.0 12/19/2024    MONOSABS 0.5 12/19/2024    EOSABS 0.2 12/19/2024    BASOSABS 0.1 12/19/2024       Lab Results   Component Value Date    GLUCOSE 78 12/19/2024    BUN 23 12/19/2024    CREATININE 1.38 (H) 12/19/2024     (H) 12/19/2024    K 4.3 12/19/2024     (H) 12/19/2024    CO2 23 12/19/2024    CALCIUM 9.9 12/19/2024    PROTEINTOT 6.7 12/19/2024    ALBUMIN 4.2 12/19/2024    BILITOT 0.5 12/19/2024    ALKPHOS 105 12/19/2024    AST 9 12/19/2024    ALT 8 12/19/2024           Assessment/Plan    1. Stage IB infiltrating intermediate grade lobular carcinoma of the right breast with " node positivity.  Continue Arimidex for 10 years.  Continue yearly mammogram.    2.  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  Will start weekly Fosamax.          Laila Thacker MD  [facility] Hematology and Oncology    Return in (Approximately): 1 year    No orders of the defined types were placed in this encounter.      5/5/2025

## 2025-05-15 RX ORDER — CALCIUM CARBONATE 500(1250)
1000 TABLET ORAL DAILY
Qty: 60 TABLET | Refills: 0 | Status: SHIPPED | OUTPATIENT
Start: 2025-05-15

## 2025-05-29 ENCOUNTER — OFFICE VISIT (OUTPATIENT)
Dept: BARIATRICS/WEIGHT MGMT | Facility: CLINIC | Age: 65
End: 2025-05-29
Payer: COMMERCIAL

## 2025-05-29 VITALS
DIASTOLIC BLOOD PRESSURE: 78 MMHG | SYSTOLIC BLOOD PRESSURE: 136 MMHG | HEART RATE: 80 BPM | HEIGHT: 63 IN | BODY MASS INDEX: 32.89 KG/M2 | WEIGHT: 185.6 LBS

## 2025-05-29 DIAGNOSIS — E66.811 OBESITY, CLASS I, BMI 30-34.9: Primary | ICD-10-CM

## 2025-05-29 DIAGNOSIS — I10 HYPERTENSION, UNSPECIFIED TYPE: ICD-10-CM

## 2025-05-29 PROCEDURE — 99214 OFFICE O/P EST MOD 30 MIN: CPT | Performed by: NURSE PRACTITIONER

## 2025-05-29 RX ORDER — BUPROPION HYDROCHLORIDE 300 MG/1
300 TABLET ORAL DAILY
Qty: 30 TABLET | Refills: 2 | Status: SHIPPED | OUTPATIENT
Start: 2025-05-29

## 2025-05-29 NOTE — ASSESSMENT & PLAN NOTE
Hypertension is stable and controlled.  Continue current treatment regimen.  Weight loss.  Regular aerobic exercise.  Ambulatory blood pressure monitoring.  Blood pressure will be reassessed in 1 month.

## 2025-05-29 NOTE — PROGRESS NOTES
[facility]  [address]  [place] [address]     Office Note      Date: 2025  Patient Name: Francoise Kamara  MRN: [id number]  : 1960  Subjective  Subjective     Chief Complaint  Obesity Management follow-up          Francoise Kamara presents to [facility] for obesity management.   Patient is satisfied with weight loss progress. Appetite is moderately controlled. Reports no side effects of prescribed medications today. The patient is not taking multivitamin and is not taking fish oil.  The patient is not using a food journal.     She is still struggling to get in her protein, she does not drink water.  Discussed the importance of staying hydrated and of meeting her protein goals. She does not get hungry anymore, and has to force herself to eat.     24 hour recall:  Breakfast: protein shake  Lunch: slice pork tenderloin, 2 new potatoes  Dinner: 1 big mouth bite (hamburger slider)  Snack: none  Water Intake: none (she does eat ice pellets during the day)  Other beverages: simply lemonade, shahnaz sun, occasional soda  Alcohol: none    The patient is exercising with a FITT score of:    Frequency Intensity Time Strength Training   []   0, none []   0 []   0 [x]   0   [x]   1 (1-2x/week) [x]   1 (light) []   1 (<10 min) []   1 (1x/week)   []   2 (3-5x/week) []   2 (moderate) [x]   2 (10-20 min) []   2 (2x/week)   []   3 (daily) []   3 (moderately hard)  []   4 (very hard) []   3 (20-30 min)  []   4 (>30 min) []   3 (3-4x/week)               Review of Systems   Constitutional:  Negative for appetite change and fatigue.   Eyes:  Negative for visual disturbance.   Cardiovascular:  Negative for chest pain and palpitations.   Gastrointestinal:  Negative for constipation and indigestion.   Neurological:  Negative for light-headedness.         Current Outpatient Medications:     alendronate (FOSAMAX) 5 MG tablet, Take 1 tablet by  mouth 1 (One) Time Per Week., Disp: 12 tablet, Rfl: 3    anastrozole (ARIMIDEX) 1 MG tablet, TAKE 1 TABLET BY MOUTH ONCE DAILY, Disp: 30 tablet, Rfl: 5    aspirin 81 MG EC tablet, Take 1 tablet by mouth Daily., Disp: , Rfl:     Biotin 5000 MCG tablet, Take  by mouth., Disp: , Rfl:     buPROPion XL (WELLBUTRIN XL) 300 MG 24 hr tablet, Take 1 tablet by mouth Daily., Disp: 30 tablet, Rfl: 2    calcium carbonate, oyster shell, 500 MG tablet tablet, TAKE 2 TABLETS BY MOUTH ONCE DAILY, Disp: 60 tablet, Rfl: 0    cetirizine (zyrTEC) 10 MG tablet, Take  by mouth., Disp: , Rfl:     cyanocobalamin 1000 MCG/ML injection, Inject 1 mL into the appropriate muscle as directed by prescriber Every 28 (Twenty-Eight) Days., Disp: , Rfl:     esomeprazole (nexIUM) 40 MG capsule, Take 1 capsule by mouth Every Morning Before Breakfast., Disp: , Rfl:     Ferrous Gluconate (IRON 27 PO), Take  by mouth., Disp: , Rfl:     folic acid (FOLVITE) 1 MG tablet, Take 1 tablet by mouth Daily., Disp: , Rfl:     hydrochlorothiazide (MICROZIDE) 12.5 MG capsule, Take 1 capsule by mouth As Needed., Disp: , Rfl:     lisinopril (PRINIVIL,ZESTRIL) 40 MG tablet, Take 0.5 tablets by mouth 2 (Two) Times a Day. Takes 1/2 tab QD at this time per provider instruction, Disp: , Rfl:     metroNIDAZOLE (METROGEL) 0.75 % gel, Apply  topically to the appropriate area as directed 2 (Two) Times a Day., Disp: , Rfl:     multivitamin with minerals (Multivitamin Adults) tablet tablet, Take 1 tablet by mouth Daily., Disp: , Rfl:     tolterodine LA (DETROL LA) 4 MG 24 hr capsule, Take 1 capsule by mouth Daily., Disp: , Rfl:     vitamin D (ERGOCALCIFEROL) 1.25 MG (49375 UT) capsule capsule, Take 1 capsule by mouth Every 7 (Seven) Days., Disp: , Rfl:     Objective   Start weight: 224.8 pounds.    Total weight loss: -39.2 pounds/-17.43%  Change in weight since last visit: -1.4      Body mass index is 32.88 kg/m².   Body composition analysis completed and showed:   Body Fat %:  "42.6    Measurements (in inches)  Waist Circumference: 43      Vital Signs:   /78 (BP Location: Left arm, Patient Position: Sitting)   Pulse 80   Ht 160 cm (63\")   Wt 84.2 kg (185 lb 9.6 oz)   BMI 32.88 kg/m²     No LMP recorded. Patient is postmenopausal.    Physical Exam   General appears stated age and normal appearance   HEENT PERRLA, EOM intact, and conjunctivae normal   Chest/lungs Normal rate, Regular rhythm, and Breathing is unlabored   Extremities without edema   Neuro Good historian and No focal deficit   Skin Warm, dry, intact   Psych normal behavior, normal thought content, and normal concentration     Result Review :                Assessment / Plan        Diagnoses and all orders for this visit:    1. Obesity, Class I, BMI 30-34.9 (Primary)  Overview:  s/p LSG by JULY on 10/20/17. Presurgery weight: 262 pounds. Angelo: 201 (10/2018)  Goal:180lb  No 37- recent syncope and a-fib (2/2024)  Caution GLP-1 due to esophageal dysmotility and significant JAMISON   Consider: topamax, metformin, wellbutrin/naltrexone   SOKE      Assessment & Plan:  Patient's (Body mass index is 32.88 kg/m².) indicates that they are obese (BMI >30) with health conditions that include hypertension . Weight is improving with treatment. BMI  is above average; BMI management plan is completed. We discussed low calorie, low carb based diet program, portion control, increasing exercise, management of depression/anxiety/stress to control compensatory eating, pharmacologic options including wellbutrin, and an leigha-based approach such as [employer] Pal or Lose It.     I have instructed the patient to continue with pursuit of medical weight loss as a part of this program. Patient does meet criteria for use of anorectics at this time as BMI >30  and is not at treatment goal.     The current plan for this month includes:   - Continue to work on lifestyle behavioral changes  - Increase hydration- keep urine pale yellow. I am requesting a " copy of labwork she had in February.   - Con't current treatment- is at her lowest weight in at least 30 years.   - Treatment goal 180lb.       Orders:  -     buPROPion XL (WELLBUTRIN XL) 300 MG 24 hr tablet; Take 1 tablet by mouth Daily.  Dispense: 30 tablet; Refill: 2    2. Hypertension, unspecified type  Assessment & Plan:  Hypertension is stable and controlled.  Continue current treatment regimen.  Weight loss.  Regular aerobic exercise.  Ambulatory blood pressure monitoring.  Blood pressure will be reassessed in 1 month.            Follow Up   Return in about 5 weeks (around 7/3/2025) for Next scheduled follow up.  Patient was given instructions and counseling regarding her condition or for health maintenance advice. Please see specific information pulled into the AVS if appropriate.     Erica Ghosh, SOLITARIO  05/29/2025

## 2025-05-29 NOTE — ASSESSMENT & PLAN NOTE
Patient's (Body mass index is 32.88 kg/m².) indicates that they are obese (BMI >30) with health conditions that include hypertension . Weight is improving with treatment. BMI  is above average; BMI management plan is completed. We discussed low calorie, low carb based diet program, portion control, increasing exercise, management of depression/anxiety/stress to control compensatory eating, pharmacologic options including wellbutrin, and an leigha-based approach such as [employer] Pal or Lose It.     I have instructed the patient to continue with pursuit of medical weight loss as a part of this program. Patient does meet criteria for use of anorectics at this time as BMI >30  and is not at treatment goal.     The current plan for this month includes:   - Continue to work on lifestyle behavioral changes  - Increase hydration- keep urine pale yellow. I am requesting a copy of labwork she had in February.   - Con't current treatment- is at her lowest weight in at least 30 years.   - Treatment goal 180lb.

## 2025-06-12 DIAGNOSIS — Z17.0 MALIGNANT NEOPLASM OF UPPER-OUTER QUADRANT OF RIGHT BREAST IN FEMALE, ESTROGEN RECEPTOR POSITIVE: ICD-10-CM

## 2025-06-12 DIAGNOSIS — C50.411 MALIGNANT NEOPLASM OF UPPER-OUTER QUADRANT OF RIGHT BREAST IN FEMALE, ESTROGEN RECEPTOR POSITIVE: ICD-10-CM

## 2025-06-12 RX ORDER — ANASTROZOLE 1 MG/1
1 TABLET ORAL DAILY
Qty: 90 TABLET | Refills: 3 | Status: SHIPPED | OUTPATIENT
Start: 2025-06-12

## 2025-06-12 RX ORDER — CALCIUM CARBONATE 500(1250)
1000 TABLET ORAL DAILY
Qty: 180 TABLET | Refills: 3 | Status: SHIPPED | OUTPATIENT
Start: 2025-06-12

## 2025-06-22 NOTE — PROGRESS NOTES
"Subjective   Chief Complaint   Patient presents with    Annual Exam     No c/c     Francoise Kamara is a 65 y.o. year old  menopausal female presenting to be seen for her annual exam.  She is doing well and has no GYN complaints.     This past year she has not been on hormone replacement therapy.  She has not had any vaginal bleeding in the last 12 months.  Menopausal symptoms are not present.    Colonoscopy due   Up to date on DEXA   Last pap 2023: NILM, HPV negative   Of note she has a history of right breast cancer, and is s/p lumpectomy, chemo and radiation  She is up to date on mammogram and MRI    She is an SLP!     SEXUAL Hx:  She is not currently sexually active.  In the past year there there has been NO new sexual partners.    Condoms are not needed because she is not sexually active.  She would not like to be screened for STD's at today's exam.  [place] is painful: n/a  HEALTH Hx:  She exercises regularly: yes.  She wears her seat belt: yes.  She has concerns about domestic violence: no.    The following portions of the patient's history were reviewed and updated as appropriate:problem list, current medications, allergies, past family history, past medical history, past social history, and past surgical history.    Social History    Tobacco Use      Smoking status: Never        Passive exposure: Never      Smokeless tobacco: Never         Objective   /80 (BP Location: Right arm, Patient Position: Sitting, Cuff Size: Adult)   Ht 160 cm (63\")   Wt 86.6 kg (191 lb)   BMI 33.83 kg/m²     General:  well developed; well nourished  no acute distress  mentation appropriate   Breasts:  Examined in supine position  Symmetric without masses or skin dimpling  Nipples normal without inversion, lesions or discharge  There are no palpable axillary nodes  Right lumpectomy site well healed with radiation and scar tissue changes    Pelvis: Clinical staff was present for exam  External " genitalia:  normal appearance of the external genitalia including Bartholin's and [place]'s glands.  :  urethral meatus normal; urethra normal:  Vaginal:  severe atrophy with stippling noted throughout the canal   Cervix:  normal appearance.  Uterus:  not palpable.  Adnexa:  non palpable bilaterally.  Rectal:  digital rectal exam not performed; anus visually normal appearing.        Assessment   Annual GYN exam   Vaginal atrophy and dryness   Osteopenia   History of breast cancer   Menopausal female currently not on HRT - without significant symptoms affecting activities of daily living  She is up to date on all relevant gynecologic and colorectal screenings     Plan   Pap was not done today.  I explained to Francoise that the recommendations for Pap smear interval in a low risk patient has lengthened to 5 years time.  I told Francoise she still needs to be seen in our office yearly for a full physical including breast and pelvic exam.   She was encouraged to get yearly mammograms along with yearly breast MRI.  The studies should set up to stagger every 6 months.  She should report any palpable breast lump(s) or skin changes regardless of mammographic findings.  I explained to Francoise that notification regarding her mammogram results will come from the center performing the study.  Our office will not be routinely calling with mammogram results.  It is her responsibility to make sure that the results from the mammogram are communicated to her by the breast center.  If she has any questions about the results, she is welcome to call our office anytime.   Patient understandably would like to avoid estrogen. Sample and ordering information given in clinic for [place] PLUS.   Today I discussed with Francoise the total recommended calcium intake for a post-menopausal female is 1200 mg.  Ideally this should be from dietary sources.  I reviewed calcium content in various foods including milk, fortified orange juice and yogurt.  If  she cannot get sufficient calcium through dietary means, it is recommended to supplement with either a multivitamin or calcium to reach her daily goal.  I also reviewed the difference in the bioavailability of calcium carbonate and calcium citrate containing supplements and the importance of taking calcium carbonate containing products with food. Finally, vitamin D's role in calcium absorption was reviewed and a total daily vitamin D intake of 600 units was recommended.   The importance of keeping all planned follow-up and taking all medications as prescribed was emphasized.  Follow up for annual exam in 1 year or sooner PRN      No orders of the defined types were placed in this encounter.         This note was electronically signed.    Grace Temple MD  June 23, 2025

## 2025-06-23 ENCOUNTER — OFFICE VISIT (OUTPATIENT)
Dept: OBSTETRICS AND GYNECOLOGY | Facility: CLINIC | Age: 65
End: 2025-06-23
Payer: COMMERCIAL

## 2025-06-23 VITALS
BODY MASS INDEX: 33.84 KG/M2 | HEIGHT: 63 IN | WEIGHT: 191 LBS | SYSTOLIC BLOOD PRESSURE: 120 MMHG | DIASTOLIC BLOOD PRESSURE: 80 MMHG

## 2025-06-23 DIAGNOSIS — Z01.419 WOMEN'S ANNUAL ROUTINE GYNECOLOGICAL EXAMINATION: Primary | ICD-10-CM

## 2025-06-23 DIAGNOSIS — N89.8 VAGINAL DRYNESS: ICD-10-CM

## 2025-06-23 DIAGNOSIS — Z85.3 HISTORY OF BREAST CANCER: ICD-10-CM

## 2025-06-23 DIAGNOSIS — N95.2 VAGINAL ATROPHY: ICD-10-CM

## 2025-06-23 PROCEDURE — 99459 PELVIC EXAMINATION: CPT | Performed by: STUDENT IN AN ORGANIZED HEALTH CARE EDUCATION/TRAINING PROGRAM

## 2025-06-23 PROCEDURE — 99396 PREV VISIT EST AGE 40-64: CPT | Performed by: STUDENT IN AN ORGANIZED HEALTH CARE EDUCATION/TRAINING PROGRAM

## 2025-07-08 DIAGNOSIS — E66.811 OBESITY, CLASS I, BMI 30-34.9: ICD-10-CM

## 2025-07-08 RX ORDER — BUPROPION HYDROCHLORIDE 300 MG/1
300 TABLET ORAL DAILY
Qty: 30 TABLET | Refills: 0 | Status: SHIPPED | OUTPATIENT
Start: 2025-07-08

## 2025-07-08 NOTE — TELEPHONE ENCOUNTER
Patient called had to move her appointment from Thursday due to her uncles . She couldn't get back on the schedule until 2025. She has medication to last until about 2025. She was wanting to know if you could send some before she came in, she states if not she understands, but she is doing so well with it and doesn't want to mess it up.     Rx Refill Note  Requested Prescriptions     Pending Prescriptions Disp Refills    buPROPion XL (WELLBUTRIN XL) 300 MG 24 hr tablet 30 tablet 2     Sig: Take 1 tablet by mouth Daily.      Last office visit with prescribing clinician: 2025   Last telemedicine visit with prescribing clinician: Visit date not found   Next office visit with prescribing clinician: 2025                         Would you like a call back once the refill request has been completed: [] Yes [] No    If the office needs to give you a call back, can they leave a voicemail: [] Yes [] No    Beverly Boo CMA  25, 13:18 EDT

## 2025-08-06 ENCOUNTER — OFFICE VISIT (OUTPATIENT)
Dept: BARIATRICS/WEIGHT MGMT | Facility: CLINIC | Age: 65
End: 2025-08-06
Payer: COMMERCIAL

## 2025-08-06 VITALS
BODY MASS INDEX: 32.82 KG/M2 | HEART RATE: 78 BPM | DIASTOLIC BLOOD PRESSURE: 68 MMHG | HEIGHT: 63 IN | SYSTOLIC BLOOD PRESSURE: 106 MMHG | WEIGHT: 185.2 LBS

## 2025-08-06 DIAGNOSIS — E66.811 OBESITY, CLASS I, BMI 30-34.9: Primary | ICD-10-CM

## 2025-08-06 DIAGNOSIS — N28.9 RENAL INSUFFICIENCY: ICD-10-CM

## 2025-08-06 PROCEDURE — 99214 OFFICE O/P EST MOD 30 MIN: CPT | Performed by: NURSE PRACTITIONER

## 2025-08-06 RX ORDER — BUPROPION HYDROCHLORIDE 300 MG/1
300 TABLET ORAL DAILY
Qty: 30 TABLET | Refills: 2 | Status: SHIPPED | OUTPATIENT
Start: 2025-08-06

## 2025-08-06 RX ORDER — ALENDRONATE SODIUM 70 MG/1
70 TABLET ORAL
COMMUNITY
Start: 2025-07-10

## 2025-08-19 LAB — NCCN CRITERIA FLAG: NORMAL

## (undated) DEVICE — [HIGH FLOW INSUFFLATOR,  DO NOT USE IF PACKAGE IS DAMAGED,  KEEP DRY,  KEEP AWAY FROM SUNLIGHT,  PROTECT FROM HEAT AND RADIOACTIVE SOURCES.]: Brand: PNEUMOSURE

## (undated) DEVICE — AIRWY 90MM NO9

## (undated) DEVICE — ENDOPATH XCEL BLADELESS TROCARS WITH STABILITY SLEEVES: Brand: ENDOPATH XCEL

## (undated) DEVICE — ENDOPATH XCEL UNIVERSAL TROCAR STABLILITY SLEEVES: Brand: ENDOPATH XCEL

## (undated) DEVICE — SUT MONOCRYL PLS ANTIB UND 3/0  PS1 27IN

## (undated) DEVICE — TISSUE RETRIEVAL SYSTEM: Brand: INZII RETRIEVAL SYSTEM

## (undated) DEVICE — ECHELON FLEX POWERED PLUS LONG ARTICULATING ENDOSCOPIC LINEAR CUTTER, 60MM: Brand: ECHELON FLEX

## (undated) DEVICE — MEDI-VAC YANKAUER SUCTION HANDLE W/BULBOUS TIP: Brand: CARDINAL HEALTH

## (undated) DEVICE — SYS CLS PORTSITE CT CLOSESURE 5AND10/12

## (undated) DEVICE — APPL HEMOS FOR DELIVERY FLOSEAL

## (undated) DEVICE — MEDI-VAC NON-CONDUCTIVE SUCTION TUBING: Brand: CARDINAL HEALTH

## (undated) DEVICE — GLV SURG SENSICARE W/ALOE PF LF 6.5 STRL

## (undated) DEVICE — SKIN AFFIX SURG ADHESIVE 72/CS 0.55ML: Brand: MEDLINE

## (undated) DEVICE — FLTR PLUMEPORT LAP W/CONN STRL

## (undated) DEVICE — CANNULA,OXY,ADULT,SUPERSOFT,W/7'TUB,UC: Brand: MEDLINE

## (undated) DEVICE — GOWN,NON-REINFORCED,SIRUS,SET IN SLV,XL: Brand: MEDLINE

## (undated) DEVICE — 50" SINGLE PATIENT USE HOVERMATT: Brand: SINGLE PATIENT USE HOVERMATT

## (undated) DEVICE — GLV SURG TRIUMPH ORTHO W/ALOE PF LTX 8.5 STRL

## (undated) DEVICE — TROCAR: Brand: KII FIOS FIRST ENTRY

## (undated) DEVICE — APL DUPLOSPRAYER MIS 40CM

## (undated) DEVICE — PK BARIATRIC 10

## (undated) DEVICE — GLV SURG TRIUMPH ORTHO W/ALOE PF LTX 9 STRL

## (undated) DEVICE — FLOSEAL MATRIX IS INDICATED IN SURGICAL PROCEDURES (OTHER THAN IN OPHTHALMIC) AS AN ADJUNCT TO HEMOSTASIS WHEN CONTROL OF BLEEDING BY LIGATURE OR CONVENTIONALPROCEDURES IS INEFFECTIVE OR IMPRACTICAL.: Brand: FLOSEAL HEMOSTATIC MATRIX

## (undated) DEVICE — DUAL LUMEN STOMACH TUBE,ANTI-REFLUX VALVE: Brand: SALEM SUMP